# Patient Record
Sex: FEMALE | Race: BLACK OR AFRICAN AMERICAN | NOT HISPANIC OR LATINO | Employment: FULL TIME | URBAN - METROPOLITAN AREA
[De-identification: names, ages, dates, MRNs, and addresses within clinical notes are randomized per-mention and may not be internally consistent; named-entity substitution may affect disease eponyms.]

---

## 2017-01-25 ENCOUNTER — CLINICAL SUPPORT (OUTPATIENT)
Dept: OBSTETRICS AND GYNECOLOGY | Facility: CLINIC | Age: 37
End: 2017-01-25
Payer: MEDICAID

## 2017-01-25 DIAGNOSIS — Z30.42 ENCOUNTER FOR DEPO-PROVERA CONTRACEPTION: Primary | ICD-10-CM

## 2017-01-25 PROCEDURE — 96372 THER/PROPH/DIAG INJ SC/IM: CPT | Mod: PBBFAC,PO

## 2017-01-25 RX ADMIN — MEDROXYPROGESTERONE ACETATE 150 MG: 150 INJECTION, SUSPENSION INTRAMUSCULAR at 12:01

## 2017-01-25 NOTE — PROGRESS NOTES
Administered Depo Provera 150 mg/ml   1 ml IM inj in L upper quad gluteus  Patient Tolerated well.  Patient instructed to return on 4-18-17 for next injection.   Patient verbalized understanding.   Lot:[id number]  Exp: 08/2019

## 2017-03-31 ENCOUNTER — TELEPHONE (OUTPATIENT)
Dept: OBSTETRICS AND GYNECOLOGY | Facility: CLINIC | Age: 37
End: 2017-03-31

## 2017-04-03 ENCOUNTER — OFFICE VISIT (OUTPATIENT)
Dept: OBSTETRICS AND GYNECOLOGY | Facility: CLINIC | Age: 37
End: 2017-04-03
Payer: MEDICAID

## 2017-04-03 VITALS — BODY MASS INDEX: 48.86 KG/M2 | WEIGHT: 286.19 LBS | HEIGHT: 64 IN

## 2017-04-03 DIAGNOSIS — Z30.018 ENCOUNTER FOR INITIAL PRESCRIPTION OF OTHER CONTRACEPTIVES: Primary | ICD-10-CM

## 2017-04-03 PROCEDURE — 99999 PR PBB SHADOW E&M-EST. PATIENT-LVL II: CPT | Mod: PBBFAC,,, | Performed by: OBSTETRICS & GYNECOLOGY

## 2017-04-03 PROCEDURE — 99212 OFFICE O/P EST SF 10 MIN: CPT | Mod: S$PBB,,, | Performed by: OBSTETRICS & GYNECOLOGY

## 2017-04-03 PROCEDURE — 99212 OFFICE O/P EST SF 10 MIN: CPT | Mod: PBBFAC,PO | Performed by: OBSTETRICS & GYNECOLOGY

## 2017-04-03 PROCEDURE — 87591 N.GONORRHOEAE DNA AMP PROB: CPT

## 2017-04-03 NOTE — PROGRESS NOTES
Patient presents to discuss contraception options.  She has been taking Depo Provera for contraception but reports that this causes weight gain.  She desires to proceed with IUD.  On vaginal exam, uterus sounds to 10cm.  Patient counseled on IUD options. She desires Mirena IUD. Ordered.  Gc/chl collected.    Patient due for depo provera on April 18, 2017.  Patient instructed that she will likely need to get her next depo provera injection as her IUD will likely not be in the office by this time.    She verbalized her understanding    CARLIE Alvarez MD

## 2017-04-04 LAB
C TRACH DNA SPEC QL NAA+PROBE: NOT DETECTED
N GONORRHOEA DNA SPEC QL NAA+PROBE: NOT DETECTED

## 2017-04-11 ENCOUNTER — TELEPHONE (OUTPATIENT)
Dept: OBSTETRICS AND GYNECOLOGY | Facility: CLINIC | Age: 37
End: 2017-04-11

## 2017-04-19 ENCOUNTER — CLINICAL SUPPORT (OUTPATIENT)
Dept: OBSTETRICS AND GYNECOLOGY | Facility: CLINIC | Age: 37
End: 2017-04-19
Payer: MEDICAID

## 2017-04-19 DIAGNOSIS — Z30.42 ENCOUNTER FOR DEPO-PROVERA CONTRACEPTION: Primary | ICD-10-CM

## 2017-04-19 PROCEDURE — 96372 THER/PROPH/DIAG INJ SC/IM: CPT | Mod: PBBFAC,PO

## 2017-04-19 RX ADMIN — MEDROXYPROGESTERONE ACETATE 150 MG: 150 INJECTION, SUSPENSION INTRAMUSCULAR at 01:04

## 2017-04-19 NOTE — PROGRESS NOTES
Administered Depo Provera 150 mg/ml   1 ml IM inj in R upper quad gluteus  Patient Tolerated well.  Patient instructed to return on 7-11-17 for next injection.   Patient verbalized understanding.   Lot:[id number]  Exp:11/2019

## 2017-04-21 ENCOUNTER — OFFICE VISIT (OUTPATIENT)
Dept: OBSTETRICS AND GYNECOLOGY | Facility: CLINIC | Age: 37
End: 2017-04-21
Payer: MEDICAID

## 2017-04-21 VITALS
WEIGHT: 285.06 LBS | HEIGHT: 64 IN | SYSTOLIC BLOOD PRESSURE: 120 MMHG | DIASTOLIC BLOOD PRESSURE: 80 MMHG | BODY MASS INDEX: 48.67 KG/M2

## 2017-04-21 DIAGNOSIS — Z30.014 ENCOUNTER FOR INITIAL PRESCRIPTION OF INTRAUTERINE CONTRACEPTIVE DEVICE (IUD): Primary | ICD-10-CM

## 2017-04-21 PROCEDURE — 81025 URINE PREGNANCY TEST: CPT | Mod: PBBFAC,PO | Performed by: OBSTETRICS & GYNECOLOGY

## 2017-04-21 PROCEDURE — 99999 PR PBB SHADOW E&M-EST. PATIENT-LVL II: CPT | Mod: PBBFAC,,, | Performed by: OBSTETRICS & GYNECOLOGY

## 2017-04-21 PROCEDURE — 99212 OFFICE O/P EST SF 10 MIN: CPT | Mod: PBBFAC,PO | Performed by: OBSTETRICS & GYNECOLOGY

## 2017-04-21 PROCEDURE — 58300 INSERT INTRAUTERINE DEVICE: CPT | Mod: S$PBB,,, | Performed by: OBSTETRICS & GYNECOLOGY

## 2017-04-21 PROCEDURE — 58300 INSERT INTRAUTERINE DEVICE: CPT | Mod: PBBFAC,PO | Performed by: OBSTETRICS & GYNECOLOGY

## 2017-04-21 PROCEDURE — 99499 UNLISTED E&M SERVICE: CPT | Mod: S$PBB,,, | Performed by: OBSTETRICS & GYNECOLOGY

## 2017-04-21 RX ORDER — LEVONORGESTREL 52 MG/1
INTRAUTERINE DEVICE INTRAUTERINE
Refills: 0 | COMMUNITY
Start: 2017-04-05

## 2017-04-21 NOTE — PROGRESS NOTES
MIRENA INSERTION:  Date:2017  Time:9:16 AM  Name of the procedure: Mirena Insertion  Indications: Mohsen Julien is a 36 y.o. female  who presents today for insertion of Mirena.  No LMP recorded. Patient has had an injection..      PRE-Mirena INSERTION COUNSELING:  All contraceptive options were reviewed and the patient chooses Mirena.  Patients history was reviewed and there were no contraindications to Mirena.  The procedure and minimal risks of infection, bleeding, and uterine perforation at at  insertion have been discussed. Possible irregular menstrual bleeding pattern versus amenorrhea was explained. No protection against STDs discussed.    Consents: Risks/benefits of the procedure were discussed with the patient. Patient's questions were answered.  Consents signed.      Labs:    Office urine pregnancy test negative;   Gonorrhea/Chlamydia on 4/3/17 neg/neg    Procedure:   TIME OUT PERFORMED.  Speculum inserted and cervix visualized.  Cervix swabbed with betadine x 2; Single tooth tenaculum placed on anterior cervix. Endometrial pipelle inserted and uterus sounded to 9.5 cm. Mirena IUD then inserted using standard technique. IUD string cut about 4cm in length.  Tenaculum removed and hemostasis acheived. All instruments removed from the vagina.  Patient tolerated the procedure well.    Complications:    EBL: min    Disposition: Pt tolerated the procedure well.    LOT number: [id number]  Exp date:     A/P:   -s/p successful insertion of Mirena IUD  -Motrin prn pain  -instructed to report nausea/vomiting/purulent discharge to MD  -instructed to use condoms for at least 1 week after insertion to avoid undesired pregnancy  -f/u 6-8 wks for IUD check    MANSI Alvarez MD

## 2017-05-04 DIAGNOSIS — R94.5 ABNORMAL LIVER FUNCTION: Primary | ICD-10-CM

## 2017-06-06 ENCOUNTER — HOSPITAL ENCOUNTER (OUTPATIENT)
Dept: RADIOLOGY | Facility: HOSPITAL | Age: 37
Discharge: HOME | End: 2017-06-06
Attending: INTERNAL MEDICINE
Payer: MEDICAID

## 2017-06-06 DIAGNOSIS — R94.5 ABNORMAL LIVER FUNCTION: ICD-10-CM

## 2017-06-07 ENCOUNTER — HOSPITAL ENCOUNTER (OUTPATIENT)
Dept: RADIOLOGY | Facility: HOSPITAL | Age: 37
Discharge: HOME | End: 2017-06-07
Attending: INTERNAL MEDICINE
Payer: MEDICAID

## 2017-06-07 PROCEDURE — 76700 US EXAM ABDOM COMPLETE: CPT | Mod: 26,,, | Performed by: RADIOLOGY

## 2017-06-07 PROCEDURE — 76700 US EXAM ABDOM COMPLETE: CPT | Mod: TC

## 2018-08-03 ENCOUNTER — OFFICE VISIT (OUTPATIENT)
Dept: OBSTETRICS AND GYNECOLOGY | Facility: CLINIC | Age: 38
End: 2018-08-03
Payer: MEDICAID

## 2018-08-03 VITALS
WEIGHT: 288.56 LBS | DIASTOLIC BLOOD PRESSURE: 70 MMHG | SYSTOLIC BLOOD PRESSURE: 130 MMHG | BODY MASS INDEX: 49.26 KG/M2 | HEIGHT: 64 IN

## 2018-08-03 DIAGNOSIS — Z12.4 CERVICAL CANCER SCREENING: ICD-10-CM

## 2018-08-03 DIAGNOSIS — Z01.419 ENCOUNTER FOR GYNECOLOGICAL EXAMINATION WITHOUT ABNORMAL FINDING: Primary | ICD-10-CM

## 2018-08-03 DIAGNOSIS — Z12.39 SCREENING BREAST EXAMINATION: ICD-10-CM

## 2018-08-03 DIAGNOSIS — B37.31 VAGINAL YEAST INFECTION: ICD-10-CM

## 2018-08-03 PROCEDURE — 87491 CHLMYD TRACH DNA AMP PROBE: CPT

## 2018-08-03 PROCEDURE — 88142 CYTOPATH C/V THIN LAYER: CPT

## 2018-08-03 PROCEDURE — 87624 HPV HI-RISK TYP POOLED RSLT: CPT

## 2018-08-03 PROCEDURE — 99215 OFFICE O/P EST HI 40 MIN: CPT | Mod: PBBFAC,PO | Performed by: OBSTETRICS & GYNECOLOGY

## 2018-08-03 PROCEDURE — 99395 PREV VISIT EST AGE 18-39: CPT | Mod: S$PBB,,, | Performed by: OBSTETRICS & GYNECOLOGY

## 2018-08-03 PROCEDURE — 99999 PR PBB SHADOW E&M-EST. PATIENT-LVL V: CPT | Mod: PBBFAC,,, | Performed by: OBSTETRICS & GYNECOLOGY

## 2018-08-03 RX ORDER — FLUCONAZOLE 150 MG/1
150 TABLET ORAL
Qty: 2 TABLET | Refills: 3 | Status: SHIPPED | OUTPATIENT
Start: 2018-08-03 | End: 2019-06-19 | Stop reason: SDUPTHER

## 2018-08-03 NOTE — PROGRESS NOTES
"38 yo female who presents for routine gyn visit.  The patient reports pain with her breasts around the time she would have a menstrual cycle.  No cycles with Mirena IUD in place since 4/2017l.  The patient has no complaints today.  Desires STD testing.    ROS:  GENERAL: Denies weight gain or weight loss. Feeling well overall.   SKIN: Denies rash or lesions.   HEAD: Denies head injury or headache.   CHEST: Denies chest pain or shortness of breath.   CARDIOVASCULAR: Denies palpitations or left sided chest pain.   ABDOMEN: No abdominal pain, constipation, diarrhea, nausea, vomiting or rectal bleeding.   URINARY: No frequency, dysuria, hematuria, or burning on urination.  REPRODUCTIVE: See HPI.   BREASTS:  denies pain, lumps, or nipple discharge.   HEMATOLOGIC: No easy bruisability or excessive bleeding.   MUSCULOSKELETAL: Denies joint pain or swelling.   NEUROLOGIC: Denies syncope or weakness.   PSYCHIATRIC: Denies depression, anxiety or mood swings.         PE:   Vitals: /70   Ht 5' 4" (1.626 m)   Wt 130.9 kg (288 lb 9.3 oz)   BMI 49.53 kg/m²   APPEARANCE: Well nourished, well developed, in no acute distress.  SKIN: Normal skin turgor, no lesions.  CHEST: Lungs clear to auscultation.  HEART: Regular rate and rhythm, no murmurs, rubs or gallops.  ABDOMEN: Soft. No tenderness or masses. No hepatosplenomegaly. No hernias. obese  BREASTS: Symmetrical, no skin changes or visible lesions. No palpable masses, nipple discharge or adenopathy bilaterally.  PELVIC: Normal external female genitalia without lesions. Normal hair distribution. Adequate perineal body, normal urethral meatus. Vagina moist and well rugated without lesions, cottage cheese appearing discharge. Cervix pink and without lesions. No significant cystocele or rectocele. Bimanual exam showed uterus normal size, shape, position, mobile and nontender. Adnexa without masses or tenderness. Urethra and bladder normal. IUD string visualized.    AP  Routine " gyn  -s/p normal breast exam:   -s/p normal pelvic exam:   -Pap and HPV: collected  -STD testing: gc/chl and HIV  -contraception: Mirena IUD in place since 2017  -rx for diflcuan provided    CARLIE Alvarez MD

## 2018-08-05 LAB
C TRACH DNA SPEC QL NAA+PROBE: NOT DETECTED
N GONORRHOEA DNA SPEC QL NAA+PROBE: NOT DETECTED

## 2018-08-09 LAB
HPV HR 12 DNA CVX QL NAA+PROBE: NEGATIVE
HPV16 AG SPEC QL: NEGATIVE
HPV18 DNA SPEC QL NAA+PROBE: NEGATIVE

## 2018-09-04 RX ORDER — TRIAMTERENE/HYDROCHLOROTHIAZID 37.5-25 MG
TABLET ORAL
Qty: 30 TABLET | Refills: 3 | Status: CANCELLED | OUTPATIENT
Start: 2018-09-04 | End: 2019-09-04

## 2018-09-11 RX ORDER — TRIAMTERENE/HYDROCHLOROTHIAZID 37.5-25 MG
TABLET ORAL
Qty: 30 TABLET | Refills: 3 | Status: CANCELLED | OUTPATIENT
Start: 2018-09-04 | End: 2019-09-04

## 2018-10-12 RX ORDER — METHOCARBAMOL 750 MG/1
TABLET, FILM COATED ORAL
Qty: 270 TABLET | Refills: 3 | Status: CANCELLED | OUTPATIENT
Start: 2018-10-12 | End: 2019-10-12

## 2018-11-14 RX ORDER — AMITRIPTYLINE HYDROCHLORIDE 25 MG/1
TABLET, FILM COATED ORAL
Qty: 180 TABLET | Refills: 1 | Status: CANCELLED | OUTPATIENT
Start: 2018-11-14 | End: 2019-11-14

## 2019-01-12 ENCOUNTER — HOSPITAL ENCOUNTER (OUTPATIENT)
Dept: RADIOLOGY | Facility: HOSPITAL | Age: 39
Discharge: HOME | End: 2019-01-12
Attending: OBSTETRICS & GYNECOLOGY
Payer: MEDICAID

## 2019-01-12 VITALS — WEIGHT: 288 LBS | HEIGHT: 64 IN | BODY MASS INDEX: 49.17 KG/M2

## 2019-01-12 DIAGNOSIS — Z12.39 SCREENING BREAST EXAMINATION: ICD-10-CM

## 2019-01-12 PROCEDURE — 77067 SCR MAMMO BI INCL CAD: CPT | Mod: TC

## 2019-01-12 PROCEDURE — 77067 MAMMO DIGITAL SCREENING BILAT WITH TOMOSYNTHESIS_CAD: ICD-10-PCS | Mod: 26,,, | Performed by: RADIOLOGY

## 2019-01-12 PROCEDURE — 77063 BREAST TOMOSYNTHESIS BI: CPT | Mod: 26,,, | Performed by: RADIOLOGY

## 2019-01-12 PROCEDURE — 77063 MAMMO DIGITAL SCREENING BILAT WITH TOMOSYNTHESIS_CAD: ICD-10-PCS | Mod: 26,,, | Performed by: RADIOLOGY

## 2019-01-12 PROCEDURE — 77067 SCR MAMMO BI INCL CAD: CPT | Mod: 26,,, | Performed by: RADIOLOGY

## 2019-01-17 RX ORDER — TRIAMTERENE/HYDROCHLOROTHIAZID 37.5-25 MG
TABLET ORAL
Qty: 30 TABLET | Refills: 3 | Status: CANCELLED | OUTPATIENT
Start: 2019-01-17 | End: 2020-01-17

## 2019-01-27 ENCOUNTER — OFFICE VISIT (OUTPATIENT)
Dept: URGENT CARE | Facility: CLINIC | Age: 39
End: 2019-01-27
Payer: MEDICAID

## 2019-01-27 VITALS
DIASTOLIC BLOOD PRESSURE: 95 MMHG | WEIGHT: 285 LBS | SYSTOLIC BLOOD PRESSURE: 156 MMHG | HEART RATE: 78 BPM | OXYGEN SATURATION: 98 % | BODY MASS INDEX: 48.92 KG/M2 | RESPIRATION RATE: 18 BRPM | TEMPERATURE: 98 F

## 2019-01-27 DIAGNOSIS — S39.012A LUMBAR STRAIN, INITIAL ENCOUNTER: Primary | ICD-10-CM

## 2019-01-27 PROCEDURE — 72100 X-RAY EXAM L-S SPINE 2/3 VWS: CPT | Mod: FY,S$GLB,, | Performed by: RADIOLOGY

## 2019-01-27 PROCEDURE — 99203 PR OFFICE/OUTPT VISIT, NEW, LEVL III, 30-44 MIN: ICD-10-PCS | Mod: S$GLB,,, | Performed by: PHYSICIAN ASSISTANT

## 2019-01-27 PROCEDURE — 72100 XR LUMBAR SPINE AP AND LATERAL: ICD-10-PCS | Mod: FY,S$GLB,, | Performed by: RADIOLOGY

## 2019-01-27 PROCEDURE — 99203 OFFICE O/P NEW LOW 30 MIN: CPT | Mod: S$GLB,,, | Performed by: PHYSICIAN ASSISTANT

## 2019-01-27 RX ORDER — KETOROLAC TROMETHAMINE 30 MG/ML
30 INJECTION, SOLUTION INTRAMUSCULAR; INTRAVENOUS
Status: COMPLETED | OUTPATIENT
Start: 2019-01-27 | End: 2019-01-27

## 2019-01-27 RX ORDER — IBUPROFEN 800 MG/1
800 TABLET ORAL 3 TIMES DAILY
Qty: 40 TABLET | Refills: 0 | Status: ON HOLD | OUTPATIENT
Start: 2019-01-27 | End: 2020-10-05 | Stop reason: HOSPADM

## 2019-01-27 RX ORDER — TIZANIDINE 2 MG/1
2 TABLET ORAL EVERY 12 HOURS PRN
Qty: 30 TABLET | Refills: 0 | Status: SHIPPED | OUTPATIENT
Start: 2019-01-27 | End: 2019-03-08

## 2019-01-27 RX ADMIN — KETOROLAC TROMETHAMINE 30 MG: 30 INJECTION, SOLUTION INTRAMUSCULAR; INTRAVENOUS at 04:01

## 2019-01-27 NOTE — LETTER
January 27, 2019      [facility] [place] [address]  Phone: [phone]  Fax: [phone]       Patient: Mohsen Julien   YOB: 1980  Date of Visit: 01/27/2019    To Whom It May Concern:    Nancy Julien  was at [facility] on 01/27/2019. She may return to work/school on 1/29/19 with no restrictions. If you have any questions or concerns, or if I can be of further assistance, please do not hesitate to contact me.    Sincerely,    Marylu White PA-C

## 2019-01-27 NOTE — PROGRESS NOTES
Subjective:       Patient ID: Mohsen Julien is a 38 y.o. female.    Vitals:  weight is 129.3 kg (285 lb). Her tympanic temperature is 97.7 °F (36.5 °C). Her blood pressure is 156/95 (abnormal) and her pulse is 78. Her respiration is 18 and oxygen saturation is 98%.     Chief Complaint: Back Pain (2 days)    Patient was sitting in a chair when she got up to stand and felt a pull in her right low back.  Since she has spasming in her right low back.  She denies any radicular symptoms.  She denies any numbness, tingling, or weakness of bilateral lower extremities.  She denies any bowel or bladder incontinence.  She denies any fever or malaise.      Back Pain   This is a new problem. The current episode started in the past 7 days. The problem occurs constantly. The problem is unchanged. The quality of the pain is described as aching. The pain does not radiate. The pain is at a severity of 8/10. The pain is moderate. The symptoms are aggravated by bending, coughing, lying down, position, standing, sitting and twisting. Pertinent negatives include no abdominal pain, bladder incontinence, bowel incontinence, chest pain, dysuria, fever, headaches, leg pain, numbness, paresis, paresthesias, pelvic pain, perianal numbness, tingling, weakness or weight loss. She has tried heat, muscle relaxant and NSAIDs for the symptoms. The treatment provided moderate relief.       Constitution: Negative for fatigue and fever.   Cardiovascular: Negative for chest pain.   Gastrointestinal: Negative for abdominal pain and bowel incontinence.   Genitourinary: Negative for dysuria, urgency, bladder incontinence, hematuria and pelvic pain.   Musculoskeletal: Positive for back pain. Negative for muscle cramps and history of spine disorder.   Skin: Negative for rash.   Neurological: Negative for coordination disturbances, headaches, numbness and tingling.       Objective:      Physical Exam   Constitutional: She is oriented to person, place,  and time. Vital signs are normal. She appears well-developed and well-nourished. She is active and cooperative.  Non-toxic appearance. She does not appear ill. No distress.   HENT:   Head: Normocephalic and atraumatic. Head is without abrasion, without contusion and without laceration.   Right Ear: Hearing, tympanic membrane, external ear and ear canal normal. No hemotympanum.   Left Ear: Hearing, tympanic membrane, external ear and ear canal normal. No hemotympanum.   Nose: Nose normal. No mucosal edema, rhinorrhea or nasal deformity. No epistaxis. Right sinus exhibits no maxillary sinus tenderness and no frontal sinus tenderness. Left sinus exhibits no maxillary sinus tenderness and no frontal sinus tenderness.   Mouth/Throat: Uvula is midline, oropharynx is clear and moist and mucous membranes are normal. No trismus in the jaw. Normal dentition. No uvula swelling. No posterior oropharyngeal erythema.   Eyes: Conjunctivae, EOM and lids are normal. Pupils are equal, round, and reactive to light. Right eye exhibits no discharge. Left eye exhibits no discharge. No scleral icterus.   Sclera clear bilat   Neck: Trachea normal, normal range of motion, full passive range of motion without pain and phonation normal. Neck supple. No spinous process tenderness and no muscular tenderness present. No neck rigidity. No tracheal deviation present.   Cardiovascular: Normal rate, regular rhythm, normal heart sounds, intact distal pulses and normal pulses.   Pulmonary/Chest: Effort normal and breath sounds normal. No respiratory distress.   Abdominal: Soft. Normal appearance and bowel sounds are normal. She exhibits no distension, no abdominal bruit, no pulsatile midline mass and no mass. There is no tenderness.   Musculoskeletal: Normal range of motion. She exhibits no edema or deformity.        Thoracic back: She exhibits normal range of motion, no tenderness, no bony tenderness, no swelling, no edema, no deformity, no  laceration, no pain, no spasm and normal pulse.        Lumbar back: She exhibits tenderness, pain and spasm. She exhibits normal range of motion, no bony tenderness, no swelling, no edema, no deformity, no laceration and normal pulse.        Back:    TTP WITH SPASM NOTED RIGHT LUMBAR PARASPINALS  NEG ST LEG RAISE  FULL ROM B LE WITH 5/5 STRENGTH  2+DTR PATELLA AND ACHILLES  NVIT DISTALLY WITH SILT AND 2+BCR  ABLE TO AMBULATE WITH SMOOTH RHYTHMIC GAIT     Neurological: She is alert and oriented to person, place, and time. She has normal strength and normal reflexes. No cranial nerve deficit or sensory deficit. She exhibits normal muscle tone. She displays no seizure activity. Coordination normal. GCS eye subscore is 4. GCS verbal subscore is 5. GCS motor subscore is 6.   Skin: Skin is warm, dry and intact. Capillary refill takes less than 2 seconds. No abrasion, no bruising, no burn, no ecchymosis and no laceration noted. She is not diaphoretic. No pallor.   Psychiatric: She has a normal mood and affect. Her speech is normal and behavior is normal. Judgment and thought content normal. Cognition and memory are normal.   Nursing note and vitals reviewed.        XRAY: No acute fractures or dislocations noted  '  Assessment:       1. Lumbar strain, initial encounter        Plan:         Lumbar strain, initial encounter  -     X-Ray Lumbar Spine AP And Lateral; Future; Expected date: 01/27/2019  -     ketorolac injection 30 mg  -     tiZANidine (ZANAFLEX) 2 MG tablet; Take 1 tablet (2 mg total) by mouth every 12 (twelve) hours as needed (muscle spasms).  Dispense: 30 tablet; Refill: 0  -     ibuprofen (ADVIL,MOTRIN) 800 MG tablet; Take 1 tablet (800 mg total) by mouth 3 (three) times daily.  Dispense: 40 tablet; Refill: 0          Understanding Lumbosacral Strain    Lumbosacral strain is a medical term for an injury that causes low back pain. The lumbosacral area (low back) is between the bottom of the ribcage and the top  of the buttocks. A strain is tearing of muscles and tendons. These tears can be very small but still cause pain.  How a lumbosacral strain happens  Muscles and tendons connected to the spine can be strained in a number of ways:  · Sitting or standing in the same position for long periods of time. This can harm the low back over time. Poor posture can make low back pain more likely.  · Moving the muscles and tendons past their usual range of motion. This can cause a sudden injury. This can happen when you twist, bend over, or lift something heavy. Not using correct technique for sports or tasks like lifting can make back injury more likely.  · Accidents or falls  Lumbosacral strain can be caused by other problems, but these are less common.  Symptoms of lumbosacral strain  Symptoms may include:  · Pain in the back, often on one side  · Pain that gets worse with movement and gets better with rest  · Inability to move as freely as usual  · Swelling, slight redness, and skin warmth in the painful area  Treatment for lumbosacral strain  Low back pain often goes away by itself within several weeks. But it often comes back. Treatment focuses on reducing pain and avoiding further injury. Bed rest is usually not recommended for low back pain. Treatments may include:  · Avoiding or changing the action that caused the problem. This helps prevent injuring the tissues again.  · Prescription or over-the-counter pain medicines. These help reduce inflammation, swelling, and pain.  · Cold or heat packs. These help reduce pain and swelling.  · Stretching and other exercises. These improve flexibility and strength.  · Physical therapy. This usually includes exercises and other treatments.  · Injections of medicine. This may relieve symptoms.  If these treatments do not relieve symptoms, your healthcare provider may order imaging tests to learn more about the problem. Sometimes you may need surgery.  Possible complications of  lumbosacral strain  If the cause of the pain is not addressed, symptoms may return or get worse. Follow your healthcare providers instructions on lifestyle changes and treating your back.     When to call your healthcare provider  Call your healthcare provider right away if you have any of these:  · Fever of 100.4°F (38°C) or higher, or as directed  · Numbness, tingling, or weakness  · Problems with bowel or bladder control, or problems having sex  · Pain that does not go away, or gets worse  · New symptoms   Date Last Reviewed: 3/10/2016  © 7113-7163 [employer]. [address] [place] [id number]. All rights reserved. This information is not intended as a substitute for professional medical care. Always follow your healthcare professional's instructions.      Please follow up with your Primary care provider within 2-5 days if your signs and symptoms have not resolved or worsen.     If your condition worsens or fails to improve we recommend that you receive another evaluation at the emergency room immediately or contact your primary medical clinic to discuss your concerns.   You must understand that you have received an Urgent Care treatment only and that you may be released before all of your medical problems are known or treated. You, the patient, will arrange for follow up care as instructed.     RED FLAGS/WARNING SYMPTOMS DISCUSSED WITH PATIENT THAT WOULD WARRANT EMERGENT MEDICAL ATTENTION. PATIENT VERBALIZED UNDERSTANDING.     Elevated Blood Pressure  Your blood pressure was elevated during your visit to the urgent care.  It was not so high that immediate care was needed but it is recommended that you monitor your blood pressure over the next week or two to make sure that it is not staying elevated.  Please have your blood pressure taken 2-3 times daily at different times of the day.  Write all of those blood pressures down and record the time that they were taken.  Keep all that  information and take it with you to see your Primary Care Physician.  If your blood pressure is consistently above 140/90 you will need to follow up with your PCP more quickly

## 2019-01-27 NOTE — PATIENT INSTRUCTIONS
Understanding Lumbosacral Strain    Lumbosacral strain is a medical term for an injury that causes low back pain. The lumbosacral area (low back) is between the bottom of the ribcage and the top of the buttocks. A strain is tearing of muscles and tendons. These tears can be very small but still cause pain.  How a lumbosacral strain happens  Muscles and tendons connected to the spine can be strained in a number of ways:  · Sitting or standing in the same position for long periods of time. This can harm the low back over time. Poor posture can make low back pain more likely.  · Moving the muscles and tendons past their usual range of motion. This can cause a sudden injury. This can happen when you twist, bend over, or lift something heavy. Not using correct technique for sports or tasks like lifting can make back injury more likely.  · Accidents or falls  Lumbosacral strain can be caused by other problems, but these are less common.  Symptoms of lumbosacral strain  Symptoms may include:  · Pain in the back, often on one side  · Pain that gets worse with movement and gets better with rest  · Inability to move as freely as usual  · Swelling, slight redness, and skin warmth in the painful area  Treatment for lumbosacral strain  Low back pain often goes away by itself within several weeks. But it often comes back. Treatment focuses on reducing pain and avoiding further injury. Bed rest is usually not recommended for low back pain. Treatments may include:  · Avoiding or changing the action that caused the problem. This helps prevent injuring the tissues again.  · Prescription or over-the-counter pain medicines. These help reduce inflammation, swelling, and pain.  · Cold or heat packs. These help reduce pain and swelling.  · Stretching and other exercises. These improve flexibility and strength.  · Physical therapy. This usually includes exercises and other treatments.  · Injections of medicine. This may relieve  symptoms.  If these treatments do not relieve symptoms, your healthcare provider may order imaging tests to learn more about the problem. Sometimes you may need surgery.  Possible complications of lumbosacral strain  If the cause of the pain is not addressed, symptoms may return or get worse. Follow your healthcare providers instructions on lifestyle changes and treating your back.     When to call your healthcare provider  Call your healthcare provider right away if you have any of these:  · Fever of 100.4°F (38°C) or higher, or as directed  · Numbness, tingling, or weakness  · Problems with bowel or bladder control, or problems having sex  · Pain that does not go away, or gets worse  · New symptoms   Date Last Reviewed: 3/10/2016  © 9422-1596 [employer]. [address] [place] [address], [place] [address]. All rights reserved. This information is not intended as a substitute for professional medical care. Always follow your healthcare professional's instructions.      Please follow up with your Primary care provider within 2-5 days if your signs and symptoms have not resolved or worsen.     If your condition worsens or fails to improve we recommend that you receive another evaluation at the emergency room immediately or contact your primary medical clinic to discuss your concerns.   You must understand that you have received an Urgent Care treatment only and that you may be released before all of your medical problems are known or treated. You, the patient, will arrange for follow up care as instructed.     RED FLAGS/WARNING SYMPTOMS DISCUSSED WITH PATIENT THAT WOULD WARRANT EMERGENT MEDICAL ATTENTION. PATIENT VERBALIZED UNDERSTANDING.     Elevated Blood Pressure  Your blood pressure was elevated during your visit to the urgent care.  It was not so high that immediate care was needed but it is recommended that you monitor your blood pressure over the next week or two to make sure that it is not staying  elevated.  Please have your blood pressure taken 2-3 times daily at different times of the day.  Write all of those blood pressures down and record the time that they were taken.  Keep all that information and take it with you to see your Primary Care Physician.  If your blood pressure is consistently above 140/90 you will need to follow up with your PCP more quickly

## 2019-02-11 RX ORDER — FENOFIBRATE 160 MG/1
160 TABLET ORAL DAILY
Qty: 90 TABLET | Refills: 3 | Status: CANCELLED | OUTPATIENT
Start: 2019-02-11 | End: 2020-02-11

## 2019-02-13 ENCOUNTER — HOSPITAL ENCOUNTER (OUTPATIENT)
Dept: RADIOLOGY | Facility: HOSPITAL | Age: 39
Discharge: HOME | End: 2019-02-13
Attending: INTERNAL MEDICINE
Payer: MEDICAID

## 2019-02-13 DIAGNOSIS — M79.605 LEFT LEG PAIN: ICD-10-CM

## 2019-02-13 DIAGNOSIS — R60.0 LOCALIZED EDEMA: ICD-10-CM

## 2019-02-13 DIAGNOSIS — M79.605 LEFT LEG PAIN: Primary | ICD-10-CM

## 2019-02-13 DIAGNOSIS — R60.0 LOCALIZED EDEMA: Primary | ICD-10-CM

## 2019-02-13 PROCEDURE — 71046 X-RAY EXAM CHEST 2 VIEWS: CPT | Mod: TC,FY

## 2019-02-13 PROCEDURE — 93970 US LOWER EXTREMITY VEINS BILATERAL: ICD-10-PCS | Mod: 26,,, | Performed by: RADIOLOGY

## 2019-02-13 PROCEDURE — 93970 EXTREMITY STUDY: CPT | Mod: 26,,, | Performed by: RADIOLOGY

## 2019-02-13 PROCEDURE — 93970 EXTREMITY STUDY: CPT | Mod: TC

## 2019-02-13 PROCEDURE — 71046 XR CHEST PA AND LATERAL: ICD-10-PCS | Mod: 26,,, | Performed by: RADIOLOGY

## 2019-02-13 PROCEDURE — 71046 X-RAY EXAM CHEST 2 VIEWS: CPT | Mod: 26,,, | Performed by: RADIOLOGY

## 2019-02-19 RX ORDER — SIMVASTATIN 20 MG/1
20 TABLET, FILM COATED ORAL NIGHTLY
Qty: 90 TABLET | Refills: 3 | OUTPATIENT
Start: 2019-02-19 | End: 2020-10-19

## 2019-02-19 RX ORDER — FENOFIBRATE 160 MG/1
160 TABLET ORAL DAILY
Qty: 90 TABLET | Refills: 3 | OUTPATIENT
Start: 2019-02-19 | End: 2022-02-10 | Stop reason: SDUPTHER

## 2019-02-20 RX ORDER — METHOCARBAMOL 750 MG/1
750 TABLET, FILM COATED ORAL 3 TIMES DAILY PRN
Qty: 270 TABLET | Refills: 1 | Status: CANCELLED | OUTPATIENT
Start: 2019-02-20 | End: 2020-02-20

## 2019-03-04 RX ORDER — METHOCARBAMOL 750 MG/1
750 TABLET, FILM COATED ORAL 3 TIMES DAILY PRN
Qty: 270 TABLET | Refills: 1 | Status: CANCELLED | OUTPATIENT
Start: 2019-02-20 | End: 2020-02-20

## 2019-03-18 RX ORDER — TRIAMTERENE/HYDROCHLOROTHIAZID 37.5-25 MG
TABLET ORAL
Qty: 30 TABLET | Refills: 3 | Status: CANCELLED | OUTPATIENT
Start: 2019-03-18 | End: 2020-03-17

## 2019-03-21 RX ORDER — TRIAMTERENE/HYDROCHLOROTHIAZID 37.5-25 MG
TABLET ORAL
Qty: 30 TABLET | Refills: 3 | Status: CANCELLED | OUTPATIENT
Start: 2019-03-21 | End: 2020-03-20

## 2019-03-28 RX ORDER — TRIAMTERENE/HYDROCHLOROTHIAZID 37.5-25 MG
TABLET ORAL
Qty: 30 TABLET | Refills: 3 | Status: CANCELLED | OUTPATIENT
Start: 2019-03-28 | End: 2020-03-27

## 2019-04-02 RX ORDER — TRIAMTERENE/HYDROCHLOROTHIAZID 37.5-25 MG
TABLET ORAL
Qty: 30 TABLET | Refills: 3 | Status: CANCELLED | OUTPATIENT
Start: 2019-04-02 | End: 2020-04-01

## 2019-04-05 RX ORDER — TRIAMTERENE/HYDROCHLOROTHIAZID 37.5-25 MG
TABLET ORAL
Qty: 30 TABLET | Refills: 3 | Status: CANCELLED | OUTPATIENT
Start: 2019-04-05 | End: 2020-04-04

## 2019-04-06 RX ORDER — ERGOCALCIFEROL 1.25 MG/1
50000 CAPSULE ORAL WEEKLY
Qty: 4 CAPSULE | Refills: 11 | Status: CANCELLED | OUTPATIENT
Start: 2019-04-06 | End: 2020-04-05

## 2019-04-12 RX ORDER — POTASSIUM CHLORIDE 750 MG/1
10 TABLET, EXTENDED RELEASE ORAL DAILY
Qty: 30 TABLET | Refills: 11 | Status: CANCELLED | OUTPATIENT
Start: 2019-04-12 | End: 2020-04-11

## 2019-04-13 RX ORDER — TRIAMTERENE/HYDROCHLOROTHIAZID 37.5-25 MG
TABLET ORAL
Qty: 30 TABLET | Refills: 3 | Status: CANCELLED | OUTPATIENT
Start: 2019-04-13 | End: 2020-04-12

## 2019-04-17 RX ORDER — POTASSIUM CHLORIDE 750 MG/1
10 TABLET, EXTENDED RELEASE ORAL DAILY
Qty: 30 TABLET | Refills: 11 | Status: CANCELLED | OUTPATIENT
Start: 2019-04-17 | End: 2020-04-16

## 2019-04-17 RX ORDER — TRIAMTERENE/HYDROCHLOROTHIAZID 37.5-25 MG
TABLET ORAL
Qty: 30 TABLET | Refills: 3 | Status: CANCELLED | OUTPATIENT
Start: 2019-04-17 | End: 2020-04-16

## 2019-04-17 RX ORDER — ERGOCALCIFEROL 1.25 MG/1
50000 CAPSULE ORAL WEEKLY
Qty: 4 CAPSULE | Refills: 11 | Status: CANCELLED | OUTPATIENT
Start: 2019-04-17 | End: 2020-04-16

## 2019-04-18 RX ORDER — ERGOCALCIFEROL 1.25 MG/1
50000 CAPSULE ORAL WEEKLY
Qty: 4 CAPSULE | Refills: 11 | Status: CANCELLED | OUTPATIENT
Start: 2019-04-18 | End: 2020-04-17

## 2019-05-24 ENCOUNTER — HOSPITAL ENCOUNTER (OUTPATIENT)
Dept: WOUND CARE | Facility: HOSPITAL | Age: 39
Discharge: HOME | End: 2019-05-24
Attending: SURGERY
Payer: MEDICAID

## 2019-05-24 VITALS
HEIGHT: 64 IN | TEMPERATURE: 98 F | HEART RATE: 87 BPM | WEIGHT: 285 LBS | SYSTOLIC BLOOD PRESSURE: 126 MMHG | BODY MASS INDEX: 48.65 KG/M2 | DIASTOLIC BLOOD PRESSURE: 65 MMHG

## 2019-05-24 DIAGNOSIS — I87.2 VENOUS STASIS DERMATITIS OF LEFT LOWER EXTREMITY: Primary | ICD-10-CM

## 2019-05-24 PROCEDURE — 99203 OFFICE O/P NEW LOW 30 MIN: CPT | Mod: ,,, | Performed by: SURGERY

## 2019-05-24 PROCEDURE — 99212 OFFICE O/P EST SF 10 MIN: CPT

## 2019-05-24 PROCEDURE — 99203 PR OFFICE/OUTPT VISIT, NEW, LEVL III, 30-44 MIN: ICD-10-PCS | Mod: ,,, | Performed by: SURGERY

## 2019-05-24 NOTE — PROGRESS NOTES
[facility]                Progress Note    Subjective:       Patient ID: Mohsen Julien is a 38 y.o. female.    Chief Complaint: Non-healing Wound    5/24/2019: Mrs. Mohsen Robb a 38 year old female presents to wound care clinic c/o left lower leg discoloration, itching and swelling. New admit for Dr. Jeansonne; assessment and evaluation completed. Pedal pulses present bilateral. Per Dr. Jeansonne, Mrs. Julien presents with Left lower extremity Venous stasis dermatitis, education on leg discoloration, treatment and medication application provided, questions answered by Dr. Jeansonne. States going to the beach this weekend, recommendation on avoiding submerging in water provided, voices understanding. Recommendation on following up with Dr. Jeansonne in three weeks, states prefers to be seen next week at wound care clinic.      Review of Systems   Constitutional: Negative for fever.   Respiratory: Negative for shortness of breath.    Cardiovascular: Negative for chest pain.         Objective:        Physical Exam   Constitutional: She appears well-developed and well-nourished. No distress.   HENT:   Head: Normocephalic and atraumatic.   Eyes: EOM are normal.   Neck: No tracheal deviation present.   Pulmonary/Chest: Effort normal. No respiratory distress.   Musculoskeletal: Normal range of motion.   Neurological: She is alert. Gait normal.   Skin:   See wound assessment   Psychiatric: She has a normal mood and affect. Her behavior is normal. Judgment and thought content normal.       Vitals:    05/24/19 1335   BP: 126/65   Pulse: 87   Temp: 98 °F (36.7 °C)       Assessment:           ICD-10-CM ICD-9-CM   1. Venous stasis dermatitis of left lower extremity I87.2 454.1            Wound 05/24/19 1330  anterior Leg #1 (Active)   05/24/19 1330    Pre-existing: Yes   Primary Wound Type:    Side: Left   Orientation: anterior   Location: Leg   Wound/PI Number  (optional): #1   Ankle-Brachial Index: Rt leg JOSH 1.31; Left leg JOSH 1.28   Pulses: present bilateral   Removal Indication and Assessment:    Wound Outcome:    (Retired) Wound Type:    (Retired) Wound Length (cm):    (Retired) Wound Width (cm):    (Retired) Depth (cm):    Wound Description (Comments):    Removal Indications:    Wound Image     5/24/2019  2:27 PM   Dressing Appearance Open to air 5/24/2019  2:27 PM   Drainage Amount None 5/24/2019  2:27 PM   Tissue loss description Not applicable 5/24/2019  2:27 PM   Black (%), Wound Tissue Color 100 % 5/24/2019  2:27 PM   Periwound Area Dry;Swelling;Edematous 5/24/2019  2:27 PM   Wound Length (cm) 0 cm 5/24/2019  2:27 PM   Wound Width (cm) 0 cm 5/24/2019  2:27 PM   Wound Depth (cm) 0 cm 5/24/2019  2:27 PM   Wound Volume (cm^3) 0 cm^3 5/24/2019  2:27 PM   Wound Surface Area (cm^2) 0 cm^2 5/24/2019  2:27 PM   Care Cleansed with:;Sterile normal saline 5/24/2019  2:27 PM   Dressing Applied;Other (see comments) 5/24/2019  2:27 PM   Dressing Change Due 05/25/19 5/24/2019  2:27 PM         Clean left lower leg with normal saline, apply miconazole nitrate cream 2% antifungal cream daily to left lower leg, may leave open to air.    Plan:               Follow up in about 5 days (around 5/29/2019). Clinic visit as per Mrs. Julien's request.

## 2019-05-29 ENCOUNTER — HOSPITAL ENCOUNTER (OUTPATIENT)
Dept: WOUND CARE | Facility: HOSPITAL | Age: 39
Discharge: HOME | End: 2019-05-29
Attending: SURGERY
Payer: MEDICAID

## 2019-05-29 VITALS
HEART RATE: 77 BPM | TEMPERATURE: 98 F | DIASTOLIC BLOOD PRESSURE: 78 MMHG | HEIGHT: 64 IN | BODY MASS INDEX: 48.65 KG/M2 | SYSTOLIC BLOOD PRESSURE: 137 MMHG | WEIGHT: 285 LBS

## 2019-05-29 DIAGNOSIS — I10 HYPERTENSION, UNSPECIFIED TYPE: ICD-10-CM

## 2019-05-29 DIAGNOSIS — I87.2 VENOUS STASIS DERMATITIS OF LEFT LOWER EXTREMITY: Primary | ICD-10-CM

## 2019-05-29 DIAGNOSIS — E66.9 OBESITY, UNSPECIFIED CLASSIFICATION, UNSPECIFIED OBESITY TYPE, UNSPECIFIED WHETHER SERIOUS COMORBIDITY PRESENT: ICD-10-CM

## 2019-05-29 PROCEDURE — 87070 CULTURE OTHR SPECIMN AEROBIC: CPT

## 2019-05-29 PROCEDURE — 87102 FUNGUS ISOLATION CULTURE: CPT

## 2019-05-29 PROCEDURE — 29581 APPL MULTLAYER CMPRN SYS LEG: CPT | Performed by: NURSE PRACTITIONER

## 2019-05-29 PROCEDURE — 87107 FUNGI IDENTIFICATION MOLD: CPT

## 2019-05-29 RX ORDER — FLUCONAZOLE 100 MG/1
100 TABLET ORAL DAILY
Qty: 30 TABLET | Refills: 1 | Status: SHIPPED | OUTPATIENT
Start: 2019-05-29 | End: 2020-12-15 | Stop reason: SDUPTHER

## 2019-05-29 RX ORDER — CLOTRIMAZOLE AND BETAMETHASONE DIPROPIONATE 10; .64 MG/G; MG/G
CREAM TOPICAL 2 TIMES DAILY
Qty: 45 G | Refills: 1 | Status: SHIPPED | OUTPATIENT
Start: 2019-05-29 | End: 2020-10-19

## 2019-05-29 NOTE — PROGRESS NOTES
"Subjective:       Patient ID: Mohsen Julien is a 38 y.o. female.    Chief Complaint: Wound Care    5/24/2019: Mrs. Mohsen Robb a 38 year old female presents to wound care clinic c/o left lower leg discoloration, itching and swelling. New admit for Dr. Jeansonne; assessment and evaluation completed. Pedal pulses present bilateral. Per Dr. Jeansonne, Mrs. Julien presents with Left lower extremity Venous stasis dermatitis, education on leg discoloration, treatment and medication application provided, questions answered by Dr. Jeansonne. States going to the beach this weekend, recommendation on avoiding submerging in water provided, voices understanding. Recommendation on following up with Dr. Jeansonne in three weeks, states prefers to be seen next week at wound care clinic.    5/29/19: F/U in clinic with Dr. Griffith. New tiny open areas noted to lateral and medial left ankle. Patient states that "it opened up last night and was draining".  Attempted to take culture.  No drainage noted. Will start patient on Diflucan PO and continue antifungal ointment to open areas. Edema noted to LLE. Will wrap leg with compression wrap.    Review of Systems   Constitutional: Negative.    HENT: Negative.    Eyes: Negative.    Respiratory: Negative.    Cardiovascular: Negative.    Gastrointestinal: Negative.    Genitourinary: Negative.    Musculoskeletal: Negative.    Skin: Negative.    Neurological: Negative.    Psychiatric/Behavioral: Negative.        Objective:      Physical Exam   Constitutional: She is oriented to person, place, and time. She appears well-developed and well-nourished.   HENT:   Head: Normocephalic.   Eyes: Pupils are equal, round, and reactive to light. Conjunctivae and EOM are normal.   Neck: Normal range of motion. Neck supple.   Cardiovascular: Normal rate, regular rhythm, normal heart sounds and intact distal pulses.   Pulmonary/Chest: Effort normal and breath sounds normal.   Abdominal: Soft. Bowel " sounds are normal.   Musculoskeletal: Normal range of motion.   Neurological: She is alert and oriented to person, place, and time. She has normal reflexes.   Skin: Skin is warm and dry.       Assessment:       1. Venous stasis dermatitis of left lower extremity           Wound 05/24/19 1330  anterior Leg #1 (Active)   05/24/19 1330    Pre-existing: Yes   Primary Wound Type:    Side: Left   Orientation: anterior   Location: Leg   Wound/PI Number (optional): #1   Ankle-Brachial Index: Rt leg JOSH 1.31; Left leg JOSH 1.28   Pulses: present bilateral   Removal Indication and Assessment:    Wound Outcome:    (Retired) Wound Type:    (Retired) Wound Length (cm):    (Retired) Wound Width (cm):    (Retired) Depth (cm):    Wound Description (Comments):    Removal Indications:    Dressing Appearance Open to air 5/29/2019  4:00 PM   Drainage Amount None 5/29/2019  4:00 PM   Periwound Area Intact;Edematous;Hemosiderin Staining 5/29/2019  4:00 PM   Care Cleansed with:;Soap and water 5/29/2019  4:00 PM   Dressing Applied;Compression wrap 5/29/2019  4:00 PM   Periwound Care Moisturizer applied 5/29/2019  4:00 PM   Compression Two layer compression 5/29/2019  4:00 PM   Dressing Change Due 05/31/19 5/29/2019  4:00 PM            Wound 05/29/19 1610 medial Malleolus/Ankle (Active)   05/29/19 1610    Pre-existing: Yes   Primary Wound Type:    Side: Left   Orientation: medial   Location: Malleolus/Ankle   Wound/PI Number (optional):    Ankle-Brachial Index:    Pulses:    Removal Indication and Assessment:    Wound Outcome:    (Retired) Wound Type:    (Retired) Wound Length (cm):    (Retired) Wound Width (cm):    (Retired) Depth (cm):    Wound Description (Comments):    Removal Indications:    Dressing Appearance Open to air 5/29/2019  4:00 PM   Drainage Amount None 5/29/2019  4:00 PM   Appearance [place] 5/29/2019  4:00 PM   Tissue loss description Partial thickness 5/29/2019  4:00 PM   Red (%), Wound Tissue Color 100 % 5/29/2019  4:00 PM    Periwound Area Intact;Edematous;Hemosiderin Staining 5/29/2019  4:00 PM   Wound Edges Defined 5/29/2019  4:00 PM   Wound Length (cm) 0.1 cm 5/29/2019  4:00 PM   Wound Width (cm) 0.1 cm 5/29/2019  4:00 PM   Wound Depth (cm) 0.1 cm 5/29/2019  4:00 PM   Wound Volume (cm^3) 0 cm^3 5/29/2019  4:00 PM   Wound Surface Area (cm^2) 0.01 cm^2 5/29/2019  4:00 PM   Care Cleansed with:;Sterile normal saline 5/29/2019  4:00 PM   Dressing Applied;Foam;Compression wrap 5/29/2019  4:00 PM   Periwound Care Moisturizer applied;Topical treatment applied 5/29/2019  4:00 PM   Compression Two layer compression 5/29/2019  4:00 PM   Dressing Change Due 05/31/19 5/29/2019  4:00 PM            Wound 05/29/19 1615 lateral Malleolus/Ankle (Active)   05/29/19 1615    Pre-existing: Yes   Primary Wound Type:    Side: Left   Orientation: lateral   Location: Malleolus/Ankle   Wound/PI Number (optional):    Ankle-Brachial Index:    Pulses:    Removal Indication and Assessment:    Wound Outcome:    (Retired) Wound Type:    (Retired) Wound Length (cm):    (Retired) Wound Width (cm):    (Retired) Depth (cm):    Wound Description (Comments):    Removal Indications:    Dressing Appearance Open to air 5/29/2019  4:00 PM   Drainage Amount None 5/29/2019  4:00 PM   Appearance [place] 5/29/2019  4:00 PM   Tissue loss description Partial thickness 5/29/2019  4:00 PM   Red (%), Wound Tissue Color 100 % 5/29/2019  4:00 PM   Periwound Area Intact;Edematous;Hemosiderin Staining 5/29/2019  4:00 PM   Wound Edges Defined 5/29/2019  4:00 PM   Wound Length (cm) 0.1 cm 5/29/2019  4:00 PM   Wound Width (cm) 0.1 cm 5/29/2019  4:00 PM   Wound Depth (cm) 0.1 cm 5/29/2019  4:00 PM   Wound Volume (cm^3) 0 cm^3 5/29/2019  4:00 PM   Wound Surface Area (cm^2) 0.01 cm^2 5/29/2019  4:00 PM   Care Cleansed with:;Sterile normal saline 5/29/2019  4:00 PM   Dressing Applied;Compression wrap;Foam 5/29/2019  4:00 PM   Periwound Care Moisturizer applied;Topical treatment applied 5/29/2019   4:00 PM   Compression Two layer compression 5/29/2019  4:00 PM   Dressing Change Due 05/31/19 5/29/2019  4:00 PM       Open areas cleaned with normal saline. Culture taken of lateral left ankle wound. Applied moisturizer with antifungal in it to LLE. Miconazole to 2 open areas.  Covered with adaptic and aquacel foam.  2-layer co-flex with zinc compression wrap toes to knee.    Plan:                Follow up in about 1 week (around 6/5/2019) for Dr. Griffith.

## 2019-05-31 ENCOUNTER — HOSPITAL ENCOUNTER (OUTPATIENT)
Dept: WOUND CARE | Facility: HOSPITAL | Age: 39
Discharge: HOME | End: 2019-05-31
Attending: SURGERY
Payer: MEDICAID

## 2019-05-31 DIAGNOSIS — I87.2 VENOUS STASIS DERMATITIS OF LEFT LOWER EXTREMITY: Primary | ICD-10-CM

## 2019-05-31 PROCEDURE — 29581 APPL MULTLAYER CMPRN SYS LEG: CPT

## 2019-05-31 NOTE — PROGRESS NOTES
"[facility]  [address] [address]  Nurse Visit    Subjective:     Patient seen in clinic today.  Dressing changed as ordered.    5/24/2019: Mrs. Mohsen Robb a 38 year old female presents to wound care clinic c/o left lower leg discoloration, itching and swelling. New admit for Dr. Jeansonne; assessment and evaluation completed. Pedal pulses present bilateral. Per Dr. Jeansonne, Mrs. Julien presents with Left lower extremity Venous stasis dermatitis, education on leg discoloration, treatment and medication application provided, questions answered by Dr. Jeansonne. States going to the beach this weekend, recommendation on avoiding submerging in water provided, voices understanding. Recommendation on following up with Dr. Jeansonne in three weeks, states prefers to be seen next week at wound care clinic.     5/29/19: F/U in clinic with Dr. Griffith. New tiny open areas noted to lateral and medial left ankle. Patient states that "it opened up last night and was draining".  Attempted to take culture.  No drainage noted. Will start patient on Diflucan PO and continue antifungal ointment to open areas. Edema noted to LLE. Will wrap leg with compression wrap.    5/31/2019: F/U nurse visit. Pedal pulses present, two compression dressing to LLE, coflex zinc applied as ordered, tolerated dressing change.    Assessment:          Wound 05/24/19 1330  anterior Leg #1 (Active)   05/24/19 1330    Pre-existing: Yes   Primary Wound Type:    Side: Left   Orientation: anterior   Location: Leg   Wound/PI Number (optional): #1   Ankle-Brachial Index: Rt leg JOSH 1.31; Left leg JOSH 1.28   Pulses: present bilateral   Removal Indication and Assessment:    Wound Outcome:    (Retired) Wound Type:    (Retired) Wound Length (cm):    (Retired) Wound Width (cm):    (Retired) Depth (cm):    Wound Description (Comments):    Removal Indications:    Dressing Appearance Open to air 5/31/2019  4:26 PM   Drainage " Amount None 5/31/2019  4:26 PM   Tissue loss description Not applicable 5/31/2019  4:26 PM   Black (%), Wound Tissue Color 100 % 5/31/2019  4:26 PM   Periwound Area Intact;Dry;Hemosiderin Staining;Edematous 5/31/2019  4:26 PM   Wound Length (cm) 0 cm 5/31/2019  4:26 PM   Wound Width (cm) 0 cm 5/31/2019  4:26 PM   Wound Depth (cm) 0 cm 5/31/2019  4:26 PM   Wound Volume (cm^3) 0 cm^3 5/31/2019  4:26 PM   Wound Surface Area (cm^2) 0 cm^2 5/31/2019  4:26 PM   Care Cleansed with:;Sterile normal saline 5/31/2019  4:26 PM   Dressing Applied;Compression wrap 5/31/2019  4:26 PM   Periwound Care Moisture barrier applied 5/31/2019  4:26 PM   Compression Two layer compression 5/31/2019  4:26 PM   Dressing Change Due 06/03/19 5/31/2019  4:26 PM            Wound 05/29/19 1610 medial Malleolus/Ankle (Active)   05/29/19 1610    Pre-existing: Yes   Primary Wound Type:    Side: Left   Orientation: medial   Location: Malleolus/Ankle   Wound/PI Number (optional):    Ankle-Brachial Index:    Pulses:    Removal Indication and Assessment:    Wound Outcome:    (Retired) Wound Type:    (Retired) Wound Length (cm):    (Retired) Wound Width (cm):    (Retired) Depth (cm):    Wound Description (Comments):    Removal Indications:    Dressing Appearance Open to air 5/31/2019  4:26 PM   Drainage Amount None 5/31/2019  4:26 PM   Appearance [place] 5/31/2019  4:26 PM   Tissue loss description Partial thickness 5/31/2019  4:26 PM   Red (%), Wound Tissue Color 100 % 5/31/2019  4:26 PM   Periwound Area Intact;Edematous;Hemosiderin Staining 5/31/2019  4:26 PM   Wound Edges Defined 5/31/2019  4:26 PM   Wound Length (cm) 0.1 cm 5/31/2019  4:26 PM   Wound Width (cm) 0.1 cm 5/31/2019  4:26 PM   Wound Depth (cm) 0.1 cm 5/31/2019  4:26 PM   Wound Volume (cm^3) 0 cm^3 5/31/2019  4:26 PM   Wound Surface Area (cm^2) 0.01 cm^2 5/31/2019  4:26 PM   Care Cleansed with:;Sterile normal saline 5/31/2019  4:26 PM   Dressing Applied;Compression wrap 5/31/2019  4:26 PM    Periwound Care Moisture barrier applied 5/31/2019  4:26 PM   Compression Two layer compression 5/31/2019  4:26 PM   Dressing Change Due 06/03/19 5/31/2019  4:26 PM            Wound 05/29/19 1615 lateral Malleolus/Ankle (Active)   05/29/19 1615    Pre-existing: Yes   Primary Wound Type:    Side: Left   Orientation: lateral   Location: Malleolus/Ankle   Wound/PI Number (optional):    Ankle-Brachial Index:    Pulses:    Removal Indication and Assessment:    Wound Outcome:    (Retired) Wound Type:    (Retired) Wound Length (cm):    (Retired) Wound Width (cm):    (Retired) Depth (cm):    Wound Description (Comments):    Removal Indications:    Dressing Appearance Open to air 5/31/2019  4:26 PM   Drainage Amount None 5/31/2019  4:26 PM   Appearance [place] 5/31/2019  4:26 PM   Tissue loss description Partial thickness 5/31/2019  4:26 PM   Red (%), Wound Tissue Color 100 % 5/31/2019  4:26 PM   Periwound Area Intact;Edematous;Hemosiderin Staining 5/31/2019  4:26 PM   Wound Edges Defined 5/31/2019  4:26 PM   Wound Length (cm) 0.1 cm 5/31/2019  4:26 PM   Wound Width (cm) 0.1 cm 5/31/2019  4:26 PM   Wound Depth (cm) 0.1 cm 5/31/2019  4:26 PM   Wound Volume (cm^3) 0 cm^3 5/31/2019  4:26 PM   Wound Surface Area (cm^2) 0.01 cm^2 5/31/2019  4:26 PM   Care Cleansed with:;Sterile normal saline 5/31/2019  4:26 PM   Dressing Applied;Compression wrap 5/31/2019  4:26 PM   Periwound Care Moisturizer applied 5/31/2019  4:26 PM   Compression Two layer compression 5/31/2019  4:26 PM   Dressing Change Due 06/03/19 5/31/2019  4:26 PM         ICD-10-CM ICD-9-CM   1. Venous stasis dermatitis of left lower extremity I87.2 454.1     Open areas cleaned with normal saline. Applied moisturizer with antifungal in it to LLE. Miconazole to 2 open areas.  Covered with adaptic and aquacel foam.  2-layer co-flex with zinc compression wrap toes to knee.    Plan:   Follow up in about 3 days (around 6/3/2019).

## 2019-06-01 LAB — BACTERIA SPEC AEROBE CULT: NORMAL

## 2019-06-03 ENCOUNTER — HOSPITAL ENCOUNTER (OUTPATIENT)
Dept: WOUND CARE | Facility: HOSPITAL | Age: 39
Discharge: HOME | End: 2019-06-03
Attending: SURGERY
Payer: MEDICAID

## 2019-06-03 DIAGNOSIS — L97.321 NON-PRESSURE CHRONIC ULCER OF LEFT ANKLE, LIMITED TO BREAKDOWN OF SKIN: ICD-10-CM

## 2019-06-03 PROCEDURE — 99213 OFFICE O/P EST LOW 20 MIN: CPT

## 2019-06-03 NOTE — PROGRESS NOTES
"[facility]  [address] [address]  Nurse Visit    Subjective:     Patient seen in clinic today.  Dressing changed as ordered.    /24/2019: Mrs. Mohsen Robb a 38 year old female presents to wound care clinic c/o left lower leg discoloration, itching and swelling. New admit for Dr. Jeansonne; assessment and evaluation completed. Pedal pulses present bilateral. Per Dr. Jeansonne, Mrs. Julien presents with Left lower extremity Venous stasis dermatitis, education on leg discoloration, treatment and medication application provided, questions answered by Dr. Jeansonne. States going to the beach this weekend, recommendation on avoiding submerging in water provided, voices understanding. Recommendation on following up with Dr. Jeansonne in three weeks, states prefers to be seen next week at wound care clinic.     5/29/19: F/U in clinic with Dr. Griffith. New tiny open areas noted to lateral and medial left ankle. Patient states that "it opened up last night and was draining".  Attempted to take culture.  No drainage noted. Will start patient on Diflucan PO and continue antifungal ointment to open areas. Edema noted to LLE. Will wrap leg with compression wrap.     5/31/2019: F/U nurse visit. Pedal pulses present, two compression dressing to LLE, coflex zinc applied as ordered, tolerated dressing change.   06/03/19  F"U nurse visit.  Rash worsened.  Patient c/o itching.  Tubi- applied. Patient instructed to apply antifungal cream twice daily.  And return to clinic Friday to recheck.  Dr. Griffith notified of change in orders  Assessment:          Wound 05/29/19 1610 medial Malleolus/Ankle (Active)   05/29/19 1610    Pre-existing: Yes   Primary Wound Type:    Side: Left   Orientation: medial   Location: Malleolus/Ankle   Wound/PI Number (optional):    Ankle-Brachial Index:    Pulses:    Removal Indication and Assessment:    Wound Outcome:    (Retired) Wound Type:    (Retired) Wound " Length (cm):    (Retired) Wound Width (cm):    (Retired) Depth (cm):    Wound Description (Comments):    Removal Indications:    Dressing Appearance Open to air 6/3/2019  4:00 PM   Drainage Amount None 6/3/2019  4:00 PM   Appearance [place] 6/3/2019  4:00 PM   Tissue loss description Not applicable 6/3/2019  4:00 PM   Wound Length (cm) 0.1 cm 6/3/2019  4:00 PM   Wound Width (cm) 0.1 cm 6/3/2019  4:00 PM   Wound Depth (cm) 0.1 cm 6/3/2019  4:00 PM   Wound Volume (cm^3) 0 cm^3 6/3/2019  4:00 PM   Wound Surface Area (cm^2) 0.01 cm^2 6/3/2019  4:00 PM   Care Soap and water 6/3/2019  4:00 PM   Dressing Tubular bandage 6/3/2019  4:00 PM   Periwound Care Moisturizer applied 6/3/2019  4:00 PM       Open areas cleaned with normal saline. Applied  antifungal cream in it to LLE. Miconazole to 2 open areas.  Tubi- F x2 applied.  Patient instructed to apply antifungal twice daily and return to clinic on Friday.       Plan:   [unfilled]

## 2019-06-07 ENCOUNTER — HOSPITAL ENCOUNTER (OUTPATIENT)
Dept: WOUND CARE | Facility: HOSPITAL | Age: 39
Discharge: HOME | End: 2019-06-07
Attending: SURGERY
Payer: MEDICAID

## 2019-06-07 DIAGNOSIS — L97.321 NON-PRESSURE CHRONIC ULCER OF LEFT ANKLE, LIMITED TO BREAKDOWN OF SKIN: ICD-10-CM

## 2019-06-07 PROCEDURE — 99212 OFFICE O/P EST SF 10 MIN: CPT

## 2019-06-07 NOTE — PROGRESS NOTES
"[facility]  [address] [address]  Nurse Visit    Subjective:     Patient seen in clinic today.  Dressing changed as ordered.  5/24/2019: Mrs. Mohsen Robb a 38 year old female presents to wound care clinic c/o left lower leg discoloration, itching and swelling. New admit for Dr. Jeansonne; assessment and evaluation completed. Pedal pulses present bilateral. Per Dr. Jeansonne, Mrs. Julien presents with Left lower extremity Venous stasis dermatitis, education on leg discoloration, treatment and medication application provided, questions answered by Dr. Jeansonne. States going to the beach this weekend, recommendation on avoiding submerging in water provided, voices understanding. Recommendation on following up with Dr. Jeansonne in three weeks, states prefers to be seen next week at wound care clinic.     5/29/19: F/U in clinic with Dr. Griffith. New tiny open areas noted to lateral and medial left ankle. Patient states that "it opened up last night and was draining".  Attempted to take culture.  No drainage noted. Will start patient on Diflucan PO and continue antifungal ointment to open areas. Edema noted to LLE. Will wrap leg with compression wrap.     5/31/2019: F/U nurse visit. Pedal pulses present, two compression dressing to LLE, coflex zinc applied as ordered, tolerated dressing change.   06/03/19  F/U nurse visit.  Rash worsened.  Patient c/o itching.  Tubi- applied. Patient instructed to apply antifungal cream twice daily.  And return to clinic Friday to recheck.  Dr. Griffith notified of change in orders  06/07/19  F/U nurse visit. Rash much improved today.  Continue twice daily application of antifungal cream per patient with tubi- F X2  Patient to return to clinic June 12,2019    Assessment:          Wound 05/29/19 1610 medial Malleolus/Ankle (Active)   05/29/19 1610    Pre-existing: Yes   Primary Wound Type:    Side: Left   Orientation: medial   Location: " Malleolus/Ankle   Wound/PI Number (optional):    Ankle-Brachial Index:    Pulses:    Removal Indication and Assessment:    Wound Outcome:    (Retired) Wound Type:    (Retired) Wound Length (cm):    (Retired) Wound Width (cm):    (Retired) Depth (cm):    Wound Description (Comments):    Removal Indications:    Dressing Appearance Open to air 6/7/2019  3:00 PM   Drainage Amount None 6/7/2019  3:00 PM   Appearance [place] 6/7/2019  3:00 PM   Tissue loss description Not applicable 6/7/2019  3:00 PM   Wound Length (cm) 0.1 cm 6/7/2019  3:00 PM   Wound Width (cm) 0.1 cm 6/7/2019  3:00 PM   Wound Depth (cm) 0.1 cm 6/7/2019  3:00 PM   Wound Volume (cm^3) 0 cm^3 6/7/2019  3:00 PM   Wound Surface Area (cm^2) 0.01 cm^2 6/7/2019  3:00 PM   Care Soap and water 6/7/2019  3:00 PM   Dressing Tubular bandage 6/7/2019  3:00 PM   Compression Tubular elasticized bandage 6/7/2019  3:00 PM   Dressing Change Due 06/12/19 6/7/2019  3:00 PM            Wound 05/29/19 1615 lateral Malleolus/Ankle (Active)   05/29/19 1615    Pre-existing: Yes   Primary Wound Type:    Side: Left   Orientation: lateral   Location: Malleolus/Ankle   Wound/PI Number (optional):    Ankle-Brachial Index:    Pulses:    Removal Indication and Assessment:    Wound Outcome:    (Retired) Wound Type:    (Retired) Wound Length (cm):    (Retired) Wound Width (cm):    (Retired) Depth (cm):    Wound Description (Comments):    Removal Indications:    Dressing Appearance Open to air 6/7/2019  3:00 PM   Drainage Amount None 6/7/2019  3:00 PM   Tissue loss description Not applicable 6/7/2019  3:00 PM   Periwound Area Intact;Edematous;Hemosiderin Staining 6/7/2019  3:00 PM   Wound Length (cm) 0.1 cm 6/7/2019  3:00 PM   Wound Width (cm) 0.1 cm 6/7/2019  3:00 PM   Wound Depth (cm) 0.1 cm 6/7/2019  3:00 PM   Wound Volume (cm^3) 0 cm^3 6/7/2019  3:00 PM   Wound Surface Area (cm^2) 0.01 cm^2 6/7/2019  3:00 PM   Care Cleansed with: 6/7/2019  3:00 PM   Dressing Applied 6/7/2019  3:00 PM    Compression Tubular elasticized bandage 6/7/2019  3:00 PM   Dressing Change Due 06/12/19 6/7/2019  3:00 PM           Open areas cleaned with normal saline. Applied  antifungal cream in it to LLE. Miconazole/betamethasone  to 2 open areas.  Tubi- F x2 applied.  Patient instructed to apply antifungal twice daily and return to clinic on Friday.   Plan:   Patient to return to clinic Wednesday 06/12/19 for possible discharge

## 2019-06-18 DIAGNOSIS — B37.31 VAGINAL YEAST INFECTION: ICD-10-CM

## 2019-06-18 DIAGNOSIS — Z01.419 ENCOUNTER FOR GYNECOLOGICAL EXAMINATION WITHOUT ABNORMAL FINDING: ICD-10-CM

## 2019-06-19 ENCOUNTER — HOSPITAL ENCOUNTER (OUTPATIENT)
Dept: WOUND CARE | Facility: HOSPITAL | Age: 39
Discharge: HOME | End: 2019-06-19
Attending: SURGERY
Payer: MEDICAID

## 2019-06-19 VITALS
DIASTOLIC BLOOD PRESSURE: 71 MMHG | TEMPERATURE: 98 F | SYSTOLIC BLOOD PRESSURE: 137 MMHG | HEIGHT: 64 IN | HEART RATE: 82 BPM | BODY MASS INDEX: 48.65 KG/M2 | WEIGHT: 285 LBS

## 2019-06-19 DIAGNOSIS — E66.9 OBESITY, UNSPECIFIED CLASSIFICATION, UNSPECIFIED OBESITY TYPE, UNSPECIFIED WHETHER SERIOUS COMORBIDITY PRESENT: ICD-10-CM

## 2019-06-19 DIAGNOSIS — I10 HYPERTENSION, UNSPECIFIED TYPE: ICD-10-CM

## 2019-06-19 DIAGNOSIS — I87.2 VENOUS STASIS DERMATITIS OF LEFT LOWER EXTREMITY: Primary | ICD-10-CM

## 2019-06-19 DIAGNOSIS — B36.9 FUNGAL INFECTION OF SKIN: ICD-10-CM

## 2019-06-19 PROCEDURE — 99213 OFFICE O/P EST LOW 20 MIN: CPT | Performed by: NURSE PRACTITIONER

## 2019-06-19 RX ORDER — FLUCONAZOLE 150 MG/1
150 TABLET ORAL
Qty: 2 TABLET | Refills: 3 | Status: ON HOLD | OUTPATIENT
Start: 2019-06-19 | End: 2020-10-05 | Stop reason: HOSPADM

## 2019-06-19 NOTE — PROGRESS NOTES
"Subjective:       Patient ID: Mohsen Julien is a 38 y.o. female.    Chief Complaint: Wound Care    5/24/2019: Mrs. Mohsen Robb a 38 year old female presents to wound care clinic c/o left lower leg discoloration, itching and swelling. New admit for Dr. Jeansonne; assessment and evaluation completed. Pedal pulses present bilateral. Per Dr. Jeansonne, Mrs. Julien presents with Left lower extremity Venous stasis dermatitis, education on leg discoloration, treatment and medication application provided, questions answered by Dr. Jeansonne. States going to the beach this weekend, recommendation on avoiding submerging in water provided, voices understanding. Recommendation on following up with Dr. Jeansonne in three weeks, states prefers to be seen next week at wound care clinic.    5/29/19: F/U in clinic with Dr. Griffith. New tiny open areas noted to lateral and medial left ankle. Patient states that "it opened up last night and was draining".  Attempted to take culture.  No drainage noted. Will start patient on Diflucan PO and continue antifungal ointment to open areas. Edema noted to LLE. Will wrap leg with compression wrap.  5/31/2019: F/U nurse visit. Pedal pulses present, two compression dressing to LLE, coflex zinc applied as ordered, tolerated dressing change.  06/03/19  F/U nurse visit.  Rash worsened.  Patient c/o itching.  Tubi- applied. Patient instructed to apply antifungal cream twice daily.  And return to clinic Friday to recheck.  Dr. Griffith notified of change in orders  06/07/19  F/U nurse visit. Rash much improved today.  Continue twice daily application of antifungal cream per patient with tubi- F X2  Patient to return to clinic June 12,2019 6/19/19: F/U in clinic with Dr. Griffith. Continues with open area to lateral ankle. C/O itching to area. Continue taking Diflucan PO.      Review of Systems   Constitutional: Negative.    HENT: Negative.    Eyes: Negative.    Respiratory: Negative. "    Cardiovascular: Negative.    Gastrointestinal: Negative.    Genitourinary: Negative.    Musculoskeletal: Negative.    Skin: Negative.    Neurological: Negative.    Psychiatric/Behavioral: Negative.        Objective:      Physical Exam   Constitutional: She is oriented to person, place, and time. She appears well-developed and well-nourished.   HENT:   Head: Normocephalic.   Eyes: Pupils are equal, round, and reactive to light. Conjunctivae and EOM are normal.   Neck: Normal range of motion. Neck supple.   Cardiovascular: Normal rate, regular rhythm, normal heart sounds and intact distal pulses.   Pulmonary/Chest: Effort normal and breath sounds normal.   Abdominal: Soft. Bowel sounds are normal.   Musculoskeletal: Normal range of motion.   Neurological: She is alert and oriented to person, place, and time. She has normal reflexes.   Skin: Skin is warm and dry.       Assessment:       1. Venous stasis dermatitis of left lower extremity           Wound 05/29/19 1615 lateral Malleolus/Ankle (Active)   05/29/19 1615    Pre-existing: Yes   Primary Wound Type:    Side: Left   Orientation: lateral   Location: Malleolus/Ankle   Wound/PI Number (optional):    Ankle-Brachial Index:    Pulses:    Removal Indication and Assessment:    Wound Outcome:    (Retired) Wound Type:    (Retired) Wound Length (cm):    (Retired) Wound Width (cm):    (Retired) Depth (cm):    Wound Description (Comments):    Removal Indications:    Wound Image   6/19/2019  5:00 PM   Dressing Appearance Open to air 6/19/2019  5:00 PM   Drainage Amount None 6/19/2019  5:00 PM   Appearance [place] 6/19/2019  5:00 PM   Periwound Area Intact;Edematous;Hemosiderin Staining 6/19/2019  5:00 PM   Wound Edges Defined 6/19/2019  5:00 PM   Wound Length (cm) 0.3 cm 6/19/2019  5:00 PM   Wound Width (cm) 0.3 cm 6/19/2019  5:00 PM   Wound Depth (cm) 0.1 cm 6/19/2019  5:00 PM   Wound Volume (cm^3) 0.01 cm^3 6/19/2019  5:00 PM   Wound Surface Area (cm^2) 0.09 cm^2 6/19/2019   5:00 PM   Care Cleansed with:;Sterile normal saline 6/19/2019  5:00 PM   Dressing Applied;Tubular bandage 6/19/2019  5:00 PM   Periwound Care Topical treatment applied;Moisturizer applied 6/19/2019  5:00 PM   Compression Tubular elasticized bandage 6/19/2019  5:00 PM   Dressing Change Due 06/20/19 6/19/2019  5:00 PM       Wound cleaned with soap and water. Antifungal lotion to LLE.  Applied Miconazole to open area on left lateral ankle.  Tubi  F x2 toes to knee.    Plan:                Follow up in about 1 week (around 6/26/2019) for Dr. Griffith.

## 2019-06-28 RX ORDER — AMITRIPTYLINE HYDROCHLORIDE 25 MG/1
TABLET, FILM COATED ORAL
Qty: 90 TABLET | Refills: 3 | OUTPATIENT
Start: 2019-06-28 | End: 2020-06-05 | Stop reason: SDUPTHER

## 2019-07-03 LAB — FUNGUS SPEC CULT: NORMAL

## 2020-04-29 DIAGNOSIS — I10 ESSENTIAL (PRIMARY) HYPERTENSION: ICD-10-CM

## 2020-04-29 RX ORDER — TRAMADOL HYDROCHLORIDE 50 MG/1
50 TABLET ORAL 2 TIMES DAILY
Qty: 60 TABLET | Refills: 0 | Status: CANCELLED | OUTPATIENT
Start: 2020-04-29

## 2020-04-29 RX ORDER — METHOCARBAMOL 750 MG/1
750 TABLET, FILM COATED ORAL 3 TIMES DAILY
Qty: 90 TABLET | Refills: 0 | Status: CANCELLED | OUTPATIENT
Start: 2020-04-29

## 2020-04-29 RX ORDER — TRIAMTERENE/HYDROCHLOROTHIAZID 37.5-25 MG
1 TABLET ORAL DAILY
Qty: 90 TABLET | Refills: 3 | Status: CANCELLED | OUTPATIENT
Start: 2020-04-29

## 2020-07-29 ENCOUNTER — OFFICE VISIT (OUTPATIENT)
Dept: OBSTETRICS AND GYNECOLOGY | Facility: CLINIC | Age: 40
End: 2020-07-29
Payer: MEDICAID

## 2020-07-29 VITALS
WEIGHT: 284.38 LBS | HEIGHT: 64 IN | DIASTOLIC BLOOD PRESSURE: 78 MMHG | SYSTOLIC BLOOD PRESSURE: 124 MMHG | BODY MASS INDEX: 48.55 KG/M2

## 2020-07-29 DIAGNOSIS — Z12.4 CERVICAL CANCER SCREENING: ICD-10-CM

## 2020-07-29 DIAGNOSIS — Z30.431 IUD CHECK UP: ICD-10-CM

## 2020-07-29 DIAGNOSIS — Z12.31 VISIT FOR SCREENING MAMMOGRAM: ICD-10-CM

## 2020-07-29 DIAGNOSIS — Z01.419 ROUTINE GYNECOLOGICAL EXAMINATION: Primary | ICD-10-CM

## 2020-07-29 PROCEDURE — 99396 PR PREVENTIVE VISIT,EST,40-64: ICD-10-PCS | Mod: S$PBB,,, | Performed by: OBSTETRICS & GYNECOLOGY

## 2020-07-29 PROCEDURE — 99215 OFFICE O/P EST HI 40 MIN: CPT | Mod: PBBFAC,PO | Performed by: OBSTETRICS & GYNECOLOGY

## 2020-07-29 PROCEDURE — 99396 PREV VISIT EST AGE 40-64: CPT | Mod: S$PBB,,, | Performed by: OBSTETRICS & GYNECOLOGY

## 2020-07-29 PROCEDURE — 87624 HPV HI-RISK TYP POOLED RSLT: CPT

## 2020-07-29 PROCEDURE — 99999 PR PBB SHADOW E&M-EST. PATIENT-LVL V: CPT | Mod: PBBFAC,,, | Performed by: OBSTETRICS & GYNECOLOGY

## 2020-07-29 PROCEDURE — 88175 CYTOPATH C/V AUTO FLUID REDO: CPT

## 2020-07-29 PROCEDURE — 99999 PR PBB SHADOW E&M-EST. PATIENT-LVL V: ICD-10-PCS | Mod: PBBFAC,,, | Performed by: OBSTETRICS & GYNECOLOGY

## 2020-07-29 PROCEDURE — 87491 CHLMYD TRACH DNA AMP PROBE: CPT

## 2020-07-29 NOTE — PROGRESS NOTES
"  40 yo female who presents for routine gyn visit.  No cycles with Mirena IUD in place since 4/2017.  The patient has no complaints today.  Reports bump on vagina that burst yesterday.  Desires STD testing.    ROS:  GENERAL: Denies weight gain or weight loss. Feeling well overall.   SKIN: Denies rash or lesions.   HEAD: Denies head injury or headache.   CHEST: Denies chest pain or shortness of breath.   CARDIOVASCULAR: Denies palpitations or left sided chest pain.   ABDOMEN: No abdominal pain, constipation, diarrhea, nausea, vomiting or rectal bleeding.   URINARY: No frequency, dysuria, hematuria, or burning on urination.  REPRODUCTIVE: no cycle  BREASTS:  denies pain, lumps, or nipple discharge.   HEMATOLOGIC: No easy bruisability or excessive bleeding.   MUSCULOSKELETAL: Denies joint pain or swelling.   NEUROLOGIC: Denies syncope or weakness.   PSYCHIATRIC: Denies depression, anxiety or mood swings.         PE:   Vitals: /78   Ht 5' 4" (1.626 m)   Wt 129 kg (284 lb 6.3 oz)   LMP  (LMP Unknown)   BMI 48.82 kg/m²   APPEARANCE: Well nourished, well developed, in no acute distress.  SKIN: Normal skin turgor, no lesions.  ABDOMEN: Soft. No tenderness or masses. No hepatosplenomegaly. No hernias. obese  BREASTS: Symmetrical, no skin changes or visible lesions. No palpable masses, nipple discharge or adenopathy bilaterally.  PELVIC: Normal external female genitalia without lesions. Normal hair distribution. Adequate perineal body, normal urethral meatus. Vagina moist and well rugated without lesions, cottage cheese appearing discharge. Cervix pink and without lesions. No significant cystocele or rectocele. Bimanual exam showed uterus normal size, shape, position, mobile and nontender. Adnexa without masses or tenderness. Urethra and bladder normal. IUD string NOT visualized.    AP  Routine gyn  -s/p normal breast exam:   -s/p normal pelvic exam:   -Pap and HPV: collected  -STD testing: gc/chl and " HIV  -contraception: Mirena IUD in place since 2017  -pelvic US ordered to check placement of IUD      CARLIE Alvarez MD

## 2020-08-04 LAB
C TRACH DNA SPEC QL NAA+PROBE: NOT DETECTED
N GONORRHOEA DNA SPEC QL NAA+PROBE: NOT DETECTED

## 2020-08-05 LAB
HPV HR 12 DNA SPEC QL NAA+PROBE: NEGATIVE
HPV16 AG SPEC QL: NEGATIVE
HPV18 DNA SPEC QL NAA+PROBE: NEGATIVE

## 2020-08-12 ENCOUNTER — PROCEDURE VISIT (OUTPATIENT)
Dept: OBSTETRICS AND GYNECOLOGY | Facility: CLINIC | Age: 40
End: 2020-08-12
Payer: MEDICAID

## 2020-08-12 DIAGNOSIS — Z30.431 IUD CHECK UP: ICD-10-CM

## 2020-08-12 PROCEDURE — 76830 TRANSVAGINAL US NON-OB: CPT | Mod: PBBFAC,PO

## 2020-08-12 PROCEDURE — 76830 PR  ECHOGRAPHY,TRANSVAGINAL: ICD-10-PCS | Mod: 26,S$PBB,, | Performed by: OBSTETRICS & GYNECOLOGY

## 2020-08-12 PROCEDURE — 76830 TRANSVAGINAL US NON-OB: CPT | Mod: 26,S$PBB,, | Performed by: OBSTETRICS & GYNECOLOGY

## 2020-08-14 LAB
FINAL PATHOLOGIC DIAGNOSIS: NORMAL
Lab: NORMAL

## 2020-08-14 NOTE — PROGRESS NOTES
Pap and hpv are normal    Your pelvic exam will still need to occur once a year!    See you then!    Dr mclean

## 2020-09-12 ENCOUNTER — HOSPITAL ENCOUNTER (OUTPATIENT)
Dept: RADIOLOGY | Facility: HOSPITAL | Age: 40
Discharge: HOME | End: 2020-09-12
Attending: OBSTETRICS & GYNECOLOGY
Payer: MEDICAID

## 2020-09-12 DIAGNOSIS — Z12.31 VISIT FOR SCREENING MAMMOGRAM: ICD-10-CM

## 2020-09-12 PROCEDURE — 77067 SCR MAMMO BI INCL CAD: CPT | Mod: 26,,, | Performed by: RADIOLOGY

## 2020-09-12 PROCEDURE — 77067 MAMMO DIGITAL SCREENING BILAT WITH TOMOSYNTHESIS_CAD: ICD-10-PCS | Mod: 26,,, | Performed by: RADIOLOGY

## 2020-09-12 PROCEDURE — 77063 MAMMO DIGITAL SCREENING BILAT WITH TOMOSYNTHESIS_CAD: ICD-10-PCS | Mod: 26,,, | Performed by: RADIOLOGY

## 2020-09-12 PROCEDURE — 77067 SCR MAMMO BI INCL CAD: CPT | Mod: TC

## 2020-09-12 PROCEDURE — 77063 BREAST TOMOSYNTHESIS BI: CPT | Mod: 26,,, | Performed by: RADIOLOGY

## 2020-09-24 RX ORDER — FLUCONAZOLE 50 MG/1
TABLET ORAL
Qty: 20 TABLET | Refills: 2 | OUTPATIENT
Start: 2020-09-24

## 2020-09-25 ENCOUNTER — HOSPITAL ENCOUNTER (INPATIENT)
Facility: HOSPITAL | Age: 40
LOS: 10 days | Discharge: HOME | DRG: 871 | End: 2020-10-05
Attending: EMERGENCY MEDICINE | Admitting: FAMILY MEDICINE
Payer: MEDICAID

## 2020-09-25 DIAGNOSIS — I87.2 VENOUS STASIS DERMATITIS OF LEFT LOWER EXTREMITY: ICD-10-CM

## 2020-09-25 DIAGNOSIS — E87.6 HYPOKALEMIA: ICD-10-CM

## 2020-09-25 DIAGNOSIS — R11.2 NON-INTRACTABLE VOMITING WITH NAUSEA, UNSPECIFIED VOMITING TYPE: ICD-10-CM

## 2020-09-25 DIAGNOSIS — M79.89 OTHER SPECIFIED SOFT TISSUE DISORDERS: ICD-10-CM

## 2020-09-25 DIAGNOSIS — R65.10 SIRS (SYSTEMIC INFLAMMATORY RESPONSE SYNDROME): Primary | ICD-10-CM

## 2020-09-25 DIAGNOSIS — E66.01 MORBIDLY OBESE: ICD-10-CM

## 2020-09-25 DIAGNOSIS — I10 ESSENTIAL HYPERTENSION: ICD-10-CM

## 2020-09-25 DIAGNOSIS — I10 HYPERTENSION, UNSPECIFIED TYPE: ICD-10-CM

## 2020-09-25 DIAGNOSIS — L03.116 LEFT LEG CELLULITIS: ICD-10-CM

## 2020-09-25 DIAGNOSIS — L03.116 CELLULITIS OF LEFT LEG: ICD-10-CM

## 2020-09-25 DIAGNOSIS — M79.89 LEFT LEG SWELLING: ICD-10-CM

## 2020-09-25 PROBLEM — L03.90 CELLULITIS: Status: ACTIVE | Noted: 2020-09-25

## 2020-09-25 PROBLEM — E78.5 HYPERLIPIDEMIA: Status: ACTIVE | Noted: 2020-09-25

## 2020-09-25 LAB
ALBUMIN SERPL BCP-MCNC: 3.2 G/DL (ref 3.5–5.2)
ALP SERPL-CCNC: 100 U/L (ref 55–135)
ALT SERPL W/O P-5'-P-CCNC: 19 U/L (ref 10–44)
AMPHET+METHAMPHET UR QL: NEGATIVE
ANION GAP SERPL CALC-SCNC: 12 MMOL/L (ref 8–16)
ANION GAP SERPL CALC-SCNC: 15 MMOL/L (ref 8–16)
AST SERPL-CCNC: 13 U/L (ref 10–40)
B-HCG UR QL: NEGATIVE
BARBITURATES UR QL SCN>200 NG/ML: NEGATIVE
BASOPHILS # BLD AUTO: 0.04 K/UL (ref 0–0.2)
BASOPHILS NFR BLD: 0.2 % (ref 0–1.9)
BENZODIAZ UR QL SCN>200 NG/ML: NEGATIVE
BILIRUB SERPL-MCNC: 1.1 MG/DL (ref 0.1–1)
BILIRUB UR QL STRIP: NEGATIVE
BUN SERPL-MCNC: 16 MG/DL (ref 6–20)
BUN SERPL-MCNC: 17 MG/DL (ref 6–20)
BZE UR QL SCN: NEGATIVE
CALCIUM SERPL-MCNC: 8.1 MG/DL (ref 8.7–10.5)
CALCIUM SERPL-MCNC: 8.7 MG/DL (ref 8.7–10.5)
CANNABINOIDS UR QL SCN: NEGATIVE
CHLORIDE SERPL-SCNC: 97 MMOL/L (ref 95–110)
CHLORIDE SERPL-SCNC: 98 MMOL/L (ref 95–110)
CLARITY UR: CLEAR
CO2 SERPL-SCNC: 24 MMOL/L (ref 23–29)
CO2 SERPL-SCNC: 26 MMOL/L (ref 23–29)
COLOR UR: ABNORMAL
CREAT SERPL-MCNC: 1 MG/DL (ref 0.5–1.4)
CREAT SERPL-MCNC: 1 MG/DL (ref 0.5–1.4)
CREAT UR-MCNC: 218.7 MG/DL (ref 15–325)
CRP SERPL-MCNC: 232.6 MG/L (ref 0–8.2)
CTP QC/QA: YES
DIFFERENTIAL METHOD: ABNORMAL
EOSINOPHIL # BLD AUTO: 0.1 K/UL (ref 0–0.5)
EOSINOPHIL NFR BLD: 0.4 % (ref 0–8)
ERYTHROCYTE [DISTWIDTH] IN BLOOD BY AUTOMATED COUNT: 14.3 % (ref 11.5–14.5)
ERYTHROCYTE [SEDIMENTATION RATE] IN BLOOD BY WESTERGREN METHOD: 42 MM/HR (ref 0–20)
EST. GFR  (AFRICAN AMERICAN): >60 ML/MIN/1.73 M^2
EST. GFR  (AFRICAN AMERICAN): >60 ML/MIN/1.73 M^2
EST. GFR  (NON AFRICAN AMERICAN): >60 ML/MIN/1.73 M^2
EST. GFR  (NON AFRICAN AMERICAN): >60 ML/MIN/1.73 M^2
GLUCOSE SERPL-MCNC: 123 MG/DL (ref 70–110)
GLUCOSE SERPL-MCNC: 126 MG/DL (ref 70–110)
GLUCOSE UR QL STRIP: NEGATIVE
HCT VFR BLD AUTO: 37.6 % (ref 37–48.5)
HGB BLD-MCNC: 11.6 G/DL (ref 12–16)
HGB UR QL STRIP: NEGATIVE
IMM GRANULOCYTES # BLD AUTO: 0.24 K/UL (ref 0–0.04)
IMM GRANULOCYTES NFR BLD AUTO: 1.4 % (ref 0–0.5)
KETONES UR QL STRIP: NEGATIVE
LACTATE SERPL-SCNC: 2.2 MMOL/L (ref 0.5–2.2)
LEUKOCYTE ESTERASE UR QL STRIP: NEGATIVE
LIPASE SERPL-CCNC: 8 U/L (ref 4–60)
LYMPHOCYTES # BLD AUTO: 1.7 K/UL (ref 1–4.8)
LYMPHOCYTES NFR BLD: 10.2 % (ref 18–48)
MCH RBC QN AUTO: 24 PG (ref 27–31)
MCHC RBC AUTO-ENTMCNC: 30.9 G/DL (ref 32–36)
MCV RBC AUTO: 78 FL (ref 82–98)
METHADONE UR QL SCN>300 NG/ML: NEGATIVE
MONOCYTES # BLD AUTO: 0.8 K/UL (ref 0.3–1)
MONOCYTES NFR BLD: 4.7 % (ref 4–15)
NEUTROPHILS # BLD AUTO: 14.1 K/UL (ref 1.8–7.7)
NEUTROPHILS NFR BLD: 83.1 % (ref 38–73)
NITRITE UR QL STRIP: NEGATIVE
NRBC BLD-RTO: 0 /100 WBC
OPIATES UR QL SCN: NEGATIVE
PCP UR QL SCN>25 NG/ML: NEGATIVE
PH UR STRIP: 6 [PH] (ref 5–8)
PLATELET # BLD AUTO: 224 K/UL (ref 150–350)
PLATELET BLD QL SMEAR: ABNORMAL
PMV BLD AUTO: 10 FL (ref 9.2–12.9)
POTASSIUM SERPL-SCNC: 2.7 MMOL/L (ref 3.5–5.1)
POTASSIUM SERPL-SCNC: 3.3 MMOL/L (ref 3.5–5.1)
PROT SERPL-MCNC: 7.7 G/DL (ref 6–8.4)
PROT UR QL STRIP: ABNORMAL
RBC # BLD AUTO: 4.83 M/UL (ref 4–5.4)
SARS-COV-2 RDRP RESP QL NAA+PROBE: NEGATIVE
SODIUM SERPL-SCNC: 136 MMOL/L (ref 136–145)
SODIUM SERPL-SCNC: 136 MMOL/L (ref 136–145)
SP GR UR STRIP: 1.02 (ref 1–1.03)
T4 FREE SERPL-MCNC: 0.9 NG/DL (ref 0.71–1.51)
TOXICOLOGY INFORMATION: NORMAL
TSH SERPL DL<=0.005 MIU/L-ACNC: 0.27 UIU/ML (ref 0.4–4)
URN SPEC COLLECT METH UR: ABNORMAL
UROBILINOGEN UR STRIP-ACNC: NEGATIVE EU/DL
WBC # BLD AUTO: 17.06 K/UL (ref 3.9–12.7)

## 2020-09-25 PROCEDURE — 93010 EKG 12-LEAD: ICD-10-PCS | Mod: ,,, | Performed by: INTERNAL MEDICINE

## 2020-09-25 PROCEDURE — 96375 TX/PRO/DX INJ NEW DRUG ADDON: CPT

## 2020-09-25 PROCEDURE — 80053 COMPREHEN METABOLIC PANEL: CPT

## 2020-09-25 PROCEDURE — 83036 HEMOGLOBIN GLYCOSYLATED A1C: CPT

## 2020-09-25 PROCEDURE — 96372 THER/PROPH/DIAG INJ SC/IM: CPT | Mod: 59

## 2020-09-25 PROCEDURE — 96365 THER/PROPH/DIAG IV INF INIT: CPT

## 2020-09-25 PROCEDURE — 93005 ELECTROCARDIOGRAM TRACING: CPT

## 2020-09-25 PROCEDURE — 86140 C-REACTIVE PROTEIN: CPT

## 2020-09-25 PROCEDURE — 84443 ASSAY THYROID STIM HORMONE: CPT

## 2020-09-25 PROCEDURE — 63600175 PHARM REV CODE 636 W HCPCS: Performed by: STUDENT IN AN ORGANIZED HEALTH CARE EDUCATION/TRAINING PROGRAM

## 2020-09-25 PROCEDURE — 83690 ASSAY OF LIPASE: CPT

## 2020-09-25 PROCEDURE — 99285 EMERGENCY DEPT VISIT HI MDM: CPT | Mod: 25

## 2020-09-25 PROCEDURE — 84439 ASSAY OF FREE THYROXINE: CPT

## 2020-09-25 PROCEDURE — 25000003 PHARM REV CODE 250: Performed by: PHYSICIAN ASSISTANT

## 2020-09-25 PROCEDURE — 81025 URINE PREGNANCY TEST: CPT | Performed by: FAMILY MEDICINE

## 2020-09-25 PROCEDURE — 96361 HYDRATE IV INFUSION ADD-ON: CPT

## 2020-09-25 PROCEDURE — 25000003 PHARM REV CODE 250: Performed by: STUDENT IN AN ORGANIZED HEALTH CARE EDUCATION/TRAINING PROGRAM

## 2020-09-25 PROCEDURE — G0378 HOSPITAL OBSERVATION PER HR: HCPCS

## 2020-09-25 PROCEDURE — 83605 ASSAY OF LACTIC ACID: CPT

## 2020-09-25 PROCEDURE — 11000001 HC ACUTE MED/SURG PRIVATE ROOM

## 2020-09-25 PROCEDURE — 93010 ELECTROCARDIOGRAM REPORT: CPT | Mod: ,,, | Performed by: INTERNAL MEDICINE

## 2020-09-25 PROCEDURE — 83735 ASSAY OF MAGNESIUM: CPT

## 2020-09-25 PROCEDURE — 36415 COLL VENOUS BLD VENIPUNCTURE: CPT

## 2020-09-25 PROCEDURE — 80048 BASIC METABOLIC PNL TOTAL CA: CPT

## 2020-09-25 PROCEDURE — 96376 TX/PRO/DX INJ SAME DRUG ADON: CPT

## 2020-09-25 PROCEDURE — 87040 BLOOD CULTURE FOR BACTERIA: CPT

## 2020-09-25 PROCEDURE — 85652 RBC SED RATE AUTOMATED: CPT

## 2020-09-25 PROCEDURE — 63600175 PHARM REV CODE 636 W HCPCS: Performed by: PHYSICIAN ASSISTANT

## 2020-09-25 PROCEDURE — 85025 COMPLETE CBC W/AUTO DIFF WBC: CPT

## 2020-09-25 PROCEDURE — 81003 URINALYSIS AUTO W/O SCOPE: CPT | Mod: 59

## 2020-09-25 PROCEDURE — U0002 COVID-19 LAB TEST NON-CDC: HCPCS

## 2020-09-25 PROCEDURE — 80307 DRUG TEST PRSMV CHEM ANLYZR: CPT

## 2020-09-25 RX ORDER — POTASSIUM CHLORIDE 1.5 G/1.58G
40 POWDER, FOR SOLUTION ORAL ONCE
Status: COMPLETED | OUTPATIENT
Start: 2020-09-25 | End: 2020-09-25

## 2020-09-25 RX ORDER — FENOFIBRATE 145 MG/1
145 TABLET, FILM COATED ORAL DAILY
Status: DISCONTINUED | OUTPATIENT
Start: 2020-09-26 | End: 2020-10-05 | Stop reason: HOSPADM

## 2020-09-25 RX ORDER — CLINDAMYCIN PHOSPHATE 600 MG/50ML
600 INJECTION, SOLUTION INTRAVENOUS
Status: COMPLETED | OUTPATIENT
Start: 2020-09-25 | End: 2020-09-25

## 2020-09-25 RX ORDER — ONDANSETRON 8 MG/1
8 TABLET, ORALLY DISINTEGRATING ORAL EVERY 8 HOURS PRN
Status: DISCONTINUED | OUTPATIENT
Start: 2020-09-25 | End: 2020-10-05 | Stop reason: HOSPADM

## 2020-09-25 RX ORDER — METOPROLOL TARTRATE 50 MG/1
100 TABLET ORAL 2 TIMES DAILY
Status: DISCONTINUED | OUTPATIENT
Start: 2020-09-25 | End: 2020-10-05 | Stop reason: HOSPADM

## 2020-09-25 RX ORDER — SIMVASTATIN 20 MG/1
20 TABLET, FILM COATED ORAL NIGHTLY
Status: DISCONTINUED | OUTPATIENT
Start: 2020-09-25 | End: 2020-10-05 | Stop reason: HOSPADM

## 2020-09-25 RX ORDER — ACETAMINOPHEN 325 MG/1
650 TABLET ORAL
Status: COMPLETED | OUTPATIENT
Start: 2020-09-25 | End: 2020-09-25

## 2020-09-25 RX ORDER — AMITRIPTYLINE HYDROCHLORIDE 25 MG/1
25 TABLET, FILM COATED ORAL NIGHTLY PRN
Status: DISCONTINUED | OUTPATIENT
Start: 2020-09-25 | End: 2020-10-05 | Stop reason: HOSPADM

## 2020-09-25 RX ORDER — ACETAMINOPHEN 325 MG/1
650 TABLET ORAL EVERY 4 HOURS PRN
Status: DISCONTINUED | OUTPATIENT
Start: 2020-09-25 | End: 2020-09-26

## 2020-09-25 RX ORDER — FUROSEMIDE 20 MG/1
20 TABLET ORAL 2 TIMES DAILY
Status: DISCONTINUED | OUTPATIENT
Start: 2020-09-26 | End: 2020-09-28

## 2020-09-25 RX ORDER — MORPHINE SULFATE 4 MG/ML
4 INJECTION, SOLUTION INTRAMUSCULAR; INTRAVENOUS
Status: COMPLETED | OUTPATIENT
Start: 2020-09-25 | End: 2020-09-25

## 2020-09-25 RX ORDER — FERROUS SULFATE 325(65) MG
325 TABLET, DELAYED RELEASE (ENTERIC COATED) ORAL DAILY
Status: DISCONTINUED | OUTPATIENT
Start: 2020-09-26 | End: 2020-10-05 | Stop reason: HOSPADM

## 2020-09-25 RX ORDER — ACETAMINOPHEN 325 MG/1
650 TABLET ORAL EVERY 8 HOURS PRN
Status: DISCONTINUED | OUTPATIENT
Start: 2020-09-25 | End: 2020-09-26

## 2020-09-25 RX ORDER — CLINDAMYCIN PHOSPHATE 600 MG/50ML
600 INJECTION, SOLUTION INTRAVENOUS
Status: DISCONTINUED | OUTPATIENT
Start: 2020-09-25 | End: 2020-09-26

## 2020-09-25 RX ORDER — KETOROLAC TROMETHAMINE 30 MG/ML
15 INJECTION, SOLUTION INTRAMUSCULAR; INTRAVENOUS
Status: COMPLETED | OUTPATIENT
Start: 2020-09-25 | End: 2020-09-25

## 2020-09-25 RX ORDER — ONDANSETRON 2 MG/ML
4 INJECTION INTRAMUSCULAR; INTRAVENOUS
Status: COMPLETED | OUTPATIENT
Start: 2020-09-25 | End: 2020-09-25

## 2020-09-25 RX ORDER — TRIAMTERENE AND HYDROCHLOROTHIAZIDE 37.5; 25 MG/1; MG/1
1 CAPSULE ORAL DAILY
Status: DISCONTINUED | OUTPATIENT
Start: 2020-09-26 | End: 2020-10-05 | Stop reason: HOSPADM

## 2020-09-25 RX ORDER — SODIUM CHLORIDE 0.9 % (FLUSH) 0.9 %
10 SYRINGE (ML) INJECTION
Status: DISCONTINUED | OUTPATIENT
Start: 2020-09-25 | End: 2020-10-05 | Stop reason: HOSPADM

## 2020-09-25 RX ORDER — POTASSIUM CHLORIDE 20 MEQ/1
40 TABLET, EXTENDED RELEASE ORAL
Status: COMPLETED | OUTPATIENT
Start: 2020-09-25 | End: 2020-09-25

## 2020-09-25 RX ORDER — ENOXAPARIN SODIUM 100 MG/ML
40 INJECTION SUBCUTANEOUS EVERY 24 HOURS
Status: DISCONTINUED | OUTPATIENT
Start: 2020-09-25 | End: 2020-10-05 | Stop reason: HOSPADM

## 2020-09-25 RX ADMIN — ENOXAPARIN SODIUM 40 MG: 40 INJECTION SUBCUTANEOUS at 07:09

## 2020-09-25 RX ADMIN — SODIUM CHLORIDE 1000 ML: 0.9 INJECTION, SOLUTION INTRAVENOUS at 12:09

## 2020-09-25 RX ADMIN — ACETAMINOPHEN 650 MG: 325 TABLET ORAL at 10:09

## 2020-09-25 RX ADMIN — AMITRIPTYLINE HYDROCHLORIDE 25 MG: 25 TABLET, FILM COATED ORAL at 11:09

## 2020-09-25 RX ADMIN — ACETAMINOPHEN 650 MG: 325 TABLET ORAL at 04:09

## 2020-09-25 RX ADMIN — METOPROLOL TARTRATE 100 MG: 50 TABLET, FILM COATED ORAL at 09:09

## 2020-09-25 RX ADMIN — KETOROLAC TROMETHAMINE 15 MG: 30 INJECTION, SOLUTION INTRAMUSCULAR at 02:09

## 2020-09-25 RX ADMIN — CLINDAMYCIN IN 5 PERCENT DEXTROSE 600 MG: 12 INJECTION, SOLUTION INTRAVENOUS at 02:09

## 2020-09-25 RX ADMIN — CLINDAMYCIN IN 5 PERCENT DEXTROSE 600 MG: 12 INJECTION, SOLUTION INTRAVENOUS at 09:09

## 2020-09-25 RX ADMIN — SIMVASTATIN 20 MG: 20 TABLET, FILM COATED ORAL at 11:09

## 2020-09-25 RX ADMIN — SODIUM CHLORIDE 500 ML: 0.9 INJECTION, SOLUTION INTRAVENOUS at 04:09

## 2020-09-25 RX ADMIN — MORPHINE SULFATE 4 MG: 4 INJECTION INTRAVENOUS at 04:09

## 2020-09-25 RX ADMIN — POTASSIUM CHLORIDE 40 MEQ: 1.5 FOR SOLUTION ORAL at 07:09

## 2020-09-25 RX ADMIN — ONDANSETRON 4 MG: 2 INJECTION INTRAMUSCULAR; INTRAVENOUS at 12:09

## 2020-09-25 RX ADMIN — POTASSIUM CHLORIDE 40 MEQ: 1500 TABLET, EXTENDED RELEASE ORAL at 02:09

## 2020-09-25 NOTE — ED NOTES
Pt states her headache has resolved, but continues to c/o left leg pain rated 8/10. States she still does not feel like she can provide a urine sample

## 2020-09-25 NOTE — ED NOTES
Pt c/o nausea and vomiting since last night, and states she has been unable to keep anything down. Denies any abdominal pain at this time, and states she has only been having abdominal pain when actively vomiting. Pt also c/o pain and redness to left leg since yesterday. Reports history of venous stasis ulcer to her left leg. Pt also c/o headache since last night, worse today. Rates pain 8/10. Denies chest pain, SOB, or cough. Skin is hot, dry.

## 2020-09-25 NOTE — H&P
[facility] Medicine  History & Physical    Patient Name: Mohsen Julien  MRN: [id number]  Admission Date: 2020  Attending Physician: Christopher Diaz MD   Primary Care Provider: No primary care provider on file.         Patient information was obtained from patient, past medical records and ER records.     Subjective:     Principal Problem:Cellulitis    Chief Complaint:   Chief Complaint   Patient presents with    Emesis     pt. reports multiple episodes of vomiting since last night. also has hx. of venous stasis ulcer lt. leg and reports leg has been warm and painful x1 day.         HPI: Patient is a 39 y.o.  female with PMHx of HTN and HLD presenting with fevers and chills starting last night. Pt  Reports associated nausea and vomiting with headache and left leg swelling. Pt reports fevers and chills persisted this morning and  Was told by Dr. Kwok to come to the ED. Pt reports left leg skin changes for a couple of months and she has been taking antifungal medications, po and cream, to treat the wound. Pt reports using compression socks but noticed clear discharge on the socks.     In ED, patient was febrile with nausea. Pt given zofran and started IVF. Lab work presented hypokalemia of 2.7, ESR 42,  and WBC  Of  17. Pt given onedose of 40mEq K repletion. Blood cultures were ordered. Pt admitted to observation for hypokalemia and cellulitis.     Past Medical History:   Diagnosis Date    Anemia     Arthritis     Fibromyalgia     Hypertension     Thyroid disease        Past Surgical History:   Procedure Laterality Date    APPENDECTOMY       SECTION      CHOLECYSTECTOMY         Review of patient's allergies indicates:  No Known Allergies    Current Facility-Administered Medications on File Prior to Encounter   Medication    medroxyPROGESTERone (DEPO-PROVERA) injection 150 mg     Current Outpatient Medications on File Prior to Encounter    Medication Sig    amitriptyline (ELAVIL) 25 MG tablet TAKE 1 to 2 TABLETS BY MOUTH EVERY NIGHT AT BEDTIME AS NEEDED FOR SLEEP DISTURBANCE    amoxicillin (AMOXIL) 500 MG capsule Take 1 capsule by mouth 3 times a day until finished    betamethasone valerate 0.1% (VALISONE) 0.1 % Crea as needed.     cephALEXin (KEFLEX) 250 MG capsule Take 3 capsules (750 mg total) by mouth 2 (two) times daily.    clotrimazole-betamethasone 1-0.05% (LOTRISONE) cream Apply topically 2 (two) times daily.    clotrimazole-betamethasone 1-0.05% (LOTRISONE) cream Apply topically locally  as directed daily    ergocalciferol (ERGOCALCIFEROL) 50,000 unit Cap TAKE 1 CAPSULE  BY MOUTH  WEEKLY for vitamin d (Patient not taking: Reported on 7/29/2020)    fenofibrate 160 MG Tab Take 1 tablet (160 mg total) by mouth once daily for hypertriglyceridemia    ferrous sulfate 325 mg (65 mg iron) Tab tablet 325 mg once daily.     flu vac [id number] 36mos up,PF, 60 mcg (15 mcg x 4)/0.5 mL Syrg TO BE ADMINISTERED BY PHARMACIST    flu vacc [id number] 6mos up,PF, (FLUARIX QUAD [id number], PF,) 60 mcg (15 mcg x 4)/0.5 mL Syrg inject by [facility]    fluconazole (DIFLUCAN) 150 MG Tab Take 1 tablet (150 mg total) by mouth every 72 hours. (Patient not taking: Reported on 7/29/2020)    fluconazole (DIFLUCAN) 50 MG Tab TAKE 1 TABLET BY MOUTH DAILY    FLUZONE QUAD [id number], PF, 60 mcg (15 mcg x 4)/0.5 mL Syrg     ibuprofen (ADVIL,MOTRIN) 800 MG tablet Take 1 tablet (800 mg total) by mouth 3 (three) times daily. (Patient not taking: Reported on 7/29/2020)    ibuprofen (ADVIL,MOTRIN) 800 MG tablet Take 1 tablet by mouth every 8hours (no more than 4 times a day) as needed for pain (Patient not taking: Reported on 7/29/2020)    methocarbamoL (ROBAXIN) 750 MG Tab Take 1 tablet (750 mg total) by mouth 3 (three) times daily as needed for muscle spasms.    metoprolol tartrate (LOPRESSOR) 100 MG tablet     metoprolol tartrate (LOPRESSOR) 100 MG tablet TAKE ONE  TABLET BY MOUTH TWO TIMES A DAY FOR BLOOD PRESSURE    MIRENA 20 mcg/24 hr (5 years) IUD TO BE INSERTED ONE TIME BY PRESCRIBER. ROUTE INTRAUTERINE.    mupirocin (BACTROBAN) 2 % ointment APPLY AND GENTLY MASSAGE INTO AFFECTED AREA(S) TWICE DAILY.    oxyCODONE-acetaminophen (PERCOCET)  mg per tablet TAKE 1 TABLET BY MOUTH EVERY 4 TO 6 HOURS AS NEEDED FOR PAIN (Patient not taking: Reported on 7/29/2020)    potassium chloride (KLOR-CON) 10 MEQ TbSR take one tablet  by mouth daily for potassium replacment    OPW TEST CLAIM - DO NOT FILL OPW test claim. Do not fill. (Patient not taking: Reported on 7/29/2020)    simvastatin (ZOCOR) 20 MG tablet Take 1 tablet (20 mg total) by mouth every evening at bedtime to lower cholesterol    sulfamethoxazole-trimethoprim 800-160mg (BACTRIM DS) 800-160 mg Tab Take 1 tablet by mouth every 12 (twelve) hours for uti    sulfamethoxazole-trimethoprim 800-160mg (BACTRIM DS) 800-160 mg Tab TAKE 1 TABLET BY MOUTH TWICE DAILY    tramadol (ULTRAM) 50 mg tablet     traMADoL (ULTRAM) 50 mg tablet TAKE 1 TABLET BY MOUTH TWICE DAILY    triamterene-hydrochlorothiazide 37.5-25 mg (MAXZIDE-25) 37.5-25 mg per tablet Take 1 tablet by mouth once daily.    triamterene-hydrochlorothiazide 37.5-25 mg (MAXZIDE-25) 37.5-25 mg per tablet TAKE ONE TABLET BY MOUTH ONCE DAILY FOR BLOOD PRESSURE    triamterene-hydrochlorothiazide 37.5-25 mg (MAXZIDE-25) 37.5-25 mg per tablet TAKE ONE TABLET BY MOUTH ONCE DAILY FOR BLOOD PRESSURE     Family History     Problem Relation (Age of Onset)    Colon cancer Maternal Aunt    Stroke Mother        Tobacco Use    Smoking status: Never Smoker    Smokeless tobacco: Never Used   Substance and Sexual Activity    Alcohol use: No    Drug use: No    Sexual activity: Yes     Partners: Male     Birth control/protection: Implant     Review of Systems   Constitutional: Positive for chills and fever. Negative for activity change.   HENT: Negative for congestion,  postnasal drip, rhinorrhea and sore throat.    Eyes: Negative for discharge and redness.   Respiratory: Negative for cough, chest tightness and wheezing.    Cardiovascular: Negative for chest pain and palpitations.   Gastrointestinal: Positive for nausea. Negative for abdominal distention, abdominal pain, diarrhea and vomiting.   Genitourinary: Negative for dysuria, frequency and urgency.   Musculoskeletal: Negative for back pain and joint swelling.   Skin: Positive for color change.   Neurological: Positive for headaches. Negative for weakness.   Psychiatric/Behavioral: Negative.      Objective:     Vital Signs (Most Recent):  Temp: (!) 101.1 °F (38.4 °C) (09/25/20 1622)  Pulse: 102 (09/25/20 1622)  Resp: (!) 24 (09/25/20 1650)  BP: 118/62 (09/25/20 1622)  SpO2: 100 % (09/25/20 1622) Vital Signs (24h Range):  Temp:  [98.9 °F (37.2 °C)-101.6 °F (38.7 °C)] 101.1 °F (38.4 °C)  Pulse:  [102-106] 102  Resp:  [20-24] 24  SpO2:  [97 %-100 %] 100 %  BP: (118-131)/(62-74) 118/62     Weight: 128.8 kg (284 lb)  Body mass index is 48.75 kg/m².    Physical Exam  Vitals signs and nursing note reviewed.   Constitutional:       Appearance: Normal appearance. She is obese.   HENT:      Head: Normocephalic and atraumatic.      Mouth/Throat:      Mouth: Mucous membranes are moist.   Eyes:      Extraocular Movements: Extraocular movements intact.      Conjunctiva/sclera: Conjunctivae normal.   Neck:      Musculoskeletal: Normal range of motion.   Cardiovascular:      Rate and Rhythm: Normal rate and regular rhythm.      Pulses: Normal pulses.      Heart sounds: Normal heart sounds. No murmur.   Pulmonary:      Effort: Pulmonary effort is normal. No respiratory distress.      Breath sounds: Normal breath sounds. No wheezing.   Abdominal:      General: Abdomen is flat. There is no distension.      Palpations: Abdomen is soft.      Tenderness: There is no abdominal tenderness.   Musculoskeletal:         General: No swelling.   Skin:      General: Skin is warm.      Findings: Erythema and wound present.          Neurological:      General: No focal deficit present.      Mental Status: She is alert and oriented to person, place, and time.   Psychiatric:         Mood and Affect: Mood normal.         Behavior: Behavior normal.             Significant Labs:   A1C: No results for input(s): HGBA1C in the last 4320 hours.  Blood Culture: No results for input(s): LABBLOO in the last 48 hours.  CBC:   Recent Labs   Lab 09/25/20  1220   WBC 17.06*   HGB 11.6*   HCT 37.6        CMP:   Recent Labs   Lab 09/25/20  1220      K 2.7*   CL 97   CO2 24   *   BUN 16   CREATININE 1.0   CALCIUM 8.7   PROT 7.7   ALBUMIN 3.2*   BILITOT 1.1*   ALKPHOS 100   AST 13   ALT 19   ANIONGAP 15   EGFRNONAA >60     Magnesium: No results for input(s): MG in the last 48 hours.  TSH: No results for input(s): TSH in the last 4320 hours.  Urine Studies:   Recent Labs   Lab 09/25/20  1559   COLORU Inlet*   APPEARANCEUA Clear   PHUR 6.0   SPECGRAV 1.025   PROTEINUA Trace*   GLUCUA Negative   KETONESU Negative   BILIRUBINUA Negative   OCCULTUA Negative   NITRITE Negative   UROBILINOGEN Negative   LEUKOCYTESUR Negative       Significant Imaging: I have reviewed all pertinent imaging results/findings within the past 24 hours.    Assessment/Plan:     * Cellulitis  Persistent skin wound  With no skin breaks  Warm to touch  Failed antifungal treatments    Plan:  Antipyretics  IVF  Blood cultures pending  Broad spectrum antibiotics        Hyperlipidemia  Restart home med      Hypokalemia  K  2.7  EKG: no changes  No symptoms    Plan:   KCL repletion x2  in ED  Trend BMP        HTN (hypertension)  BP stable in ED    Plan:   Continue home meds      VTE Risk Mitigation (From admission, onward)    None             Maryanne Hallman MD  Department of Hospital Medicine   [facility]

## 2020-09-25 NOTE — ASSESSMENT & PLAN NOTE
Persistent skin wound  With no skin breaks  Warm to touch  Failed antifungal treatments    Plan:  Antipyretics  IVF  Blood cultures pending  Broad spectrum antibiotics

## 2020-09-25 NOTE — ED PROVIDER NOTES
Encounter Date: 2020       History     Chief Complaint   Patient presents with    Emesis     pt. reports multiple episodes of vomiting since last night. also has hx. of venous stasis ulcer lt. leg and reports leg has been warm and painful x1 day.      Mohsen Julien, a 39 y.o. female  has a past medical history of Anemia, Arthritis, Fibromyalgia, Hypertension, and Thyroid disease.     She presents to the ED evaluation of N/V that started last night.  States that she had at least 4 episodes of vomiting today.  Denies any abdominal pain.  No diarrhea.  Denies any suspect food ingestion, recent travel or antibiotic use.  No treatments tried at home.  Secondary complaint of increased tightness and sensation of warmth to left lower extremity x 2-3 days.  States that she suffered with venous stasis ulcer with combination of fungal rash to left lower extremity that has been treated with antifungal cream and oral medication.  States that the rash waxes and wanes however she has never felt tightness, pain or warmth.  Denies any previous history of DVT, no recent travel, estrogen use, or previous cancer diagnosis.      The history is provided by the patient.     Review of patient's allergies indicates:  No Known Allergies  Past Medical History:   Diagnosis Date    Anemia     Arthritis     Fibromyalgia     Hypertension     Thyroid disease      Past Surgical History:   Procedure Laterality Date    APPENDECTOMY       SECTION      CHOLECYSTECTOMY       Family History   Problem Relation Age of Onset    Stroke Mother     Colon cancer Maternal Aunt      Social History     Tobacco Use    Smoking status: Never Smoker    Smokeless tobacco: Never Used   Substance Use Topics    Alcohol use: No    Drug use: No     Review of Systems   Constitutional: Positive for chills. Negative for fever.   Respiratory: Negative for shortness of breath.    Cardiovascular: Positive for leg swelling. Negative for chest pain.    Gastrointestinal: Positive for nausea and vomiting. Negative for abdominal pain and blood in stool.   Skin: Positive for rash.   Allergic/Immunologic: Negative for immunocompromised state.   Neurological: Negative for weakness.   All other systems reviewed and are negative.      Physical Exam     Initial Vitals [09/25/20 1132]   BP Pulse Resp Temp SpO2   131/72 102 20 98.9 °F (37.2 °C) 97 %      MAP       --         Physical Exam    Nursing note and vitals reviewed.  Constitutional: She appears well-developed and well-nourished. She is not diaphoretic. She is Obese . No distress.   HENT:   Head: Normocephalic and atraumatic.   Right Ear: External ear normal.   Left Ear: External ear normal.   Nose: Nose normal.   Eyes: Conjunctivae and EOM are normal.   Neck: Normal range of motion.   Cardiovascular: Normal rate and regular rhythm.   Pulmonary/Chest: Breath sounds normal. No respiratory distress. She has no wheezes. She has no rhonchi. She has no rales. She exhibits no tenderness.   Abdominal: Soft. Bowel sounds are normal. She exhibits no distension. There is no abdominal tenderness. There is no rebound and no guarding.   Musculoskeletal: Normal range of motion.   Neurological: She is alert and oriented to person, place, and time.   Skin: Skin is warm and dry. Capillary refill takes less than 2 seconds. Rash (crusting rash to LLE ) noted. No erythema.   Psychiatric: She has a normal mood and affect. Thought content normal.               ED Course   Procedures  Labs Reviewed   CBC W/ AUTO DIFFERENTIAL - Abnormal; Notable for the following components:       Result Value    WBC 17.06 (*)     Hemoglobin 11.6 (*)     Mean Corpuscular Volume 78 (*)     Mean Corpuscular Hemoglobin 24.0 (*)     Mean Corpuscular Hemoglobin Conc 30.9 (*)     Immature Granulocytes 1.4 (*)     Gran # (ANC) 14.1 (*)     Immature Grans (Abs) 0.24 (*)     Gran% 83.1 (*)     Lymph% 10.2 (*)     All other components within normal limits    COMPREHENSIVE METABOLIC PANEL - Abnormal; Notable for the following components:    Potassium 2.7 (*)     Glucose 123 (*)     Albumin 3.2 (*)     Total Bilirubin 1.1 (*)     All other components within normal limits    Narrative:     k   critical result(s) called and verbal readback obtained from   jesus louis by MAGY 09/25/2020 13:24   C-REACTIVE PROTEIN - Abnormal; Notable for the following components:    .6 (*)     All other components within normal limits   SEDIMENTATION RATE - Abnormal; Notable for the following components:    Sed Rate 42 (*)     All other components within normal limits   CULTURE, BLOOD   CULTURE, BLOOD   LIPASE   SEDIMENTATION RATE   C-REACTIVE PROTEIN   LACTIC ACID, PLASMA   SARS-COV-2 RNA AMPLIFICATION, QUAL   URINALYSIS, REFLEX TO URINE CULTURE   DRUG SCREEN PANEL, URINE EMERGENCY          Imaging Results          US Lower Extremity Veins Bilateral (Final result)  Result time 09/25/20 14:22:39    Final result by Charlie Mike Jr., MD (09/25/20 14:22:39)                 Impression:      No evidence of deep venous thrombosis in either lower extremity.      Electronically signed by: Charile Ochoa Jr  Date:    09/25/2020  Time:    14:22             Narrative:    EXAMINATION:  US LOWER EXTREMITY VEINS BILATERAL    CLINICAL HISTORY:  Other specified soft tissue disorders    TECHNIQUE:  Duplex and color flow Doppler and dynamic compression was performed of the bilateral lower extremity veins was performed.    COMPARISON:  None    FINDINGS:  Right thigh veins: The common femoral, femoral, popliteal, upper greater saphenous, and deep femoral veins are patent and free of thrombus. The veins are normally compressible and have normal phasic flow and augmentation response.    Right calf veins: The visualized calf veins are patent.    Left thigh veins: The common femoral, femoral, popliteal, upper greater saphenous, and deep femoral veins are patent and free of thrombus. The veins are  normally compressible and have normal phasic flow and augmentation response.    Left calf veins: The visualized calf veins are patent.    Miscellaneous: None                                 Medical Decision Making:   Initial Assessment:   N/V, chills, LLE swelling and erythema   Differential Diagnosis:   Cellulitis, venous stasis, tenia, electrolyte abnormality, gastroenteritis   Clinical Tests:   Lab Tests: Ordered and Reviewed  The following lab test(s) were unremarkable: CBC, CMP, Lactate and Urinalysis  Radiological Study: Ordered and Reviewed  ED Management:  Pt presents to ED for evaluation of N/V with chills x 1 day  and concern for increased swelling and warmth to LLE x 2-3 days. Abdomen is NTTP.  Fluids and zofran given with improvement of N/V.  Potassium at 2.7.  Replete with oral.      1232  - WBC at 17K, blood cultures and lactic acid ordered; clindamycin given.     While patient was in the ED she spiked fever.  Toradol given.  US shows no evidence of DVT.  Patient will be admitted for treatment of her cellulitis.                Attending Attestation:     Physician Attestation Statement for NP/PA:   I discussed this assessment and plan of this patient with the NP/PA, but I did not personally examine the patient. The face to face encounter was performed by the NP/PA.                            Clinical Impression:       ICD-10-CM ICD-9-CM   1. SIRS (systemic inflammatory response syndrome)  R65.10 995.90   2. Left leg swelling  M79.89 729.81   3. Hypokalemia  E87.6 276.8   4. Cellulitis of left leg  L03.116 682.6   5. Non-intractable vomiting with nausea, unspecified vomiting type  R11.2 787.01                                               Lianet Chandler PA-C  09/25/20 1547       Norman Dupree MD  09/26/20 0604

## 2020-09-25 NOTE — HPI
Patient is a 39 y.o.  female with PMHx of HTN and HLD presenting with fevers and chills starting last night. Pt  Reports associated nausea and vomiting with headache and left leg swelling. Pt reports fevers and chills persisted this morning and  Was told by Dr. Griffith to come to the ED. Pt reports left leg skin changes for a couple of months and she has been taking antifungal medications, po and cream, to treat the wound. Pt reports using compression socks but noticed clear discharge on the socks.     In ED, patient was febrile with nausea. Pt given zofran and started IVF. Lab work presented hypokalemia of 2.7, ESR 42,  and WBC  Of  17. Pt given onedose of 40mEq K repletion. Blood cultures were ordered. Pt admitted to observation for hypokalemia and cellulitis.

## 2020-09-25 NOTE — SUBJECTIVE & OBJECTIVE
Past Medical History:   Diagnosis Date    Anemia     Arthritis     Fibromyalgia     Hypertension     Thyroid disease        Past Surgical History:   Procedure Laterality Date    APPENDECTOMY       SECTION      CHOLECYSTECTOMY         Review of patient's allergies indicates:  No Known Allergies    Current Facility-Administered Medications on File Prior to Encounter   Medication    medroxyPROGESTERone (DEPO-PROVERA) injection 150 mg     Current Outpatient Medications on File Prior to Encounter   Medication Sig    amitriptyline (ELAVIL) 25 MG tablet TAKE 1 to 2 TABLETS BY MOUTH EVERY NIGHT AT BEDTIME AS NEEDED FOR SLEEP DISTURBANCE    amoxicillin (AMOXIL) 500 MG capsule Take 1 capsule by mouth 3 times a day until finished    betamethasone valerate 0.1% (VALISONE) 0.1 % Crea as needed.     cephALEXin (KEFLEX) 250 MG capsule Take 3 capsules (750 mg total) by mouth 2 (two) times daily.    clotrimazole-betamethasone 1-0.05% (LOTRISONE) cream Apply topically 2 (two) times daily.    clotrimazole-betamethasone 1-0.05% (LOTRISONE) cream Apply topically locally  as directed daily    ergocalciferol (ERGOCALCIFEROL) 50,000 unit Cap TAKE 1 CAPSULE  BY MOUTH  WEEKLY for vitamin d (Patient not taking: Reported on 2020)    fenofibrate 160 MG Tab Take 1 tablet (160 mg total) by mouth once daily for hypertriglyceridemia    ferrous sulfate 325 mg (65 mg iron) Tab tablet 325 mg once daily.     flu vac [id number] 36mos up,PF, 60 mcg (15 mcg x 4)/0.5 mL Syrg TO BE ADMINISTERED BY PHARMACIST    flu vacc [id number] 6mos up,PF, (FLUARIX QUAD [id number], PF,) 60 mcg (15 mcg x 4)/0.5 mL Syrg inject by [facility]    fluconazole (DIFLUCAN) 150 MG Tab Take 1 tablet (150 mg total) by mouth every 72 hours. (Patient not taking: Reported on 2020)    fluconazole (DIFLUCAN) 50 MG Tab TAKE 1 TABLET BY MOUTH DAILY    FLUZONE QUAD [id number], PF, 60 mcg (15 mcg x 4)/0.5 mL Syrg     ibuprofen (ADVIL,MOTRIN) 800 MG tablet Take  1 tablet (800 mg total) by mouth 3 (three) times daily. (Patient not taking: Reported on 7/29/2020)    ibuprofen (ADVIL,MOTRIN) 800 MG tablet Take 1 tablet by mouth every 8hours (no more than 4 times a day) as needed for pain (Patient not taking: Reported on 7/29/2020)    methocarbamoL (ROBAXIN) 750 MG Tab Take 1 tablet (750 mg total) by mouth 3 (three) times daily as needed for muscle spasms.    metoprolol tartrate (LOPRESSOR) 100 MG tablet     metoprolol tartrate (LOPRESSOR) 100 MG tablet TAKE ONE TABLET BY MOUTH TWO TIMES A DAY FOR BLOOD PRESSURE    MIRENA 20 mcg/24 hr (5 years) IUD TO BE INSERTED ONE TIME BY PRESCRIBER. ROUTE INTRAUTERINE.    mupirocin (BACTROBAN) 2 % ointment APPLY AND GENTLY MASSAGE INTO AFFECTED AREA(S) TWICE DAILY.    oxyCODONE-acetaminophen (PERCOCET)  mg per tablet TAKE 1 TABLET BY MOUTH EVERY 4 TO 6 HOURS AS NEEDED FOR PAIN (Patient not taking: Reported on 7/29/2020)    potassium chloride (KLOR-CON) 10 MEQ TbSR take one tablet  by mouth daily for potassium replacment    OPW TEST CLAIM - DO NOT FILL OPW test claim. Do not fill. (Patient not taking: Reported on 7/29/2020)    simvastatin (ZOCOR) 20 MG tablet Take 1 tablet (20 mg total) by mouth every evening at bedtime to lower cholesterol    sulfamethoxazole-trimethoprim 800-160mg (BACTRIM DS) 800-160 mg Tab Take 1 tablet by mouth every 12 (twelve) hours for uti    sulfamethoxazole-trimethoprim 800-160mg (BACTRIM DS) 800-160 mg Tab TAKE 1 TABLET BY MOUTH TWICE DAILY    tramadol (ULTRAM) 50 mg tablet     traMADoL (ULTRAM) 50 mg tablet TAKE 1 TABLET BY MOUTH TWICE DAILY    triamterene-hydrochlorothiazide 37.5-25 mg (MAXZIDE-25) 37.5-25 mg per tablet Take 1 tablet by mouth once daily.    triamterene-hydrochlorothiazide 37.5-25 mg (MAXZIDE-25) 37.5-25 mg per tablet TAKE ONE TABLET BY MOUTH ONCE DAILY FOR BLOOD PRESSURE    triamterene-hydrochlorothiazide 37.5-25 mg (MAXZIDE-25) 37.5-25 mg per tablet TAKE ONE TABLET BY  MOUTH ONCE DAILY FOR BLOOD PRESSURE     Family History     Problem Relation (Age of Onset)    Colon cancer Maternal Aunt    Stroke Mother        Tobacco Use    Smoking status: Never Smoker    Smokeless tobacco: Never Used   Substance and Sexual Activity    Alcohol use: No    Drug use: No    Sexual activity: Yes     Partners: Male     Birth control/protection: Implant     Review of Systems   Constitutional: Positive for chills and fever. Negative for activity change.   HENT: Negative for congestion, postnasal drip, rhinorrhea and sore throat.    Eyes: Negative for discharge and redness.   Respiratory: Negative for cough, chest tightness and wheezing.    Cardiovascular: Negative for chest pain and palpitations.   Gastrointestinal: Positive for nausea. Negative for abdominal distention, abdominal pain, diarrhea and vomiting.   Genitourinary: Negative for dysuria, frequency and urgency.   Musculoskeletal: Negative for back pain and joint swelling.   Skin: Positive for color change.   Neurological: Positive for headaches. Negative for weakness.   Psychiatric/Behavioral: Negative.      Objective:     Vital Signs (Most Recent):  Temp: (!) 101.1 °F (38.4 °C) (09/25/20 1622)  Pulse: 102 (09/25/20 1622)  Resp: (!) 24 (09/25/20 1650)  BP: 118/62 (09/25/20 1622)  SpO2: 100 % (09/25/20 1622) Vital Signs (24h Range):  Temp:  [98.9 °F (37.2 °C)-101.6 °F (38.7 °C)] 101.1 °F (38.4 °C)  Pulse:  [102-106] 102  Resp:  [20-24] 24  SpO2:  [97 %-100 %] 100 %  BP: (118-131)/(62-74) 118/62     Weight: 128.8 kg (284 lb)  Body mass index is 48.75 kg/m².    Physical Exam  Vitals signs and nursing note reviewed.   Constitutional:       Appearance: Normal appearance. She is obese.   HENT:      Head: Normocephalic and atraumatic.      Mouth/Throat:      Mouth: Mucous membranes are moist.   Eyes:      Extraocular Movements: Extraocular movements intact.      Conjunctiva/sclera: Conjunctivae normal.   Neck:      Musculoskeletal: Normal range  of motion.   Cardiovascular:      Rate and Rhythm: Normal rate and regular rhythm.      Pulses: Normal pulses.      Heart sounds: Normal heart sounds. No murmur.   Pulmonary:      Effort: Pulmonary effort is normal. No respiratory distress.      Breath sounds: Normal breath sounds. No wheezing.   Abdominal:      General: Abdomen is flat. There is no distension.      Palpations: Abdomen is soft.      Tenderness: There is no abdominal tenderness.   Musculoskeletal:         General: No swelling.   Skin:     General: Skin is warm.      Findings: Erythema and wound present.          Neurological:      General: No focal deficit present.      Mental Status: She is alert and oriented to person, place, and time.   Psychiatric:         Mood and Affect: Mood normal.         Behavior: Behavior normal.             Significant Labs:   A1C: No results for input(s): HGBA1C in the last 4320 hours.  Blood Culture: No results for input(s): LABBLOO in the last 48 hours.  CBC:   Recent Labs   Lab 09/25/20  1220   WBC 17.06*   HGB 11.6*   HCT 37.6        CMP:   Recent Labs   Lab 09/25/20  1220      K 2.7*   CL 97   CO2 24   *   BUN 16   CREATININE 1.0   CALCIUM 8.7   PROT 7.7   ALBUMIN 3.2*   BILITOT 1.1*   ALKPHOS 100   AST 13   ALT 19   ANIONGAP 15   EGFRNONAA >60     Magnesium: No results for input(s): MG in the last 48 hours.  TSH: No results for input(s): TSH in the last 4320 hours.  Urine Studies:   Recent Labs   Lab 09/25/20  1559   COLORU [place]*   APPEARANCEUA Clear   PHUR 6.0   SPECGRAV 1.025   PROTEINUA Trace*   GLUCUA Negative   KETONESU Negative   BILIRUBINUA Negative   OCCULTUA Negative   NITRITE Negative   UROBILINOGEN Negative   LEUKOCYTESUR Negative       Significant Imaging: I have reviewed all pertinent imaging results/findings within the past 24 hours.

## 2020-09-25 NOTE — FIRST PROVIDER EVALUATION
Emergency Department TeleTriage Encounter Note      CHIEF COMPLAINT    Chief Complaint   Patient presents with    Emesis     pt. reports multiple episodes of vomiting since last night. also has hx. of venous stasis ulcer lt. leg and reports leg has been warm and painful x1 day.        VITAL SIGNS   Initial Vitals [09/25/20 1132]   BP Pulse Resp Temp SpO2   131/72 102 20 98.9 °F (37.2 °C) 97 %      MAP       --            ALLERGIES    Review of patient's allergies indicates:  No Known Allergies    PROVIDER TRIAGE NOTE  This is a teletriage evaluation of a 39 y.o. female presenting to the ED with c/o nausea, vomiting nad headache since last night.  Pt was advised to come to the ED by Dr. Griffith.  Pt denies any fever but had intermittent cough yesterday.     Initial orders will be placed and care will be transferred to an alternate provider when patient is roomed for a full evaluation. Any additional orders and the final disposition will be determined by that provider.         ORDERS  Labs Reviewed - No data to display    ED Orders (720h ago, onward)    Start Ordered     Status Ordering Provider    09/25/20 1141 09/25/20 1140  Vital signs  Every 2 hours      Ordered DEMETRIA PATRICK    09/25/20 1141 09/25/20 1140  Diet NPO  Diet effective now      Ordered DEMETRIA PATRICK    09/25/20 1141 09/25/20 1140  Insert peripheral IV  Once      Ordered DEMETRIA PATRICK    09/25/20 1141 09/25/20 1140  CBC W/ AUTO DIFFERENTIAL  STAT      Ordered DEMETRIA PATRICK    09/25/20 1141 09/25/20 1140  Comp. Metabolic Panel  STAT      Ordered DEMETRIA PATRICK    09/25/20 1141 09/25/20 1140  Lipase  STAT      Ordered DEMETRIA PATRICK    09/25/20 1141 09/25/20 1140  Urinalysis, Reflex to Urine Culture Urine, Clean Catch  STAT      Ordered DEMETRIA PATRICK            Virtual Visit Note: The provider triage portion of this emergency department evaluation and documentation was performed via [employer], a HIPAA-compliant telemedicine  application, in concert with a tele-presenter in the room. A face to face patient evaluation with one of my colleagues will occur once the patient is placed in an emergency department room.      DISCLAIMER: This note was prepared with [employer] voice recognition transcription software. Garbled syntax, mangled pronouns, and other bizarre constructions may be attributed to that software system.

## 2020-09-26 PROBLEM — L03.116 LEFT LEG CELLULITIS: Status: ACTIVE | Noted: 2020-09-26

## 2020-09-26 LAB
ALBUMIN SERPL BCP-MCNC: 2.8 G/DL (ref 3.5–5.2)
ALP SERPL-CCNC: 88 U/L (ref 55–135)
ALT SERPL W/O P-5'-P-CCNC: 19 U/L (ref 10–44)
ANION GAP SERPL CALC-SCNC: 10 MMOL/L (ref 8–16)
ANION GAP SERPL CALC-SCNC: 11 MMOL/L (ref 8–16)
AST SERPL-CCNC: 16 U/L (ref 10–40)
BASOPHILS # BLD AUTO: 0.02 K/UL (ref 0–0.2)
BASOPHILS NFR BLD: 0.2 % (ref 0–1.9)
BILIRUB SERPL-MCNC: 1.1 MG/DL (ref 0.1–1)
BUN SERPL-MCNC: 13 MG/DL (ref 6–20)
BUN SERPL-MCNC: 8 MG/DL (ref 6–20)
CALCIUM SERPL-MCNC: 7.7 MG/DL (ref 8.7–10.5)
CALCIUM SERPL-MCNC: 8 MG/DL (ref 8.7–10.5)
CHLORIDE SERPL-SCNC: 101 MMOL/L (ref 95–110)
CHLORIDE SERPL-SCNC: 98 MMOL/L (ref 95–110)
CO2 SERPL-SCNC: 22 MMOL/L (ref 23–29)
CO2 SERPL-SCNC: 25 MMOL/L (ref 23–29)
CREAT SERPL-MCNC: 0.8 MG/DL (ref 0.5–1.4)
CREAT SERPL-MCNC: 0.9 MG/DL (ref 0.5–1.4)
DIFFERENTIAL METHOD: ABNORMAL
EOSINOPHIL # BLD AUTO: 0.1 K/UL (ref 0–0.5)
EOSINOPHIL NFR BLD: 0.8 % (ref 0–8)
ERYTHROCYTE [DISTWIDTH] IN BLOOD BY AUTOMATED COUNT: 14.2 % (ref 11.5–14.5)
EST. GFR  (AFRICAN AMERICAN): >60 ML/MIN/1.73 M^2
EST. GFR  (AFRICAN AMERICAN): >60 ML/MIN/1.73 M^2
EST. GFR  (NON AFRICAN AMERICAN): >60 ML/MIN/1.73 M^2
EST. GFR  (NON AFRICAN AMERICAN): >60 ML/MIN/1.73 M^2
ESTIMATED AVG GLUCOSE: 94 MG/DL (ref 68–131)
GLUCOSE SERPL-MCNC: 152 MG/DL (ref 70–110)
GLUCOSE SERPL-MCNC: 181 MG/DL (ref 70–110)
HBA1C MFR BLD HPLC: 4.9 % (ref 4–5.6)
HCT VFR BLD AUTO: 33.8 % (ref 37–48.5)
HGB BLD-MCNC: 10.2 G/DL (ref 12–16)
IMM GRANULOCYTES # BLD AUTO: 0.13 K/UL (ref 0–0.04)
IMM GRANULOCYTES NFR BLD AUTO: 1.2 % (ref 0–0.5)
LACTATE SERPL-SCNC: 1.5 MMOL/L (ref 0.5–2.2)
LYMPHOCYTES # BLD AUTO: 0.8 K/UL (ref 1–4.8)
LYMPHOCYTES NFR BLD: 6.9 % (ref 18–48)
MAGNESIUM SERPL-MCNC: 1.3 MG/DL (ref 1.6–2.6)
MAGNESIUM SERPL-MCNC: 1.9 MG/DL (ref 1.6–2.6)
MCH RBC QN AUTO: 23.8 PG (ref 27–31)
MCHC RBC AUTO-ENTMCNC: 30.2 G/DL (ref 32–36)
MCV RBC AUTO: 79 FL (ref 82–98)
MONOCYTES # BLD AUTO: 0.3 K/UL (ref 0.3–1)
MONOCYTES NFR BLD: 2.3 % (ref 4–15)
NEUTROPHILS # BLD AUTO: 9.7 K/UL (ref 1.8–7.7)
NEUTROPHILS NFR BLD: 88.6 % (ref 38–73)
NRBC BLD-RTO: 0 /100 WBC
PHOSPHATE SERPL-MCNC: 1.1 MG/DL (ref 2.7–4.5)
PLATELET # BLD AUTO: 217 K/UL (ref 150–350)
PMV BLD AUTO: 10.4 FL (ref 9.2–12.9)
POTASSIUM SERPL-SCNC: 2.8 MMOL/L (ref 3.5–5.1)
POTASSIUM SERPL-SCNC: 3.8 MMOL/L (ref 3.5–5.1)
PROT SERPL-MCNC: 7.1 G/DL (ref 6–8.4)
RBC # BLD AUTO: 4.29 M/UL (ref 4–5.4)
SODIUM SERPL-SCNC: 133 MMOL/L (ref 136–145)
SODIUM SERPL-SCNC: 134 MMOL/L (ref 136–145)
WBC # BLD AUTO: 10.89 K/UL (ref 3.9–12.7)

## 2020-09-26 PROCEDURE — 80053 COMPREHEN METABOLIC PANEL: CPT

## 2020-09-26 PROCEDURE — 21400001 HC TELEMETRY ROOM

## 2020-09-26 PROCEDURE — 83605 ASSAY OF LACTIC ACID: CPT

## 2020-09-26 PROCEDURE — 63600175 PHARM REV CODE 636 W HCPCS: Performed by: STUDENT IN AN ORGANIZED HEALTH CARE EDUCATION/TRAINING PROGRAM

## 2020-09-26 PROCEDURE — 25000003 PHARM REV CODE 250: Performed by: STUDENT IN AN ORGANIZED HEALTH CARE EDUCATION/TRAINING PROGRAM

## 2020-09-26 PROCEDURE — 25000003 PHARM REV CODE 250: Performed by: FAMILY MEDICINE

## 2020-09-26 PROCEDURE — 84100 ASSAY OF PHOSPHORUS: CPT

## 2020-09-26 PROCEDURE — 96361 HYDRATE IV INFUSION ADD-ON: CPT

## 2020-09-26 PROCEDURE — 96376 TX/PRO/DX INJ SAME DRUG ADON: CPT

## 2020-09-26 PROCEDURE — 87040 BLOOD CULTURE FOR BACTERIA: CPT

## 2020-09-26 PROCEDURE — G0378 HOSPITAL OBSERVATION PER HR: HCPCS

## 2020-09-26 PROCEDURE — 83735 ASSAY OF MAGNESIUM: CPT

## 2020-09-26 PROCEDURE — 87086 URINE CULTURE/COLONY COUNT: CPT

## 2020-09-26 PROCEDURE — 96375 TX/PRO/DX INJ NEW DRUG ADDON: CPT

## 2020-09-26 PROCEDURE — 87070 CULTURE OTHR SPECIMN AEROBIC: CPT

## 2020-09-26 PROCEDURE — 25500020 PHARM REV CODE 255: Performed by: FAMILY MEDICINE

## 2020-09-26 PROCEDURE — 36415 COLL VENOUS BLD VENIPUNCTURE: CPT

## 2020-09-26 PROCEDURE — 96372 THER/PROPH/DIAG INJ SC/IM: CPT

## 2020-09-26 PROCEDURE — 87088 URINE BACTERIA CULTURE: CPT

## 2020-09-26 PROCEDURE — 80048 BASIC METABOLIC PNL TOTAL CA: CPT

## 2020-09-26 PROCEDURE — 63600175 PHARM REV CODE 636 W HCPCS: Performed by: FAMILY MEDICINE

## 2020-09-26 PROCEDURE — 85025 COMPLETE CBC W/AUTO DIFF WBC: CPT

## 2020-09-26 RX ORDER — SODIUM CHLORIDE 9 MG/ML
INJECTION, SOLUTION INTRAVENOUS CONTINUOUS
Status: ACTIVE | OUTPATIENT
Start: 2020-09-26 | End: 2020-09-26

## 2020-09-26 RX ORDER — POTASSIUM CHLORIDE 1.5 G/1.58G
40 POWDER, FOR SOLUTION ORAL EVERY 4 HOURS
Status: DISCONTINUED | OUTPATIENT
Start: 2020-09-26 | End: 2020-09-26

## 2020-09-26 RX ORDER — POTASSIUM CHLORIDE 1.5 G/1.58G
40 POWDER, FOR SOLUTION ORAL EVERY 4 HOURS
Status: COMPLETED | OUTPATIENT
Start: 2020-09-26 | End: 2020-09-26

## 2020-09-26 RX ORDER — MAGNESIUM SULFATE 1 G/100ML
1 INJECTION INTRAVENOUS
Status: COMPLETED | OUTPATIENT
Start: 2020-09-26 | End: 2020-09-26

## 2020-09-26 RX ORDER — ACETAMINOPHEN 500 MG
1000 TABLET ORAL EVERY 6 HOURS PRN
Status: DISCONTINUED | OUTPATIENT
Start: 2020-09-26 | End: 2020-10-05 | Stop reason: HOSPADM

## 2020-09-26 RX ORDER — TALC
6 POWDER (GRAM) TOPICAL NIGHTLY PRN
Status: DISCONTINUED | OUTPATIENT
Start: 2020-09-26 | End: 2020-10-05 | Stop reason: HOSPADM

## 2020-09-26 RX ORDER — BUTALBITAL, ACETAMINOPHEN AND CAFFEINE 50; 325; 40 MG/1; MG/1; MG/1
1 TABLET ORAL ONCE
Status: COMPLETED | OUTPATIENT
Start: 2020-09-26 | End: 2020-09-26

## 2020-09-26 RX ORDER — IBUPROFEN 400 MG/1
400 TABLET ORAL EVERY 6 HOURS PRN
Status: DISCONTINUED | OUTPATIENT
Start: 2020-09-26 | End: 2020-10-03

## 2020-09-26 RX ORDER — MORPHINE SULFATE 2 MG/ML
4 INJECTION, SOLUTION INTRAMUSCULAR; INTRAVENOUS ONCE
Status: COMPLETED | OUTPATIENT
Start: 2020-09-26 | End: 2020-09-26

## 2020-09-26 RX ORDER — POTASSIUM CHLORIDE 1.5 G/1.58G
20 POWDER, FOR SOLUTION ORAL ONCE
Status: COMPLETED | OUTPATIENT
Start: 2020-09-26 | End: 2020-09-26

## 2020-09-26 RX ADMIN — PIPERACILLIN AND TAZOBACTAM 4.5 G: 4; .5 INJECTION, POWDER, LYOPHILIZED, FOR SOLUTION INTRAVENOUS; PARENTERAL at 06:09

## 2020-09-26 RX ADMIN — Medication 6 MG: at 09:09

## 2020-09-26 RX ADMIN — SODIUM CHLORIDE 1000 ML: 0.9 INJECTION, SOLUTION INTRAVENOUS at 01:09

## 2020-09-26 RX ADMIN — MAGNESIUM SULFATE 1 G: 1 INJECTION INTRAVENOUS at 03:09

## 2020-09-26 RX ADMIN — POTASSIUM CHLORIDE 40 MEQ: 1.5 FOR SOLUTION ORAL at 06:09

## 2020-09-26 RX ADMIN — SODIUM CHLORIDE 1000 ML: 0.9 INJECTION, SOLUTION INTRAVENOUS at 06:09

## 2020-09-26 RX ADMIN — PIPERACILLIN AND TAZOBACTAM 4.5 G: 4; .5 INJECTION, POWDER, LYOPHILIZED, FOR SOLUTION INTRAVENOUS; PARENTERAL at 03:09

## 2020-09-26 RX ADMIN — SIMVASTATIN 20 MG: 20 TABLET, FILM COATED ORAL at 09:09

## 2020-09-26 RX ADMIN — MAGNESIUM SULFATE 1 G: 1 INJECTION INTRAVENOUS at 07:09

## 2020-09-26 RX ADMIN — FUROSEMIDE 20 MG: 20 TABLET ORAL at 05:09

## 2020-09-26 RX ADMIN — ENOXAPARIN SODIUM 40 MG: 40 INJECTION SUBCUTANEOUS at 05:09

## 2020-09-26 RX ADMIN — MAGNESIUM SULFATE 1 G: 1 INJECTION INTRAVENOUS at 01:09

## 2020-09-26 RX ADMIN — METOPROLOL TARTRATE 100 MG: 50 TABLET, FILM COATED ORAL at 09:09

## 2020-09-26 RX ADMIN — ACETAMINOPHEN 650 MG: 325 TABLET ORAL at 09:09

## 2020-09-26 RX ADMIN — POTASSIUM CHLORIDE 40 MEQ: 1.5 FOR SOLUTION ORAL at 09:09

## 2020-09-26 RX ADMIN — FENOFIBRATE 145 MG: 145 TABLET ORAL at 09:09

## 2020-09-26 RX ADMIN — ONDANSETRON 8 MG: 8 TABLET, ORALLY DISINTEGRATING ORAL at 09:09

## 2020-09-26 RX ADMIN — VANCOMYCIN HYDROCHLORIDE 1750 MG: 750 INJECTION, POWDER, LYOPHILIZED, FOR SOLUTION INTRAVENOUS at 01:09

## 2020-09-26 RX ADMIN — BUTALBITAL, ACETAMINOPHEN, AND CAFFEINE 1 TABLET: 50; 325; 40 TABLET ORAL at 01:09

## 2020-09-26 RX ADMIN — POTASSIUM CHLORIDE 40 MEQ: 1.5 FOR SOLUTION ORAL at 01:09

## 2020-09-26 RX ADMIN — SODIUM CHLORIDE: 0.9 INJECTION, SOLUTION INTRAVENOUS at 09:09

## 2020-09-26 RX ADMIN — IBUPROFEN 400 MG: 400 TABLET, FILM COATED ORAL at 09:09

## 2020-09-26 RX ADMIN — ACETAMINOPHEN 1000 MG: 500 TABLET ORAL at 05:09

## 2020-09-26 RX ADMIN — FERROUS SULFATE TAB EC 325 MG (65 MG FE EQUIVALENT) 325 MG: 325 (65 FE) TABLET DELAYED RESPONSE at 09:09

## 2020-09-26 RX ADMIN — IBUPROFEN 400 MG: 400 TABLET, FILM COATED ORAL at 01:09

## 2020-09-26 RX ADMIN — VANCOMYCIN HYDROCHLORIDE 2500 MG: 100 INJECTION, POWDER, LYOPHILIZED, FOR SOLUTION INTRAVENOUS at 01:09

## 2020-09-26 RX ADMIN — MAGNESIUM SULFATE 1 G: 1 INJECTION INTRAVENOUS at 08:09

## 2020-09-26 RX ADMIN — IOHEXOL 100 ML: 350 INJECTION, SOLUTION INTRAVENOUS at 12:09

## 2020-09-26 RX ADMIN — PIPERACILLIN AND TAZOBACTAM 4.5 G: 4; .5 INJECTION, POWDER, LYOPHILIZED, FOR SOLUTION INTRAVENOUS; PARENTERAL at 09:09

## 2020-09-26 RX ADMIN — MORPHINE SULFATE 4 MG: 2 INJECTION, SOLUTION INTRAMUSCULAR; INTRAVENOUS at 05:09

## 2020-09-26 RX ADMIN — SODIUM CHLORIDE: 0.9 INJECTION, SOLUTION INTRAVENOUS at 05:09

## 2020-09-26 RX ADMIN — FUROSEMIDE 20 MG: 20 TABLET ORAL at 09:09

## 2020-09-26 RX ADMIN — SODIUM CHLORIDE 1000 ML: 0.9 INJECTION, SOLUTION INTRAVENOUS at 04:09

## 2020-09-26 RX ADMIN — TRIAMTERENE AND HYDROCHLOROTHIAZIDE 1 CAPSULE: 25; 37.5 CAPSULE ORAL at 09:09

## 2020-09-26 RX ADMIN — POTASSIUM PHOSPHATE, MONOBASIC AND POTASSIUM PHOSPHATE, DIBASIC 15 MMOL: 224; 236 INJECTION, SOLUTION, CONCENTRATE INTRAVENOUS at 09:09

## 2020-09-26 RX ADMIN — POTASSIUM CHLORIDE 20 MEQ: 1.5 FOR SOLUTION ORAL at 05:09

## 2020-09-26 NOTE — PLAN OF CARE
Permission attained to enter room via virtual system.  Virtual rounds completed as documented.  Today's lab values, notes, and vital signs up to now have been reviewed.      MEWS  Score alert noted at 5    Elevation in temperature and   heart rate noted.     Sepsis work up completed within past  24 hours interval already.   Temp elevation not as high as on admission   Wbc count has improved and pt is on iv antibiotics.  DR Pena notified.   Bedside nurse contacted regarding temp and mews score    Pt is experiencing chills.  Requested assistance to restroom.   Staff contacted and arrived to assist.

## 2020-09-26 NOTE — PLAN OF CARE
Patient continued to be febrile despite receiving clindamyin, on basis of fever of 102.7 despite acetaminophen, tachycardia, and source, patient met sepsis criteria. Will bolus 1L fluid /hr x3h, reculture, and broaden ABX to Vanc/zosyn. Will add ibuprofen to alternate with tylenol. Continue to monitor.    Emily Mercado MD  [employer] [facility] PGY2

## 2020-09-26 NOTE — ASSESSMENT & PLAN NOTE
HR> 105 Temp 39.3C  SIRS  Criteria met    Plan:  Broad spectrum antibiotics  IVF with 2 bore IVs  bolused 3L over 3 hr; mIVF 1.5  LA wnl  Antipyretics

## 2020-09-26 NOTE — PLAN OF CARE
Patient resting in bed, AAOx4. Medications administered as ordered. Redness to left leg marked. Patient asleep on and off through the night. Encouraged to call with needs or concerns. Will continue to monitor.

## 2020-09-26 NOTE — HOSPITAL COURSE
Pt initially met SIRS criteria and was started on Vanc and zosyn. She then developed MAYUR and zosyn was discontinued, flagyl and cefipime was initiated. Blood cultures without any growth and UA did not grow any non-skin mark. Creatinine remained elevated at 2.2 so mIVF increased on 10/1/2020. MRI consistent with cellulitis, however cannot rule out fascitis so clindamycin added to antibiotic regimen 10/1/2020. Elevated Creatinine persisted despite increase in mIVF,so further work-up done to investigate. FeUrea suggestive of intrinsic causes of MAYUR. Nephrology consulted and suspected ATN, recommended avoiding nephrotoxic agents and to closely monitor outpatient for improvement and resolution. Creatinine decreased to 1.8 on day of discharge with outpatient plan to increase lasix dose to 40mg po daily and repeat BMP and follow-up with PCP later this week. IV antibiotics discontinued and po augmentin initiated 10/4/2020. Pt tolerated oral antibiotics well, remained afebrile and kidney function improved with continued adequate UOP therefore was stable for discharge. Pt will need to continue oral augmentin until 10/18 and have close following of kidney function outpatient. Pain continued to be an issue during hospitalization requiring 10mg Norco q6hrs with PRN dilaudid. Pain was increased as she became more mobile and worked with PT during hospitalization. Dr. Griffith is managing her pain medications outpatient so plan to give short course of Norco until she can be seen by him.

## 2020-09-26 NOTE — PROGRESS NOTES
Pharmacokinetic Initial Assessment: IV Vancomycin    Assessment/Plan:    Initiate intravenous vancomycin with loading dose of 2500 mg once followed by a maintenance dose of vancomycin 1750mg IV every 12 hours  Desired empiric serum trough concentration is 10 to 15 mcg/mL  Draw vancomycin trough level 60 min prior to fourth dose on 9-28 at approximately 12:30  Pharmacy will continue to follow and monitor vancomycin.      Please contact pharmacy at extension [phone] with any questions regarding this assessment.     Thank you for the consult,   [place] SUDHAKAR Jaz       Patient brief summary:  Mohsen Julien is a 39 y.o. female initiated on antimicrobial therapy with IV Vancomycin for treatment of suspected skin & soft tissue infection    Drug Allergies:   Review of patient's allergies indicates:  No Known Allergies    Actual Body Weight:   127.4 kg    Renal Function:   Estimated Creatinine Clearance: 97.9 mL/min (based on SCr of 1 mg/dL).,     Dialysis Method (if applicable):  N/A    CBC (last 72 hours):  Recent Labs   Lab Result Units 09/25/20  1220 09/25/20  2349   WBC K/uL 17.06*  --    Hemoglobin g/dL 11.6*  --    Hemoglobin A1C %  --  4.9   Hematocrit % 37.6  --    Platelets K/uL 224  --    Gran% % 83.1*  --    Lymph% % 10.2*  --    Mono% % 4.7  --    Eosinophil% % 0.4  --    Basophil% % 0.2  --    Differential Method  Automated  --        Metabolic Panel (last 72 hours):  Recent Labs   Lab Result Units 09/25/20  1220 09/25/20  1559 09/25/20  2130 09/25/20  2349   Sodium mmol/L 136  --  136  --    Potassium mmol/L 2.7*  --  3.3*  --    Chloride mmol/L 97  --  98  --    CO2 mmol/L 24  --  26  --    Glucose mg/dL 123*  --  126*  --    Glucose, UA   --  Negative  --   --    BUN, Bld mg/dL 16  --  17  --    Creatinine mg/dL 1.0  --  1.0  --    Creatinine, Random Ur mg/dL  --  218.7  --   --    Albumin g/dL 3.2*  --   --   --    Total Bilirubin mg/dL 1.1*  --   --   --    Alkaline Phosphatase U/L 100  --   --   --    AST  U/L 13  --   --   --    ALT U/L 19  --   --   --    Magnesium mg/dL  --   --   --  1.3*       Drug levels (last 3 results):  No results for input(s): VANCOMYCINRA, VANCOMYCINPE, VANCOMYCINTR in the last 72 hours.    Microbiologic Results:  Microbiology Results (last 7 days)       Procedure Component Value Units Date/Time    Blood culture [[id number]]     Order Status: Sent Specimen: Blood     Blood culture [[id number]]     Order Status: Sent Specimen: Blood     Blood Culture #1 **CANNOT BE ORDERED STAT** [[id number]] Collected: 09/25/20 1355    Order Status: Sent Specimen: Blood from Peripheral, Antecubital, Right Updated: 09/25/20 2153    Blood Culture #2 **CANNOT BE ORDERED STAT** [[id number]] Collected: 09/25/20 1410    Order Status: Sent Specimen: Blood from Peripheral, Antecubital, Left Updated: 09/25/20 2153

## 2020-09-26 NOTE — NURSING
Notified Dr. Cuadra with primary team of temp 103.2F. PRN Tylenol and ibuprofen given. Ice packs placed on patient.

## 2020-09-26 NOTE — ASSESSMENT & PLAN NOTE
Persistent skin wound  With no skin breaks  Warm to touch  Failed antifungal treatments     Plan:  Antipyretics  IVF  Blood cultures pending  vanc  And zosyn

## 2020-09-26 NOTE — PLAN OF CARE
Problem: Adult Inpatient Plan of Care  Goal: Plan of Care Review  Outcome: Ongoing, Progressing  VIRTUAL NURSE:  Cued into patient's room.  Permission received per patient to turn camera to view patient.  Introduced as VN for night shift that will be working with floor nurse and nursing assistant.  Educated patient on VN's role in patient care and  [facility] model.  Nurse at bedside at this time. Plan of care reviewed with patient.  Education per flowsheet.   Informed patient that staff will round on them every 2 hours but to use call light for any other needs they may have; informed of fall risk and fall precautions.  Patient verbalized understanding.  Call light within reach; bed siderails up x2.  Opportunity given for questions and questions answered.  Admission assessment questions answered.  Patient denies complaints or any needs at this time. Instructed to call for assistance.  Will cont to monitor and intervene as needed.    Labs, notes, orders, and careplan reviewed.

## 2020-09-26 NOTE — SUBJECTIVE & OBJECTIVE
Interval History: persistent fevers, headaches.     Review of Systems   Constitutional: Positive for chills and fever. Negative for activity change.   HENT: Negative for congestion, postnasal drip, rhinorrhea and sore throat.    Eyes: Negative for discharge and redness.   Respiratory: Negative for cough, chest tightness and wheezing.    Cardiovascular: Negative for chest pain and palpitations.   Gastrointestinal: Negative for abdominal distention, abdominal pain, diarrhea, nausea and vomiting.   Genitourinary: Negative for dysuria, frequency and urgency.   Musculoskeletal: Negative for back pain and joint swelling.   Skin: Positive for color change.   Neurological: Positive for headaches. Negative for weakness.   Psychiatric/Behavioral: Negative.      Objective:     Vital Signs (Most Recent):  Temp: 99.4 °F (37.4 °C) (09/26/20 0426)  Pulse: 103 (09/26/20 0453)  Resp: 18 (09/26/20 0426)  BP: 134/82 (09/26/20 0426)  SpO2: 100 % (09/26/20 0426) Vital Signs (24h Range):  Temp:  [98.9 °F (37.2 °C)-102.7 °F (39.3 °C)] 99.4 °F (37.4 °C)  Pulse:  [102-112] 103  Resp:  [12-24] 18  SpO2:  [97 %-100 %] 100 %  BP: (118-169)/(57-82) 134/82     Weight: 132 kg (291 lb 0.1 oz)  Body mass index is 49.95 kg/m².    Intake/Output Summary (Last 24 hours) at 9/26/2020 0740  Last data filed at 9/26/2020 0609  Gross per 24 hour   Intake 4150 ml   Output --   Net 4150 ml      Physical Exam  Vitals signs and nursing note reviewed.   Constitutional:       Appearance: Normal appearance. She is obese.   HENT:      Head: Normocephalic and atraumatic.      Mouth/Throat:      Mouth: Mucous membranes are moist.   Eyes:      Extraocular Movements: Extraocular movements intact.      Conjunctiva/sclera: Conjunctivae normal.   Neck:      Musculoskeletal: Normal range of motion.   Cardiovascular:      Rate and Rhythm: Normal rate and regular rhythm.      Pulses: Normal pulses.      Heart sounds: Normal heart sounds. No murmur.   Pulmonary:      Effort:  Pulmonary effort is normal. No respiratory distress.      Breath sounds: Normal breath sounds. No wheezing.   Abdominal:      General: Abdomen is flat. There is no distension.      Palpations: Abdomen is soft.      Tenderness: There is no abdominal tenderness.   Musculoskeletal:         General: No swelling.   Skin:     General: Skin is warm.      Findings: Erythema and wound present.             Comments: Left lower extremity   Neurological:      General: No focal deficit present.      Mental Status: She is alert and oriented to person, place, and time.   Psychiatric:         Mood and Affect: Mood normal.         Behavior: Behavior normal.         Significant Labs:   A1C:   Recent Labs   Lab 09/25/20  2349   HGBA1C 4.9     Blood Culture:   Recent Labs   Lab 09/25/20  1355 09/25/20  1410   LABBLOO No Growth to date No Growth to date     CBC:   Recent Labs   Lab 09/25/20  1220 09/26/20  0439   WBC 17.06* 10.89   HGB 11.6* 10.2*   HCT 37.6 33.8*    217     CMP:   Recent Labs   Lab 09/25/20  1220 09/25/20  2130 09/26/20  0439    136 133*   K 2.7* 3.3* 2.8*   CL 97 98 98   CO2 24 26 25   * 126* 181*   BUN 16 17 13   CREATININE 1.0 1.0 0.9   CALCIUM 8.7 8.1* 8.0*   PROT 7.7  --  7.1   ALBUMIN 3.2*  --  2.8*   BILITOT 1.1*  --  1.1*   ALKPHOS 100  --  88   AST 13  --  16   ALT 19  --  19   ANIONGAP 15 12 10   EGFRNONAA >60 >60 >60     Magnesium:   Recent Labs   Lab 09/25/20  2349 09/26/20  0439   MG 1.3* 1.9       Significant Imaging: I have reviewed all pertinent imaging results/findings within the past 24 hours.

## 2020-09-26 NOTE — ED NOTES
Adult Physical Assessment  LOC: Mohsen Julien, 39 y.o. female verified via two identifiers.  The patient is sleeping but easily roused, alert, oriented and speaking appropriately at this time.  APPEARANCE: Patient resting comfortably and appears to be in no acute distress at this time. Patient is clean and well groomed, patient's clothing is properly fastened.  SKIN:The skin is hot and dry, color consistent with ethnicity, patient has normal skin turgor and moist mucus membranes, skin intact, no breakdown or brusing noted.  MUSCULOSKELETAL: Patient moving all extremities well, no obvious swelling or deformities noted.  RESPIRATORY: Airway is open and patent, respirations are spontaneous, patient has a normal effort and rate, no accessory muscle use noted.  CARDIAC: Patient has a normal rate and rhythm, no periphreal edema noted in any extremity, capillary refill < 3 seconds in all extremities  ABDOMEN: Soft and  tender to palpation, no abdominal distention noted. Bowel sounds present in all four quadrants.  NEUROLOGIC: Eyes open spontaneously, behavior appropriate to situation, follows commands, facial expression symmetrical, bilateral hand grasp equal and even, purposeful motor response noted, normal sensation in all extremities when touched with a finger.

## 2020-09-26 NOTE — PLAN OF CARE
Adult Inpatient Plan of Care  Goal: Plan of Care Review  Outcome: Ongoing, Progressing    A&Ox4. TMax 103.2F. PRN Tylenol and ibuprofen given per order. Pt complaining of persistent headache; some relief with PRN meds. IVF and IV abx continued per order. Left leg remains warm and tender to touch. Up to bathroom and bedside chair with stand-by assistance. Safety precautions in place. Plan of care reviewed with patient and family.

## 2020-09-26 NOTE — PROGRESS NOTES
Patient with 2 IV sites after losing third. Nursing unsuccessful starting another line at this time. Will run bolus and antibiotics first before magnesium while trying to get another line started per MD.

## 2020-09-26 NOTE — PROGRESS NOTES
[facility] Hospital Medicine  Progress Note    Patient Name: Mohsen Julien  MRN: [id number]  Patient Class: OP- Observation   Admission Date: 9/25/2020  Length of Stay: 0 days  Attending Physician: Christopher Diaz MD  Primary Care Provider: No primary care provider on file.        Subjective:     Principal Problem:Cellulitis        HPI:  Patient is a 39 y.o.  female with PMHx of HTN and HLD presenting with fevers and chills starting last night. Pt  Reports associated nausea and vomiting with headache and left leg swelling. Pt reports fevers and chills persisted this morning and  Was told by Dr. Kwok to come to the ED. Pt reports left leg skin changes for a couple of months and she has been taking antifungal medications, po and cream, to treat the wound. Pt reports using compression socks but noticed clear discharge on the socks.     In ED, patient was febrile with nausea. Pt given zofran and started IVF. Lab work presented hypokalemia of 2.7, ESR 42,  and WBC  Of  17. Pt given onedose of 40mEq K repletion. Blood cultures were ordered. Pt admitted to observation for hypokalemia and cellulitis.     Overview/Hospital Course:  9/26 pt fevers persisted, met SIRS criteria. Broad spectrum abx with 2 bore IVs .     Interval History: persistent fevers, headaches.     Review of Systems   Constitutional: Positive for chills and fever. Negative for activity change.   HENT: Negative for congestion, postnasal drip, rhinorrhea and sore throat.    Eyes: Negative for discharge and redness.   Respiratory: Negative for cough, chest tightness and wheezing.    Cardiovascular: Negative for chest pain and palpitations.   Gastrointestinal: Negative for abdominal distention, abdominal pain, diarrhea, nausea and vomiting.   Genitourinary: Negative for dysuria, frequency and urgency.   Musculoskeletal: Negative for back pain and joint swelling.   Skin: Positive for color change.    Neurological: Positive for headaches. Negative for weakness.   Psychiatric/Behavioral: Negative.      Objective:     Vital Signs (Most Recent):  Temp: 99.4 °F (37.4 °C) (09/26/20 0426)  Pulse: 103 (09/26/20 0453)  Resp: 18 (09/26/20 0426)  BP: 134/82 (09/26/20 0426)  SpO2: 100 % (09/26/20 0426) Vital Signs (24h Range):  Temp:  [98.9 °F (37.2 °C)-102.7 °F (39.3 °C)] 99.4 °F (37.4 °C)  Pulse:  [102-112] 103  Resp:  [12-24] 18  SpO2:  [97 %-100 %] 100 %  BP: (118-169)/(57-82) 134/82     Weight: 132 kg (291 lb 0.1 oz)  Body mass index is 49.95 kg/m².    Intake/Output Summary (Last 24 hours) at 9/26/2020 0740  Last data filed at 9/26/2020 0609  Gross per 24 hour   Intake 4150 ml   Output --   Net 4150 ml      Physical Exam  Vitals signs and nursing note reviewed.   Constitutional:       Appearance: Normal appearance. She is obese.   HENT:      Head: Normocephalic and atraumatic.      Mouth/Throat:      Mouth: Mucous membranes are moist.   Eyes:      Extraocular Movements: Extraocular movements intact.      Conjunctiva/sclera: Conjunctivae normal.   Neck:      Musculoskeletal: Normal range of motion.   Cardiovascular:      Rate and Rhythm: Normal rate and regular rhythm.      Pulses: Normal pulses.      Heart sounds: Normal heart sounds. No murmur.   Pulmonary:      Effort: Pulmonary effort is normal. No respiratory distress.      Breath sounds: Normal breath sounds. No wheezing.   Abdominal:      General: Abdomen is flat. There is no distension.      Palpations: Abdomen is soft.      Tenderness: There is no abdominal tenderness.   Musculoskeletal:         General: No swelling.   Skin:     General: Skin is warm.      Findings: Erythema and wound present.             Comments: Left lower extremity   Neurological:      General: No focal deficit present.      Mental Status: She is alert and oriented to person, place, and time.   Psychiatric:         Mood and Affect: Mood normal.         Behavior: Behavior normal.          Significant Labs:   A1C:   Recent Labs   Lab 09/25/20  2349   HGBA1C 4.9     Blood Culture:   Recent Labs   Lab 09/25/20  1355 09/25/20  1410   LABBLOO No Growth to date No Growth to date     CBC:   Recent Labs   Lab 09/25/20  1220 09/26/20  0439   WBC 17.06* 10.89   HGB 11.6* 10.2*   HCT 37.6 33.8*    217     CMP:   Recent Labs   Lab 09/25/20  1220 09/25/20  2130 09/26/20  0439    136 133*   K 2.7* 3.3* 2.8*   CL 97 98 98   CO2 24 26 25   * 126* 181*   BUN 16 17 13   CREATININE 1.0 1.0 0.9   CALCIUM 8.7 8.1* 8.0*   PROT 7.7  --  7.1   ALBUMIN 3.2*  --  2.8*   BILITOT 1.1*  --  1.1*   ALKPHOS 100  --  88   AST 13  --  16   ALT 19  --  19   ANIONGAP 15 12 10   EGFRNONAA >60 >60 >60     Magnesium:   Recent Labs   Lab 09/25/20  2349 09/26/20  0439   MG 1.3* 1.9       Significant Imaging: I have reviewed all pertinent imaging results/findings within the past 24 hours.      Assessment/Plan:      * Cellulitis  Persistent skin wound  With no skin breaks  Warm to touch  Failed antifungal treatments    Plan:  Antipyretics  IVF  Blood cultures pending  vanc  And zosyn        SIRS (systemic inflammatory response syndrome)  HR> 105 Temp 39.3C  SIRS  Criteria met    Plan:  Broad spectrum antibiotics  IVF with 2 bore IVs  bolused 3L over 3 hr; mIVF 1.5  LA wnl  Antipyretics       Hyperlipidemia  Restart home med      Hypokalemia  K  2.7 on admission  EKG: no changes  Asymptomatic  Possibly due to antifungal meds?    Plan:   KCL repletion x2  in ED  Trend BMP        HTN (hypertension)  BP stable in ED    Plan:   Continue home meds        VTE Risk Mitigation (From admission, onward)         Ordered     enoxaparin injection 40 mg  Every 24 hours      09/25/20 1920     IP VTE HIGH RISK PATIENT  Once      09/25/20 1920     Place sequential compression device  Until discontinued      09/25/20 1920                Discharge Planning   SAYRA:      Code Status: Full Code   Is the patient medically ready for  discharge?:     Reason for patient still in hospital (select all that apply): Patient trending condition and Treatment           Maryanne Hallman MD  Department of Hospital Medicine   [facility]

## 2020-09-26 NOTE — ASSESSMENT & PLAN NOTE
K  2.7 on admission  EKG: no changes  Asymptomatic  Possibly due to antifungal meds?    Plan:   KCL repletion x2  in ED  Trend BMP

## 2020-09-26 NOTE — PROGRESS NOTES
[facility] Hospital Medicine  Progress Note    Patient Name: Mohsen Julien  MRN: [id number]  Patient Class: OP- Observation   Admission Date: 9/25/2020  Length of Stay: 0 days  Attending Physician: Christopher Diaz MD  Primary Care Provider: No primary care provider on file.        Subjective:     Principal Problem:Left leg cellulitis        HPI:  Patient is a 39 y.o.  female with PMHx of HTN and HLD presenting with fevers and chills starting last night. Pt  Reports associated nausea and vomiting with headache and left leg swelling. Pt reports fevers and chills persisted this morning and  Was told by Dr. Kwok to come to the ED. Pt reports left leg skin changes for a couple of months and she has been taking antifungal medications, po and cream, to treat the wound. Pt reports using compression socks but noticed clear discharge on the socks.     In ED, patient was febrile with nausea. Pt given zofran and started IVF. Lab work presented hypokalemia of 2.7, ESR 42,  and WBC  Of  17. Pt given onedose of 40mEq K repletion. Blood cultures were ordered. Pt admitted to observation for hypokalemia and cellulitis.     Overview/Hospital Course:  9/26 pt fevers persisted, met SIRS criteria. Broad spectrum abx with 2 bore IVs .     Interval History: persistent fevers, headaches.     Review of Systems   Constitutional: Positive for chills and fever. Negative for activity change.   HENT: Negative for congestion, postnasal drip, rhinorrhea and sore throat.    Eyes: Negative for discharge and redness.   Respiratory: Negative for cough, chest tightness and wheezing.    Cardiovascular: Negative for chest pain and palpitations.   Gastrointestinal: Negative for abdominal distention, abdominal pain, diarrhea, nausea and vomiting.   Genitourinary: Negative for dysuria, frequency and urgency.   Musculoskeletal: Negative for back pain and joint swelling.   Skin: Positive for color change.    Neurological: Positive for headaches. Negative for weakness.   Psychiatric/Behavioral: Negative.      Objective:     Vital Signs (Most Recent):  Temp: 99.4 °F (37.4 °C) (09/26/20 0426)  Pulse: 103 (09/26/20 0453)  Resp: 18 (09/26/20 0426)  BP: 134/82 (09/26/20 0426)  SpO2: 100 % (09/26/20 0426) Vital Signs (24h Range):  Temp:  [98.9 °F (37.2 °C)-102.7 °F (39.3 °C)] 99.4 °F (37.4 °C)  Pulse:  [102-112] 103  Resp:  [12-24] 18  SpO2:  [97 %-100 %] 100 %  BP: (118-169)/(57-82) 134/82     Weight: 132 kg (291 lb 0.1 oz)  Body mass index is 49.95 kg/m².    Intake/Output Summary (Last 24 hours) at 9/26/2020 0740  Last data filed at 9/26/2020 0609  Gross per 24 hour   Intake 4150 ml   Output --   Net 4150 ml      Physical Exam  Vitals signs and nursing note reviewed.   Constitutional:       Appearance: Normal appearance. She is obese.   HENT:      Head: Normocephalic and atraumatic.      Mouth/Throat:      Mouth: Mucous membranes are moist.   Eyes:      Extraocular Movements: Extraocular movements intact.      Conjunctiva/sclera: Conjunctivae normal.   Neck:      Musculoskeletal: Normal range of motion.   Cardiovascular:      Rate and Rhythm: Normal rate and regular rhythm.      Pulses: Normal pulses.      Heart sounds: Normal heart sounds. No murmur.   Pulmonary:      Effort: Pulmonary effort is normal. No respiratory distress.      Breath sounds: Normal breath sounds. No wheezing.   Abdominal:      General: Abdomen is flat. There is no distension.      Palpations: Abdomen is soft.      Tenderness: There is no abdominal tenderness.   Musculoskeletal:         General: No swelling.   Skin:     General: Skin is warm.      Findings: Erythema and wound present.             Comments: Left lower extremity   Neurological:      General: No focal deficit present.      Mental Status: She is alert and oriented to person, place, and time.   Psychiatric:         Mood and Affect: Mood normal.         Behavior: Behavior normal.          Significant Labs:   A1C:   Recent Labs   Lab 09/25/20  2349   HGBA1C 4.9     Blood Culture:   Recent Labs   Lab 09/25/20  1355 09/25/20  1410   LABBLOO No Growth to date No Growth to date     CBC:   Recent Labs   Lab 09/25/20  1220 09/26/20  0439   WBC 17.06* 10.89   HGB 11.6* 10.2*   HCT 37.6 33.8*    217     CMP:   Recent Labs   Lab 09/25/20  1220 09/25/20  2130 09/26/20  0439    136 133*   K 2.7* 3.3* 2.8*   CL 97 98 98   CO2 24 26 25   * 126* 181*   BUN 16 17 13   CREATININE 1.0 1.0 0.9   CALCIUM 8.7 8.1* 8.0*   PROT 7.7  --  7.1   ALBUMIN 3.2*  --  2.8*   BILITOT 1.1*  --  1.1*   ALKPHOS 100  --  88   AST 13  --  16   ALT 19  --  19   ANIONGAP 15 12 10   EGFRNONAA >60 >60 >60     Magnesium:   Recent Labs   Lab 09/25/20  2349 09/26/20  0439   MG 1.3* 1.9       Significant Imaging: I have reviewed all pertinent imaging results/findings within the past 24 hours.      Assessment/Plan:      * Left leg cellulitis  Persistent skin wound  With no skin breaks  Warm to touch  Failed antifungal treatments     Plan:  Antipyretics  IVF  Blood cultures pending  vanc  And zosyn      SIRS (systemic inflammatory response syndrome)  HR> 105 Temp 39.3C  SIRS  Criteria met    Plan:  Broad spectrum antibiotics  IVF with 2 bore IVs  bolused 3L over 3 hr; mIVF 1.5  LA wnl  Antipyretics       Hyperlipidemia  Restart home med      Hypokalemia  K  2.7 on admission  EKG: no changes  Asymptomatic  Possibly due to antifungal meds?    Plan:   KCL repletion x2  in ED  Trend BMP        HTN (hypertension)  BP stable in ED    Plan:   Continue home meds        VTE Risk Mitigation (From admission, onward)         Ordered     enoxaparin injection 40 mg  Every 24 hours      09/25/20 1920     IP VTE HIGH RISK PATIENT  Once      09/25/20 1920     Place sequential compression device  Until discontinued      09/25/20 1920                Discharge Planning   SAYRA:      Code Status: Full Code   Is the patient medically ready  for discharge?:     Reason for patient still in hospital (select all that apply): Patient trending condition, Treatment and Imaging                     Maryanne Hallman MD  Department of Hospital Medicine   [facility]

## 2020-09-27 LAB
ALBUMIN SERPL BCP-MCNC: 2.5 G/DL (ref 3.5–5.2)
ALP SERPL-CCNC: 94 U/L (ref 55–135)
ALT SERPL W/O P-5'-P-CCNC: 20 U/L (ref 10–44)
ANION GAP SERPL CALC-SCNC: 10 MMOL/L (ref 8–16)
AST SERPL-CCNC: 19 U/L (ref 10–40)
BACTERIA #/AREA URNS HPF: NORMAL /HPF
BASOPHILS # BLD AUTO: 0.01 K/UL (ref 0–0.2)
BASOPHILS NFR BLD: 0.1 % (ref 0–1.9)
BILIRUB SERPL-MCNC: 0.6 MG/DL (ref 0.1–1)
BILIRUB UR QL STRIP: NEGATIVE
BUN SERPL-MCNC: 7 MG/DL (ref 6–20)
CALCIUM SERPL-MCNC: 7.8 MG/DL (ref 8.7–10.5)
CHLORIDE SERPL-SCNC: 100 MMOL/L (ref 95–110)
CLARITY UR: CLEAR
CO2 SERPL-SCNC: 24 MMOL/L (ref 23–29)
COLOR UR: YELLOW
CREAT SERPL-MCNC: 0.9 MG/DL (ref 0.5–1.4)
DIFFERENTIAL METHOD: ABNORMAL
EOSINOPHIL # BLD AUTO: 0 K/UL (ref 0–0.5)
EOSINOPHIL NFR BLD: 0.3 % (ref 0–8)
ERYTHROCYTE [DISTWIDTH] IN BLOOD BY AUTOMATED COUNT: 14.6 % (ref 11.5–14.5)
EST. GFR  (AFRICAN AMERICAN): >60 ML/MIN/1.73 M^2
EST. GFR  (NON AFRICAN AMERICAN): >60 ML/MIN/1.73 M^2
GLUCOSE SERPL-MCNC: 135 MG/DL (ref 70–110)
GLUCOSE UR QL STRIP: NEGATIVE
HCT VFR BLD AUTO: 31.1 % (ref 37–48.5)
HGB BLD-MCNC: 9.2 G/DL (ref 12–16)
HGB UR QL STRIP: ABNORMAL
HYALINE CASTS #/AREA URNS LPF: 0 /LPF
IMM GRANULOCYTES # BLD AUTO: 0.03 K/UL (ref 0–0.04)
IMM GRANULOCYTES NFR BLD AUTO: 0.4 % (ref 0–0.5)
KETONES UR QL STRIP: NEGATIVE
LEUKOCYTE ESTERASE UR QL STRIP: NEGATIVE
LYMPHOCYTES # BLD AUTO: 0.8 K/UL (ref 1–4.8)
LYMPHOCYTES NFR BLD: 10.1 % (ref 18–48)
MAGNESIUM SERPL-MCNC: 2.5 MG/DL (ref 1.6–2.6)
MCH RBC QN AUTO: 23.5 PG (ref 27–31)
MCHC RBC AUTO-ENTMCNC: 29.6 G/DL (ref 32–36)
MCV RBC AUTO: 80 FL (ref 82–98)
MICROSCOPIC COMMENT: NORMAL
MONOCYTES # BLD AUTO: 0.4 K/UL (ref 0.3–1)
MONOCYTES NFR BLD: 5.1 % (ref 4–15)
NEUTROPHILS # BLD AUTO: 6.2 K/UL (ref 1.8–7.7)
NEUTROPHILS NFR BLD: 84 % (ref 38–73)
NITRITE UR QL STRIP: NEGATIVE
NRBC BLD-RTO: 0 /100 WBC
PH UR STRIP: 6 [PH] (ref 5–8)
PHOSPHATE SERPL-MCNC: 1.5 MG/DL (ref 2.7–4.5)
PLATELET # BLD AUTO: 175 K/UL (ref 150–350)
PMV BLD AUTO: 10.4 FL (ref 9.2–12.9)
POCT GLUCOSE: 151 MG/DL (ref 70–110)
POTASSIUM SERPL-SCNC: 3.1 MMOL/L (ref 3.5–5.1)
PROT SERPL-MCNC: 6.8 G/DL (ref 6–8.4)
PROT UR QL STRIP: ABNORMAL
RBC # BLD AUTO: 3.91 M/UL (ref 4–5.4)
RBC #/AREA URNS HPF: 3 /HPF (ref 0–4)
SODIUM SERPL-SCNC: 134 MMOL/L (ref 136–145)
SP GR UR STRIP: 1.01 (ref 1–1.03)
URN SPEC COLLECT METH UR: ABNORMAL
UROBILINOGEN UR STRIP-ACNC: NEGATIVE EU/DL
WBC # BLD AUTO: 7.41 K/UL (ref 3.9–12.7)
WBC #/AREA URNS HPF: 2 /HPF (ref 0–5)

## 2020-09-27 PROCEDURE — 25000003 PHARM REV CODE 250: Performed by: STUDENT IN AN ORGANIZED HEALTH CARE EDUCATION/TRAINING PROGRAM

## 2020-09-27 PROCEDURE — 36415 COLL VENOUS BLD VENIPUNCTURE: CPT

## 2020-09-27 PROCEDURE — 63600175 PHARM REV CODE 636 W HCPCS: Performed by: STUDENT IN AN ORGANIZED HEALTH CARE EDUCATION/TRAINING PROGRAM

## 2020-09-27 PROCEDURE — 63600175 PHARM REV CODE 636 W HCPCS: Performed by: FAMILY MEDICINE

## 2020-09-27 PROCEDURE — 81000 URINALYSIS NONAUTO W/SCOPE: CPT

## 2020-09-27 PROCEDURE — 96376 TX/PRO/DX INJ SAME DRUG ADON: CPT

## 2020-09-27 PROCEDURE — 80053 COMPREHEN METABOLIC PANEL: CPT

## 2020-09-27 PROCEDURE — 21400001 HC TELEMETRY ROOM

## 2020-09-27 PROCEDURE — 83735 ASSAY OF MAGNESIUM: CPT

## 2020-09-27 PROCEDURE — 85025 COMPLETE CBC W/AUTO DIFF WBC: CPT

## 2020-09-27 PROCEDURE — 84100 ASSAY OF PHOSPHORUS: CPT

## 2020-09-27 PROCEDURE — 25000003 PHARM REV CODE 250: Performed by: FAMILY MEDICINE

## 2020-09-27 PROCEDURE — 87040 BLOOD CULTURE FOR BACTERIA: CPT | Mod: 59

## 2020-09-27 RX ORDER — SODIUM CHLORIDE, SODIUM LACTATE, POTASSIUM CHLORIDE, CALCIUM CHLORIDE 600; 310; 30; 20 MG/100ML; MG/100ML; MG/100ML; MG/100ML
INJECTION, SOLUTION INTRAVENOUS CONTINUOUS
Status: DISCONTINUED | OUTPATIENT
Start: 2020-09-27 | End: 2020-10-02

## 2020-09-27 RX ORDER — HYDROXYZINE HYDROCHLORIDE 25 MG/1
25 TABLET, FILM COATED ORAL 3 TIMES DAILY PRN
Status: DISCONTINUED | OUTPATIENT
Start: 2020-09-27 | End: 2020-10-05 | Stop reason: HOSPADM

## 2020-09-27 RX ORDER — MUPIROCIN 20 MG/G
OINTMENT TOPICAL 2 TIMES DAILY
Status: DISCONTINUED | OUTPATIENT
Start: 2020-09-27 | End: 2020-10-05 | Stop reason: HOSPADM

## 2020-09-27 RX ORDER — SODIUM CHLORIDE 9 MG/ML
INJECTION, SOLUTION INTRAVENOUS CONTINUOUS
Status: DISCONTINUED | OUTPATIENT
Start: 2020-09-27 | End: 2020-09-27

## 2020-09-27 RX ORDER — KETOROLAC TROMETHAMINE 30 MG/ML
15 INJECTION, SOLUTION INTRAMUSCULAR; INTRAVENOUS ONCE
Status: COMPLETED | OUTPATIENT
Start: 2020-09-27 | End: 2020-09-27

## 2020-09-27 RX ORDER — SODIUM,POTASSIUM PHOSPHATES 280-250MG
1 POWDER IN PACKET (EA) ORAL
Status: DISCONTINUED | OUTPATIENT
Start: 2020-09-27 | End: 2020-09-27

## 2020-09-27 RX ORDER — SODIUM,POTASSIUM PHOSPHATES 280-250MG
1 POWDER IN PACKET (EA) ORAL
Status: DISPENSED | OUTPATIENT
Start: 2020-09-27 | End: 2020-09-27

## 2020-09-27 RX ADMIN — PROMETHAZINE HYDROCHLORIDE 25 MG: 25 INJECTION INTRAMUSCULAR; INTRAVENOUS at 06:09

## 2020-09-27 RX ADMIN — ACETAMINOPHEN 1000 MG: 500 TABLET ORAL at 12:09

## 2020-09-27 RX ADMIN — ONDANSETRON 8 MG: 8 TABLET, ORALLY DISINTEGRATING ORAL at 12:09

## 2020-09-27 RX ADMIN — MUPIROCIN: 20 OINTMENT TOPICAL at 08:09

## 2020-09-27 RX ADMIN — FUROSEMIDE 20 MG: 20 TABLET ORAL at 06:09

## 2020-09-27 RX ADMIN — METOPROLOL TARTRATE 100 MG: 50 TABLET, FILM COATED ORAL at 09:09

## 2020-09-27 RX ADMIN — FERROUS SULFATE TAB EC 325 MG (65 MG FE EQUIVALENT) 325 MG: 325 (65 FE) TABLET DELAYED RESPONSE at 09:09

## 2020-09-27 RX ADMIN — METOPROLOL TARTRATE 100 MG: 50 TABLET, FILM COATED ORAL at 08:09

## 2020-09-27 RX ADMIN — SODIUM CHLORIDE, SODIUM LACTATE, POTASSIUM CHLORIDE, AND CALCIUM CHLORIDE: .6; .31; .03; .02 INJECTION, SOLUTION INTRAVENOUS at 01:09

## 2020-09-27 RX ADMIN — VANCOMYCIN HYDROCHLORIDE 1750 MG: 750 INJECTION, POWDER, LYOPHILIZED, FOR SOLUTION INTRAVENOUS at 01:09

## 2020-09-27 RX ADMIN — ONDANSETRON 8 MG: 8 TABLET, ORALLY DISINTEGRATING ORAL at 05:09

## 2020-09-27 RX ADMIN — KETOROLAC TROMETHAMINE 15 MG: 30 INJECTION, SOLUTION INTRAMUSCULAR at 01:09

## 2020-09-27 RX ADMIN — Medication 6 MG: at 10:09

## 2020-09-27 RX ADMIN — PIPERACILLIN AND TAZOBACTAM 4.5 G: 4; .5 INJECTION, POWDER, LYOPHILIZED, FOR SOLUTION INTRAVENOUS; PARENTERAL at 10:09

## 2020-09-27 RX ADMIN — TRIAMTERENE AND HYDROCHLOROTHIAZIDE 1 CAPSULE: 25; 37.5 CAPSULE ORAL at 09:09

## 2020-09-27 RX ADMIN — VANCOMYCIN HYDROCHLORIDE 1750 MG: 750 INJECTION, POWDER, LYOPHILIZED, FOR SOLUTION INTRAVENOUS at 12:09

## 2020-09-27 RX ADMIN — POTASSIUM & SODIUM PHOSPHATES POWDER PACK 280-160-250 MG 1 PACKET: 280-160-250 PACK at 12:09

## 2020-09-27 RX ADMIN — PIPERACILLIN AND TAZOBACTAM 4.5 G: 4; .5 INJECTION, POWDER, LYOPHILIZED, FOR SOLUTION INTRAVENOUS; PARENTERAL at 05:09

## 2020-09-27 RX ADMIN — PIPERACILLIN AND TAZOBACTAM 4.5 G: 4; .5 INJECTION, POWDER, LYOPHILIZED, FOR SOLUTION INTRAVENOUS; PARENTERAL at 01:09

## 2020-09-27 RX ADMIN — MUPIROCIN: 20 OINTMENT TOPICAL at 09:09

## 2020-09-27 RX ADMIN — FENOFIBRATE 145 MG: 145 TABLET ORAL at 09:09

## 2020-09-27 RX ADMIN — FUROSEMIDE 20 MG: 20 TABLET ORAL at 09:09

## 2020-09-27 RX ADMIN — IBUPROFEN 400 MG: 400 TABLET, FILM COATED ORAL at 09:09

## 2020-09-27 RX ADMIN — ONDANSETRON 8 MG: 8 TABLET, ORALLY DISINTEGRATING ORAL at 09:09

## 2020-09-27 RX ADMIN — HYDROXYZINE HYDROCHLORIDE 25 MG: 25 TABLET, FILM COATED ORAL at 12:09

## 2020-09-27 RX ADMIN — ENOXAPARIN SODIUM 40 MG: 40 INJECTION SUBCUTANEOUS at 05:09

## 2020-09-27 RX ADMIN — SIMVASTATIN 20 MG: 20 TABLET, FILM COATED ORAL at 08:09

## 2020-09-27 RX ADMIN — KETOROLAC TROMETHAMINE 15 MG: 30 INJECTION, SOLUTION INTRAMUSCULAR at 06:09

## 2020-09-27 NOTE — PLAN OF CARE
Pt endorsing 10/10 LLE pain and HA. Ordered 1x dose of toradol 15mg IV. Temp. 100.9, pt received tylenol 650mg PO. Continue to monitor.

## 2020-09-27 NOTE — PROGRESS NOTES
[facility] Hospital Medicine  Progress Note    Patient Name: Mohsen Julien  MRN: [id number]  Patient Class: OP- Observation   Admission Date: 9/25/2020  Length of Stay: 0 days  Attending Physician: Christopher Diaz MD  Primary Care Provider: No primary care provider on file.        Subjective:     Principal Problem:Left leg cellulitis        HPI:  Patient is a 39 y.o.  female with PMHx of HTN and HLD presenting with fevers and chills starting last night. Pt  Reports associated nausea and vomiting with headache and left leg swelling. Pt reports fevers and chills persisted this morning and  Was told by Dr. Kwok to come to the ED. Pt reports left leg skin changes for a couple of months and she has been taking antifungal medications, po and cream, to treat the wound. Pt reports using compression socks but noticed clear discharge on the socks.     In ED, patient was febrile with nausea. Pt given zofran and started IVF. Lab work presented hypokalemia of 2.7, ESR 42,  and WBC  Of  17. Pt given onedose of 40mEq K repletion. Blood cultures were ordered. Pt admitted to observation for hypokalemia and cellulitis.     Overview/Hospital Course:  9/26 pt fevers persisted, met SIRS criteria. Broad spectrum abx with 2 bore IVs .     Interval History: NAEON. Pt continued to endorse 10/10 LLE pain and HA overnight. 1 time dose of toradol given, pt tolerated. Pt had 2 episodes of vomiting, non bloody. Gave  1 time dose of phenergan. Pt continues to endorse LLE pain and HA this AM. Pt had febrile episode of 100.9 and 102, but then 99.3. No cultures drawn. Denies F, lightheadedness, dizziness, acute changes in vision, congestion, rhinorrhea, cough, sore throat, difficulty breathing, CP, SOB, AP, bowel/bladder issues.    Review of Systems   Constitutional: Positive for chills. Negative for activity change and fever.   HENT: Negative for congestion, postnasal drip, rhinorrhea and sore  throat.    Eyes: Negative for discharge and redness.   Respiratory: Negative for cough, chest tightness and wheezing.    Cardiovascular: Negative for chest pain and palpitations.   Gastrointestinal: Negative for abdominal distention, abdominal pain, diarrhea, nausea and vomiting.   Genitourinary: Negative for dysuria, frequency and urgency.   Musculoskeletal: Negative for back pain and joint swelling.   Skin: Positive for color change, rash and wound.        LLE skin breakdown w/ drainage on lateral leg.    Neurological: Positive for headaches. Negative for weakness.   Psychiatric/Behavioral: Negative.      Objective:     Vital Signs (Most Recent):  Temp: 98.5 °F (36.9 °C) (09/27/20 0450)  Pulse: (!) 118 (09/27/20 0745)  Resp: 18 (09/27/20 0450)  BP: 139/63 (09/27/20 0450)  SpO2: 99 % (09/27/20 0450) Vital Signs (24h Range):  Temp:  [98.5 °F (36.9 °C)-103.2 °F (39.6 °C)] 98.5 °F (36.9 °C)  Pulse:  [] 118  Resp:  [18-24] 18  SpO2:  [99 %] 99 %  BP: (119-139)/(58-72) 139/63     Weight: 132 kg (291 lb 0.1 oz)  Body mass index is 49.95 kg/m².    Intake/Output Summary (Last 24 hours) at 9/27/2020 0835  Last data filed at 9/27/2020 0516  Gross per 24 hour   Intake 3217.5 ml   Output --   Net 3217.5 ml      Physical Exam  Vitals signs and nursing note reviewed.   Constitutional:       Appearance: Normal appearance. She is obese.   HENT:      Head: Normocephalic and atraumatic.      Mouth/Throat:      Pharynx: Oropharynx is clear.   Eyes:      General:         Right eye: No discharge.         Left eye: No discharge.      Conjunctiva/sclera: Conjunctivae normal.   Neck:      Musculoskeletal: Normal range of motion.   Cardiovascular:      Pulses: Normal pulses.   Pulmonary:      Effort: Pulmonary effort is normal. No respiratory distress.   Abdominal:      General: Abdomen is flat.      Palpations: Abdomen is soft.      Tenderness: There is no abdominal tenderness.   Musculoskeletal:         General: No swelling.       Right lower leg: No edema.   Skin:     General: Skin is warm.      Findings: Erythema and wound present.             Comments: Left lower extremity skin breakdwon w/ drainage from lateral leg. Erythematous to touch to below the knee.    Neurological:      General: No focal deficit present.      Mental Status: She is alert and oriented to person, place, and time.   Psychiatric:         Mood and Affect: Mood normal.         Behavior: Behavior normal.         Significant Labs:  Recent Results (from the past 24 hour(s))   Basic metabolic panel    Collection Time: 09/26/20  4:02 PM   Result Value Ref Range    Sodium 134 (L) 136 - 145 mmol/L    Potassium 3.8 3.5 - 5.1 mmol/L    Chloride 101 95 - 110 mmol/L    CO2 22 (L) 23 - 29 mmol/L    Glucose 152 (H) 70 - 110 mg/dL    BUN, Bld 8 6 - 20 mg/dL    Creatinine 0.8 0.5 - 1.4 mg/dL    Calcium 7.7 (L) 8.7 - 10.5 mg/dL    Anion Gap 11 8 - 16 mmol/L    eGFR if African American >60 >60 mL/min/1.73 m^2    eGFR if non African American >60 >60 mL/min/1.73 m^2   Comprehensive Metabolic Panel (CMP)    Collection Time: 09/27/20  4:41 AM   Result Value Ref Range    Sodium 134 (L) 136 - 145 mmol/L    Potassium 3.1 (L) 3.5 - 5.1 mmol/L    Chloride 100 95 - 110 mmol/L    CO2 24 23 - 29 mmol/L    Glucose 135 (H) 70 - 110 mg/dL    BUN, Bld 7 6 - 20 mg/dL    Creatinine 0.9 0.5 - 1.4 mg/dL    Calcium 7.8 (L) 8.7 - 10.5 mg/dL    Total Protein 6.8 6.0 - 8.4 g/dL    Albumin 2.5 (L) 3.5 - 5.2 g/dL    Total Bilirubin 0.6 0.1 - 1.0 mg/dL    Alkaline Phosphatase 94 55 - 135 U/L    AST 19 10 - 40 U/L    ALT 20 10 - 44 U/L    Anion Gap 10 8 - 16 mmol/L    eGFR if African American >60 >60 mL/min/1.73 m^2    eGFR if non African American >60 >60 mL/min/1.73 m^2   Magnesium    Collection Time: 09/27/20  4:41 AM   Result Value Ref Range    Magnesium 2.5 1.6 - 2.6 mg/dL   Phosphorus    Collection Time: 09/27/20  4:41 AM   Result Value Ref Range    Phosphorus 1.5 (L) 2.7 - 4.5 mg/dL   CBC with Automated  Differential    Collection Time: 09/27/20  4:41 AM   Result Value Ref Range    WBC 7.41 3.90 - 12.70 K/uL    RBC 3.91 (L) 4.00 - 5.40 M/uL    Hemoglobin 9.2 (L) 12.0 - 16.0 g/dL    Hematocrit 31.1 (L) 37.0 - 48.5 %    Mean Corpuscular Volume 80 (L) 82 - 98 fL    Mean Corpuscular Hemoglobin 23.5 (L) 27.0 - 31.0 pg    Mean Corpuscular Hemoglobin Conc 29.6 (L) 32.0 - 36.0 g/dL    RDW 14.6 (H) 11.5 - 14.5 %    Platelets 175 150 - 350 K/uL    MPV 10.4 9.2 - 12.9 fL    Immature Granulocytes 0.4 0.0 - 0.5 %    Gran # (ANC) 6.2 1.8 - 7.7 K/uL    Immature Grans (Abs) 0.03 0.00 - 0.04 K/uL    Lymph # 0.8 (L) 1.0 - 4.8 K/uL    Mono # 0.4 0.3 - 1.0 K/uL    Eos # 0.0 0.0 - 0.5 K/uL    Baso # 0.01 0.00 - 0.20 K/uL    nRBC 0 0 /100 WBC    Gran% 84.0 (H) 38.0 - 73.0 %    Lymph% 10.1 (L) 18.0 - 48.0 %    Mono% 5.1 4.0 - 15.0 %    Eosinophil% 0.3 0.0 - 8.0 %    Basophil% 0.1 0.0 - 1.9 %    Differential Method Automated        Significant Imaging:   Imaging Results          X-Ray Chest 1 View (Final result)  Result time 09/25/20 16:27:41    Final result by Ivonne Gottlieb MD (09/25/20 16:27:41)                 Impression:      No acute cardiopulmonary abnormality.      Electronically signed by: Ivonne Gottlieb  Date:    09/25/2020  Time:    16:27             Narrative:    EXAMINATION:  XR CHEST 1 VIEW    CLINICAL HISTORY:  COPD Exacerbation;    TECHNIQUE:  Single view of the chest was obtained.    COMPARISON:  Multiple priors, most recent 02/13/2019    FINDINGS:  The right costophrenic angles excluded from field of view.    Normal cardiomediastinal contour. No focal consolidation, pleural effusion or pneumothorax.                               US Lower Extremity Veins Bilateral (Final result)  Result time 09/25/20 14:22:39    Final result by Charlie Mike Jr., MD (09/25/20 14:22:39)                 Impression:      No evidence of deep venous thrombosis in either lower extremity.      Electronically signed by: Charlie Ochoa    Date:    09/25/2020  Time:    14:22             Narrative:    EXAMINATION:  US LOWER EXTREMITY VEINS BILATERAL    CLINICAL HISTORY:  Other specified soft tissue disorders    TECHNIQUE:  Duplex and color flow Doppler and dynamic compression was performed of the bilateral lower extremity veins was performed.    COMPARISON:  None    FINDINGS:  Right thigh veins: The common femoral, femoral, popliteal, upper greater saphenous, and deep femoral veins are patent and free of thrombus. The veins are normally compressible and have normal phasic flow and augmentation response.    Right calf veins: The visualized calf veins are patent.    Left thigh veins: The common femoral, femoral, popliteal, upper greater saphenous, and deep femoral veins are patent and free of thrombus. The veins are normally compressible and have normal phasic flow and augmentation response.    Left calf veins: The visualized calf veins are patent.    Miscellaneous: None                                  Assessment/Plan:      * Left leg cellulitis  Persistent skin wound  With no skin breaks  Warm to touch  Failed antifungal treatments     Plan:  Antipyretics  Consider restarting IVF, given poor PO intake  Blood cultures pending  vanc  And zosyn      SIRS (systemic inflammatory response syndrome)  HR> 105 Temp 39.3C  SIRS  Criteria met    Plan:  Broad spectrum antibiotics, on Vanc and Zosyn  IVF with 2 bore IVs  Consider stating fluids again? Last fluids received 09/26  LA wnl, 1.5 on 09/26  Antipyretics       Hyperlipidemia  Restart home med  On fenofibrate 145mg PO daily and simvastatin 20mg PO QHS    Hypokalemia  K  2.7 on admission  EKG: no changes  Asymptomatic  Possibly due to antifungal meds?    Plan:   KCL repletion x2  in ED  Trend BMP  K 3.1 and Phos 1.5 this AM. Repleted      Morbidly obese      HTN (hypertension)  BP stable in ED    Plan:   Continue home meds  On lasix 20mg BID and lopressor 100mg BID      VTE Risk Mitigation (From  admission, onward)         Ordered     enoxaparin injection 40 mg  Every 24 hours      09/25/20 1920     IP VTE HIGH RISK PATIENT  Once      09/25/20 1920     Place sequential compression device  Until discontinued      09/25/20 1920                Discharge Planning   SAYRA:      Code Status: Full Code   Is the patient medically ready for discharge?:     Reason for patient still in hospital (select all that apply): Treatment           James Kapoor MD   PGY-1  Department of Hospital Medicine   [facility]

## 2020-09-27 NOTE — PLAN OF CARE
Upon VN chart review MEWS score of 5 seen. VN completed sepsis screening tool. Physicians notified.reivewed screen with md  . Orders are being placed.   Notified assigned bedside nurse and charge nurse Jennifer

## 2020-09-27 NOTE — SUBJECTIVE & OBJECTIVE
Interval History: NAEON. Pt continued to endorse 10/10 LLE pain and HA overnight. 1 time dose of toradol given, pt tolerated. Pt had 2 episodes of vomiting, non bloody. Gave  1 time dose of phenergan. Pt continues to endorse LLE pain and HA this AM. Pt had febrile episode of 100.9 and 102, but then 99.3. No cultures drawn. Denies F, lightheadedness, dizziness, acute changes in vision, congestion, rhinorrhea, cough, sore throat, difficulty breathing, CP, SOB, AP, bowel/bladder issues.    Review of Systems   Constitutional: Positive for chills. Negative for activity change and fever.   HENT: Negative for congestion, postnasal drip, rhinorrhea and sore throat.    Eyes: Negative for discharge and redness.   Respiratory: Negative for cough, chest tightness and wheezing.    Cardiovascular: Negative for chest pain and palpitations.   Gastrointestinal: Negative for abdominal distention, abdominal pain, diarrhea, nausea and vomiting.   Genitourinary: Negative for dysuria, frequency and urgency.   Musculoskeletal: Negative for back pain and joint swelling.   Skin: Positive for color change, rash and wound.        LLE skin breakdown w/ drainage on lateral leg.    Neurological: Positive for headaches. Negative for weakness.   Psychiatric/Behavioral: Negative.      Objective:     Vital Signs (Most Recent):  Temp: 98.5 °F (36.9 °C) (09/27/20 0450)  Pulse: (!) 118 (09/27/20 0745)  Resp: 18 (09/27/20 0450)  BP: 139/63 (09/27/20 0450)  SpO2: 99 % (09/27/20 0450) Vital Signs (24h Range):  Temp:  [98.5 °F (36.9 °C)-103.2 °F (39.6 °C)] 98.5 °F (36.9 °C)  Pulse:  [] 118  Resp:  [18-24] 18  SpO2:  [99 %] 99 %  BP: (119-139)/(58-72) 139/63     Weight: 132 kg (291 lb 0.1 oz)  Body mass index is 49.95 kg/m².    Intake/Output Summary (Last 24 hours) at 9/27/2020 0821  Last data filed at 9/27/2020 0516  Gross per 24 hour   Intake 3217.5 ml   Output --   Net 3217.5 ml      Physical Exam  Vitals signs and nursing note reviewed.    Constitutional:       Appearance: Normal appearance. She is obese.   HENT:      Head: Normocephalic and atraumatic.      Mouth/Throat:      Pharynx: Oropharynx is clear.   Eyes:      General:         Right eye: No discharge.         Left eye: No discharge.      Conjunctiva/sclera: Conjunctivae normal.   Neck:      Musculoskeletal: Normal range of motion.   Cardiovascular:      Pulses: Normal pulses.   Pulmonary:      Effort: Pulmonary effort is normal. No respiratory distress.   Abdominal:      General: Abdomen is flat.      Palpations: Abdomen is soft.      Tenderness: There is no abdominal tenderness.   Musculoskeletal:         General: No swelling.      Right lower leg: No edema.   Skin:     General: Skin is warm.      Findings: Erythema and wound present.             Comments: Left lower extremity skin breakdwon w/ drainage from lateral leg. Erythematous to touch to below the knee.    Neurological:      General: No focal deficit present.      Mental Status: She is alert and oriented to person, place, and time.   Psychiatric:         Mood and Affect: Mood normal.         Behavior: Behavior normal.         Significant Labs:  Recent Results (from the past 24 hour(s))   Basic metabolic panel    Collection Time: 09/26/20  4:02 PM   Result Value Ref Range    Sodium 134 (L) 136 - 145 mmol/L    Potassium 3.8 3.5 - 5.1 mmol/L    Chloride 101 95 - 110 mmol/L    CO2 22 (L) 23 - 29 mmol/L    Glucose 152 (H) 70 - 110 mg/dL    BUN, Bld 8 6 - 20 mg/dL    Creatinine 0.8 0.5 - 1.4 mg/dL    Calcium 7.7 (L) 8.7 - 10.5 mg/dL    Anion Gap 11 8 - 16 mmol/L    eGFR if African American >60 >60 mL/min/1.73 m^2    eGFR if non African American >60 >60 mL/min/1.73 m^2   Comprehensive Metabolic Panel (CMP)    Collection Time: 09/27/20  4:41 AM   Result Value Ref Range    Sodium 134 (L) 136 - 145 mmol/L    Potassium 3.1 (L) 3.5 - 5.1 mmol/L    Chloride 100 95 - 110 mmol/L    CO2 24 23 - 29 mmol/L    Glucose 135 (H) 70 - 110 mg/dL    BUN,  Bld 7 6 - 20 mg/dL    Creatinine 0.9 0.5 - 1.4 mg/dL    Calcium 7.8 (L) 8.7 - 10.5 mg/dL    Total Protein 6.8 6.0 - 8.4 g/dL    Albumin 2.5 (L) 3.5 - 5.2 g/dL    Total Bilirubin 0.6 0.1 - 1.0 mg/dL    Alkaline Phosphatase 94 55 - 135 U/L    AST 19 10 - 40 U/L    ALT 20 10 - 44 U/L    Anion Gap 10 8 - 16 mmol/L    eGFR if African American >60 >60 mL/min/1.73 m^2    eGFR if non African American >60 >60 mL/min/1.73 m^2   Magnesium    Collection Time: 09/27/20  4:41 AM   Result Value Ref Range    Magnesium 2.5 1.6 - 2.6 mg/dL   Phosphorus    Collection Time: 09/27/20  4:41 AM   Result Value Ref Range    Phosphorus 1.5 (L) 2.7 - 4.5 mg/dL   CBC with Automated Differential    Collection Time: 09/27/20  4:41 AM   Result Value Ref Range    WBC 7.41 3.90 - 12.70 K/uL    RBC 3.91 (L) 4.00 - 5.40 M/uL    Hemoglobin 9.2 (L) 12.0 - 16.0 g/dL    Hematocrit 31.1 (L) 37.0 - 48.5 %    Mean Corpuscular Volume 80 (L) 82 - 98 fL    Mean Corpuscular Hemoglobin 23.5 (L) 27.0 - 31.0 pg    Mean Corpuscular Hemoglobin Conc 29.6 (L) 32.0 - 36.0 g/dL    RDW 14.6 (H) 11.5 - 14.5 %    Platelets 175 150 - 350 K/uL    MPV 10.4 9.2 - 12.9 fL    Immature Granulocytes 0.4 0.0 - 0.5 %    Gran # (ANC) 6.2 1.8 - 7.7 K/uL    Immature Grans (Abs) 0.03 0.00 - 0.04 K/uL    Lymph # 0.8 (L) 1.0 - 4.8 K/uL    Mono # 0.4 0.3 - 1.0 K/uL    Eos # 0.0 0.0 - 0.5 K/uL    Baso # 0.01 0.00 - 0.20 K/uL    nRBC 0 0 /100 WBC    Gran% 84.0 (H) 38.0 - 73.0 %    Lymph% 10.1 (L) 18.0 - 48.0 %    Mono% 5.1 4.0 - 15.0 %    Eosinophil% 0.3 0.0 - 8.0 %    Basophil% 0.1 0.0 - 1.9 %    Differential Method Automated        Significant Imaging:   Imaging Results          X-Ray Chest 1 View (Final result)  Result time 09/25/20 16:27:41    Final result by Ivonne Gottlieb MD (09/25/20 16:27:41)                 Impression:      No acute cardiopulmonary abnormality.      Electronically signed by: Ivonne Gottlieb  Date:    09/25/2020  Time:    16:27             Narrative:    EXAMINATION:  XR  CHEST 1 VIEW    CLINICAL HISTORY:  COPD Exacerbation;    TECHNIQUE:  Single view of the chest was obtained.    COMPARISON:  Multiple priors, most recent 02/13/2019    FINDINGS:  The right costophrenic angles excluded from field of view.    Normal cardiomediastinal contour. No focal consolidation, pleural effusion or pneumothorax.                               US Lower Extremity Veins Bilateral (Final result)  Result time 09/25/20 14:22:39    Final result by Charlie Mike Jr., MD (09/25/20 14:22:39)                 Impression:      No evidence of deep venous thrombosis in either lower extremity.      Electronically signed by: Charlie Ochoa Jr  Date:    09/25/2020  Time:    14:22             Narrative:    EXAMINATION:  US LOWER EXTREMITY VEINS BILATERAL    CLINICAL HISTORY:  Other specified soft tissue disorders    TECHNIQUE:  Duplex and color flow Doppler and dynamic compression was performed of the bilateral lower extremity veins was performed.    COMPARISON:  None    FINDINGS:  Right thigh veins: The common femoral, femoral, popliteal, upper greater saphenous, and deep femoral veins are patent and free of thrombus. The veins are normally compressible and have normal phasic flow and augmentation response.    Right calf veins: The visualized calf veins are patent.    Left thigh veins: The common femoral, femoral, popliteal, upper greater saphenous, and deep femoral veins are patent and free of thrombus. The veins are normally compressible and have normal phasic flow and augmentation response.    Left calf veins: The visualized calf veins are patent.    Miscellaneous: None

## 2020-09-27 NOTE — PLAN OF CARE
Problem: Adult Inpatient Plan of Care  Goal: Plan of Care Review  Outcome: Ongoing, Progressing     VIRTUAL NURSE:  Cued into patient's room.  Permission received per patient to turn camera to view patient.  Introduced as VN for night shift that will be working with floor nurse and nursing assistant.  Educated patient on VN's role in patient care and  [facility] model.  Plan of care reviewed with patient.  Education per flowsheet.   Informed patient that staff will round on them every 2 hours but to use call light for any other needs they may have; informed of fall risk and fall precautions.  Patient verbalized understanding.  Call light within reach; bed siderails up x3.  Opportunity given for questions and questions answered.  Patient denies complaints or any needs at this time.  MEW score 3 with temp 99.3.  Will cont to monitor and intervene as needed.    Labs, notes, orders, and careplan reviewed.

## 2020-09-27 NOTE — PROGRESS NOTES
"Surgery follow up  /65   Pulse 93   Temp 98.6 °F (37 °C)   Resp 18   Ht 5' 4" (1.626 m)   Wt 132 kg (291 lb 0.1 oz)   SpO2 99%   Breastfeeding No   BMI 49.95 kg/m²   I/O last 3 completed shifts:  In: 6417.5 [P.O.:480; I.V.:1287.5; IV Piggyback:4650]  Out: -   No intake/output data recorded.      Recent Results (from the past 336 hour(s))   CBC with Automated Differential    Collection Time: 09/27/20  4:41 AM   Result Value Ref Range    WBC 7.41 3.90 - 12.70 K/uL    Hemoglobin 9.2 (L) 12.0 - 16.0 g/dL    Hematocrit 31.1 (L) 37.0 - 48.5 %    Platelets 175 150 - 350 K/uL   CBC with Automated Differential    Collection Time: 09/26/20  4:39 AM   Result Value Ref Range    WBC 10.89 3.90 - 12.70 K/uL    Hemoglobin 10.2 (L) 12.0 - 16.0 g/dL    Hematocrit 33.8 (L) 37.0 - 48.5 %    Platelets 217 150 - 350 K/uL   CBC W/ AUTO DIFFERENTIAL    Collection Time: 09/25/20 12:20 PM   Result Value Ref Range    WBC 17.06 (H) 3.90 - 12.70 K/uL    Hemoglobin 11.6 (L) 12.0 - 16.0 g/dL    Hematocrit 37.6 37.0 - 48.5 %    Platelets 224 150 - 350 K/uL     Recent Results (from the past 336 hour(s))   Basic metabolic panel    Collection Time: 09/26/20  4:02 PM   Result Value Ref Range    Sodium 134 (L) 136 - 145 mmol/L    Potassium 3.8 3.5 - 5.1 mmol/L    Chloride 101 95 - 110 mmol/L    CO2 22 (L) 23 - 29 mmol/L    BUN, Bld 8 6 - 20 mg/dL    Creatinine 0.8 0.5 - 1.4 mg/dL    Calcium 7.7 (L) 8.7 - 10.5 mg/dL    Anion Gap 11 8 - 16 mmol/L   Basic metabolic panel    Collection Time: 09/25/20  9:30 PM   Result Value Ref Range    Sodium 136 136 - 145 mmol/L    Potassium 3.3 (L) 3.5 - 5.1 mmol/L    Chloride 98 95 - 110 mmol/L    CO2 26 23 - 29 mmol/L    BUN, Bld 17 6 - 20 mg/dL    Creatinine 1.0 0.5 - 1.4 mg/dL    Calcium 8.1 (L) 8.7 - 10.5 mg/dL    Anion Gap 12 8 - 16 mmol/L   awaiting cultures report, , blood cultures negative , swelling left leg unchanged , will start local application of Bactroban daily, continue iv antibiotics.  "

## 2020-09-27 NOTE — ASSESSMENT & PLAN NOTE
K  2.7 on admission  EKG: no changes  Asymptomatic  Possibly due to antifungal meds?    Plan:   KCL repletion x2  in ED  Trend BMP  K 3.1 and Phos 1.5 this AM. Repleted

## 2020-09-27 NOTE — ASSESSMENT & PLAN NOTE
Persistent skin wound  With no skin breaks  Warm to touch  Failed antifungal treatments     Plan:  Antipyretics  Consider restarting IVF, given poor PO intake  Blood cultures pending  vanc  And zosyn

## 2020-09-27 NOTE — NURSING
VIRTUAL NURSE:  Mew score 3 with temp 100.9; patient received tylenol 650mg po per YVES Lindsey. Awaiting temp retake.

## 2020-09-27 NOTE — ASSESSMENT & PLAN NOTE
HR> 105 Temp 39.3C  SIRS  Criteria met    Plan:  Broad spectrum antibiotics, on Vanc and Zosyn  IVF with 2 bore IVs  Consider stating fluids again? Last fluids received 09/26  LA wnl, 1.5 on 09/26  Antipyretics

## 2020-09-27 NOTE — CONSULTS
Today`s Date: 2020     Admit Date: 2020    Admitting Physician: Christopher Diaz MD    Patient`s Name: Mohsen Julien , 39 y.o. female    Reason for consultation  Patient known to me , with cellulitis left leg   Patient Active Problem List:     HTN (hypertension)     Morbidly obese     Venous stasis dermatitis of left lower extremity     Fungal infection of skin     Hypokalemia     Hyperlipidemia     SIRS (systemic inflammatory response syndrome)     Left leg cellulitis      Past Medical History:  No date: Anemia  No date: Arthritis  No date: Fibromyalgia  No date: Hypertension  No date: Thyroid disease    Past Surgical History:  No date: APPENDECTOMY  No date:  SECTION  No date: CHOLECYSTECTOMY    Prior to Admission medications :  Medication amitriptyline (ELAVIL) 25 MG tablet, Sig TAKE 1 to 2 TABLETS BY MOUTH EVERY NIGHT AT BEDTIME AS NEEDED FOR SLEEP DISTURBANCE, Start Date 20, End Date 21, Taking? Yes, Authorizing Provider NADINE Leyva    Medication methocarbamoL (ROBAXIN) 750 MG Tab, Sig Take 1 tablet (750 mg total) by mouth 3 (three) times daily as needed for muscle spasms., Start Date 20, End Date , Taking? Yes, Authorizing Provider     Medication metoprolol tartrate (LOPRESSOR) 100 MG tablet, Sig TAKE ONE TABLET BY MOUTH TWO TIMES A DAY FOR BLOOD PRESSURE, Start Date 20, End Date , Taking? Yes, Authorizing Provider     Medication potassium chloride (KLOR-CON) 10 MEQ TbSR, Sig take one tablet  by mouth daily for potassium replacment, Start Date 19, End Date , Taking? Yes, Authorizing Provider     Medication simvastatin (ZOCOR) 20 MG tablet, Sig Take 1 tablet (20 mg total) by mouth every evening at bedtime to lower cholesterol, Start Date 19, End Date 20, Taking? Yes, Authorizing Provider Barbie Pino MD    Medication traMADoL (ULTRAM) 50 mg tablet, Sig TAKE 1 TABLET BY MOUTH TWICE DAILY, Start Date 20, End Date , Taking? Yes,  Authorizing Provider Ana Griffith MD    Medication triamterene-hydrochlorothiazide 37.5-25 mg (MAXZIDE-25) 37.5-25 mg per tablet, Sig TAKE ONE TABLET BY MOUTH ONCE DAILY FOR BLOOD PRESSURE, Start Date 4/29/20, End Date , Taking? Yes, Authorizing Provider Ana Griffith MD    Medication amoxicillin (AMOXIL) 500 MG capsule, Sig Take 1 capsule by mouth 3 times a day until finished, Start Date 3/15/19, End Date , Taking? , Authorizing Provider     Medication betamethasone valerate 0.1% (VALISONE) 0.1 % Crea, Sig as needed. , Start Date 8/26/16, End Date , Taking? , Authorizing Provider Historical Provider    Medication cephALEXin (KEFLEX) 250 MG capsule, Sig Take 3 capsules (750 mg total) by mouth 2 (two) times daily., Start Date 11/14/18, End Date , Taking? , Authorizing Provider Barbie Pino MD    Medication clotrimazole-betamethasone 1-0.05% (LOTRISONE) cream, Sig Apply topically 2 (two) times daily., Start Date 5/29/19, End Date , Taking? , Authorizing Provider Ana Griffith MD    Medication clotrimazole-betamethasone 1-0.05% (LOTRISONE) cream, Sig Apply topically locally  as directed daily, Start Date 5/19/20, End Date , Taking? , Authorizing Provider Ana Griffith MD    Medication ergocalciferol (ERGOCALCIFEROL) 50,000 unit Cap, Sig TAKE 1 CAPSULE  BY MOUTH  WEEKLY for vitamin dPatient not taking: Reported on 7/29/2020, Start Date 4/16/19, End Date , Taking? , Authorizing Provider     Medication fenofibrate 160 MG Tab, Sig Take 1 tablet (160 mg total) by mouth once daily for hypertriglyceridemia, Start Date 2/19/19, End Date 2/19/20, Taking? , Authorizing Provider Barbie Pino MD    Medication ferrous sulfate 325 mg (65 mg iron) Tab tablet, Sig 325 mg once daily. , Start Date 8/26/16, End Date , Taking? , Authorizing Provider Historical Provider    Medication flu vac [id number] 36mos up,PF, 60 mcg (15 mcg x 4)/0.5 mL Syrg, Sig TO BE ADMINISTERED BY PHARMACIST, Start  Date 9/5/18, End Date , Taking? , Authorizing Provider Colton Browning, PharmD    Medication flu vacc [id number] 6mos up,PF, (FLUARIX QUAD [id number], PF,) 60 mcg (15 mcg x 4)/0.5 mL Syrg, Sig inject by [facility], Start Date 10/2/19, End Date , Taking? , Authorizing Provider Dewayne Berry, Nadja    Medication fluconazole (DIFLUCAN) 150 MG Tab, Sig Take 1 tablet (150 mg total) by mouth every 72 hours.Patient not taking: Reported on 7/29/2020, Start Date 6/19/19, End Date , Taking? , Authorizing Provider Ana Griffith MD    Medication fluconazole (DIFLUCAN) 50 MG Tab, Sig TAKE 1 TABLET BY MOUTH DAILY, Start Date 5/19/20, End Date , Taking? , Authorizing Provider Ana Griffith MD    Medication FLUZONE QUAD [id number], PF, 60 mcg (15 mcg x 4)/0.5 mL Syrg, Sig , Start Date 1/13/17, End Date , Taking? , Authorizing Provider Historical Provider    Medication ibuprofen (ADVIL,MOTRIN) 800 MG tablet, Sig Take 1 tablet (800 mg total) by mouth 3 (three) times daily.Patient not taking: Reported on 7/29/2020, Start Date 1/27/19, End Date , Taking? , Authorizing Provider Marylu Vasques PA-C    Medication ibuprofen (ADVIL,MOTRIN) 800 MG tablet, Sig Take 1 tablet by mouth every 8hours (no more than 4 times a day) as needed for painPatient not taking: Reported on 7/29/2020, Start Date 3/15/19, End Date , Taking? , Authorizing Provider     Medication metoprolol tartrate (LOPRESSOR) 100 MG tablet, Sig , Start Date 8/26/16, End Date , Taking? , Authorizing Provider Historical Provider    Medication MIRENA 20 mcg/24 hr (5 years) IUD, Sig TO BE INSERTED ONE TIME BY PRESCRIBER. ROUTE INTRAUTERINE., Start Date 4/5/17, End Date , Taking? , Authorizing Provider Historical Provider    Medication mupirocin (BACTROBAN) 2 % ointment, Sig APPLY AND GENTLY MASSAGE INTO AFFECTED AREA(S) TWICE DAILY., Start Date 2/13/19, End Date , Taking? , Authorizing Provider     Medication oxyCODONE-acetaminophen (PERCOCET)  mg per tablet, Sig  TAKE 1 TABLET BY MOUTH EVERY 4 TO 6 HOURS AS NEEDED FOR PAINPatient not taking: Reported on 7/29/2020, Start Date 4/4/19, End Date , Taking? , Authorizing Provider Tomasz Iniguez DPM    Medication OPW TEST CLAIM - DO NOT FILL, Sig OPW test claim. Do not fill.Patient not taking: Reported on 7/29/2020, Start Date 7/27/20, End Date , Taking? , Authorizing Provider Dewayne Berry, PharmD    Medication sulfamethoxazole-trimethoprim 800-160mg (BACTRIM DS) 800-160 mg Tab, Sig Take 1 tablet by mouth every 12 (twelve) hours for uti, Start Date 5/9/18, End Date , Taking? , Authorizing Provider Pankaj Cha MD    Medication sulfamethoxazole-trimethoprim 800-160mg (BACTRIM DS) 800-160 mg Tab, Sig TAKE 1 TABLET BY MOUTH TWICE DAILY, Start Date 2/22/19, End Date , Taking? , Authorizing Provider     Medication tramadol (ULTRAM) 50 mg tablet, Sig , Start Date 4/13/15, End Date , Taking? , Authorizing Provider Historical Provider    Medication triamterene-hydrochlorothiazide 37.5-25 mg (MAXZIDE-25) 37.5-25 mg per tablet, Sig Take 1 tablet by mouth once daily., Start Date , End Date , Taking? , Authorizing Provider Historical Provider    Medication triamterene-hydrochlorothiazide 37.5-25 mg (MAXZIDE-25) 37.5-25 mg per tablet, Sig TAKE ONE TABLET BY MOUTH ONCE DAILY FOR BLOOD PRESSURE, Start Date 1/25/19, End Date , Taking? , Authorizing Provider       No current facility-administered medications on file prior to encounter.   Current Outpatient Medications on File Prior to Encounter:  amitriptyline (ELAVIL) 25 MG tablet, TAKE 1 to 2 TABLETS BY MOUTH EVERY NIGHT AT BEDTIME AS NEEDED FOR SLEEP DISTURBANCE, Disp: 90 tablet, Rfl: 3  methocarbamoL (ROBAXIN) 750 MG Tab, Take 1 tablet (750 mg total) by mouth 3 (three) times daily as needed for muscle spasms., Disp: 90 tablet, Rfl: 3  metoprolol tartrate (LOPRESSOR) 100 MG tablet, TAKE ONE TABLET BY MOUTH TWO TIMES A DAY FOR BLOOD PRESSURE, Disp: 180 tablet, Rfl: 3  potassium chloride  (KLOR-CON) 10 MEQ TbSR, take one tablet  by mouth daily for potassium replacment, Disp: 90 tablet, Rfl: 3  simvastatin (ZOCOR) 20 MG tablet, Take 1 tablet (20 mg total) by mouth every evening at bedtime to lower cholesterol, Disp: 90 tablet, Rfl: 3  traMADoL (ULTRAM) 50 mg tablet, TAKE 1 TABLET BY MOUTH TWICE DAILY, Disp: 60 tablet, Rfl: 3  triamterene-hydrochlorothiazide 37.5-25 mg (MAXZIDE-25) 37.5-25 mg per tablet, TAKE ONE TABLET BY MOUTH ONCE DAILY FOR BLOOD PRESSURE, Disp: 90 tablet, Rfl: 3  amoxicillin (AMOXIL) 500 MG capsule, Take 1 capsule by mouth 3 times a day until finished, Disp: 30 capsule, Rfl: 0  betamethasone valerate 0.1% (VALISONE) 0.1 % Crea, as needed. , Disp: , Rfl:   cephALEXin (KEFLEX) 250 MG capsule, Take 3 capsules (750 mg total) by mouth 2 (two) times daily., Disp: 30 capsule, Rfl: 0  clotrimazole-betamethasone 1-0.05% (LOTRISONE) cream, Apply topically 2 (two) times daily., Disp: 45 g, Rfl: 1  clotrimazole-betamethasone 1-0.05% (LOTRISONE) cream, Apply topically locally  as directed daily, Disp: 15 g, Rfl: 2  ergocalciferol (ERGOCALCIFEROL) 50,000 unit Cap, TAKE 1 CAPSULE  BY MOUTH  WEEKLY for vitamin d (Patient not taking: Reported on 7/29/2020), Disp: 12 capsule, Rfl: 3  fenofibrate 160 MG Tab, Take 1 tablet (160 mg total) by mouth once daily for hypertriglyceridemia, Disp: 90 tablet, Rfl: 3  ferrous sulfate 325 mg (65 mg iron) Tab tablet, 325 mg once daily. , Disp: , Rfl:   flu vac [id number] 36mos up,PF, 60 mcg (15 mcg x 4)/0.5 mL Syrg, TO BE ADMINISTERED BY PHARMACIST, Disp: 0.5 mL, Rfl: 0  flu vacc [id number] 6mos up,PF, (FLUARIX QUAD [id number], PF,) 60 mcg (15 mcg x 4)/0.5 mL Syrg, inject by [facility], Disp: 0.5 mL, Rfl: 0  fluconazole (DIFLUCAN) 150 MG Tab, Take 1 tablet (150 mg total) by mouth every 72 hours. (Patient not taking: Reported on 7/29/2020), Disp: 2 tablet, Rfl: 3  fluconazole (DIFLUCAN) 50 MG Tab, TAKE 1 TABLET BY MOUTH DAILY, Disp: 20 tablet, Rfl: 2  FLUZONE QUAD [id number],  PF, 60 mcg (15 mcg x 4)/0.5 mL Syrg, , Disp: , Rfl:    ibuprofen (ADVIL,MOTRIN) 800 MG tablet, Take 1 tablet (800 mg total) by mouth 3 (three) times daily. (Patient not taking: Reported on 7/29/2020), Disp: 40 tablet, Rfl: 0  ibuprofen (ADVIL,MOTRIN) 800 MG tablet, Take 1 tablet by mouth every 8hours (no more than 4 times a day) as needed for pain (Patient not taking: Reported on 7/29/2020), Disp: 16 tablet, Rfl: 0  metoprolol tartrate (LOPRESSOR) 100 MG tablet, , Disp: , Rfl:   MIRENA 20 mcg/24 hr (5 years) IUD, TO BE INSERTED ONE TIME BY PRESCRIBER. ROUTE INTRAUTERINE., Disp: , Rfl: 0  mupirocin (BACTROBAN) 2 % ointment, APPLY AND GENTLY MASSAGE INTO AFFECTED AREA(S) TWICE DAILY., Disp: 22 g, Rfl: 3  oxyCODONE-acetaminophen (PERCOCET)  mg per tablet, TAKE 1 TABLET BY MOUTH EVERY 4 TO 6 HOURS AS NEEDED FOR PAIN (Patient not taking: Reported on 7/29/2020), Disp: 30 tablet, Rfl: 0  OPW TEST CLAIM - DO NOT FILL, OPW test claim. Do not fill. (Patient not taking: Reported on 7/29/2020), Disp: 10 tablet, Rfl: 0  sulfamethoxazole-trimethoprim 800-160mg (BACTRIM DS) 800-160 mg Tab, Take 1 tablet by mouth every 12 (twelve) hours for uti, Disp: 14 tablet, Rfl: 0  sulfamethoxazole-trimethoprim 800-160mg (BACTRIM DS) 800-160 mg Tab, TAKE 1 TABLET BY MOUTH TWICE DAILY, Disp: 20 tablet, Rfl: 0  tramadol (ULTRAM) 50 mg tablet, , Disp: , Rfl: 0  triamterene-hydrochlorothiazide 37.5-25 mg (MAXZIDE-25) 37.5-25 mg per tablet, Take 1 tablet by mouth once daily., Disp: , Rfl:   triamterene-hydrochlorothiazide 37.5-25 mg (MAXZIDE-25) 37.5-25 mg per tablet, TAKE ONE TABLET BY MOUTH ONCE DAILY FOR BLOOD PRESSURE, Disp: 90 tablet, Rfl: 3         Review of patient's allergies indicates:  No Known Allergies    Social History:   reports that she has never smoked. She has never used smokeless tobacco. She reports that she does not drink alcohol or use drugs.     Review of patient's family history indicates:  Problem: Stroke       Relation: Mother          Age of Onset: (Not Specified)  Problem: Colon cancer      Relation: Maternal Aunt          Age of Onset: (Not Specified)      PHYSICAL EXAMINATION  Temp:  [98.5 °F (36.9 °C)-102.3 °F (39.1 °C)] 98.6 °F (37 °C)  Pulse:  [] 93  Resp:  [18-24] 18  SpO2:  [99 %] 99 %  BP: (119-139)/(58-72) 127/65    General Condition:   alert x 3    Head & Neck  Anemia: None  Jaundice: None  Neck vein: Not distended  Carotid Bruits: none  Lymph nodes: none palpable  Thyroid: normal    Chest: normal    Heart: normal    Rt. Breast: not examined  Lt. Breast: not examined  Axillary lymph nodes: none    Abdomen: Soft,  None tender with no palpable mass or organ  Hernia: none    Rectal: Defered    Extremities:  Swelling and edema left leg upto left knee area in circular fashion, with skin break down medially with milr serous ooze from the wound  Vascular: normal    Specific focus Examination    Imp: cellulitis left leg skin break down medially. Obesity, htn    Plan:, continue iv antibiotics, fever w/u  Check culture for areobic , check ct scan leg

## 2020-09-27 NOTE — PLAN OF CARE
Permission attained to enter room via virtual system.  Virtual rounds completed as documented.  Today's lab values, notes, and vital signs up to now have been reviewed.  Pt informed that additional lab studies would be performed

## 2020-09-28 LAB
ALBUMIN SERPL BCP-MCNC: 2.3 G/DL (ref 3.5–5.2)
ALP SERPL-CCNC: 80 U/L (ref 55–135)
ALT SERPL W/O P-5'-P-CCNC: 38 U/L (ref 10–44)
ANION GAP SERPL CALC-SCNC: 12 MMOL/L (ref 8–16)
AST SERPL-CCNC: 48 U/L (ref 10–40)
BACTERIA UR CULT: ABNORMAL
BASOPHILS # BLD AUTO: 0.01 K/UL (ref 0–0.2)
BASOPHILS NFR BLD: 0.1 % (ref 0–1.9)
BILIRUB SERPL-MCNC: 0.5 MG/DL (ref 0.1–1)
BUN SERPL-MCNC: 14 MG/DL (ref 6–20)
CALCIUM SERPL-MCNC: 8.4 MG/DL (ref 8.7–10.5)
CHLORIDE SERPL-SCNC: 100 MMOL/L (ref 95–110)
CO2 SERPL-SCNC: 24 MMOL/L (ref 23–29)
CREAT SERPL-MCNC: 2.2 MG/DL (ref 0.5–1.4)
DIFFERENTIAL METHOD: ABNORMAL
EOSINOPHIL # BLD AUTO: 0 K/UL (ref 0–0.5)
EOSINOPHIL NFR BLD: 0.1 % (ref 0–8)
ERYTHROCYTE [DISTWIDTH] IN BLOOD BY AUTOMATED COUNT: 14.6 % (ref 11.5–14.5)
EST. GFR  (AFRICAN AMERICAN): 31 ML/MIN/1.73 M^2
EST. GFR  (NON AFRICAN AMERICAN): 27 ML/MIN/1.73 M^2
GLUCOSE SERPL-MCNC: 130 MG/DL (ref 70–110)
HCT VFR BLD AUTO: 26.7 % (ref 37–48.5)
HGB BLD-MCNC: 8.2 G/DL (ref 12–16)
IMM GRANULOCYTES # BLD AUTO: 0.09 K/UL (ref 0–0.04)
IMM GRANULOCYTES NFR BLD AUTO: 0.9 % (ref 0–0.5)
LYMPHOCYTES # BLD AUTO: 1.1 K/UL (ref 1–4.8)
LYMPHOCYTES NFR BLD: 11.7 % (ref 18–48)
MAGNESIUM SERPL-MCNC: 2.3 MG/DL (ref 1.6–2.6)
MCH RBC QN AUTO: 23.8 PG (ref 27–31)
MCHC RBC AUTO-ENTMCNC: 30.7 G/DL (ref 32–36)
MCV RBC AUTO: 78 FL (ref 82–98)
MONOCYTES # BLD AUTO: 0.8 K/UL (ref 0.3–1)
MONOCYTES NFR BLD: 7.8 % (ref 4–15)
NEUTROPHILS # BLD AUTO: 7.8 K/UL (ref 1.8–7.7)
NEUTROPHILS NFR BLD: 79.4 % (ref 38–73)
NRBC BLD-RTO: 0 /100 WBC
PHOSPHATE SERPL-MCNC: 2.1 MG/DL (ref 2.7–4.5)
PLATELET # BLD AUTO: 234 K/UL (ref 150–350)
PMV BLD AUTO: 10.3 FL (ref 9.2–12.9)
POTASSIUM SERPL-SCNC: 3.1 MMOL/L (ref 3.5–5.1)
PROT SERPL-MCNC: 6.8 G/DL (ref 6–8.4)
RBC # BLD AUTO: 3.44 M/UL (ref 4–5.4)
SODIUM SERPL-SCNC: 136 MMOL/L (ref 136–145)
VANCOMYCIN TROUGH SERPL-MCNC: 26.4 UG/ML (ref 10–22)
WBC # BLD AUTO: 9.78 K/UL (ref 3.9–12.7)

## 2020-09-28 PROCEDURE — 25000003 PHARM REV CODE 250: Performed by: STUDENT IN AN ORGANIZED HEALTH CARE EDUCATION/TRAINING PROGRAM

## 2020-09-28 PROCEDURE — 84100 ASSAY OF PHOSPHORUS: CPT

## 2020-09-28 PROCEDURE — 25000003 PHARM REV CODE 250: Performed by: FAMILY MEDICINE

## 2020-09-28 PROCEDURE — 63600175 PHARM REV CODE 636 W HCPCS: Performed by: STUDENT IN AN ORGANIZED HEALTH CARE EDUCATION/TRAINING PROGRAM

## 2020-09-28 PROCEDURE — 80053 COMPREHEN METABOLIC PANEL: CPT

## 2020-09-28 PROCEDURE — 21400001 HC TELEMETRY ROOM

## 2020-09-28 PROCEDURE — 80202 ASSAY OF VANCOMYCIN: CPT

## 2020-09-28 PROCEDURE — 85025 COMPLETE CBC W/AUTO DIFF WBC: CPT

## 2020-09-28 PROCEDURE — 63600175 PHARM REV CODE 636 W HCPCS: Performed by: FAMILY MEDICINE

## 2020-09-28 PROCEDURE — 83735 ASSAY OF MAGNESIUM: CPT

## 2020-09-28 RX ORDER — POTASSIUM CHLORIDE 20 MEQ/1
40 TABLET, EXTENDED RELEASE ORAL ONCE
Status: COMPLETED | OUTPATIENT
Start: 2020-09-28 | End: 2020-09-28

## 2020-09-28 RX ORDER — LOPERAMIDE HYDROCHLORIDE 2 MG/1
2 CAPSULE ORAL 4 TIMES DAILY PRN
Status: DISCONTINUED | OUTPATIENT
Start: 2020-09-28 | End: 2020-10-05 | Stop reason: HOSPADM

## 2020-09-28 RX ORDER — KETOROLAC TROMETHAMINE 30 MG/ML
15 INJECTION, SOLUTION INTRAMUSCULAR; INTRAVENOUS ONCE
Status: COMPLETED | OUTPATIENT
Start: 2020-09-28 | End: 2020-09-28

## 2020-09-28 RX ORDER — CLINDAMYCIN PHOSPHATE 600 MG/50ML
600 INJECTION, SOLUTION INTRAVENOUS
Status: DISCONTINUED | OUTPATIENT
Start: 2020-09-28 | End: 2020-09-28

## 2020-09-28 RX ORDER — POTASSIUM CHLORIDE 1.5 G/1.58G
40 POWDER, FOR SOLUTION ORAL ONCE
Status: COMPLETED | OUTPATIENT
Start: 2020-09-28 | End: 2020-09-28

## 2020-09-28 RX ADMIN — VANCOMYCIN HYDROCHLORIDE 1750 MG: 750 INJECTION, POWDER, LYOPHILIZED, FOR SOLUTION INTRAVENOUS at 12:09

## 2020-09-28 RX ADMIN — FENOFIBRATE 145 MG: 145 TABLET ORAL at 09:09

## 2020-09-28 RX ADMIN — CLINDAMYCIN IN 5 PERCENT DEXTROSE 600 MG: 12 INJECTION, SOLUTION INTRAVENOUS at 06:09

## 2020-09-28 RX ADMIN — ONDANSETRON 8 MG: 8 TABLET, ORALLY DISINTEGRATING ORAL at 01:09

## 2020-09-28 RX ADMIN — TRIAMTERENE AND HYDROCHLOROTHIAZIDE 1 CAPSULE: 25; 37.5 CAPSULE ORAL at 08:09

## 2020-09-28 RX ADMIN — SODIUM CHLORIDE, SODIUM LACTATE, POTASSIUM CHLORIDE, AND CALCIUM CHLORIDE: .6; .31; .03; .02 INJECTION, SOLUTION INTRAVENOUS at 06:09

## 2020-09-28 RX ADMIN — METOPROLOL TARTRATE 100 MG: 50 TABLET, FILM COATED ORAL at 09:09

## 2020-09-28 RX ADMIN — MUPIROCIN: 20 OINTMENT TOPICAL at 09:09

## 2020-09-28 RX ADMIN — PIPERACILLIN AND TAZOBACTAM 4.5 G: 4; .5 INJECTION, POWDER, LYOPHILIZED, FOR SOLUTION INTRAVENOUS; PARENTERAL at 05:09

## 2020-09-28 RX ADMIN — FERROUS SULFATE TAB EC 325 MG (65 MG FE EQUIVALENT) 325 MG: 325 (65 FE) TABLET DELAYED RESPONSE at 08:09

## 2020-09-28 RX ADMIN — MUPIROCIN: 20 OINTMENT TOPICAL at 08:09

## 2020-09-28 RX ADMIN — ENOXAPARIN SODIUM 40 MG: 40 INJECTION SUBCUTANEOUS at 06:09

## 2020-09-28 RX ADMIN — POTASSIUM CHLORIDE 40 MEQ: 1.5 FOR SOLUTION ORAL at 02:09

## 2020-09-28 RX ADMIN — LOPERAMIDE HYDROCHLORIDE 2 MG: 2 CAPSULE ORAL at 01:09

## 2020-09-28 RX ADMIN — CLINDAMYCIN IN 5 PERCENT DEXTROSE 600 MG: 12 INJECTION, SOLUTION INTRAVENOUS at 11:09

## 2020-09-28 RX ADMIN — KETOROLAC TROMETHAMINE 15 MG: 30 INJECTION, SOLUTION INTRAMUSCULAR at 06:09

## 2020-09-28 RX ADMIN — FUROSEMIDE 20 MG: 20 TABLET ORAL at 08:09

## 2020-09-28 RX ADMIN — Medication 6 MG: at 11:09

## 2020-09-28 RX ADMIN — SIMVASTATIN 20 MG: 20 TABLET, FILM COATED ORAL at 09:09

## 2020-09-28 RX ADMIN — METOPROLOL TARTRATE 100 MG: 50 TABLET, FILM COATED ORAL at 08:09

## 2020-09-28 RX ADMIN — AMITRIPTYLINE HYDROCHLORIDE 25 MG: 25 TABLET, FILM COATED ORAL at 11:09

## 2020-09-28 RX ADMIN — POTASSIUM CHLORIDE 40 MEQ: 1500 TABLET, EXTENDED RELEASE ORAL at 02:09

## 2020-09-28 NOTE — PROGRESS NOTES
[facility] Hospital Medicine  Progress Note    Patient Name: Mohsen Julien  MRN: [id number]  Patient Class: IP- Inpatient   Admission Date: 9/25/2020  Length of Stay: 1 days  Attending Physician: Christopher Diaz MD  Primary Care Provider: No primary care provider on file.        Subjective:     Principal Problem:Left leg cellulitis        HPI:  Patient is a 39 y.o.  female with PMHx of HTN and HLD presenting with fevers and chills starting last night. Pt  Reports associated nausea and vomiting with headache and left leg swelling. Pt reports fevers and chills persisted this morning and  Was told by Dr. Griffith to come to the ED. Pt reports left leg skin changes for a couple of months and she has been taking antifungal medications, po and cream, to treat the wound. Pt reports using compression socks but noticed clear discharge on the socks.     In ED, patient was febrile with nausea. Pt given zofran and started IVF. Lab work presented hypokalemia of 2.7, ESR 42,  and WBC  Of  17. Pt given onedose of 40mEq K repletion. Blood cultures were ordered. Pt admitted to observation for hypokalemia and cellulitis.     Overview/Hospital Course:  9/26 pt fevers persisted, met SIRS criteria. Broad spectrum abx with 2 bore IVs .   9/27 fevers persisting, on IV vanc and zosyn,    Interval History: multiple episodes of watery diarrhea. Afebrile. Reports feeling better and LLE improving. Increased appetite this morning, will attempt to eat.     Review of Systems   Constitutional: Positive for chills. Negative for activity change and fever.   HENT: Negative for congestion, postnasal drip, rhinorrhea and sore throat.    Eyes: Negative for discharge and redness.   Respiratory: Negative for cough, chest tightness and wheezing.    Cardiovascular: Negative for chest pain and palpitations.   Gastrointestinal: Negative for abdominal distention, abdominal pain, diarrhea, nausea and vomiting.    Genitourinary: Negative for dysuria, frequency and urgency.   Musculoskeletal: Negative for back pain and joint swelling.   Skin: Positive for color change, rash and wound.        LLE skin breakdown w/ drainage on lateral leg.    Neurological: Negative for weakness and headaches.   Psychiatric/Behavioral: Negative.      Objective:     Vital Signs (Most Recent):  Temp: 99.1 °F (37.3 °C) (09/28/20 0354)  Pulse: 96 (09/28/20 0824)  Resp: 18 (09/28/20 0824)  BP: 115/60 (09/28/20 0824)  SpO2: 99 % (09/27/20 0450) Vital Signs (24h Range):  Temp:  [98.6 °F (37 °C)-102.3 °F (39.1 °C)] 99.1 °F (37.3 °C)  Pulse:  [] 96  Resp:  [18-20] 18  BP: (108-138)/(58-70) 115/60     Weight: 132 kg (291 lb 0.1 oz)  Body mass index is 49.95 kg/m².    Intake/Output Summary (Last 24 hours) at 9/28/2020 0846  Last data filed at 9/27/2020 1821  Gross per 24 hour   Intake 1402.5 ml   Output 700 ml   Net 702.5 ml      Physical Exam  Vitals signs and nursing note reviewed.   Constitutional:       Appearance: Normal appearance. She is obese.   HENT:      Head: Normocephalic and atraumatic.      Mouth/Throat:      Pharynx: Oropharynx is clear.   Eyes:      General:         Right eye: No discharge.         Left eye: No discharge.      Conjunctiva/sclera: Conjunctivae normal.   Neck:      Musculoskeletal: Normal range of motion.   Cardiovascular:      Pulses: Normal pulses.   Pulmonary:      Effort: Pulmonary effort is normal. No respiratory distress.   Abdominal:      General: Abdomen is flat.      Palpations: Abdomen is soft.      Tenderness: There is no abdominal tenderness.   Musculoskeletal:         General: No swelling.      Right lower leg: No edema.   Skin:     General: Skin is warm.      Findings: Erythema and wound present.             Comments: Left lower extremity skin breakdown w/ drainage from lateral leg. Warm to touch to midshaft, improving    Neurological:      General: No focal deficit present.      Mental Status: She is  alert and oriented to person, place, and time.   Psychiatric:         Mood and Affect: Mood normal.         Behavior: Behavior normal.         Significant Labs:   CBC:   Recent Labs   Lab 09/27/20  0441   WBC 7.41   HGB 9.2*   HCT 31.1*        CMP:   Recent Labs   Lab 09/26/20  1602 09/27/20  0441   * 134*   K 3.8 3.1*    100   CO2 22* 24   * 135*   BUN 8 7   CREATININE 0.8 0.9   CALCIUM 7.7* 7.8*   PROT  --  6.8   ALBUMIN  --  2.5*   BILITOT  --  0.6   ALKPHOS  --  94   AST  --  19   ALT  --  20   ANIONGAP 11 10   EGFRNONAA >60 >60       Significant Imaging: I have reviewed all pertinent imaging results/findings within the past 24 hours.      Assessment/Plan:      * Left leg cellulitis  Persistent skin wound  With no skin breaks  Warm to touch  Failed antifungal treatments     Plan:  Antipyretics  Encouraging PO intake.   Blood cultures ngtd  vanc  And zosyn      SIRS (systemic inflammatory response syndrome)  HR> 105 Temp 39.3C  SIRS  Criteria met    Plan:  Broad spectrum antibiotics, on Vanc and Zosyn  IVF with 2 bore IVs  Consider stating fluids again? Last fluids received 09/26  LA wnl, 1.5 on 09/26  Antipyretics       Hyperlipidemia  Restart home med  On fenofibrate 145mg PO daily and simvastatin 20mg PO QHS    Hypokalemia  K  2.7 on admission  EKG: no changes  Asymptomatic  Possibly due to antifungal meds?    Plan:   KCL repletion x2  in ED  Trend BMP  AM labs pending collection          Morbidly obese        HTN (hypertension)  BP stable in ED    Plan:   Continue home meds  On lasix 20mg BID and lopressor 100mg BID      VTE Risk Mitigation (From admission, onward)         Ordered     enoxaparin injection 40 mg  Every 24 hours      09/25/20 1920     IP VTE HIGH RISK PATIENT  Once      09/25/20 1920     Place sequential compression device  Until discontinued      09/25/20 1920                Discharge Planning   SAYRA:      Code Status: Full Code   Is the patient medically ready for  discharge?:     Reason for patient still in hospital (select all that apply): Patient trending condition, Laboratory test and Treatment                     Maryanne Hallman MD  Department of Hospital Medicine   [facility]

## 2020-09-28 NOTE — PLAN OF CARE
TN met with patient at bedside to complete discharge assessment. Currently, patient lives at home with family and is independent with ADL's. No DME or HH noted. Per patient, upon discharge her family will provide transportation home and will be available to help as needed. Patient states she does not have a PCP at this time.      TN  updated whiteboard with contact information. Discharge brochure given to patient. TN will continue to follow throughout transitions of care and will assist with discharge needs.          09/28/20 1130   Discharge Assessment   Assessment Type Discharge Planning Assessment   Confirmed/corrected address and phone number on facesheet? Yes   Assessment information obtained from? Patient;Medical Record   Expected Length of Stay (days) 3   Communicated expected length of stay with patient/caregiver yes   Prior to hospitilization cognitive status: Alert/Oriented   Prior to hospitalization functional status: Independent   Current cognitive status: Alert/Oriented   Current Functional Status: Independent   Lives With other relative(s)   Able to Return to Prior Arrangements yes   Is patient able to care for self after discharge? Yes   Readmission Within the Last 30 Days no previous admission in last 30 days   Patient currently being followed by outpatient case management? No   Patient currently receives any other outside agency services? No   Equipment Currently Used at Home none   Do you have any problems affording any of your prescribed medications? No   Is the patient taking medications as prescribed? yes   Does the patient have transportation home? Yes   Transportation Anticipated family or friend will provide   Does the patient receive services at the [facility]? No   Discharge Plan A Home with family   DME Needed Upon Discharge  none

## 2020-09-28 NOTE — ASSESSMENT & PLAN NOTE
K  2.7 on admission  EKG: no changes  Asymptomatic  Possibly due to antifungal meds?    Plan:   KCL repletion x2  in ED  Trend BMP  AM labs pending collection

## 2020-09-28 NOTE — PLAN OF CARE
Patient AAOx4, VSS, patient tolerating activity, ambulated to bathroom with assistance.  Free from falls. Tolerating diet, no nausea or vomiting. Patient's dressing cleansed and dressing changed today. Clean, dry, and intact. Call bell in reach. Safety maintained. Will continue to monitor.   Problem: Fall Injury Risk  Goal: Absence of Fall and Fall-Related Injury  Outcome: Ongoing, Progressing     Problem: Adult Inpatient Plan of Care  Goal: Plan of Care Review  Outcome: Ongoing, Progressing  Goal: Patient-Specific Goal (Individualization)  Outcome: Ongoing, Progressing  Goal: Absence of Hospital-Acquired Illness or Injury  Outcome: Ongoing, Progressing  Goal: Optimal Comfort and Wellbeing  Outcome: Ongoing, Progressing  Goal: Readiness for Transition of Care  Outcome: Ongoing, Progressing  Goal: Rounds/Family Conference  Outcome: Ongoing, Progressing     Problem: Wound  Goal: Optimal Wound Healing  Outcome: Ongoing, Progressing     Problem: Infection  Goal: Infection Symptom Resolution  Outcome: Ongoing, Progressing     Problem: Skin or Soft Tissue Infection  Goal: Infection Symptom Resolution  Outcome: Ongoing, Progressing

## 2020-09-28 NOTE — ASSESSMENT & PLAN NOTE
K  2.7 on admission  EKG: no changes  Asymptomatic  Possibly due to antifungal meds?    Plan:   KCL repletion x2  in ED  Trend BMP  AM labs showing hypokalemia, repleted this AM  Dc  lasix

## 2020-09-28 NOTE — PROGRESS NOTES
"Surgery follow up  /60   Pulse 94   Temp 98.8 °F (37.1 °C)   Resp 18   Ht 5' 4" (1.626 m)   Wt 132 kg (291 lb 0.1 oz)   SpO2 99%   Breastfeeding No   BMI 49.95 kg/m²   I/O last 3 completed shifts:  In: 2342.5 [P.O.:480; I.V.:562.5; IV Piggyback:1300]  Out: 700 [Urine:700]  I/O this shift:  In: 50 [IV Piggyback:50]  Out: 600 [Urine:600]  Recent Results (from the past 336 hour(s))   CBC with Automated Differential    Collection Time: 09/27/20  4:41 AM   Result Value Ref Range    WBC 7.41 3.90 - 12.70 K/uL    Hemoglobin 9.2 (L) 12.0 - 16.0 g/dL    Hematocrit 31.1 (L) 37.0 - 48.5 %    Platelets 175 150 - 350 K/uL   CBC with Automated Differential    Collection Time: 09/26/20  4:39 AM   Result Value Ref Range    WBC 10.89 3.90 - 12.70 K/uL    Hemoglobin 10.2 (L) 12.0 - 16.0 g/dL    Hematocrit 33.8 (L) 37.0 - 48.5 %    Platelets 217 150 - 350 K/uL   CBC W/ AUTO DIFFERENTIAL    Collection Time: 09/25/20 12:20 PM   Result Value Ref Range    WBC 17.06 (H) 3.90 - 12.70 K/uL    Hemoglobin 11.6 (L) 12.0 - 16.0 g/dL    Hematocrit 37.6 37.0 - 48.5 %    Platelets 224 150 - 350 K/uL   Afebrile , Pain better leg area, No dressing placed left leg, awaitng culture repot , cellulitis, t. leg getting better   Continue present treatment.  "

## 2020-09-28 NOTE — SUBJECTIVE & OBJECTIVE
Interval History: multiple episodes of watery diarrhea. Afebrile. Reports feeling better and LLE improving. Increased appetite this morning, will attempt to eat.     Review of Systems   Constitutional: Positive for chills. Negative for activity change and fever.   HENT: Negative for congestion, postnasal drip, rhinorrhea and sore throat.    Eyes: Negative for discharge and redness.   Respiratory: Negative for cough, chest tightness and wheezing.    Cardiovascular: Negative for chest pain and palpitations.   Gastrointestinal: Negative for abdominal distention, abdominal pain, diarrhea, nausea and vomiting.   Genitourinary: Negative for dysuria, frequency and urgency.   Musculoskeletal: Negative for back pain and joint swelling.   Skin: Positive for color change, rash and wound.        LLE skin breakdown w/ drainage on lateral leg.    Neurological: Negative for weakness and headaches.   Psychiatric/Behavioral: Negative.      Objective:     Vital Signs (Most Recent):  Temp: 99.1 °F (37.3 °C) (09/28/20 0354)  Pulse: 96 (09/28/20 0824)  Resp: 18 (09/28/20 0824)  BP: 115/60 (09/28/20 0824)  SpO2: 99 % (09/27/20 0450) Vital Signs (24h Range):  Temp:  [98.6 °F (37 °C)-102.3 °F (39.1 °C)] 99.1 °F (37.3 °C)  Pulse:  [] 96  Resp:  [18-20] 18  BP: (108-138)/(58-70) 115/60     Weight: 132 kg (291 lb 0.1 oz)  Body mass index is 49.95 kg/m².    Intake/Output Summary (Last 24 hours) at 9/28/2020 0846  Last data filed at 9/27/2020 1821  Gross per 24 hour   Intake 1402.5 ml   Output 700 ml   Net 702.5 ml      Physical Exam  Vitals signs and nursing note reviewed.   Constitutional:       Appearance: Normal appearance. She is obese.   HENT:      Head: Normocephalic and atraumatic.      Mouth/Throat:      Pharynx: Oropharynx is clear.   Eyes:      General:         Right eye: No discharge.         Left eye: No discharge.      Conjunctiva/sclera: Conjunctivae normal.   Neck:      Musculoskeletal: Normal range of motion.    Cardiovascular:      Pulses: Normal pulses.   Pulmonary:      Effort: Pulmonary effort is normal. No respiratory distress.   Abdominal:      General: Abdomen is flat.      Palpations: Abdomen is soft.      Tenderness: There is no abdominal tenderness.   Musculoskeletal:         General: No swelling.      Right lower leg: No edema.   Skin:     General: Skin is warm.      Findings: Erythema and wound present.             Comments: Left lower extremity skin breakdown w/ drainage from lateral leg. Warm to touch to midshaft, improving    Neurological:      General: No focal deficit present.      Mental Status: She is alert and oriented to person, place, and time.   Psychiatric:         Mood and Affect: Mood normal.         Behavior: Behavior normal.         Significant Labs:   CBC:   Recent Labs   Lab 09/27/20  0441   WBC 7.41   HGB 9.2*   HCT 31.1*        CMP:   Recent Labs   Lab 09/26/20  1602 09/27/20  0441   * 134*   K 3.8 3.1*    100   CO2 22* 24   * 135*   BUN 8 7   CREATININE 0.8 0.9   CALCIUM 7.7* 7.8*   PROT  --  6.8   ALBUMIN  --  2.5*   BILITOT  --  0.6   ALKPHOS  --  94   AST  --  19   ALT  --  20   ANIONGAP 11 10   EGFRNONAA >60 >60       Significant Imaging: I have reviewed all pertinent imaging results/findings within the past 24 hours.

## 2020-09-28 NOTE — PLAN OF CARE
VN rounds: VN cued into pt's room with pt's permission. Pt sitting up in chair at bedside. Fall risk protocol discussed with pt. VN instructed pt to call for assistance. Pt aware and agreeable. Pt rates lle pain 6/10. Refused pain medication. NAD noted. Allowed time for questions.  Will cont to be available and intervene as needed.

## 2020-09-28 NOTE — PLAN OF CARE
Pt vitals were maintained, pt denied any pain. Md was called b/c pt c/o of diarrhea, imodium was ordered. Pt ambulates with asst. To bathroom, pt left foot is toe to touching when ambulating. L/ leg is warm to touch. Iv antibiotics tolerated. Possible MRSA in wound awaiting results. TM

## 2020-09-28 NOTE — ASSESSMENT & PLAN NOTE
Persistent skin wound  With no skin breaks  Warm to touch  Failed antifungal treatments     Plan:  Antipyretics  Encouraging PO intake.   Blood cultures ngtd  vanc  And zosyn

## 2020-09-28 NOTE — PROGRESS NOTES
Pharmacokinetic Assessment Follow Up: IV Vancomycin    Vancomycin serum concentration assessment(s):    The trough level was drawn correctly and can be used to guide therapy at this time. The measurement is above the desired definitive target range of 10 to 15 mcg/mL.    Vancomycin Regimen Plan:    Plan to HOLD vancomycin therapy today as level is elevated and patient with new MAYUR. Will order vancomycin random with AM labs on 9/29.     Drug levels (last 3 results):  Recent Labs   Lab Result Units 09/28/20  1310   Vancomycin-Trough ug/mL 26.4*       Pharmacy will continue to follow and monitor vancomycin.    Please contact pharmacy at extension [phone] for questions regarding this assessment.    Thank you for the consult,   Татьяна Singh       Patient brief summary:  Mohsen Julien is a 39 y.o. female initiated on antimicrobial therapy with IV Vancomycin for treatment of skin & soft tissue infection    The patient's current regimen is vancomycin 1750 mg q 12 hours    Drug Allergies:   Review of patient's allergies indicates:  No Known Allergies    Actual Body Weight:   132 kg    Renal Function:   Estimated Creatinine Clearance: 45.5 mL/min (A) (based on SCr of 2.2 mg/dL (H)).,     Dialysis Method (if applicable):  N/A    CBC (last 72 hours):  Recent Labs   Lab Result Units 09/25/20  2349 09/26/20  0439 09/27/20  0441 09/28/20  1310   WBC K/uL  --  10.89 7.41 9.78   Hemoglobin g/dL  --  10.2* 9.2* 8.2*   Hemoglobin A1C % 4.9  --   --   --    Hematocrit %  --  33.8* 31.1* 26.7*   Platelets K/uL  --  217 175 234   Gran% %  --  88.6* 84.0* 79.4*   Lymph% %  --  6.9* 10.1* 11.7*   Mono% %  --  2.3* 5.1 7.8   Eosinophil% %  --  0.8 0.3 0.1   Basophil% %  --  0.2 0.1 0.1   Differential Method   --  Automated Automated Automated       Metabolic Panel (last 72 hours):  Recent Labs   Lab Result Units 09/25/20  1559 09/25/20  2130 09/25/20  2349 09/26/20  0439 09/26/20  1602 09/27/20  0441 09/27/20  1446 09/28/20  1319    Sodium mmol/L  --  136  --  133* 134* 134*  --  136   Potassium mmol/L  --  3.3*  --  2.8* 3.8 3.1*  --  3.1*   Chloride mmol/L  --  98  --  98 101 100  --  100   CO2 mmol/L  --  26  --  25 22* 24  --  24   Glucose mg/dL  --  126*  --  181* 152* 135*  --  130*   Glucose, UA  Negative  --   --   --   --   --  Negative  --    BUN, Bld mg/dL  --  17  --  13 8 7  --  14   Creatinine mg/dL  --  1.0  --  0.9 0.8 0.9  --  2.2*   Creatinine, Random Ur mg/dL 218.7  --   --   --   --   --   --   --    Albumin g/dL  --   --   --  2.8*  --  2.5*  --  2.3*   Total Bilirubin mg/dL  --   --   --  1.1*  --  0.6  --  0.5   Alkaline Phosphatase U/L  --   --   --  88  --  94  --  80   AST U/L  --   --   --  16  --  19  --  48*   ALT U/L  --   --   --  19  --  20  --  38   Magnesium mg/dL  --   --  1.3* 1.9  --  2.5  --  2.3   Phosphorus mg/dL  --   --   --  1.1*  --  1.5*  --  2.1*       Vancomycin Administrations:  vancomycin given in the last 96 hours                     vancomycin (VANCOCIN) 1,750 mg in dextrose 5 % 500 mL IVPB (mg) 1,750 mg New Bag 09/28/20 0058     1,750 mg New Bag 09/27/20 1351     1,750 mg New Bag  0053     1,750 mg New Bag 09/26/20 1323    vancomycin (VANCOCIN) 2,500 mg in dextrose 5 % 500 mL IVPB (mg) 2,500 mg New Bag 09/26/20 0153                    Microbiologic Results:  Microbiology Results (last 7 days)       Procedure Component Value Units Date/Time    Urine Culture High Risk [[id number]] Collected: 09/26/20 1131    Order Status: Completed Specimen: Urine, Clean Catch Updated: 09/28/20 0921     Urine Culture, Routine Further report to follow    Narrative:      Indicated criteria for high risk culture:->Other  Other (specify):->septic patient on broad spectrum abx with  continued fevers    Clostridium difficile EIA [[id number]]     Order Status: No result Specimen: Stool     Aerobic culture [[id number]] Collected: 09/26/20 1316    Order Status: Completed Specimen: Wound from Leg, Left Updated: 09/28/20  0829     Aerobic Bacterial Culture Further report to follow    Blood Culture #2 **CANNOT BE ORDERED STAT** [[id number]] Collected: 09/25/20 1410    Order Status: Completed Specimen: Blood from Peripheral, Antecubital, Left Updated: 09/27/20 2312     Blood Culture, Routine No Growth to date      No Growth to date      No Growth to date    Blood Culture #1 **CANNOT BE ORDERED STAT** [[id number]] Collected: 09/25/20 1355    Order Status: Completed Specimen: Blood from Peripheral, Antecubital, Right Updated: 09/27/20 2312     Blood Culture, Routine No Growth to date      No Growth to date      No Growth to date    Blood culture [[id number]] Collected: 09/27/20 0944    Order Status: Completed Specimen: Blood from Antecubital, Left Arm Updated: 09/27/20 2115     Blood Culture, Routine No Growth to date    Narrative:      Please collected 2 cultures from 2 separate sites    Blood culture [[id number]] Collected: 09/27/20 0939    Order Status: Completed Specimen: Blood from Antecubital, Left Arm Updated: 09/27/20 2115     Blood Culture, Routine No Growth to date    Narrative:      Please collect 2 cultures from 2 separate sites.  Collection has been rescheduled by UAD at 09/27/2020 09:46 Reason:   duplicate order did 2 cultute  Collection has been rescheduled by UAD at 09/27/2020 09:46 Reason:   duplicate order did 2 cultute    Blood culture [[id number]] Collected: 09/26/20 0439    Order Status: Completed Specimen: Blood from Peripheral, Left Updated: 09/27/20 1412     Blood Culture, Routine No Growth to date      No Growth to date    Blood culture [[id number]] Collected: 09/26/20 0444    Order Status: Completed Specimen: Blood from Peripheral, Left Updated: 09/27/20 1412     Blood Culture, Routine No Growth to date      No Growth to date    Blood culture [[id number]]     Order Status: Canceled Specimen: Blood     Blood culture [[id number]]     Order Status: Canceled Specimen: Blood     Blood culture [[id number]]     Order Status:  Canceled Specimen: Blood

## 2020-09-28 NOTE — PROGRESS NOTES
Rx Message- Therapy with Vancomycin is complete and/or discontinued by provider and Vancomycin lab and consult is now discontinued. Pharmacy will sign off at this time.Please re-consult as needed. Thanks

## 2020-09-29 ENCOUNTER — ANESTHESIA (OUTPATIENT)
Dept: MEDSURG UNIT | Facility: HOSPITAL | Age: 40
DRG: 871 | End: 2020-09-29
Payer: MEDICAID

## 2020-09-29 ENCOUNTER — ANESTHESIA EVENT (OUTPATIENT)
Dept: MEDSURG UNIT | Facility: HOSPITAL | Age: 40
DRG: 871 | End: 2020-09-29
Payer: MEDICAID

## 2020-09-29 LAB
ALBUMIN SERPL BCP-MCNC: 2.2 G/DL (ref 3.5–5.2)
ALP SERPL-CCNC: 79 U/L (ref 55–135)
ALT SERPL W/O P-5'-P-CCNC: 83 U/L (ref 10–44)
ANION GAP SERPL CALC-SCNC: 11 MMOL/L (ref 8–16)
AST SERPL-CCNC: 88 U/L (ref 10–40)
BACTERIA SPEC AEROBE CULT: NORMAL
BASOPHILS # BLD AUTO: 0.02 K/UL (ref 0–0.2)
BASOPHILS NFR BLD: 0.2 % (ref 0–1.9)
BILIRUB SERPL-MCNC: 0.5 MG/DL (ref 0.1–1)
BUN SERPL-MCNC: 15 MG/DL (ref 6–20)
CALCIUM SERPL-MCNC: 8.2 MG/DL (ref 8.7–10.5)
CHLORIDE SERPL-SCNC: 102 MMOL/L (ref 95–110)
CO2 SERPL-SCNC: 25 MMOL/L (ref 23–29)
CREAT SERPL-MCNC: 2.3 MG/DL (ref 0.5–1.4)
DIFFERENTIAL METHOD: ABNORMAL
EOSINOPHIL # BLD AUTO: 0 K/UL (ref 0–0.5)
EOSINOPHIL NFR BLD: 0.5 % (ref 0–8)
ERYTHROCYTE [DISTWIDTH] IN BLOOD BY AUTOMATED COUNT: 14.5 % (ref 11.5–14.5)
EST. GFR  (AFRICAN AMERICAN): 30 ML/MIN/1.73 M^2
EST. GFR  (NON AFRICAN AMERICAN): 26 ML/MIN/1.73 M^2
GLUCOSE SERPL-MCNC: 118 MG/DL (ref 70–110)
HCT VFR BLD AUTO: 27.7 % (ref 37–48.5)
HGB BLD-MCNC: 8.5 G/DL (ref 12–16)
IMM GRANULOCYTES # BLD AUTO: 0.1 K/UL (ref 0–0.04)
IMM GRANULOCYTES NFR BLD AUTO: 1.2 % (ref 0–0.5)
LYMPHOCYTES # BLD AUTO: 1.4 K/UL (ref 1–4.8)
LYMPHOCYTES NFR BLD: 17 % (ref 18–48)
MAGNESIUM SERPL-MCNC: 2.3 MG/DL (ref 1.6–2.6)
MCH RBC QN AUTO: 23.6 PG (ref 27–31)
MCHC RBC AUTO-ENTMCNC: 30.7 G/DL (ref 32–36)
MCV RBC AUTO: 77 FL (ref 82–98)
MONOCYTES # BLD AUTO: 0.9 K/UL (ref 0.3–1)
MONOCYTES NFR BLD: 10.3 % (ref 4–15)
NEUTROPHILS # BLD AUTO: 5.9 K/UL (ref 1.8–7.7)
NEUTROPHILS NFR BLD: 70.8 % (ref 38–73)
NRBC BLD-RTO: 0 /100 WBC
PHOSPHATE SERPL-MCNC: 2.5 MG/DL (ref 2.7–4.5)
PLATELET # BLD AUTO: 237 K/UL (ref 150–350)
PMV BLD AUTO: 10 FL (ref 9.2–12.9)
POTASSIUM SERPL-SCNC: 3.1 MMOL/L (ref 3.5–5.1)
PROT SERPL-MCNC: 6.7 G/DL (ref 6–8.4)
RBC # BLD AUTO: 3.6 M/UL (ref 4–5.4)
SODIUM SERPL-SCNC: 138 MMOL/L (ref 136–145)
VANCOMYCIN SERPL-MCNC: 15.8 UG/ML
WBC # BLD AUTO: 8.33 K/UL (ref 3.9–12.7)

## 2020-09-29 PROCEDURE — 36415 COLL VENOUS BLD VENIPUNCTURE: CPT

## 2020-09-29 PROCEDURE — 63600175 PHARM REV CODE 636 W HCPCS: Performed by: SURGERY

## 2020-09-29 PROCEDURE — 80202 ASSAY OF VANCOMYCIN: CPT

## 2020-09-29 PROCEDURE — 25000003 PHARM REV CODE 250: Performed by: STUDENT IN AN ORGANIZED HEALTH CARE EDUCATION/TRAINING PROGRAM

## 2020-09-29 PROCEDURE — 85025 COMPLETE CBC W/AUTO DIFF WBC: CPT

## 2020-09-29 PROCEDURE — 36000 PLACE NEEDLE IN VEIN: CPT | Performed by: STUDENT IN AN ORGANIZED HEALTH CARE EDUCATION/TRAINING PROGRAM

## 2020-09-29 PROCEDURE — 63600175 PHARM REV CODE 636 W HCPCS: Performed by: STUDENT IN AN ORGANIZED HEALTH CARE EDUCATION/TRAINING PROGRAM

## 2020-09-29 PROCEDURE — 84100 ASSAY OF PHOSPHORUS: CPT

## 2020-09-29 PROCEDURE — 25000003 PHARM REV CODE 250: Performed by: SURGERY

## 2020-09-29 PROCEDURE — 63600175 PHARM REV CODE 636 W HCPCS: Performed by: FAMILY MEDICINE

## 2020-09-29 PROCEDURE — 83735 ASSAY OF MAGNESIUM: CPT

## 2020-09-29 PROCEDURE — 21400001 HC TELEMETRY ROOM

## 2020-09-29 PROCEDURE — 80053 COMPREHEN METABOLIC PANEL: CPT

## 2020-09-29 RX ORDER — CEFEPIME HYDROCHLORIDE 1 G/50ML
1 INJECTION, SOLUTION INTRAVENOUS
Status: DISCONTINUED | OUTPATIENT
Start: 2020-09-29 | End: 2020-09-29

## 2020-09-29 RX ORDER — POTASSIUM CHLORIDE 20 MEQ/1
40 TABLET, EXTENDED RELEASE ORAL ONCE
Status: COMPLETED | OUTPATIENT
Start: 2020-09-29 | End: 2020-09-29

## 2020-09-29 RX ORDER — METRONIDAZOLE 500 MG/1
500 TABLET ORAL EVERY 12 HOURS
Status: DISCONTINUED | OUTPATIENT
Start: 2020-09-29 | End: 2020-09-29

## 2020-09-29 RX ORDER — HYDROCODONE BITARTRATE AND ACETAMINOPHEN 5; 325 MG/1; MG/1
1 TABLET ORAL EVERY 8 HOURS PRN
Status: DISCONTINUED | OUTPATIENT
Start: 2020-09-29 | End: 2020-10-01

## 2020-09-29 RX ORDER — CEFEPIME HYDROCHLORIDE 1 G/50ML
1 INJECTION, SOLUTION INTRAVENOUS
Status: DISCONTINUED | OUTPATIENT
Start: 2020-09-29 | End: 2020-10-04

## 2020-09-29 RX ORDER — POTASSIUM CHLORIDE 1.5 G/1.58G
40 POWDER, FOR SOLUTION ORAL DAILY
Status: DISCONTINUED | OUTPATIENT
Start: 2020-09-29 | End: 2020-09-29

## 2020-09-29 RX ORDER — METRONIDAZOLE 500 MG/1
500 TABLET ORAL EVERY 8 HOURS
Status: DISCONTINUED | OUTPATIENT
Start: 2020-09-29 | End: 2020-10-03

## 2020-09-29 RX ADMIN — METRONIDAZOLE 500 MG: 500 TABLET ORAL at 09:09

## 2020-09-29 RX ADMIN — ENOXAPARIN SODIUM 40 MG: 40 INJECTION SUBCUTANEOUS at 06:09

## 2020-09-29 RX ADMIN — SODIUM CHLORIDE, SODIUM LACTATE, POTASSIUM CHLORIDE, AND CALCIUM CHLORIDE: .6; .31; .03; .02 INJECTION, SOLUTION INTRAVENOUS at 09:09

## 2020-09-29 RX ADMIN — METOPROLOL TARTRATE 100 MG: 50 TABLET, FILM COATED ORAL at 09:09

## 2020-09-29 RX ADMIN — TRIAMTERENE AND HYDROCHLOROTHIAZIDE 1 CAPSULE: 25; 37.5 CAPSULE ORAL at 08:09

## 2020-09-29 RX ADMIN — POTASSIUM CHLORIDE 40 MEQ: 1500 TABLET, EXTENDED RELEASE ORAL at 03:09

## 2020-09-29 RX ADMIN — MUPIROCIN: 20 OINTMENT TOPICAL at 09:09

## 2020-09-29 RX ADMIN — Medication 6 MG: at 09:09

## 2020-09-29 RX ADMIN — HYDROCODONE BITARTRATE AND ACETAMINOPHEN 1 TABLET: 5; 325 TABLET ORAL at 07:09

## 2020-09-29 RX ADMIN — METRONIDAZOLE 500 MG: 500 TABLET ORAL at 03:09

## 2020-09-29 RX ADMIN — FERROUS SULFATE TAB EC 325 MG (65 MG FE EQUIVALENT) 325 MG: 325 (65 FE) TABLET DELAYED RESPONSE at 08:09

## 2020-09-29 RX ADMIN — FENOFIBRATE 145 MG: 145 TABLET ORAL at 08:09

## 2020-09-29 RX ADMIN — VANCOMYCIN HYDROCHLORIDE 1500 MG: 1.5 INJECTION, POWDER, LYOPHILIZED, FOR SOLUTION INTRAVENOUS at 09:09

## 2020-09-29 RX ADMIN — ACETAMINOPHEN 1000 MG: 500 TABLET ORAL at 06:09

## 2020-09-29 RX ADMIN — SIMVASTATIN 20 MG: 20 TABLET, FILM COATED ORAL at 09:09

## 2020-09-29 RX ADMIN — CEFEPIME HYDROCHLORIDE 1 G: 1 INJECTION, SOLUTION INTRAVENOUS at 03:09

## 2020-09-29 RX ADMIN — MUPIROCIN: 20 OINTMENT TOPICAL at 01:09

## 2020-09-29 RX ADMIN — POTASSIUM CHLORIDE 40 MEQ: 1500 TABLET, EXTENDED RELEASE ORAL at 09:09

## 2020-09-29 RX ADMIN — SODIUM CHLORIDE, SODIUM LACTATE, POTASSIUM CHLORIDE, AND CALCIUM CHLORIDE: .6; .31; .03; .02 INJECTION, SOLUTION INTRAVENOUS at 10:09

## 2020-09-29 RX ADMIN — AMITRIPTYLINE HYDROCHLORIDE 25 MG: 25 TABLET, FILM COATED ORAL at 09:09

## 2020-09-29 RX ADMIN — METOPROLOL TARTRATE 100 MG: 50 TABLET, FILM COATED ORAL at 08:09

## 2020-09-29 NOTE — PROGRESS NOTES
Pharmacist Renal Dose Adjustment Note    Mohsen Julien is a 39 y.o. female being treated with the medication cefepime    Patient Data:    Vital Signs (Most Recent):  Temp: 99.1 °F (37.3 °C) (09/29/20 1140)  Pulse: 84 (09/29/20 1151)  Resp: 18 (09/29/20 1140)  BP: (!) 108/55 (09/29/20 1140)  SpO2: 98 % (09/29/20 1140)   Vital Signs (72h Range):  Temp:  [98.4 °F (36.9 °C)-102.3 °F (39.1 °C)]   Pulse:  []   Resp:  [17-24]   BP: (108-139)/(54-75)   SpO2:  [93 %-100 %]      Recent Labs   Lab 09/27/20  0441 09/28/20  1310 09/29/20  0557   CREATININE 0.9 2.2* 2.3*     Serum creatinine: 2.3 mg/dL (H) 09/29/20 0557  Estimated creatinine clearance: 43.5 mL/min (A)    Medication cefepime dose: 1 gm frequency q24h will be changed to medication cefepime dose 1gm frequency q12h    Pharmacist's Name: Sadia Villa  Pharmacist's Extension: [phone]

## 2020-09-29 NOTE — CONSULTS
[facility] Consult Note    Primary Attending Physician: Christopher Diaz MD  Consultant Attending: Albert Collazo MD  Consultant Fellow: Luis Felipe George MD    Reason for Consult:     Cellulitis      Assessment:     Mohsen Julien is a 39 y.o. female with:  Patient Active Problem List    Diagnosis Date Noted    Left leg cellulitis 09/26/2020    Hypokalemia 09/25/2020    Hyperlipidemia 09/25/2020    SIRS (systemic inflammatory response syndrome) 09/25/2020    Fungal infection of skin 06/19/2019    Venous stasis dermatitis of left lower extremity     HTN (hypertension) 05/08/2013    Morbidly obese 05/08/2013        Plan:     - Though wound persists, objectively, patient is improving. Leukocytosis is resolved and she has been afebrile for over 48 hours.   - Continue vancomycin  - Add cefepime 1 g q24h (dosed for renal function) and metronidazole 500 mg q8h PO.   - Urine culture with <50k cfu diphtheroids, which are normal cutaneous mark. This is not consistent with UTI.   - Advised patient to elevate her leg while laying down, as edema is likely contributing significantly to her symptoms.     Thank you for allowing us to participate in the care of this patient. Please contact me if you have any questions regarding this consult. ID will continue to follow.     Luis Felipe George MD  [facility] Infectious Diseases, PGY-4  Cell: [phone]    Subjective:      History of Present Illness:  Mohsen Julien is a 39 y.o. female with PMHx of HTN , HLD, and venous stasis dermatitis who presented to [facility] on 9/25 with the CC of nausea, vomiting, and subjective fevers/chills for 2 days. She follows with Dr Griffith for her chronic venous stasis and small wound of the left lower extremity. She was on PO fluconazole as well as a topical antifungal prior to presentation. She feels her wound has enlarged and the edema of the left lower leg has worsened.     Patient was started on vanc and pip-tazo. CT  of the left tib-fib with soft tissue edema, no discrete fluid collection. LE venous u/s without DVT.     Past Medical History:  Past Medical History:   Diagnosis Date    Anemia     Arthritis     Fibromyalgia     Hypertension     Thyroid disease        Past Surgical History:  Past Surgical History:   Procedure Laterality Date    APPENDECTOMY       SECTION      CHOLECYSTECTOMY         Allergies:  Review of patient's allergies indicates:  No Known Allergies    Medications:   In-Hospital Scheduled Medications:   enoxparin  40 mg Subcutaneous Q24H    fenofibrate  145 mg Oral Daily    ferrous sulfate  325 mg Oral Daily    metoprolol tartrate  100 mg Oral BID    mupirocin   Topical (Top) BID    potassium chloride  40 mEq Oral Daily    simvastatin  20 mg Oral QHS    triamterene-hydrochlorothiazide 37.5-25 mg  1 capsule Oral Daily      In-Hospital PRN Medications:  acetaminophen, amitriptyline, hydrOXYzine HCL, ibuprofen, loperamide, melatonin, ondansetron, sodium chloride 0.9%, Pharmacy to dose Vancomycin consult **AND** vancomycin - pharmacy to dose   In-Hospital IV Infusion Medications:   lactated ringers 125 mL/hr at 20 1015      Home Medications:  Prior to Admission medications    Medication Sig Start Date End Date Taking? Authorizing Provider   amitriptyline (ELAVIL) 25 MG tablet TAKE 1 to 2 TABLETS BY MOUTH EVERY NIGHT AT BEDTIME AS NEEDED FOR SLEEP DISTURBANCE 20 Yes NADINE Leyva   methocarbamoL (ROBAXIN) 750 MG Tab Take 1 tablet (750 mg total) by mouth 3 (three) times daily as needed for muscle spasms. 20  Yes    metoprolol tartrate (LOPRESSOR) 100 MG tablet TAKE ONE TABLET BY MOUTH TWO TIMES A DAY FOR BLOOD PRESSURE 20  Yes    potassium chloride (KLOR-CON) 10 MEQ TbSR take one tablet  by mouth daily for potassium replacment 19  Yes    simvastatin (ZOCOR) 20 MG tablet Take 1 tablet (20 mg total) by mouth every evening at bedtime to lower cholesterol  2/19/19 9/25/20 Yes Barbie Pino MD   traMADoL (ULTRAM) 50 mg tablet TAKE 1 TABLET BY MOUTH TWICE DAILY 8/20/20  Yes Ana Griffith MD   triamterene-hydrochlorothiazide 37.5-25 mg (MAXZIDE-25) 37.5-25 mg per tablet TAKE ONE TABLET BY MOUTH ONCE DAILY FOR BLOOD PRESSURE 4/29/20  Yes Ana Griffith MD   amoxicillin (AMOXIL) 500 MG capsule Take 1 capsule by mouth 3 times a day until finished 3/15/19      betamethasone valerate 0.1% (VALISONE) 0.1 % Crea as needed.  8/26/16   Historical Provider   cephALEXin (KEFLEX) 250 MG capsule Take 3 capsules (750 mg total) by mouth 2 (two) times daily. 11/14/18   Barbie Pino MD   clotrimazole-betamethasone 1-0.05% (LOTRISONE) cream Apply topically 2 (two) times daily. 5/29/19   Ana Griffith MD   clotrimazole-betamethasone 1-0.05% (LOTRISONE) cream Apply topically locally  as directed daily 5/19/20   Ana Griffith MD   ergocalciferol (ERGOCALCIFEROL) 50,000 unit Cap TAKE 1 CAPSULE  BY MOUTH  WEEKLY for vitamin d  Patient not taking: Reported on 7/29/2020 4/16/19      fenofibrate 160 MG Tab Take 1 tablet (160 mg total) by mouth once daily for hypertriglyceridemia 2/19/19 2/19/20  Barbie Pino MD   ferrous sulfate 325 mg (65 mg iron) Tab tablet 325 mg once daily.  8/26/16   Historical Provider   flu vac [id number] 36mos up,PF, 60 mcg (15 mcg x 4)/0.5 mL Syrg TO BE ADMINISTERED BY PHARMACIST 9/5/18   Colton Browning, Nadja   flu vacc [id number] 6mos up,PF, (FLUARIX QUAD [id number], PF,) 60 mcg (15 mcg x 4)/0.5 mL Syrg inject by [facility] 10/2/19   Dewayne Berry, PharmD   fluconazole (DIFLUCAN) 150 MG Tab Take 1 tablet (150 mg total) by mouth every 72 hours.  Patient not taking: Reported on 7/29/2020 6/19/19   Ana Griffith MD   fluconazole (DIFLUCAN) 50 MG Tab TAKE 1 TABLET BY MOUTH DAILY 5/19/20   Ana Griffith MD   FLUZONE QUAD [id number], PF, 60 mcg (15 mcg x 4)/0.5 mL Syrg  1/13/17   Historical Provider   ibuprofen  (ADVIL,MOTRIN) 800 MG tablet Take 1 tablet (800 mg total) by mouth 3 (three) times daily.  Patient not taking: Reported on 7/29/2020 1/27/19   Marylu Vasques PA-C   ibuprofen (ADVIL,MOTRIN) 800 MG tablet Take 1 tablet by mouth every 8hours (no more than 4 times a day) as needed for pain  Patient not taking: Reported on 7/29/2020 3/15/19      metoprolol tartrate (LOPRESSOR) 100 MG tablet  8/26/16   Historical Provider   MIRENA 20 mcg/24 hr (5 years) IUD TO BE INSERTED ONE TIME BY PRESCRIBER. ROUTE INTRAUTERINE. 4/5/17   Historical Provider   mupirocin (BACTROBAN) 2 % ointment APPLY AND GENTLY MASSAGE INTO AFFECTED AREA(S) TWICE DAILY. 2/13/19      oxyCODONE-acetaminophen (PERCOCET)  mg per tablet TAKE 1 TABLET BY MOUTH EVERY 4 TO 6 HOURS AS NEEDED FOR PAIN  Patient not taking: Reported on 7/29/2020 4/4/19   Tomasz Iniguez DPM   OPW TEST CLAIM - DO NOT FILL OPW test claim. Do not fill.  Patient not taking: Reported on 7/29/2020 7/27/20   Dewayne Berry, PharmD   sulfamethoxazole-trimethoprim 800-160mg (BACTRIM DS) 800-160 mg Tab Take 1 tablet by mouth every 12 (twelve) hours for uti 5/9/18   Pankaj Cha MD   sulfamethoxazole-trimethoprim 800-160mg (BACTRIM DS) 800-160 mg Tab TAKE 1 TABLET BY MOUTH TWICE DAILY 2/22/19      tramadol (ULTRAM) 50 mg tablet  4/13/15   Historical Provider   triamterene-hydrochlorothiazide 37.5-25 mg (MAXZIDE-25) 37.5-25 mg per tablet Take 1 tablet by mouth once daily.    Historical Provider   triamterene-hydrochlorothiazide 37.5-25 mg (MAXZIDE-25) 37.5-25 mg per tablet TAKE ONE TABLET BY MOUTH ONCE DAILY FOR BLOOD PRESSURE 1/25/19          Family History:  Family History   Problem Relation Age of Onset    Stroke Mother     Colon cancer Maternal Aunt        Social History:  Social History     Tobacco Use    Smoking status: Never Smoker    Smokeless tobacco: Never Used   Substance Use Topics    Alcohol use: No    Drug use: No       Review of Systems   Constitutional:  Positive for chills and malaise/fatigue. Negative for fever.   HENT: Negative for sore throat.    Eyes: Negative for photophobia.   Respiratory: Negative for shortness of breath.    Cardiovascular: Positive for leg swelling. Negative for chest pain.   Gastrointestinal: Positive for nausea and vomiting. Negative for abdominal pain, blood in stool and diarrhea.   Genitourinary: Negative for dysuria.   Musculoskeletal: Negative for back pain, joint pain, myalgias and neck pain.   Neurological: Negative for headaches.   Psychiatric/Behavioral: Negative for depression.          Objective:   Last 24 Hour Vital Signs:  BP  Min: 108/55  Max: 137/75  Temp  Av.8 °F (37.1 °C)  Min: 98.4 °F (36.9 °C)  Max: 99.2 °F (37.3 °C)  Pulse  Av.9  Min: 84  Max: 102  Resp  Av.3  Min: 17  Max: 20  SpO2  Av %  Min: 93 %  Max: 100 %  I/O last 3 completed shifts:  In: 50 [IV Piggyback:50]  Out: 600 [Urine:600]    Physical Exam  Constitutional:       General: She is not in acute distress.     Appearance: Normal appearance. She is obese. She is not ill-appearing or diaphoretic.   HENT:      Head: Normocephalic and atraumatic.      Nose: Nose normal.      Mouth/Throat:      Mouth: Mucous membranes are moist.      Pharynx: Oropharynx is clear. No oropharyngeal exudate or posterior oropharyngeal erythema.   Eyes:      General: No scleral icterus.     Conjunctiva/sclera: Conjunctivae normal.   Neck:      Musculoskeletal: Normal range of motion and neck supple. No neck rigidity.   Cardiovascular:      Rate and Rhythm: Normal rate and regular rhythm.      Heart sounds: Normal heart sounds.   Pulmonary:      Effort: Pulmonary effort is normal.      Breath sounds: Normal breath sounds.   Abdominal:      General: There is no distension.      Palpations: Abdomen is soft.      Tenderness: There is no abdominal tenderness.   Musculoskeletal:         General: Swelling (bilateral, L >> R) and tenderness present. No deformity.       Comments: Wound of medial LLL in c/d/i dressing   Skin:     Coloration: Skin is not jaundiced.      Findings: No lesion or rash.   Neurological:      General: No focal deficit present.      Mental Status: She is alert and oriented to person, place, and time.   Psychiatric:         Mood and Affect: Mood normal.         Behavior: Behavior normal.         Laboratory Results:  Most Recent Data:  CBC:   Lab Results   Component Value Date    WBC 8.33 09/29/2020    HGB 8.5 (L) 09/29/2020    HCT 27.7 (L) 09/29/2020     09/29/2020    MCV 77 (L) 09/29/2020    RDW 14.5 09/29/2020     BMP:   Lab Results   Component Value Date     09/29/2020    K 3.1 (L) 09/29/2020     09/29/2020    CO2 25 09/29/2020    BUN 15 09/29/2020     (H) 09/29/2020    CALCIUM 8.2 (L) 09/29/2020    MG 2.3 09/29/2020    PHOS 2.5 (L) 09/29/2020     LFTs:   Lab Results   Component Value Date    PROT 6.7 09/29/2020    ALBUMIN 2.2 (L) 09/29/2020    BILITOT 0.5 09/29/2020    AST 88 (H) 09/29/2020    ALKPHOS 79 09/29/2020    ALT 83 (H) 09/29/2020     Coags: No results found for: INR, PROTIME, PTT  FLP: No results found for: CHOL, HDL, LDLCALC, TRIG, CHOLHDL  DM:   Lab Results   Component Value Date    HGBA1C 4.9 09/25/2020    CREATININE 2.3 (H) 09/29/2020     Thyroid:   Lab Results   Component Value Date    TSH 0.268 (L) 09/25/2020    FREET4 0.90 09/25/2020     Anemia: No results found for: IRON, TIBC, FERRITIN, [id number], FOLATE  Cardiac: No results found for: TROPONINI, CKTOTAL, CKMB, BNP  Urinalysis:   Lab Results   Component Value Date    LABURIN DIPHTHEROIDS  10,000 - 49,999 cfu/ml   (A) 09/26/2020    COLORU Yellow 09/27/2020    SPECGRAV 1.015 09/27/2020    NITRITE Negative 09/27/2020    KETONESU Negative 09/27/2020    UROBILINOGEN Negative 09/27/2020       Trended Lab Data:  Recent Labs   Lab 09/27/20  0441 09/28/20  1310 09/29/20  0557   WBC 7.41 9.78 8.33   HGB 9.2* 8.2* 8.5*   HCT 31.1* 26.7* 27.7*    234 237   MCV 80*  78* 77*   RDW 14.6* 14.6* 14.5   * 136 138   K 3.1* 3.1* 3.1*    100 102   CO2 24 24 25   BUN 7 14 15   * 130* 118*   PROT 6.8 6.8 6.7   ALBUMIN 2.5* 2.3* 2.2*   BILITOT 0.6 0.5 0.5   AST 19 48* 88*   ALKPHOS 94 80 79   ALT 20 38 83*       Microbiology Data:  Blood culture 9/25 NGTD  Blood culture 9/26 NGTD  Urine culture 9/26 < 50k cfu diphtheroids  Wound culture 9/26 with normal skin mark  Blood culture 9/27 NGTD    Antimicrobials:  Clindamycin 9/25  Pip-tazo 9/25 - 9/27  Vancomycin 9/25 - present    Other Results:    Radiology:  X-ray Chest 1 View    Result Date: 9/27/2020  EXAMINATION: XR CHEST 1 VIEW CLINICAL HISTORY: Febrile. TECHNIQUE: Single frontal view of the chest was performed. COMPARISON: Multiple prior radiographs of the chest, most recent from 09/25/2020. FINDINGS: The lungs are well expanded and clear. No focal opacities are seen. The pleural spaces are clear.  The cardiac silhouette is borderline enlarged although this may be accentuated by technique.  The visualized osseous structures are unremarkable.     No acute cardiopulmonary abnormality. Electronically signed by: Vignesh Sanchez Date:    09/27/2020 Time:    10:25    X-ray Chest 1 View    Result Date: 9/25/2020  EXAMINATION: XR CHEST 1 VIEW CLINICAL HISTORY: COPD Exacerbation; TECHNIQUE: Single view of the chest was obtained. COMPARISON: Multiple priors, most recent 02/13/2019 FINDINGS: The right costophrenic angles excluded from field of view. Normal cardiomediastinal contour. No focal consolidation, pleural effusion or pneumothorax.     No acute cardiopulmonary abnormality. Electronically signed by: Ivonne Gottlieb Date:    09/25/2020 Time:    16:27    Us Lower Extremity Veins Bilateral    Result Date: 9/25/2020  EXAMINATION: US LOWER EXTREMITY VEINS BILATERAL CLINICAL HISTORY: Other specified soft tissue disorders TECHNIQUE: Duplex and color flow Doppler and dynamic compression was performed of the bilateral lower extremity  veins was performed. COMPARISON: None FINDINGS: Right thigh veins: The common femoral, femoral, popliteal, upper greater saphenous, and deep femoral veins are patent and free of thrombus. The veins are normally compressible and have normal phasic flow and augmentation response. Right calf veins: The visualized calf veins are patent. Left thigh veins: The common femoral, femoral, popliteal, upper greater saphenous, and deep femoral veins are patent and free of thrombus. The veins are normally compressible and have normal phasic flow and augmentation response. Left calf veins: The visualized calf veins are patent. Miscellaneous: None     No evidence of deep venous thrombosis in either lower extremity. Electronically signed by: Charlie Ochoa Jr Date:    09/25/2020 Time:    14:22    Ct Thigh W W/o Contrast Left    Result Date: 9/26/2020  EXAMINATION: CT THIGH W W/O CONTRAST LEFT; CT LEG (TIBIA-FIBULA) W W/O CONTRAST LEFT CLINICAL HISTORY: Concern for necrotizing fasciitis.;; Concern for necrotizing fasciitis. Patient with lower leg cellulitis.; TECHNIQUE: CT of the left lower extremity was obtained from the level of the pelvis to the feet.  Images were obtained prior to and following administration of 100 cc IV Omnipaque 350.  Multiplanar reconstructions were obtained and reviewed. COMPARISON: None FINDINGS: There is nonspecific subcutaneous soft tissue edema in the left lower extremity below the level of the knee.  No fluid collections visualized to suggest abscess.  No soft tissue gas visualized to suggest necrotizing fasciitis.  No acute or suspicious osseous findings demonstrated.  Visualized musculature appears grossly normal. There is an IUD in place within the uterus.  No adnexal masses visualized.  The urinary bladder appears unremarkable. There are multiple nonspecific enlarged left external iliac and inguinal lymph nodes present measuring up to 15 mm and 19 mm in axial short dimension respectively.   Visualized vasculature appears grossly within normal limits.     1. Nonspecific subcutaneous soft tissue edema in the left lower leg possibly representing cellulitis, correlate clinically.  No definite abscess or evidence of necrotizing fasciitis. 2. Nonspecific left external iliac chain and inguinal adenopathy, likely reactive in nature. Electronically signed by: Humberto Love MD Date:    09/26/2020 Time:    12:34    Ct Leg (tibia-fibula) W W/o Contrast Left    Result Date: 9/26/2020  EXAMINATION: CT THIGH W W/O CONTRAST LEFT; CT LEG (TIBIA-FIBULA) W W/O CONTRAST LEFT CLINICAL HISTORY: Concern for necrotizing fasciitis.;; Concern for necrotizing fasciitis. Patient with lower leg cellulitis.; TECHNIQUE: CT of the left lower extremity was obtained from the level of the pelvis to the feet.  Images were obtained prior to and following administration of 100 cc IV Omnipaque 350.  Multiplanar reconstructions were obtained and reviewed. COMPARISON: None FINDINGS: There is nonspecific subcutaneous soft tissue edema in the left lower extremity below the level of the knee.  No fluid collections visualized to suggest abscess.  No soft tissue gas visualized to suggest necrotizing fasciitis.  No acute or suspicious osseous findings demonstrated.  Visualized musculature appears grossly normal. There is an IUD in place within the uterus.  No adnexal masses visualized.  The urinary bladder appears unremarkable. There are multiple nonspecific enlarged left external iliac and inguinal lymph nodes present measuring up to 15 mm and 19 mm in axial short dimension respectively.  Visualized vasculature appears grossly within normal limits.     1. Nonspecific subcutaneous soft tissue edema in the left lower leg possibly representing cellulitis, correlate clinically.  No definite abscess or evidence of necrotizing fasciitis. 2. Nonspecific left external iliac chain and inguinal adenopathy, likely reactive in nature. Electronically signed  by: Humberto Love MD Date:    09/26/2020 Time:    12:34    Mammo Digital Screening Bilat W/ Butch    Result Date: 9/14/2020  Result: Mammo Digital Screening Bilat w/ Butch History: Patient is 39 y.o. and is seen for a screening mammogram. Films Compared: Prior images (if available) were compared. Findings: This procedure was performed using tomosynthesis. Computer-aided detection was utilized in the interpretation of this examination.  The breasts have scattered areas of fibroglandular density. There is no evidence of suspicious masses, microcalcifications or architectural distortion. 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     No mammographic evidence of malignancy. BI-RADS Category 1: Negative Recommendation: Routine screening mammogram in 1 year is recommended. Your estimated lifetime risk of breast cancer (to age 85) based on Tyrer-Cuzick risk assessment model is Tyrer-Cuzick: 8.52 %. According to the [employer], patients with a lifetime breast cancer risk of 20% or higher might benefit from supplemental screening tests.

## 2020-09-29 NOTE — PROGRESS NOTES
[facility] Hospital Medicine  Progress Note    Patient Name: Mohsen Julien  MRN: [id number]  Patient Class: IP- Inpatient   Admission Date: 9/25/2020  Length of Stay: 2 days  Attending Physician: Christopher Diaz MD  Primary Care Provider: Christopher Diaz MD        Subjective:     Principal Problem:Left leg cellulitis        HPI:  Patient is a 39 y.o.  female with PMHx of HTN and HLD presenting with fevers and chills starting last night. Pt  Reports associated nausea and vomiting with headache and left leg swelling. Pt reports fevers and chills persisted this morning and  Was told by Dr. Griffith to come to the ED. Pt reports left leg skin changes for a couple of months and she has been taking antifungal medications, po and cream, to treat the wound. Pt reports using compression socks but noticed clear discharge on the socks.     In ED, patient was febrile with nausea. Pt given zofran and started IVF. Lab work presented hypokalemia of 2.7, ESR 42,  and WBC  Of  17. Pt given onedose of 40mEq K repletion. Blood cultures were ordered. Pt admitted to observation for hypokalemia and cellulitis.     Overview/Hospital Course:  9/26 pt fevers persisted, met SIRS criteria. Broad spectrum abx with 2 bore IVs .   9/27 fevers persisting, on IV vanc and zosyn,    Interval History: lost IV access overnight; no reported fevers. Tolerating diet,     Review of Systems   Constitutional: Negative for activity change, chills and fever.   HENT: Negative for congestion, postnasal drip, rhinorrhea and sore throat.    Eyes: Negative for discharge and redness.   Respiratory: Negative for cough, chest tightness and wheezing.    Cardiovascular: Negative for chest pain and palpitations.   Gastrointestinal: Negative for abdominal distention, abdominal pain, diarrhea, nausea and vomiting.   Genitourinary: Negative for dysuria, frequency and urgency.   Musculoskeletal: Negative for back pain and  joint swelling.   Skin: Positive for color change, rash and wound.        LLE skin breakdown w/ drainage on lateral leg.    Neurological: Negative for weakness and headaches.   Psychiatric/Behavioral: Negative.      Objective:     Vital Signs (Most Recent):  Temp: 99.1 °F (37.3 °C) (09/29/20 1140)  Pulse: 84 (09/29/20 1151)  Resp: 18 (09/29/20 1140)  BP: (!) 108/55 (09/29/20 1140)  SpO2: 98 % (09/29/20 1140) Vital Signs (24h Range):  Temp:  [98.4 °F (36.9 °C)-99.2 °F (37.3 °C)] 99.1 °F (37.3 °C)  Pulse:  [] 84  Resp:  [17-20] 18  SpO2:  [93 %-100 %] 98 %  BP: (108-137)/(54-75) 108/55     Weight: 132 kg (291 lb 0.1 oz)  Body mass index is 49.95 kg/m².    Intake/Output Summary (Last 24 hours) at 9/29/2020 1240  Last data filed at 9/29/2020 0845  Gross per 24 hour   Intake 80 ml   Output --   Net 80 ml      Physical Exam  Vitals signs and nursing note reviewed.   Constitutional:       Appearance: Normal appearance. She is obese.   HENT:      Head: Normocephalic and atraumatic.      Mouth/Throat:      Pharynx: Oropharynx is clear.   Eyes:      General:         Right eye: No discharge.         Left eye: No discharge.      Conjunctiva/sclera: Conjunctivae normal.   Neck:      Musculoskeletal: Normal range of motion.   Cardiovascular:      Pulses: Normal pulses.   Pulmonary:      Effort: Pulmonary effort is normal. No respiratory distress.   Abdominal:      General: Abdomen is flat.      Palpations: Abdomen is soft.      Tenderness: There is no abdominal tenderness.   Musculoskeletal:         General: No swelling.      Right lower leg: No edema.   Skin:     General: Skin is warm.      Findings: Erythema and wound present.             Comments: Left lower extremity skin breakdown w/ drainage from lateral leg. Warm to touch to midshaft, improving   Edematous    Neurological:      General: No focal deficit present.      Mental Status: She is alert and oriented to person, place, and time.   Psychiatric:         Mood and  Affect: Mood normal.         Behavior: Behavior normal.         Significant Labs:   CBC:   Recent Labs   Lab 09/28/20  1310 09/29/20  0557   WBC 9.78 8.33   HGB 8.2* 8.5*   HCT 26.7* 27.7*    237     CMP:   Recent Labs   Lab 09/28/20  1310 09/29/20  0557    138   K 3.1* 3.1*    102   CO2 24 25   * 118*   BUN 14 15   CREATININE 2.2* 2.3*   CALCIUM 8.4* 8.2*   PROT 6.8 6.7   ALBUMIN 2.3* 2.2*   BILITOT 0.5 0.5   ALKPHOS 80 79   AST 48* 88*   ALT 38 83*   ANIONGAP 12 11   EGFRNONAA 27* 26*     Urine Culture: No results for input(s): LABURIN in the last 48 hours.    Significant Imaging: I have reviewed all pertinent imaging results/findings within the past 24 hours.      Assessment/Plan:      * Left leg cellulitis  Persistent skin wound  With no skin breaks  Warm to touch  Failed antifungal treatments     Plan:  Antipyretics  Encouraging PO intake.   Blood cultures ngtd  vanc continued  D/c zosyn  Still afebrile but no improvement on swelling  Consulted ID, recs appreciated      SIRS (systemic inflammatory response syndrome)  HR> 105 Temp 39.3C  SIRS  Criteria met    Plan:  Broad spectrum antibiotics, on Vanc and Zosyn  IVF with 2 bore IVs  Consider stating fluids again? Last fluids received 09/26  LA wnl, 1.5 on 09/26  Antipyretics   Afebrile for few days      Hyperlipidemia  Restart home med  On fenofibrate 145mg PO daily and simvastatin 20mg PO QHS    Hypokalemia  K  2.7 on admission  EKG: no changes  Asymptomatic  Possibly due to antifungal meds?    Plan:   KCL repletion x2  in ED  Trend BMP  AM labs showing hypokalemia, repleted this AM  Dc  lasix          Morbidly obese        HTN (hypertension)  BP stable in ED    Plan:   Continue home meds  On lasix 20mg BID and lopressor 100mg BID  D/c lasix due to persistent hypokalemia    VTE Risk Mitigation (From admission, onward)         Ordered     enoxaparin injection 40 mg  Every 24 hours      09/25/20 1920     IP VTE HIGH RISK PATIENT  Once       09/25/20 1920     Place sequential compression device  Until discontinued      09/25/20 1920                Discharge Planning   SAYRA:      Code Status: Full Code   Is the patient medically ready for discharge?:     Reason for patient still in hospital (select all that apply): Patient trending condition and Treatment  Discharge Plan A: Home with family                  Maryanne Hallman MD  Department of Hospital Medicine   [facility]

## 2020-09-29 NOTE — ASSESSMENT & PLAN NOTE
HR> 105 Temp 39.3C  SIRS  Criteria met    Plan:  Broad spectrum antibiotics, on Vanc and Zosyn  IVF with 2 bore IVs  Consider stating fluids again? Last fluids received 09/26  LA wnl, 1.5 on 09/26  Antipyretics   Afebrile for few days

## 2020-09-29 NOTE — ANESTHESIA PROCEDURE NOTES
Peripheral IV Insertion    Diagnosis: I87.9 Disorder of vein, unspecified.    Patient location during procedure: floor  Procedure start time: 9/29/2020 6:25 AM  Procedure end time: 9/29/2020 6:27 AM    Staffing  Authorizing Provider: Mercedes Kaplan MD  Performing Provider: Mercedes Kaplan MD    Anesthesiologist was present at the time of the procedure.    Preanesthetic Checklist  Completed: patient identified, site marked, surgical consent, pre-op evaluation, timeout performed, IV checked, risks and benefits discussed, monitors and equipment checked and anesthesia consent givenPeripheral IV Insertion  Skin Prep: chlorhexidine gluconate  Local Infiltration: none  Orientation: right  Location: upper arm  Catheter Size: 20 G  Catheter placement by Ultrasound guidance. Heme positive aspiration all ports.  Vessel Caliber: medium, patent, compressibility normal  Vascular Doppler:  not doneInsertion Attempts: 1  Assessment  Dressing: secured with tape and tegaderm  Patient: Tolerated well  Line flushed easily.

## 2020-09-29 NOTE — SUBJECTIVE & OBJECTIVE
Interval History: lost IV access overnight; no reported fevers. Tolerating diet,     Review of Systems   Constitutional: Negative for activity change, chills and fever.   HENT: Negative for congestion, postnasal drip, rhinorrhea and sore throat.    Eyes: Negative for discharge and redness.   Respiratory: Negative for cough, chest tightness and wheezing.    Cardiovascular: Negative for chest pain and palpitations.   Gastrointestinal: Negative for abdominal distention, abdominal pain, diarrhea, nausea and vomiting.   Genitourinary: Negative for dysuria, frequency and urgency.   Musculoskeletal: Negative for back pain and joint swelling.   Skin: Positive for color change, rash and wound.        LLE skin breakdown w/ drainage on lateral leg.    Neurological: Negative for weakness and headaches.   Psychiatric/Behavioral: Negative.      Objective:     Vital Signs (Most Recent):  Temp: 99.1 °F (37.3 °C) (09/29/20 1140)  Pulse: 84 (09/29/20 1151)  Resp: 18 (09/29/20 1140)  BP: (!) 108/55 (09/29/20 1140)  SpO2: 98 % (09/29/20 1140) Vital Signs (24h Range):  Temp:  [98.4 °F (36.9 °C)-99.2 °F (37.3 °C)] 99.1 °F (37.3 °C)  Pulse:  [] 84  Resp:  [17-20] 18  SpO2:  [93 %-100 %] 98 %  BP: (108-137)/(54-75) 108/55     Weight: 132 kg (291 lb 0.1 oz)  Body mass index is 49.95 kg/m².    Intake/Output Summary (Last 24 hours) at 9/29/2020 1240  Last data filed at 9/29/2020 0845  Gross per 24 hour   Intake 80 ml   Output --   Net 80 ml      Physical Exam  Vitals signs and nursing note reviewed.   Constitutional:       Appearance: Normal appearance. She is obese.   HENT:      Head: Normocephalic and atraumatic.      Mouth/Throat:      Pharynx: Oropharynx is clear.   Eyes:      General:         Right eye: No discharge.         Left eye: No discharge.      Conjunctiva/sclera: Conjunctivae normal.   Neck:      Musculoskeletal: Normal range of motion.   Cardiovascular:      Pulses: Normal pulses.   Pulmonary:      Effort: Pulmonary  effort is normal. No respiratory distress.   Abdominal:      General: Abdomen is flat.      Palpations: Abdomen is soft.      Tenderness: There is no abdominal tenderness.   Musculoskeletal:         General: No swelling.      Right lower leg: No edema.   Skin:     General: Skin is warm.      Findings: Erythema and wound present.             Comments: Left lower extremity skin breakdown w/ drainage from lateral leg. Warm to touch to midshaft, improving   Edematous    Neurological:      General: No focal deficit present.      Mental Status: She is alert and oriented to person, place, and time.   Psychiatric:         Mood and Affect: Mood normal.         Behavior: Behavior normal.         Significant Labs:   CBC:   Recent Labs   Lab 09/28/20  1310 09/29/20  0557   WBC 9.78 8.33   HGB 8.2* 8.5*   HCT 26.7* 27.7*    237     CMP:   Recent Labs   Lab 09/28/20  1310 09/29/20  0557    138   K 3.1* 3.1*    102   CO2 24 25   * 118*   BUN 14 15   CREATININE 2.2* 2.3*   CALCIUM 8.4* 8.2*   PROT 6.8 6.7   ALBUMIN 2.3* 2.2*   BILITOT 0.5 0.5   ALKPHOS 80 79   AST 48* 88*   ALT 38 83*   ANIONGAP 12 11   EGFRNONAA 27* 26*     Urine Culture: No results for input(s): LABURIN in the last 48 hours.    Significant Imaging: I have reviewed all pertinent imaging results/findings within the past 24 hours.

## 2020-09-29 NOTE — PROGRESS NOTES
Pharmacokinetic Assessment Follow Up: IV Vancomycin    Vancomycin serum concentration assessment(s):    The vancomycin random resulted at 15.8 mcg/mL. Using patient specific kinetics, ke = 0.03, half life = 23 hours.    Vancomycin Regimen Plan:    Will give a one time dose of vancomycin 1500 mg IV and recheck vancomycin random on 9/30 at 0800. Goal 10-15 mcg/mL    Drug levels (last 3 results):  Recent Labs   Lab Result Units 09/28/20  1310 09/29/20  0557   Vancomycin, Random ug/mL  --  15.8   Vancomycin-Trough ug/mL 26.4*  --        Pharmacy will continue to follow and monitor vancomycin.    Please contact pharmacy at extension [phone] for questions regarding this assessment.    Thank you for the consult,   Татьяна Singh       Patient brief summary:  Mohsen Julien is a 39 y.o. female initiated on antimicrobial therapy with IV Vancomycin for treatment of skin & soft tissue infection    The patient's current regimen is pulse dosed    Drug Allergies:   Review of patient's allergies indicates:  No Known Allergies    Actual Body Weight:   132 kg    Renal Function:   Estimated Creatinine Clearance: 43.5 mL/min (A) (based on SCr of 2.3 mg/dL (H)).,     Dialysis Method (if applicable):  N/A    CBC (last 72 hours):  Recent Labs   Lab Result Units 09/27/20  0441 09/28/20  1310 09/29/20  0557   WBC K/uL 7.41 9.78 8.33   Hemoglobin g/dL 9.2* 8.2* 8.5*   Hematocrit % 31.1* 26.7* 27.7*   Platelets K/uL 175 234 237   Gran% % 84.0* 79.4* 70.8   Lymph% % 10.1* 11.7* 17.0*   Mono% % 5.1 7.8 10.3   Eosinophil% % 0.3 0.1 0.5   Basophil% % 0.1 0.1 0.2   Differential Method  Automated Automated Automated       Metabolic Panel (last 72 hours):  Recent Labs   Lab Result Units 09/26/20  1602 09/27/20  0441 09/27/20  1446 09/28/20  1310 09/29/20  0557   Sodium mmol/L 134* 134*  --  136 138   Potassium mmol/L 3.8 3.1*  --  3.1* 3.1*   Chloride mmol/L 101 100  --  100 102   CO2 mmol/L 22* 24  --  24 25   Glucose mg/dL 152* 135*  --  130*  118*   Glucose, UA   --   --  Negative  --   --    BUN, Bld mg/dL 8 7  --  14 15   Creatinine mg/dL 0.8 0.9  --  2.2* 2.3*   Albumin g/dL  --  2.5*  --  2.3* 2.2*   Total Bilirubin mg/dL  --  0.6  --  0.5 0.5   Alkaline Phosphatase U/L  --  94  --  80 79   AST U/L  --  19  --  48* 88*   ALT U/L  --  20  --  38 83*   Magnesium mg/dL  --  2.5  --  2.3 2.3   Phosphorus mg/dL  --  1.5*  --  2.1* 2.5*       Vancomycin Administrations:  vancomycin given in the last 96 hours                     vancomycin (VANCOCIN) 1,750 mg in dextrose 5 % 500 mL IVPB (mg) 1,750 mg New Bag 09/28/20 0058     1,750 mg New Bag 09/27/20 1351     1,750 mg New Bag  0053     1,750 mg New Bag 09/26/20 1323    vancomycin (VANCOCIN) 2,500 mg in dextrose 5 % 500 mL IVPB (mg) 2,500 mg New Bag 09/26/20 0153                    Microbiologic Results:  Microbiology Results (last 7 days)       Procedure Component Value Units Date/Time    Blood Culture #1 **CANNOT BE ORDERED STAT** [[id number]] Collected: 09/25/20 1355    Order Status: Completed Specimen: Blood from Peripheral, Antecubital, Right Updated: 09/28/20 2312     Blood Culture, Routine No Growth to date      No Growth to date      No Growth to date      No Growth to date    Blood Culture #2 **CANNOT BE ORDERED STAT** [[id number]] Collected: 09/25/20 1410    Order Status: Completed Specimen: Blood from Peripheral, Antecubital, Left Updated: 09/28/20 2312     Blood Culture, Routine No Growth to date      No Growth to date      No Growth to date      No Growth to date    Blood culture [[id number]] Collected: 09/27/20 0944    Order Status: Completed Specimen: Blood from Antecubital, Left Arm Updated: 09/28/20 1612     Blood Culture, Routine No Growth to date      No Growth to date    Narrative:      Please collected 2 cultures from 2 separate sites    Blood culture [[id number]] Collected: 09/27/20 0939    Order Status: Completed Specimen: Blood from Antecubital, Left Arm Updated: 09/28/20 1612     Blood  Culture, Routine No Growth to date      No Growth to date    Narrative:      Please collect 2 cultures from 2 separate sites.  Collection has been rescheduled by [facility] at 09/27/2020 09:46 Reason:   duplicate order did 2 cultute  Collection has been rescheduled by [facility] at 09/27/2020 09:46 Reason:   duplicate order did 2 cultute    Urine Culture High Risk [[id number]]  (Abnormal) Collected: 09/26/20 1131    Order Status: Completed Specimen: Urine, Clean Catch Updated: 09/28/20 1525     Urine Culture, Routine DIPHTHEROIDS  10,000 - 49,999 cfu/ml      Narrative:      Indicated criteria for high risk culture:->Other  Other (specify):->septic patient on broad spectrum abx with  continued fevers    Aerobic culture [[id number]] Collected: 09/26/20 1316    Order Status: Completed Specimen: Wound from Leg, Left Updated: 09/28/20 1434     Aerobic Bacterial Culture No significant isolate to date    Blood culture [[id number]] Collected: 09/26/20 0439    Order Status: Completed Specimen: Blood from Peripheral, Left Updated: 09/28/20 1412     Blood Culture, Routine No Growth to date      No Growth to date      No Growth to date    Blood culture [[id number]] Collected: 09/26/20 0444    Order Status: Completed Specimen: Blood from Peripheral, Left Updated: 09/28/20 1412     Blood Culture, Routine No Growth to date      No Growth to date      No Growth to date    Clostridium difficile EIA [[id number]]     Order Status: Canceled Specimen: Stool     Blood culture [[id number]]     Order Status: Canceled Specimen: Blood     Blood culture [[id number]]     Order Status: Canceled Specimen: Blood     Blood culture [[id number]]     Order Status: Canceled Specimen: Blood

## 2020-09-29 NOTE — PROGRESS NOTES
"Surgery follow up  BP (!) 108/55 (BP Location: Left arm, Patient Position: Lying)   Pulse 84   Temp 99.1 °F (37.3 °C) (Oral)   Resp 18   Ht 5' 4" (1.626 m)   Wt 132 kg (291 lb 0.1 oz)   SpO2 98%   Breastfeeding No   BMI 49.95 kg/m²   I/O last 3 completed shifts:  In: 50 [IV Piggyback:50]  Out: 600 [Urine:600]  I/O this shift:  In: 80 [P.O.:80]  Out: -   Recent Results (from the past 336 hour(s))   CBC with Automated Differential    Collection Time: 09/29/20  5:57 AM   Result Value Ref Range    WBC 8.33 3.90 - 12.70 K/uL    Hemoglobin 8.5 (L) 12.0 - 16.0 g/dL    Hematocrit 27.7 (L) 37.0 - 48.5 %    Platelets 237 150 - 350 K/uL   CBC with Automated Differential    Collection Time: 09/28/20  1:10 PM   Result Value Ref Range    WBC 9.78 3.90 - 12.70 K/uL    Hemoglobin 8.2 (L) 12.0 - 16.0 g/dL    Hematocrit 26.7 (L) 37.0 - 48.5 %    Platelets 234 150 - 350 K/uL   CBC with Automated Differential    Collection Time: 09/27/20  4:41 AM   Result Value Ref Range    WBC 7.41 3.90 - 12.70 K/uL    Hemoglobin 9.2 (L) 12.0 - 16.0 g/dL    Hematocrit 31.1 (L) 37.0 - 48.5 %    Platelets 175 150 - 350 K/uL     Recent Results (from the past 336 hour(s))   Basic metabolic panel    Collection Time: 09/26/20  4:02 PM   Result Value Ref Range    Sodium 134 (L) 136 - 145 mmol/L    Potassium 3.8 3.5 - 5.1 mmol/L    Chloride 101 95 - 110 mmol/L    CO2 22 (L) 23 - 29 mmol/L    BUN, Bld 8 6 - 20 mg/dL    Creatinine 0.8 0.5 - 1.4 mg/dL    Calcium 7.7 (L) 8.7 - 10.5 mg/dL    Anion Gap 11 8 - 16 mmol/L   Basic metabolic panel    Collection Time: 09/25/20  9:30 PM   Result Value Ref Range    Sodium 136 136 - 145 mmol/L    Potassium 3.3 (L) 3.5 - 5.1 mmol/L    Chloride 98 95 - 110 mmol/L    CO2 26 23 - 29 mmol/L    BUN, Bld 17 6 - 20 mg/dL    Creatinine 1.0 0.5 - 1.4 mg/dL    Calcium 8.1 (L) 8.7 - 10.5 mg/dL    Anion Gap 12 8 - 16 mmol/L   pain better swelling improving , still low [place], on vancio iv awaiting culture report.  "

## 2020-09-29 NOTE — PROGRESS NOTES
Pharmacist Renal Dose Adjustment Note    Mohsen Julien is a 39 y.o. female being treated with the medication  metronidazole    Patient Data:    Vital Signs (Most Recent):  Temp: 99.1 °F (37.3 °C) (09/29/20 1140)  Pulse: 84 (09/29/20 1151)  Resp: 18 (09/29/20 1140)  BP: (!) 108/55 (09/29/20 1140)  SpO2: 98 % (09/29/20 1140)   Vital Signs (72h Range):  Temp:  [98.4 °F (36.9 °C)-102.3 °F (39.1 °C)]   Pulse:  []   Resp:  [17-24]   BP: (108-139)/(54-75)   SpO2:  [93 %-100 %]      Recent Labs   Lab 09/27/20  0441 09/28/20  1310 09/29/20  0557   CREATININE 0.9 2.2* 2.3*     Serum creatinine: 2.3 mg/dL (H) 09/29/20 0557  Estimated creatinine clearance: 43.5 mL/min (A)    Medication metronidazole dose: 500 mg frequency q12h will be changed to medication metronidazole dose 500 mg frequency q8h    Pharmacist's Name: Sadia Villa  Pharmacist's Extension: [phone]

## 2020-09-29 NOTE — ASSESSMENT & PLAN NOTE
Persistent skin wound  With no skin breaks  Warm to touch  Failed antifungal treatments     Plan:  Antipyretics  Encouraging PO intake.   Blood cultures ngtd  vanc continued  D/c zosyn  Still afebrile but no improvement on swelling  Consulted ID, recs appreciated

## 2020-09-29 NOTE — ASSESSMENT & PLAN NOTE
BP stable in ED    Plan:   Continue home meds  On lasix 20mg BID and lopressor 100mg BID  D/c lasix due to persistent hypokalemia

## 2020-09-30 PROBLEM — N17.9 AKI (ACUTE KIDNEY INJURY): Status: ACTIVE | Noted: 2020-09-30

## 2020-09-30 LAB
ALBUMIN SERPL BCP-MCNC: 2.1 G/DL (ref 3.5–5.2)
ALBUMIN SERPL BCP-MCNC: 2.2 G/DL (ref 3.5–5.2)
ALP SERPL-CCNC: 77 U/L (ref 55–135)
ALP SERPL-CCNC: 79 U/L (ref 55–135)
ALT SERPL W/O P-5'-P-CCNC: 63 U/L (ref 10–44)
ALT SERPL W/O P-5'-P-CCNC: 73 U/L (ref 10–44)
ANION GAP SERPL CALC-SCNC: 13 MMOL/L (ref 8–16)
ANION GAP SERPL CALC-SCNC: 9 MMOL/L (ref 8–16)
ANISOCYTOSIS BLD QL SMEAR: SLIGHT
AST SERPL-CCNC: 34 U/L (ref 10–40)
AST SERPL-CCNC: 43 U/L (ref 10–40)
BACTERIA BLD CULT: NORMAL
BACTERIA BLD CULT: NORMAL
BASOPHILS # BLD AUTO: 0.03 K/UL (ref 0–0.2)
BASOPHILS NFR BLD: 0.3 % (ref 0–1.9)
BILIRUB SERPL-MCNC: 0.4 MG/DL (ref 0.1–1)
BILIRUB SERPL-MCNC: 0.6 MG/DL (ref 0.1–1)
BUN SERPL-MCNC: 12 MG/DL (ref 6–20)
BUN SERPL-MCNC: 13 MG/DL (ref 6–20)
CALCIUM SERPL-MCNC: 8.2 MG/DL (ref 8.7–10.5)
CALCIUM SERPL-MCNC: 8.4 MG/DL (ref 8.7–10.5)
CHLORIDE SERPL-SCNC: 101 MMOL/L (ref 95–110)
CHLORIDE SERPL-SCNC: 104 MMOL/L (ref 95–110)
CO2 SERPL-SCNC: 21 MMOL/L (ref 23–29)
CO2 SERPL-SCNC: 27 MMOL/L (ref 23–29)
CREAT SERPL-MCNC: 2.2 MG/DL (ref 0.5–1.4)
CREAT SERPL-MCNC: 2.2 MG/DL (ref 0.5–1.4)
CRP SERPL-MCNC: 128.34 MG/L (ref 0–3.19)
DIFFERENTIAL METHOD: ABNORMAL
EOSINOPHIL # BLD AUTO: 0.1 K/UL (ref 0–0.5)
EOSINOPHIL NFR BLD: 0.9 % (ref 0–8)
ERYTHROCYTE [DISTWIDTH] IN BLOOD BY AUTOMATED COUNT: 14.8 % (ref 11.5–14.5)
ERYTHROCYTE [SEDIMENTATION RATE] IN BLOOD BY WESTERGREN METHOD: >120 MM/HR (ref 0–20)
EST. GFR  (AFRICAN AMERICAN): 31 ML/MIN/1.73 M^2
EST. GFR  (AFRICAN AMERICAN): 31 ML/MIN/1.73 M^2
EST. GFR  (NON AFRICAN AMERICAN): 27 ML/MIN/1.73 M^2
EST. GFR  (NON AFRICAN AMERICAN): 27 ML/MIN/1.73 M^2
GLUCOSE SERPL-MCNC: 120 MG/DL (ref 70–110)
GLUCOSE SERPL-MCNC: 126 MG/DL (ref 70–110)
HCT VFR BLD AUTO: 26.4 % (ref 37–48.5)
HGB BLD-MCNC: 7.9 G/DL (ref 12–16)
HYPOCHROMIA BLD QL SMEAR: ABNORMAL
IMM GRANULOCYTES # BLD AUTO: 0.27 K/UL (ref 0–0.04)
IMM GRANULOCYTES NFR BLD AUTO: 3 % (ref 0–0.5)
LYMPHOCYTES # BLD AUTO: 1.6 K/UL (ref 1–4.8)
LYMPHOCYTES NFR BLD: 18.3 % (ref 18–48)
MAGNESIUM SERPL-MCNC: 2.3 MG/DL (ref 1.6–2.6)
MCH RBC QN AUTO: 23.4 PG (ref 27–31)
MCHC RBC AUTO-ENTMCNC: 29.9 G/DL (ref 32–36)
MCV RBC AUTO: 78 FL (ref 82–98)
MONOCYTES # BLD AUTO: 1.2 K/UL (ref 0.3–1)
MONOCYTES NFR BLD: 13 % (ref 4–15)
NEUTROPHILS # BLD AUTO: 5.8 K/UL (ref 1.8–7.7)
NEUTROPHILS NFR BLD: 64.5 % (ref 38–73)
NRBC BLD-RTO: 0 /100 WBC
PHOSPHATE SERPL-MCNC: 2.3 MG/DL (ref 2.7–4.5)
PLATELET # BLD AUTO: 267 K/UL (ref 150–350)
PLATELET BLD QL SMEAR: ABNORMAL
PMV BLD AUTO: ABNORMAL FL (ref 9.2–12.9)
POIKILOCYTOSIS BLD QL SMEAR: SLIGHT
POTASSIUM SERPL-SCNC: 3.6 MMOL/L (ref 3.5–5.1)
POTASSIUM SERPL-SCNC: 3.6 MMOL/L (ref 3.5–5.1)
PROT SERPL-MCNC: 6.7 G/DL (ref 6–8.4)
PROT SERPL-MCNC: 6.9 G/DL (ref 6–8.4)
RBC # BLD AUTO: 3.37 M/UL (ref 4–5.4)
SODIUM SERPL-SCNC: 137 MMOL/L (ref 136–145)
SODIUM SERPL-SCNC: 138 MMOL/L (ref 136–145)
VANCOMYCIN SERPL-MCNC: 16.2 UG/ML
WBC # BLD AUTO: 8.95 K/UL (ref 3.9–12.7)

## 2020-09-30 PROCEDURE — 80053 COMPREHEN METABOLIC PANEL: CPT

## 2020-09-30 PROCEDURE — 85025 COMPLETE CBC W/AUTO DIFF WBC: CPT

## 2020-09-30 PROCEDURE — 25000003 PHARM REV CODE 250: Performed by: STUDENT IN AN ORGANIZED HEALTH CARE EDUCATION/TRAINING PROGRAM

## 2020-09-30 PROCEDURE — 97802 MEDICAL NUTRITION INDIV IN: CPT

## 2020-09-30 PROCEDURE — 83735 ASSAY OF MAGNESIUM: CPT

## 2020-09-30 PROCEDURE — 25000003 PHARM REV CODE 250: Performed by: FAMILY MEDICINE

## 2020-09-30 PROCEDURE — 21400001 HC TELEMETRY ROOM

## 2020-09-30 PROCEDURE — 80202 ASSAY OF VANCOMYCIN: CPT

## 2020-09-30 PROCEDURE — 80053 COMPREHEN METABOLIC PANEL: CPT | Mod: 91

## 2020-09-30 PROCEDURE — 63600175 PHARM REV CODE 636 W HCPCS: Performed by: SURGERY

## 2020-09-30 PROCEDURE — 84100 ASSAY OF PHOSPHORUS: CPT

## 2020-09-30 PROCEDURE — 86141 C-REACTIVE PROTEIN HS: CPT

## 2020-09-30 PROCEDURE — 85652 RBC SED RATE AUTOMATED: CPT

## 2020-09-30 PROCEDURE — 63600175 PHARM REV CODE 636 W HCPCS: Performed by: FAMILY MEDICINE

## 2020-09-30 PROCEDURE — 63600175 PHARM REV CODE 636 W HCPCS: Performed by: STUDENT IN AN ORGANIZED HEALTH CARE EDUCATION/TRAINING PROGRAM

## 2020-09-30 PROCEDURE — 36415 COLL VENOUS BLD VENIPUNCTURE: CPT

## 2020-09-30 RX ORDER — LORAZEPAM 1 MG/1
1 TABLET ORAL ONCE
Status: COMPLETED | OUTPATIENT
Start: 2020-09-30 | End: 2020-09-30

## 2020-09-30 RX ORDER — LORAZEPAM 1 MG/1
1 TABLET ORAL ONCE
Status: DISCONTINUED | OUTPATIENT
Start: 2020-09-30 | End: 2020-09-30

## 2020-09-30 RX ADMIN — SODIUM CHLORIDE, SODIUM LACTATE, POTASSIUM CHLORIDE, AND CALCIUM CHLORIDE: .6; .31; .03; .02 INJECTION, SOLUTION INTRAVENOUS at 06:09

## 2020-09-30 RX ADMIN — LORAZEPAM 1 MG: 1 TABLET ORAL at 11:09

## 2020-09-30 RX ADMIN — FERROUS SULFATE TAB EC 325 MG (65 MG FE EQUIVALENT) 325 MG: 325 (65 FE) TABLET DELAYED RESPONSE at 08:09

## 2020-09-30 RX ADMIN — VANCOMYCIN HYDROCHLORIDE 1250 MG: 1.25 INJECTION, POWDER, LYOPHILIZED, FOR SOLUTION INTRAVENOUS at 12:09

## 2020-09-30 RX ADMIN — SIMVASTATIN 20 MG: 20 TABLET, FILM COATED ORAL at 09:09

## 2020-09-30 RX ADMIN — SODIUM CHLORIDE, SODIUM LACTATE, POTASSIUM CHLORIDE, AND CALCIUM CHLORIDE: .6; .31; .03; .02 INJECTION, SOLUTION INTRAVENOUS at 09:09

## 2020-09-30 RX ADMIN — CEFEPIME HYDROCHLORIDE 1 G: 1 INJECTION, SOLUTION INTRAVENOUS at 04:09

## 2020-09-30 RX ADMIN — MUPIROCIN: 20 OINTMENT TOPICAL at 12:09

## 2020-09-30 RX ADMIN — HYDROCODONE BITARTRATE AND ACETAMINOPHEN 1 TABLET: 5; 325 TABLET ORAL at 08:09

## 2020-09-30 RX ADMIN — METOPROLOL TARTRATE 100 MG: 50 TABLET, FILM COATED ORAL at 09:09

## 2020-09-30 RX ADMIN — METRONIDAZOLE 500 MG: 500 TABLET ORAL at 02:09

## 2020-09-30 RX ADMIN — TRIAMTERENE AND HYDROCHLOROTHIAZIDE 1 CAPSULE: 25; 37.5 CAPSULE ORAL at 08:09

## 2020-09-30 RX ADMIN — METRONIDAZOLE 500 MG: 500 TABLET ORAL at 06:09

## 2020-09-30 RX ADMIN — METRONIDAZOLE 500 MG: 500 TABLET ORAL at 09:09

## 2020-09-30 RX ADMIN — ENOXAPARIN SODIUM 40 MG: 40 INJECTION SUBCUTANEOUS at 04:09

## 2020-09-30 RX ADMIN — CEFEPIME HYDROCHLORIDE 1 G: 1 INJECTION, SOLUTION INTRAVENOUS at 02:09

## 2020-09-30 RX ADMIN — FENOFIBRATE 145 MG: 145 TABLET ORAL at 08:09

## 2020-09-30 RX ADMIN — METOPROLOL TARTRATE 100 MG: 50 TABLET, FILM COATED ORAL at 08:09

## 2020-09-30 RX ADMIN — MUPIROCIN: 20 OINTMENT TOPICAL at 09:09

## 2020-09-30 RX ADMIN — ACETAMINOPHEN 1000 MG: 500 TABLET ORAL at 09:09

## 2020-09-30 NOTE — PROGRESS NOTES
Infectious Disease Follow-up Note      Impression/Plan:    Left Lower Extremity Cellulitis  - Leukocytosis still resolved, afebrile for 72 hours.  - Interval worsening of left leg since yesterday; patient's vitals are currently stable, but should have low threshold for surgical exploration if patient begins to decompensate  - Continue vancomycin, cefepime (renally dosed), and flagyl  - Urine culture with <50k cfu diphtheroids, which are normal cutaneous mark. This is not consistent with UTI.   - Advised patient to elevate her leg while laying down, as edema is likely contributing significantly to her symptoms.   - Will follow up MRI of leg and cultures to check for source control    Thank you for allowing us to participate in the care for this patient. Please call with any questions.  Edwardo Leone  [facility]  Infectious Disease Consult Service  [phone]    History of Present Illness:  Mohsen Julien is a 39 y.o. female with PMHx of HTN , HLD, and venous stasis dermatitis who presented to [facility] on 9/25 with the CC of nausea, vomiting, and subjective fevers/chills for 2 days. She follows with Dr Griffith for her chronic venous stasis and small wound of the left lower extremity. She was on PO fluconazole as well as a topical antifungal prior to presentation. She feels her wound has enlarged and the edema of the left lower leg has worsened.      Patient was started on vanc and pip-tazo. CT of the left tib-fib with soft tissue edema, no discrete fluid collection. LE venous u/s without DVT.     Microbiology Data:  Blood culture 9/25 NGTD  Blood culture 9/26 NGTD  Urine culture 9/26 < 50k cfu diphtheroids  Wound culture 9/26 with normal skin mark  Blood culture 9/27 NGTD     Antimicrobials:  Clindamycin 9/25  Pip-tazo 9/25 - 9/27  Vancomycin 9/25 - present  Cefepime 9/29 - present  Flagyl 9/29 - present    Subjective and Interval History:  Hospital Day 3  Surgery felt the patient's leg has  continued to worsen while on antibiotics. Sent for left leg MRI to assess for deep tissue abscess or fluid collection.    Review of Symptoms:  ROS negative aside from that noted in HPI.    Medications:  Antibiotics:   Antibiotics (From admission, onward)    Start     Stop Route Frequency Ordered    09/30/20 1200  vancomycin (VANCOCIN) 1,250 mg in dextrose 5 % 250 mL IVPB      -- IV Once 09/30/20 1057    09/29/20 1530  cefepime in dextrose 5 % 1 gram/50 mL IVPB 1 g      -- IV Every 12 hours (non-standard times) 09/29/20 1415    09/29/20 1430  metroNIDAZOLE tablet 500 mg      -- Oral Every 8 hours 09/29/20 1415    09/28/20 1446  vancomycin - pharmacy to dose      -- IV pharmacy to manage frequency 09/28/20 1347    09/27/20 1030  mupirocin 2 % ointment      -- Top 2 times daily 09/27/20 0916          Objective:  Physical Exam:  VS (24h):  Temp:  [98.2 °F (36.8 °C)-99 °F (37.2 °C)] 99 °F (37.2 °C)  Pulse:  [] 108  Resp:  [17-18] 18  SpO2:  [97 %] 97 %  BP: (100-134)/(54-69) 134/69    Physical Exam  Constitutional:       General: She is not in acute distress.     Appearance: Normal appearance. She is obese. She is not ill-appearing or diaphoretic.   HENT:      Head: Normocephalic and atraumatic.      Nose: Nose normal.      Mouth/Throat:      Mouth: Mucous membranes are moist.      Pharynx: Oropharynx is clear. No oropharyngeal exudate or posterior oropharyngeal erythema.   Eyes:      General: No scleral icterus.     Conjunctiva/sclera: Conjunctivae normal.   Neck:      Musculoskeletal: Normal range of motion and neck supple. No neck rigidity.   Cardiovascular:      Rate and Rhythm: Normal rate and regular rhythm.      Heart sounds: Normal heart sounds.   Pulmonary:      Effort: Pulmonary effort is normal.      Breath sounds: Normal breath sounds.   Abdominal:      General: There is no distension.      Palpations: Abdomen is soft.      Tenderness: There is no abdominal tenderness.   Musculoskeletal:          General: Swelling (bilateral, L >> R) increased from yesterday and tenderness present. No deformity. Left leg warm to touch.     Comments: Wound of medial LLL in c/d/i dressing   Skin:     Coloration: Skin is not jaundiced. Blistering with interval worsening from yesterday.     Findings: No lesion or rash.   Neurological:      General: No focal deficit present.      Mental Status: She is alert and oriented to person, place, and time.   Psychiatric:         Mood and Affect: Mood normal.         Behavior: Behavior normal.     Labs:  CBC:   Lab Results   Component Value Date    WBC 8.95 09/30/2020    WBC 8.33 09/29/2020    WBC 9.78 09/28/2020    WBC 7.41 09/27/2020    WBC 10.89 09/26/2020    HCT 26.4 (L) 09/30/2020     09/30/2020       BMP:   Recent Labs   Lab 09/30/20  0848   *      K 3.6      CO2 27   BUN 12   CREATININE 2.2*   CALCIUM 8.2*   MG 2.3       LFT:   Lab Results   Component Value Date    ALT 73 (H) 09/30/2020    AST 43 (H) 09/30/2020    ALKPHOS 79 09/30/2020    BILITOT 0.6 09/30/2020         Microbiology x 7d:   Microbiology Results (last 7 days)     Procedure Component Value Units Date/Time    Blood Culture #1 **CANNOT BE ORDERED STAT** [[id number]] Collected: 09/25/20 1355    Order Status: Completed Specimen: Blood from Peripheral, Antecubital, Right Updated: 09/29/20 2312     Blood Culture, Routine No Growth to date      No Growth to date      No Growth to date      No Growth to date      No Growth to date    Blood Culture #2 **CANNOT BE ORDERED STAT** [[id number]] Collected: 09/25/20 1410    Order Status: Completed Specimen: Blood from Peripheral, Antecubital, Left Updated: 09/29/20 2312     Blood Culture, Routine No Growth to date      No Growth to date      No Growth to date      No Growth to date      No Growth to date    Blood culture [[id number]] Collected: 09/27/20 0944    Order Status: Completed Specimen: Blood from Antecubital, Left Arm Updated: 09/29/20 1612     Blood  Culture, Routine No Growth to date      No Growth to date      No Growth to date    Narrative:      Please collected 2 cultures from 2 separate sites    Blood culture [[id number]] Collected: 09/27/20 0939    Order Status: Completed Specimen: Blood from Antecubital, Left Arm Updated: 09/29/20 1612     Blood Culture, Routine No Growth to date      No Growth to date      No Growth to date    Narrative:      Please collect 2 cultures from 2 separate sites.  Collection has been rescheduled by [facility] at 09/27/2020 09:46 Reason:   duplicate order did 2 cultute  Collection has been rescheduled by [facility]AD at 09/27/2020 09:46 Reason:   duplicate order did 2 cultute    Blood culture [[id number]] Collected: 09/26/20 0439    Order Status: Completed Specimen: Blood from Peripheral, Left Updated: 09/29/20 1412     Blood Culture, Routine No Growth to date      No Growth to date      No Growth to date      No Growth to date    Blood culture [[id number]] Collected: 09/26/20 0444    Order Status: Completed Specimen: Blood from Peripheral, Left Updated: 09/29/20 1412     Blood Culture, Routine No Growth to date      No Growth to date      No Growth to date      No Growth to date    Aerobic culture [[id number]] Collected: 09/26/20 1316    Order Status: Completed Specimen: Wound from Leg, Left Updated: 09/29/20 1119     Aerobic Bacterial Culture Skin mark,  no predominant organism    Urine Culture High Risk [[id number]]  (Abnormal) Collected: 09/26/20 1131    Order Status: Completed Specimen: Urine, Clean Catch Updated: 09/28/20 1525     Urine Culture, Routine DIPHTHEROIDS  10,000 - 49,999 cfu/ml      Narrative:      Indicated criteria for high risk culture:->Other  Other (specify):->septic patient on broad spectrum abx with  continued fevers    Clostridium difficile EIA [[id number]]     Order Status: Canceled Specimen: Stool     Blood culture [[id number]]     Order Status: Canceled Specimen: Blood     Blood culture [[id number]]     Order Status:  Canceled Specimen: Blood     Blood culture [[id number]]     Order Status: Canceled Specimen: Blood           Imaging:  MRI Tibia Fibula with Contrast 9/30  Report pending    X-ray Chest 1 View     Result Date: 9/27/2020  EXAMINATION: XR CHEST 1 VIEW CLINICAL HISTORY: Febrile. TECHNIQUE: Single frontal view of the chest was performed. COMPARISON: Multiple prior radiographs of the chest, most recent from 09/25/2020. FINDINGS: The lungs are well expanded and clear. No focal opacities are seen. The pleural spaces are clear.  The cardiac silhouette is borderline enlarged although this may be accentuated by technique.  The visualized osseous structures are unremarkable.      No acute cardiopulmonary abnormality. Electronically signed by:    Vignesh Sanchez Date:                                          09/27/2020 Time:                                            10:25     X-ray Chest 1 View     Result Date: 9/25/2020  EXAMINATION: XR CHEST 1 VIEW CLINICAL HISTORY: COPD Exacerbation; TECHNIQUE: Single view of the chest was obtained. COMPARISON: Multiple priors, most recent 02/13/2019 FINDINGS: The right costophrenic angles excluded from field of view. Normal cardiomediastinal contour. No focal consolidation, pleural effusion or pneumothorax.      No acute cardiopulmonary abnormality. Electronically signed by:    Ivonne Gottlieb Date:                                          09/25/2020 Time:                                            16:27     Us Lower Extremity Veins Bilateral     Result Date: 9/25/2020  EXAMINATION: US LOWER EXTREMITY VEINS BILATERAL CLINICAL HISTORY: Other specified soft tissue disorders TECHNIQUE: Duplex and color flow Doppler and dynamic compression was performed of the bilateral lower extremity veins was performed. COMPARISON: None FINDINGS: Right thigh veins: The common femoral, femoral, popliteal, upper greater saphenous, and deep femoral veins are patent and free of thrombus. The veins are normally  compressible and have normal phasic flow and augmentation response. Right calf veins: The visualized calf veins are patent. Left thigh veins: The common femoral, femoral, popliteal, upper greater saphenous, and deep femoral veins are patent and free of thrombus. The veins are normally compressible and have normal phasic flow and augmentation response. Left calf veins: The visualized calf veins are patent. Miscellaneous: None      No evidence of deep venous thrombosis in either lower extremity. Electronically signed by:      Charlie Ochoa Jr Date:                                         09/25/2020 Time:                                            14:22     Ct Thigh W W/o Contrast Left     Result Date: 9/26/2020  EXAMINATION: CT THIGH W W/O CONTRAST LEFT; CT LEG (TIBIA-FIBULA) W W/O CONTRAST LEFT CLINICAL HISTORY: Concern for necrotizing fasciitis.;; Concern for necrotizing fasciitis. Patient with lower leg cellulitis.; TECHNIQUE: CT of the left lower extremity was obtained from the level of the pelvis to the feet.  Images were obtained prior to and following administration of 100 cc IV Omnipaque 350.  Multiplanar reconstructions were obtained and reviewed. COMPARISON: None FINDINGS: There is nonspecific subcutaneous soft tissue edema in the left lower extremity below the level of the knee.  No fluid collections visualized to suggest abscess.  No soft tissue gas visualized to suggest necrotizing fasciitis.  No acute or suspicious osseous findings demonstrated.  Visualized musculature appears grossly normal. There is an IUD in place within the uterus.  No adnexal masses visualized.  The urinary bladder appears unremarkable. There are multiple nonspecific enlarged left external iliac and inguinal lymph nodes present measuring up to 15 mm and 19 mm in axial short dimension respectively.  Visualized vasculature appears grossly within normal limits.      1. Nonspecific subcutaneous soft tissue edema in the left lower  leg possibly representing cellulitis, correlate clinically.  No definite abscess or evidence of necrotizing fasciitis. 2. Nonspecific left external iliac chain and inguinal adenopathy, likely reactive in nature. Electronically signed by:     Humberto Love MD Date:                                           09/26/2020 Time:                                            12:34     Ct Leg (tibia-fibula) W W/o Contrast Left     Result Date: 9/26/2020  EXAMINATION: CT THIGH W W/O CONTRAST LEFT; CT LEG (TIBIA-FIBULA) W W/O CONTRAST LEFT CLINICAL HISTORY: Concern for necrotizing fasciitis.;; Concern for necrotizing fasciitis. Patient with lower leg cellulitis.; TECHNIQUE: CT of the left lower extremity was obtained from the level of the pelvis to the feet.  Images were obtained prior to and following administration of 100 cc IV Omnipaque 350.  Multiplanar reconstructions were obtained and reviewed. COMPARISON: None FINDINGS: There is nonspecific subcutaneous soft tissue edema in the left lower extremity below the level of the knee.  No fluid collections visualized to suggest abscess.  No soft tissue gas visualized to suggest necrotizing fasciitis.  No acute or suspicious osseous findings demonstrated.  Visualized musculature appears grossly normal. There is an IUD in place within the uterus.  No adnexal masses visualized.  The urinary bladder appears unremarkable. There are multiple nonspecific enlarged left external iliac and inguinal lymph nodes present measuring up to 15 mm and 19 mm in axial short dimension respectively.  Visualized vasculature appears grossly within normal limits.      1. Nonspecific subcutaneous soft tissue edema in the left lower leg possibly representing cellulitis, correlate clinically.  No definite abscess or evidence of necrotizing fasciitis. 2. Nonspecific left external iliac chain and inguinal adenopathy, likely reactive in nature. Electronically signed by:     Humberto Love MD Date:                                            09/26/2020 Time:                                            12:34     Mammo Digital Screening Bilat W/ Butch     Result Date: 9/14/2020  Result: Mammo Digital Screening Bilat w/ Butch History: Patient is 39 y.o. and is seen for a screening mammogram. Films Compared: Prior images (if available) were compared. Findings: This procedure was performed using tomosynthesis. Computer-aided detection was utilized in the interpretation of this examination.  The breasts have scattered areas of fibroglandular density. There is no evidence of suspicious masses, microcalcifications or architectural distortion. 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      No mammographic evidence of malignancy. BI-RADS Category 1: Negative Recommendation: Routine screening mammogram in 1 year is recommended. Your estimated lifetime risk of breast cancer (to age 85) based on Tyrer-Cuzick risk assessment model is Tyrer-Cuzick: 8.52 %. According to the [employer] Cancer Society, patients with a lifetime breast cancer risk of 20% or higher might benefit from supplemental screening tests.       Assessment:    Active Hospital Problems    Diagnosis    *Left leg cellulitis    Hypokalemia    Hyperlipidemia    SIRS (systemic inflammatory response syndrome)    HTN (hypertension)    Morbidly obese         Plan:  See top of page.

## 2020-09-30 NOTE — PLAN OF CARE
Recommendation:   1. Addition of Boost BID. Addition of Paddy BID   2. Encourage intake of cardiac diet at all meals.   3. RD to follow up     Goals:  Pt to meet >/=50% PO intake by RD follow up    Nutrition Goal Status: new  Communication of RD Recs: other (comment)(POC)    Problem: Wound  Goal: Optimal Wound Healing  Outcome: Ongoing, Progressing     Problem: Oral Intake Inadequate (Acute Kidney Injury/Impairment)  Goal: Optimal Nutrition Intake  Outcome: Ongoing, Progressing

## 2020-09-30 NOTE — PHYSICIAN QUERY
PT Name: Mohsen Julien  MR #: [id number]     SYSTEMIC INFECTIOUS PROCESS CLARIFICATION     CDS: Noemy Hand RN, CCDS         Contact information :ext [phone] [email or web address]     This form is a permanent document in the medical record.     Query Date: September 30, 2020    By submitting this query, we are merely seeking further clarification of documentation.  Please utilize your independent clinical judgment when addressing the question(s) below.  The Medical Record contains the following:  Indicators Supporting Clinical Findings Location in Medical Record   x HR         RR          BP        Temp Temp:  [98.9 °F (37.2 °C)-101.6 °F (38.7 °C)] 101.1 °F (38.4 °C)  Pulse:  [102-106] 102  Resp:  [20-24] 24 VS 9/25/20   x Lactic Acid          Procalcitonin Lactate 2.2 Lab 9/25/20   x WBC           Bands          CRP  WBC 17.06, .6  T. Bilirubin 1.1 Lab 9/25/20   x Culture(s)  Urine culture with <50k cfu diphtheroids, which are normal cutaneous mark. This is not consistent with UTI.   Microbiology Data:  Blood culture 9/25 NGTD  Blood culture 9/26 NGTD  Urine culture 9/26 < 50k cfu diphtheroids  Wound culture 9/26 with normal skin mark  Blood culture 9/27 NGTD ID Note 9/30/20    AMS, Confusion, LOC, etc.      Organ Dysfunction/Failure     x Bacteremia or Sepsis / Septic patient met sepsis criteria Hospital medicine Plan of Care Note 9/26/20   x Known or Suspected Source of Infection documented Cellulitis H&P 9/25/20   x (Failed) Outpatient Treatment Failed antifungal treatments H&P 9/25/20   x Medication Plan:  Antipyretics  IVF  Blood cultures pending  Broad spectrum antibiotics H&P 9/25/20   x Treatment Patient continued to be febrile despite receiving clindamyin, on basis of fever of 102.7 despite acetaminophen, tachycardia, and source, patient met sepsis criteria. Will bolus 1L fluid /hr x3h, reculture, and broaden ABX to Vanc/zosyn. Will add ibuprofen to alternate with tylenol. Continue  to monitor.    Antimicrobials:  Clindamycin 9/25  Pip-tazo 9/25 - 9/27  Vancomycin 9/25 - present  Cefepime 9/29 - present  Flagyl 9/29 - present Hospital medicine Plan of Care Note 9/26/20            ID Note 9/30/20   x Other 9/26 pt fevers persisted, met SIRS criteria. Broad spectrum abx with 2 bore IVs     SIRS (systemic inflammatory response syndrome)  HR> 105 Temp 39.3C  SIRS  Criteria met  Blood cultures ngtd  vanc continued  D/c zosyn  Still afebrile but no improvement on swelling  Consulted ID, recs appreciated: added cefepime and flagyl Hospital medicine PN 9/26/20    Hospital Medicine PN 9/30/20        Provider, please specify diagnosis or diagnoses associated with above clinical findings.  Please clarify Sepsis diagnosis.    [x  ] Sepsis Ruled in , please specify associated organism(s):           [    ] Bacterial gram negative           [    ] Bacterial gram positive           [ x   ] Bacterial, unspecified           [    ] Fungal, unspecified           [    ] Unknown or Other, please specify, ______     [   ]   SIRS with infection but without Sepsis       [   ]   Sepsis Ruled Out     [  ] Clinically Undetermined         Please document in your progress notes daily for the duration of treatment until resolved and include in your discharge summary.

## 2020-09-30 NOTE — PROGRESS NOTES
[facility] Hospital Medicine  Progress Note    Patient Name: Mohsen Julien  MRN: [id number]  Patient Class: IP- Inpatient   Admission Date: 9/25/2020  Length of Stay: 3 days  Attending Physician: Jeremy White III, MD  Primary Care Provider: Christopher Diaz MD        Subjective:     Principal Problem:Left leg cellulitis        HPI:  Patient is a 39 y.o.  female with PMHx of HTN and HLD presenting with fevers and chills starting last night. Pt  Reports associated nausea and vomiting with headache and left leg swelling. Pt reports fevers and chills persisted this morning and  Was told by Dr. Griffith to come to the ED. Pt reports left leg skin changes for a couple of months and she has been taking antifungal medications, po and cream, to treat the wound. Pt reports using compression socks but noticed clear discharge on the socks.     In ED, patient was febrile with nausea. Pt given zofran and started IVF. Lab work presented hypokalemia of 2.7, ESR 42,  and WBC  Of  17. Pt given onedose of 40mEq K repletion. Blood cultures were ordered. Pt admitted to observation for hypokalemia and cellulitis.     Overview/Hospital Course:  9/26 pt fevers persisted, met SIRS criteria. Broad spectrum abx with 2 bore IVs .   9/27 fevers persisting, on IV vanc and zosyn,  9/30 afebrile for several days; ID consulted for infection.    Interval History: NAEON. Persistent swelling in lower left extremity. Afebrile for several days.     Review of Systems   Constitutional: Negative for activity change, chills and fever.   HENT: Negative for congestion, postnasal drip, rhinorrhea and sore throat.    Eyes: Negative for discharge and redness.   Respiratory: Negative for cough, chest tightness and wheezing.    Cardiovascular: Negative for chest pain and palpitations.   Gastrointestinal: Negative for abdominal distention, abdominal pain, diarrhea, nausea and vomiting.   Genitourinary: Negative for  dysuria, frequency and urgency.   Musculoskeletal: Negative for back pain and joint swelling.   Skin: Positive for color change, rash and wound.        LLE skin breakdown w/ drainage on lateral leg.    Neurological: Negative for weakness and headaches.   Psychiatric/Behavioral: Negative.      Objective:     Vital Signs (Most Recent):  Temp: 99 °F (37.2 °C) (09/30/20 0753)  Pulse: 108 (09/30/20 0815)  Resp: 18 (09/30/20 0818)  BP: 134/69 (09/30/20 0753)  SpO2: 97 % (09/29/20 1620) Vital Signs (24h Range):  Temp:  [98.2 °F (36.8 °C)-99 °F (37.2 °C)] 99 °F (37.2 °C)  Pulse:  [] 108  Resp:  [17-18] 18  SpO2:  [97 %] 97 %  BP: (100-134)/(54-69) 134/69     Weight: 132 kg (291 lb 0.1 oz)  Body mass index is 49.95 kg/m².    Intake/Output Summary (Last 24 hours) at 9/30/2020 1337  Last data filed at 9/29/2020 1922  Gross per 24 hour   Intake 140 ml   Output 400 ml   Net -260 ml      Physical Exam  Vitals signs and nursing note reviewed.   Constitutional:       Appearance: Normal appearance. She is obese.   HENT:      Head: Normocephalic and atraumatic.      Mouth/Throat:      Pharynx: Oropharynx is clear.   Eyes:      General:         Right eye: No discharge.         Left eye: No discharge.      Conjunctiva/sclera: Conjunctivae normal.   Neck:      Musculoskeletal: Normal range of motion.   Cardiovascular:      Pulses: Normal pulses.   Pulmonary:      Effort: Pulmonary effort is normal. No respiratory distress.   Abdominal:      General: Abdomen is flat.      Palpations: Abdomen is soft.      Tenderness: There is no abdominal tenderness.   Musculoskeletal:         General: No swelling.      Right lower leg: No edema.   Skin:     General: Skin is warm.      Findings: Erythema and wound present.             Comments: Left lower extremity skin breakdown w/ drainage from lateral leg. Warm to touch to midshaft, improving   Edematous    Neurological:      General: No focal deficit present.      Mental Status: She is alert  and oriented to person, place, and time.   Psychiatric:         Mood and Affect: Mood normal.         Behavior: Behavior normal.         Significant Labs:   CBC:   Recent Labs   Lab 09/29/20  0557 09/30/20  0848   WBC 8.33 8.95   HGB 8.5* 7.9*   HCT 27.7* 26.4*    267     CMP:   Recent Labs   Lab 09/29/20  0557 09/30/20  0848    137   K 3.1* 3.6    101   CO2 25 27   * 120*   BUN 15 12   CREATININE 2.3* 2.2*   CALCIUM 8.2* 8.2*   PROT 6.7 6.7   ALBUMIN 2.2* 2.2*   BILITOT 0.5 0.6   ALKPHOS 79 79   AST 88* 43*   ALT 83* 73*   ANIONGAP 11 9   EGFRNONAA 26* 27*       Significant Imaging: I have reviewed all pertinent imaging results/findings within the past 24 hours.      Assessment/Plan:      * Left leg cellulitis  Persistent skin wound  With no skin breaks  Warm to touch  Failed antifungal treatments     Plan:  Antipyretics  Encouraging PO intake.   Blood cultures ngtd  vanc continued  D/c zosyn  Still afebrile but no improvement on swelling  Consulted ID, recs appreciated: added cefepime and flagyl  Pending MRI and CT lower extremity      MAYUR (acute kidney injury)  Pt baseline cr 0.9  IV vanc on board, MAYUR developed    Plan:  mIVF  Monitor CMP for creatinine      SIRS (systemic inflammatory response syndrome)  HR> 105 Temp 39.3C  SIRS  Criteria met    Plan:  Broad spectrum antibiotics, on Vanc and Zosyn  IVF with 2 bore IVs  Consider stating fluids again? Last fluids received 09/26  LA wnl, 1.5 on 09/26  Antipyretics   Afebrile for few days      Hyperlipidemia  Restart home med  On fenofibrate 145mg PO daily and simvastatin 20mg PO QHS    Hypokalemia  K  2.7 on admission  EKG: no changes  Asymptomatic  Possibly due to antifungal meds?    Plan:   KCL repletion x2  in ED  Trend BMP  Dc  Lasix  Potassium repleted          Morbidly obese        HTN (hypertension)  BP stable in ED    Plan:   Continue home meds  On lasix 20mg BID and lopressor 100mg BID  D/c lasix due to persistent  hypokalemia      VTE Risk Mitigation (From admission, onward)         Ordered     enoxaparin injection 40 mg  Every 24 hours      09/25/20 1920     IP VTE HIGH RISK PATIENT  Once      09/25/20 1920     Place sequential compression device  Until discontinued      09/25/20 1920                Discharge Planning   SAYRA:      Code Status: Full Code   Is the patient medically ready for discharge?:     Reason for patient still in hospital (select all that apply): Treatment and Consult recommendations  Discharge Plan A: Home with family                  Maryanne Hallman MD  Department of Hospital Medicine   [facility]

## 2020-09-30 NOTE — RESIDENT HANDOFF
Ms. Julien is a 39 y.o.  female with PMHx of HTN and HLD presenting with fevers and chills and left lower extremity cellulitis. 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 Dr. Griffith was consulted and cultures were taken. Normal skin mark on cultures with no predominate. ID consulted and recommended Vancomycin (Day 5), cefepime (Day 3) and po flagyl (day 3), also had completed 3 days of zosyn. Wound is persistent and only showing mild improvement on IV antibiotics.  ID recommended MRI of leg for source control. This is likely a component of fluid overload and venous stasis ulcer. She also had an MAYUR 2/2 supra-therapeutic vancomycin trough. She will need final ID recommendations and wound care recommendations for discharge planning.

## 2020-09-30 NOTE — PROGRESS NOTES
"[facility]  Adult Nutrition  Progress Note    SUMMARY       Recommendations    Recommendation:   1. Addition of Boost BID. Addition of Paddy BID   2. Encourage intake of cardiac diet at all meals.   3. RD to follow up     Goals:  Pt to meet >/=50% PO intake by RD follow up    Nutrition Goal Status: new  Communication of RD Recs: other (comment)(POC)    Reason for Assessment    Reason For Assessment: identified at risk by screening criteria(wounds)  Diagnosis: (left leg cellulitis)  Relevant Medical History: HTN, fibromyalgia, thyroid disease, anemia, cholecystectomy  Interdisciplinary Rounds: did not attend  General Information Comments: Pt was not in room at time of visit 2/2 MRI. Discussed with daughter if Pt was eating, daughter said PT only ate fruit for breakfast. Discussed the addition of Boost for nutrients and Paddy to aid in wound healing.  Russell: 20.  NFPE- not completed today 2/2 to pt not in the room. Per note Pt appears to be well nourished.  Nutrition Discharge Planning: d/c needs to be determined    Nutrition Risk Screen    Nutrition Risk Screen: large or nonhealing wound, burn or pressure injury    Nutrition/Diet History    Food Preferences: no spiritual, cultural, or Scientologist food preferences noted at this time  Food Allergies: NKFA  Factors Affecting Nutritional Intake: early satiety, nausea/vomiting    Anthropometrics    Temp: 99 °F (37.2 °C)  Height Method: Stated  Height: 5' 4" (162.6 cm)  Height (inches): 64 in  Weight Method: Bed Scale  Weight: 132 kg (291 lb 0.1 oz)  Weight (lb): 291.01 lb  Ideal Body Weight (IBW), Female: 120 lb  % Ideal Body Weight, Female (lb): 242.51 %  BMI (Calculated): 49.9  BMI Grade: greater than 40 - morbid obesity       Lab/Procedures/Meds    Pertinent Labs Reviewed: reviewed  Pertinent Labs Comments: L Hgb 7.9; L Hct 26.4; H BUN 2.2; H glucose 120; L calcium 8.2; L phosphorus 2.3; L Alb 2.2; H AST 43; H ALT 73  Pertinent Medications Reviewed: " reviewed  Pertinent Medications Comments: cefepimine, enoxaparin, finofiibrate, ferrous sulfate, metoprolol tartate, metronidazole, mupurocin, simvastatin, triamterene- hydroxhlorothiazide    Physical Findings/Assessment    Russell: 20    Estimated/Assessed Needs    Weight Used For Calorie Calculations: 54.5 kg (120 lb 2.4 oz)(IBW)  Energy Calorie Requirements (kcal): 1907(35 kcal/kg (IBW))  Energy Need Method: Kcal/kg  Protein Requirements: 87(1.6 g/kg (IBW))  Weight Used For Protein Calculations: 54.5 kg (120 lb 2.4 oz)(IBW)     Estimated Fluid Requirement Method: RDA Method  RDA Method (mL): 1907         Nutrition Prescription Ordered    [facility]    Evaluation of Received Nutrient/Fluid Intake    I/O: 320/400  Energy Calories Required: not meeting needs  Protein Required: not meeting needs  Fluid Required: not meeting needs  Comments: LBM 09/28/2020  % Intake of Estimated Energy Needs: 0 - 25 %  % Meal Intake: 0 - 25 %    Nutrition Risk    Level of Risk/Frequency of Follow-up: (2 x/week)     Assessment and Plan    MAYUR (acute kidney injury)  Contributing Nutrition Diagnosis  Inadequate oral intake    Related to (etiology):   Nausea and vomitting    Signs and Symptoms (as evidenced by):   Pt chart review showing pt with Nausea and vomiting upon admission, pt's daughter reporting pt consuming less than 25% of her meals.      Interventions:  Collaboration with other providers  Commercial Beverage: Boost Plus BID  Modified Beverage: Paddy BID     Nutrition Diagnosis Status:   New           Monitor and Evaluation    Food and Nutrient Intake: energy intake  Food and Nutrient Adminstration: diet order  Knowledge/Beliefs/Attitudes: food and nutrition knowledge/skill  Physical Activity and Function: nutrition-related ADLs and IADLs  Anthropometric Measurements: weight, weight change, body mass index  Biochemical Data, Medical Tests and Procedures: electrolyte and renal panel, glucose/endocrine  profile  Nutrition-Focused Physical Findings: overall appearance     Malnutrition Assessment      Subcutaneous Fat Loss (Final Summary): well nourished  Muscle Loss Evaluation (Final Summary): well nourished         Nutrition Follow-Up    RD Follow-up?: Yes

## 2020-09-30 NOTE — ASSESSMENT & PLAN NOTE
Contributing Nutrition Diagnosis  Inadequate oral intake    Related to (etiology):   Nausea and vomitting    Signs and Symptoms (as evidenced by):   Pt chart review showing pt with Nausea and vomiting upon admission, pt's daughter reporting pt consuming less than 25% of her meals.      Interventions:  Collaboration with other providers  Commercial Beverage: Boost Plus BID  Modified Beverage: Paddy BID     Nutrition Diagnosis Status:   Improving

## 2020-09-30 NOTE — ASSESSMENT & PLAN NOTE
K  2.7 on admission  EKG: no changes  Asymptomatic  Possibly due to antifungal meds?    Plan:   KCL repletion x2  in ED  Trend BMP  Dc  Lasix  Potassium repleted

## 2020-09-30 NOTE — PROGRESS NOTES
Pharmacokinetic Assessment Follow Up: IV Vancomycin    Vancomycin serum concentration assessment(s):    The random level was drawn correctly and can be used to guide therapy at this time. The measurement is above the desired definitive target range of 10 to 15 mcg/mL.    Vancomycin Regimen Plan:    Will give a one time dose of vancomycin 1250 mg IV once and recheck vancomycin random on 10/1 at 1100. Goal 10-15 mcg/mL    Drug levels (last 3 results):  Recent Labs   Lab Result Units 09/28/20  1310 09/29/20  0557 09/30/20  0848   Vancomycin, Random ug/mL  --  15.8 16.2   Vancomycin-Trough ug/mL 26.4*  --   --        Pharmacy will continue to follow and monitor vancomycin.    Please contact pharmacy at extension [phone] for questions regarding this assessment.    Thank you for the consult,   Татьяна Singh       Patient brief summary:  Mohsen Julien is a 39 y.o. female initiated on antimicrobial therapy with IV Vancomycin for treatment of skin & soft tissue infection    The patient's current regimen is pulse dosing    Drug Allergies:   Review of patient's allergies indicates:  No Known Allergies    Actual Body Weight:   132 kg    Renal Function:   Estimated Creatinine Clearance: 45.5 mL/min (A) (based on SCr of 2.2 mg/dL (H)).,     Dialysis Method (if applicable):  N/A    CBC (last 72 hours):  Recent Labs   Lab Result Units 09/28/20 1310 09/29/20  0557 09/30/20  0848   WBC K/uL 9.78 8.33 8.95   Hemoglobin g/dL 8.2* 8.5* 7.9*   Hematocrit % 26.7* 27.7* 26.4*   Platelets K/uL 234 237  --    Gran% % 79.4* 70.8  --    Lymph% % 11.7* 17.0*  --    Mono% % 7.8 10.3  --    Eosinophil% % 0.1 0.5  --    Basophil% % 0.1 0.2  --    Differential Method  Automated Automated  --        Metabolic Panel (last 72 hours):  Recent Labs   Lab Result Units 09/27/20  1446 09/28/20  1310 09/29/20  0557 09/30/20  0848   Sodium mmol/L  --  136 138 137   Potassium mmol/L  --  3.1* 3.1* 3.6   Chloride mmol/L  --  100 102 101   CO2 mmol/L  --   24 25 27   Glucose mg/dL  --  130* 118* 120*   Glucose, UA  Negative  --   --   --    BUN, Bld mg/dL  --  14 15 12   Creatinine mg/dL  --  2.2* 2.3* 2.2*   Albumin g/dL  --  2.3* 2.2* 2.2*   Total Bilirubin mg/dL  --  0.5 0.5 0.6   Alkaline Phosphatase U/L  --  80 79 79   AST U/L  --  48* 88* 43*   ALT U/L  --  38 83* 73*   Magnesium mg/dL  --  2.3 2.3 2.3   Phosphorus mg/dL  --  2.1* 2.5* 2.3*       Vancomycin Administrations:  vancomycin given in the last 96 hours                     vancomycin 1.5 g in dextrose 5 % 250 mL IVPB (ready to mix) (mg) 1,500 mg New Bag 09/29/20 0900    vancomycin (VANCOCIN) 1,750 mg in dextrose 5 % 500 mL IVPB (mg) 1,750 mg New Bag 09/28/20 0058     1,750 mg New Bag 09/27/20 1351     1,750 mg New Bag  0053     1,750 mg New Bag 09/26/20 1323                    Microbiologic Results:  Microbiology Results (last 7 days)       Procedure Component Value Units Date/Time    Blood Culture #1 **CANNOT BE ORDERED STAT** [[id number]] Collected: 09/25/20 1355    Order Status: Completed Specimen: Blood from Peripheral, Antecubital, Right Updated: 09/29/20 2312     Blood Culture, Routine No Growth to date      No Growth to date      No Growth to date      No Growth to date      No Growth to date    Blood Culture #2 **CANNOT BE ORDERED STAT** [[id number]] Collected: 09/25/20 1410    Order Status: Completed Specimen: Blood from Peripheral, Antecubital, Left Updated: 09/29/20 2312     Blood Culture, Routine No Growth to date      No Growth to date      No Growth to date      No Growth to date      No Growth to date    Blood culture [[id number]] Collected: 09/27/20 0944    Order Status: Completed Specimen: Blood from Antecubital, Left Arm Updated: 09/29/20 1612     Blood Culture, Routine No Growth to date      No Growth to date      No Growth to date    Narrative:      Please collected 2 cultures from 2 separate sites    Blood culture [[id number]] Collected: 09/27/20 0939    Order Status: Completed  Specimen: Blood from Antecubital, Left Arm Updated: 09/29/20 1612     Blood Culture, Routine No Growth to date      No Growth to date      No Growth to date    Narrative:      Please collect 2 cultures from 2 separate sites.  Collection has been rescheduled by UAD at 09/27/2020 09:46 Reason:   duplicate order did 2 cultute  Collection has been rescheduled by UAD at 09/27/2020 09:46 Reason:   duplicate order did 2 cultute    Blood culture [[id number]] Collected: 09/26/20 0439    Order Status: Completed Specimen: Blood from Peripheral, Left Updated: 09/29/20 1412     Blood Culture, Routine No Growth to date      No Growth to date      No Growth to date      No Growth to date    Blood culture [[id number]] Collected: 09/26/20 0444    Order Status: Completed Specimen: Blood from Peripheral, Left Updated: 09/29/20 1412     Blood Culture, Routine No Growth to date      No Growth to date      No Growth to date      No Growth to date    Aerobic culture [[id number]] Collected: 09/26/20 1316    Order Status: Completed Specimen: Wound from Leg, Left Updated: 09/29/20 1119     Aerobic Bacterial Culture Skin mark,  no predominant organism    Urine Culture High Risk [[id number]]  (Abnormal) Collected: 09/26/20 1131    Order Status: Completed Specimen: Urine, Clean Catch Updated: 09/28/20 1525     Urine Culture, Routine DIPHTHEROIDS  10,000 - 49,999 cfu/ml      Narrative:      Indicated criteria for high risk culture:->Other  Other (specify):->septic patient on broad spectrum abx with  continued fevers    Clostridium difficile EIA [[id number]]     Order Status: Canceled Specimen: Stool     Blood culture [[id number]]     Order Status: Canceled Specimen: Blood     Blood culture [[id number]]     Order Status: Canceled Specimen: Blood     Blood culture [[id number]]     Order Status: Canceled Specimen: Blood

## 2020-09-30 NOTE — ASSESSMENT & PLAN NOTE
Persistent skin wound  With no skin breaks  Warm to touch  Failed antifungal treatments     Plan:  Antipyretics  Encouraging PO intake.   Blood cultures ngtd  vanc continued  D/c zosyn  Still afebrile but no improvement on swelling  Consulted ID, recs appreciated: added cefepime and flagyl  Pending MRI and CT lower extremity

## 2020-09-30 NOTE — ASSESSMENT & PLAN NOTE
Pt baseline cr 0.9  IV vanc on board, MAYUR developed    Plan:  mIVF  Monitor CMP for creatinine

## 2020-09-30 NOTE — PROGRESS NOTES
"Surgery follow up  /69 (Patient Position: Sitting)   Pulse 108   Temp 99 °F (37.2 °C) (Oral)   Resp 18   Ht 5' 4" (1.626 m)   Wt 132 kg (291 lb 0.1 oz)   SpO2 97%   Breastfeeding No   BMI 49.95 kg/m²   I/O last 3 completed shifts:  In: 320 [P.O.:320]  Out: 400 [Urine:400]  No intake/output data recorded.  Recent Results (from the past 336 hour(s))   CBC with Automated Differential    Collection Time: 09/30/20  8:48 AM   Result Value Ref Range    WBC 8.95 3.90 - 12.70 K/uL    Hemoglobin 7.9 (L) 12.0 - 16.0 g/dL    Hematocrit 26.4 (L) 37.0 - 48.5 %   CBC with Automated Differential    Collection Time: 09/29/20  5:57 AM   Result Value Ref Range    WBC 8.33 3.90 - 12.70 K/uL    Hemoglobin 8.5 (L) 12.0 - 16.0 g/dL    Hematocrit 27.7 (L) 37.0 - 48.5 %    Platelets 237 150 - 350 K/uL   CBC with Automated Differential    Collection Time: 09/28/20  1:10 PM   Result Value Ref Range    WBC 9.78 3.90 - 12.70 K/uL    Hemoglobin 8.2 (L) 12.0 - 16.0 g/dL    Hematocrit 26.7 (L) 37.0 - 48.5 %    Platelets 234 150 - 350 K/uL     Recent Results (from the past 336 hour(s))   Basic metabolic panel    Collection Time: 09/26/20  4:02 PM   Result Value Ref Range    Sodium 134 (L) 136 - 145 mmol/L    Potassium 3.8 3.5 - 5.1 mmol/L    Chloride 101 95 - 110 mmol/L    CO2 22 (L) 23 - 29 mmol/L    BUN, Bld 8 6 - 20 mg/dL    Creatinine 0.8 0.5 - 1.4 mg/dL    Calcium 7.7 (L) 8.7 - 10.5 mg/dL    Anion Gap 11 8 - 16 mmol/L   Basic metabolic panel    Collection Time: 09/25/20  9:30 PM   Result Value Ref Range    Sodium 136 136 - 145 mmol/L    Potassium 3.3 (L) 3.5 - 5.1 mmol/L    Chloride 98 95 - 110 mmol/L    CO2 26 23 - 29 mmol/L    BUN, Bld 17 6 - 20 mg/dL    Creatinine 1.0 0.5 - 1.4 mg/dL    Calcium 8.1 (L) 8.7 - 10.5 mg/dL    Anion Gap 12 8 - 16 mmol/L   swelling and edema increasing with blisters of the lower leg  Check us for possible dvt  ID consult noted, will check mri left leg    "

## 2020-09-30 NOTE — PROGRESS NOTES
"Surgery follow up  /80 (Patient Position: Lying)   Pulse 88   Temp 99.6 °F (37.6 °C)   Resp 18   Ht 5' 4" (1.626 m)   Wt 132 kg (291 lb 0.1 oz)   SpO2 97%   Breastfeeding No   BMI 49.95 kg/m²   I/O last 3 completed shifts:  In: 320 [P.O.:320]  Out: 400 [Urine:400]  I/O this shift:  In: 850 [I.V.:600; IV Piggyback:250]  Out: -       Recent Results (from the past 336 hour(s))   CBC with Automated Differential    Collection Time: 09/30/20  8:48 AM   Result Value Ref Range    WBC 8.95 3.90 - 12.70 K/uL    Hemoglobin 7.9 (L) 12.0 - 16.0 g/dL    Hematocrit 26.4 (L) 37.0 - 48.5 %    Platelets 267 150 - 350 K/uL   CBC with Automated Differential    Collection Time: 09/29/20  5:57 AM   Result Value Ref Range    WBC 8.33 3.90 - 12.70 K/uL    Hemoglobin 8.5 (L) 12.0 - 16.0 g/dL    Hematocrit 27.7 (L) 37.0 - 48.5 %    Platelets 237 150 - 350 K/uL   CBC with Automated Differential    Collection Time: 09/28/20  1:10 PM   Result Value Ref Range    WBC 9.78 3.90 - 12.70 K/uL    Hemoglobin 8.2 (L) 12.0 - 16.0 g/dL    Hematocrit 26.7 (L) 37.0 - 48.5 %    Platelets 234 150 - 350 K/uL     Recent Results (from the past 336 hour(s))   Basic metabolic panel    Collection Time: 09/26/20  4:02 PM   Result Value Ref Range    Sodium 134 (L) 136 - 145 mmol/L    Potassium 3.8 3.5 - 5.1 mmol/L    Chloride 101 95 - 110 mmol/L    CO2 22 (L) 23 - 29 mmol/L    BUN, Bld 8 6 - 20 mg/dL    Creatinine 0.8 0.5 - 1.4 mg/dL    Calcium 7.7 (L) 8.7 - 10.5 mg/dL    Anion Gap 11 8 - 16 mmol/L   Basic metabolic panel    Collection Time: 09/25/20  9:30 PM   Result Value Ref Range    Sodium 136 136 - 145 mmol/L    Potassium 3.3 (L) 3.5 - 5.1 mmol/L    Chloride 98 95 - 110 mmol/L    CO2 26 23 - 29 mmol/L    BUN, Bld 17 6 - 20 mg/dL    Creatinine 1.0 0.5 - 1.4 mg/dL    Calcium 8.1 (L) 8.7 - 10.5 mg/dL    Anion Gap 12 8 - 16 mmol/L     Us and MRI noted , will continue leg elevation on three pillows and present iv antibiotics.  "

## 2020-10-01 LAB
ALBUMIN SERPL BCP-MCNC: 2.1 G/DL (ref 3.5–5.2)
ALP SERPL-CCNC: 71 U/L (ref 55–135)
ALT SERPL W/O P-5'-P-CCNC: 46 U/L (ref 10–44)
ANION GAP SERPL CALC-SCNC: 10 MMOL/L (ref 8–16)
ANISOCYTOSIS BLD QL SMEAR: SLIGHT
AST SERPL-CCNC: 17 U/L (ref 10–40)
BACTERIA BLD CULT: NORMAL
BACTERIA BLD CULT: NORMAL
BASOPHILS # BLD AUTO: 0.03 K/UL (ref 0–0.2)
BASOPHILS NFR BLD: 0.3 % (ref 0–1.9)
BILIRUB SERPL-MCNC: 0.4 MG/DL (ref 0.1–1)
BUN SERPL-MCNC: 11 MG/DL (ref 6–20)
CALCIUM SERPL-MCNC: 8.1 MG/DL (ref 8.7–10.5)
CHLORIDE SERPL-SCNC: 101 MMOL/L (ref 95–110)
CO2 SERPL-SCNC: 28 MMOL/L (ref 23–29)
CREAT SERPL-MCNC: 2.2 MG/DL (ref 0.5–1.4)
DIFFERENTIAL METHOD: ABNORMAL
EOSINOPHIL # BLD AUTO: 0.1 K/UL (ref 0–0.5)
EOSINOPHIL NFR BLD: 0.6 % (ref 0–8)
ERYTHROCYTE [DISTWIDTH] IN BLOOD BY AUTOMATED COUNT: 14.6 % (ref 11.5–14.5)
EST. GFR  (AFRICAN AMERICAN): 31 ML/MIN/1.73 M^2
EST. GFR  (NON AFRICAN AMERICAN): 27 ML/MIN/1.73 M^2
GLUCOSE SERPL-MCNC: 133 MG/DL (ref 70–110)
HCT VFR BLD AUTO: 26.1 % (ref 37–48.5)
HGB BLD-MCNC: 8 G/DL (ref 12–16)
HYPOCHROMIA BLD QL SMEAR: ABNORMAL
IMM GRANULOCYTES # BLD AUTO: 0.51 K/UL (ref 0–0.04)
IMM GRANULOCYTES NFR BLD AUTO: 5.1 % (ref 0–0.5)
LYMPHOCYTES # BLD AUTO: 1.9 K/UL (ref 1–4.8)
LYMPHOCYTES NFR BLD: 19.3 % (ref 18–48)
MAGNESIUM SERPL-MCNC: 2.3 MG/DL (ref 1.6–2.6)
MCH RBC QN AUTO: 23.9 PG (ref 27–31)
MCHC RBC AUTO-ENTMCNC: 30.7 G/DL (ref 32–36)
MCV RBC AUTO: 78 FL (ref 82–98)
MONOCYTES # BLD AUTO: 1.2 K/UL (ref 0.3–1)
MONOCYTES NFR BLD: 12.3 % (ref 4–15)
NEUTROPHILS # BLD AUTO: 6.3 K/UL (ref 1.8–7.7)
NEUTROPHILS NFR BLD: 62.4 % (ref 38–73)
NRBC BLD-RTO: 0 /100 WBC
PHOSPHATE SERPL-MCNC: 2.5 MG/DL (ref 2.7–4.5)
PLATELET # BLD AUTO: 331 K/UL (ref 150–350)
PLATELET BLD QL SMEAR: ABNORMAL
PMV BLD AUTO: 9.5 FL (ref 9.2–12.9)
POIKILOCYTOSIS BLD QL SMEAR: SLIGHT
POTASSIUM SERPL-SCNC: 3.1 MMOL/L (ref 3.5–5.1)
PROT SERPL-MCNC: 6.8 G/DL (ref 6–8.4)
RBC # BLD AUTO: 3.35 M/UL (ref 4–5.4)
SODIUM SERPL-SCNC: 139 MMOL/L (ref 136–145)
VANCOMYCIN SERPL-MCNC: 18.1 UG/ML
WBC # BLD AUTO: 10.07 K/UL (ref 3.9–12.7)

## 2020-10-01 PROCEDURE — 80053 COMPREHEN METABOLIC PANEL: CPT

## 2020-10-01 PROCEDURE — 97530 THERAPEUTIC ACTIVITIES: CPT

## 2020-10-01 PROCEDURE — 84100 ASSAY OF PHOSPHORUS: CPT

## 2020-10-01 PROCEDURE — 25000003 PHARM REV CODE 250: Performed by: STUDENT IN AN ORGANIZED HEALTH CARE EDUCATION/TRAINING PROGRAM

## 2020-10-01 PROCEDURE — 25000003 PHARM REV CODE 250

## 2020-10-01 PROCEDURE — 63600175 PHARM REV CODE 636 W HCPCS: Performed by: FAMILY MEDICINE

## 2020-10-01 PROCEDURE — 80202 ASSAY OF VANCOMYCIN: CPT

## 2020-10-01 PROCEDURE — 85027 COMPLETE CBC AUTOMATED: CPT

## 2020-10-01 PROCEDURE — 36415 COLL VENOUS BLD VENIPUNCTURE: CPT

## 2020-10-01 PROCEDURE — 83735 ASSAY OF MAGNESIUM: CPT

## 2020-10-01 PROCEDURE — 63600175 PHARM REV CODE 636 W HCPCS: Performed by: STUDENT IN AN ORGANIZED HEALTH CARE EDUCATION/TRAINING PROGRAM

## 2020-10-01 PROCEDURE — 25000003 PHARM REV CODE 250: Performed by: FAMILY MEDICINE

## 2020-10-01 PROCEDURE — 21400001 HC TELEMETRY ROOM

## 2020-10-01 PROCEDURE — 97165 OT EVAL LOW COMPLEX 30 MIN: CPT

## 2020-10-01 PROCEDURE — 85007 BL SMEAR W/DIFF WBC COUNT: CPT

## 2020-10-01 PROCEDURE — 97161 PT EVAL LOW COMPLEX 20 MIN: CPT

## 2020-10-01 RX ORDER — LANOLIN ALCOHOL/MO/W.PET/CERES
400 CREAM (GRAM) TOPICAL ONCE
Status: COMPLETED | OUTPATIENT
Start: 2020-10-01 | End: 2020-10-01

## 2020-10-01 RX ORDER — SODIUM,POTASSIUM PHOSPHATES 280-250MG
1 POWDER IN PACKET (EA) ORAL ONCE
Status: COMPLETED | OUTPATIENT
Start: 2020-10-01 | End: 2020-10-01

## 2020-10-01 RX ORDER — VANCOMYCIN HCL IN 5 % DEXTROSE 1G/250ML
1000 PLASTIC BAG, INJECTION (ML) INTRAVENOUS ONCE
Status: COMPLETED | OUTPATIENT
Start: 2020-10-01 | End: 2020-10-01

## 2020-10-01 RX ORDER — POTASSIUM CHLORIDE 20 MEQ/1
40 TABLET, EXTENDED RELEASE ORAL ONCE
Status: COMPLETED | OUTPATIENT
Start: 2020-10-01 | End: 2020-10-01

## 2020-10-01 RX ORDER — HYDROCODONE BITARTRATE AND ACETAMINOPHEN 7.5; 325 MG/1; MG/1
1 TABLET ORAL EVERY 6 HOURS PRN
Status: DISCONTINUED | OUTPATIENT
Start: 2020-10-01 | End: 2020-10-03

## 2020-10-01 RX ORDER — FAMOTIDINE 20 MG/1
20 TABLET, FILM COATED ORAL DAILY
Status: DISCONTINUED | OUTPATIENT
Start: 2020-10-01 | End: 2020-10-05 | Stop reason: HOSPADM

## 2020-10-01 RX ORDER — CLINDAMYCIN PHOSPHATE 600 MG/50ML
600 INJECTION, SOLUTION INTRAVENOUS
Status: DISCONTINUED | OUTPATIENT
Start: 2020-10-01 | End: 2020-10-03

## 2020-10-01 RX ADMIN — POTASSIUM CHLORIDE 40 MEQ: 1500 TABLET, EXTENDED RELEASE ORAL at 12:10

## 2020-10-01 RX ADMIN — ACETAMINOPHEN 1000 MG: 500 TABLET ORAL at 03:10

## 2020-10-01 RX ADMIN — CEFEPIME HYDROCHLORIDE 1 G: 1 INJECTION, SOLUTION INTRAVENOUS at 03:10

## 2020-10-01 RX ADMIN — METOPROLOL TARTRATE 100 MG: 50 TABLET, FILM COATED ORAL at 09:10

## 2020-10-01 RX ADMIN — Medication 6 MG: at 09:10

## 2020-10-01 RX ADMIN — POTASSIUM & SODIUM PHOSPHATES POWDER PACK 280-160-250 MG 1 PACKET: 280-160-250 PACK at 09:10

## 2020-10-01 RX ADMIN — FERROUS SULFATE TAB EC 325 MG (65 MG FE EQUIVALENT) 325 MG: 325 (65 FE) TABLET DELAYED RESPONSE at 09:10

## 2020-10-01 RX ADMIN — HYDROCODONE BITARTRATE AND ACETAMINOPHEN 1 TABLET: 5; 325 TABLET ORAL at 09:10

## 2020-10-01 RX ADMIN — MAGNESIUM OXIDE 400 MG (241.3 MG MAGNESIUM) TABLET 400 MG: TABLET at 12:10

## 2020-10-01 RX ADMIN — ENOXAPARIN SODIUM 40 MG: 40 INJECTION SUBCUTANEOUS at 03:10

## 2020-10-01 RX ADMIN — CLINDAMYCIN IN 5 PERCENT DEXTROSE 600 MG: 12 INJECTION, SOLUTION INTRAVENOUS at 03:10

## 2020-10-01 RX ADMIN — MUPIROCIN: 20 OINTMENT TOPICAL at 09:10

## 2020-10-01 RX ADMIN — CEFEPIME HYDROCHLORIDE 1 G: 1 INJECTION, SOLUTION INTRAVENOUS at 04:10

## 2020-10-01 RX ADMIN — TRIAMTERENE AND HYDROCHLOROTHIAZIDE 1 CAPSULE: 25; 37.5 CAPSULE ORAL at 09:10

## 2020-10-01 RX ADMIN — HYDROCODONE BITARTRATE AND ACETAMINOPHEN 1 TABLET: 5; 325 TABLET ORAL at 05:10

## 2020-10-01 RX ADMIN — AMITRIPTYLINE HYDROCHLORIDE 25 MG: 25 TABLET, FILM COATED ORAL at 09:10

## 2020-10-01 RX ADMIN — METRONIDAZOLE 500 MG: 500 TABLET ORAL at 07:10

## 2020-10-01 RX ADMIN — SIMVASTATIN 20 MG: 20 TABLET, FILM COATED ORAL at 09:10

## 2020-10-01 RX ADMIN — FENOFIBRATE 145 MG: 145 TABLET ORAL at 12:10

## 2020-10-01 RX ADMIN — VANCOMYCIN HYDROCHLORIDE 1000 MG: 1 INJECTION, POWDER, LYOPHILIZED, FOR SOLUTION INTRAVENOUS at 03:10

## 2020-10-01 RX ADMIN — FAMOTIDINE 20 MG: 20 TABLET ORAL at 12:10

## 2020-10-01 RX ADMIN — METRONIDAZOLE 500 MG: 500 TABLET ORAL at 09:10

## 2020-10-01 RX ADMIN — METRONIDAZOLE 500 MG: 500 TABLET ORAL at 03:10

## 2020-10-01 RX ADMIN — SODIUM CHLORIDE, SODIUM LACTATE, POTASSIUM CHLORIDE, AND CALCIUM CHLORIDE: .6; .31; .03; .02 INJECTION, SOLUTION INTRAVENOUS at 11:10

## 2020-10-01 RX ADMIN — CLINDAMYCIN IN 5 PERCENT DEXTROSE 600 MG: 12 INJECTION, SOLUTION INTRAVENOUS at 11:10

## 2020-10-01 NOTE — PLAN OF CARE
Patient alert and awake. Isolation precaution d/c'd. On IV fluids and IV  antibiotics. Left leg elevated. Up to commode. Pain medicine given as requested. Call light within reach. Bed alarm on.

## 2020-10-01 NOTE — ASSESSMENT & PLAN NOTE
- K  2.7 on admission, improved to  3.8 today  - Continues to be asymptomatic without EKG changes

## 2020-10-01 NOTE — ASSESSMENT & PLAN NOTE
- Pt baseline cr 0.9, improved to Cr 2.1 today  - Trend daily CMP  - Strict I's/O's  - UFeUrea suggestive of intrinsic kidney injury  - Kidney US wnl  - Appreciate nephro recs

## 2020-10-01 NOTE — PT/OT/SLP EVAL
Physical Therapy Evaluation    Patient Name:  Mohsen Julien   MRN:  [id number]    Recommendations:     Discharge Recommendations:    home with possible HH vs outpt if patient goes to wound care  Discharge Equipment Recommendations: (to be determined with functional assessment)   Barriers to discharge: None    Assessment:     Mohsen Julien is a 39 y.o. female admitted with a medical diagnosis of Left leg cellulitis.  She presents with the following impairments/functional limitations:  weakness, pain, impaired skin, impaired endurance, edema, decreased ROM  Initial eval completed, needs continued functional assessment however. Pt worked with OT earlier and ambulated within room and nursing reports patient has been up OOB in chair and going to restroom. Post acute recs to be determined       Rehab Prognosis: Fair; patient would benefit from acute skilled PT services to address these deficits and reach maximum level of function.    Recent Surgery: * No surgery found *      Plan:     During this hospitalization, patient to be seen 5 x/week to address the identified rehab impairments via gait training, therapeutic activities, therapeutic exercises and progress toward the following goals:    · Plan of Care Expires:  10/10/20    Subjective     Chief Complaint: tired  Patient/Family Comments/goals: pt reports getting up with OT already, holding on to IV and walking to door. Does not feel that she needs a device to ambulate but would like to put off PT eval until am due to already being up. LLE elevated on pillows to help control pain  Pain/Comfort:  · Pain Rating 1: 6/10  · Location - Side 1: Left  · Location - Orientation 1: generalized  · Location 1: leg  · Pain Addressed 1: Cessation of Activity, Reposition  · Pain Rating Post-Intervention 1: (pt reports improved pain control with LLE elevated)    Patients cultural, spiritual, Mu-ism conflicts given the current situation: no    Living Environment:  Lives with  her 19 and 22 year old children in a SSH, no steps to enter, no DME, has tub/shower combo and soaks in tub.   Prior to admission, patients level of function was independent.  Equipment used at home: none.  DME owned (not currently used): none.  Upon discharge, patient will have assistance from unknown at this time unless children can assist with transportation    Objective:     Communicated with nursing prior to session.  Patient found supine with peripheral IV  upon PT entry to room.    General Precautions: Standard, fall   Orthopedic Precautions:N/A   Braces: N/A     Exams:  · Cognitive Exam:  Patient is oriented to Person, Place, Time and Situation  · Gross Motor Coordination:  WFL  · Sensation: pt reports hx of neuropathy  · Skin Integrity/Edema:   LLE with edema, blistering skin, with gauze wrap with moderate serous drainage noted  · Limitation if BLE ankle DF to neutral, LLE not strength tested, but patient able to demonstrate independent SLR, knee flexion / ext and PF; RLE grossly 4+/5, hip flexion 3+/5      Functional Mobility:   · Not wishing to get OOB at this time    Therapeutic Activities and Exercises:   n/a    AM-PAC 6 CLICK MOBILITY  Total Score:18     Patient left supine with all lines intact and RN notified. Raised lower left bedrail up and placed pillow in so that patient could comfortable allow LLE to relax. Exercises while in bed encouraged to maintain movement / circulation and avoid pressure areas.     GOALS:   Multidisciplinary Problems     Physical Therapy Goals        Problem: Physical Therapy Goal    Goal Priority Disciplines Outcome Goal Variances Interventions   Physical Therapy Goal     PT, PT/OT Ongoing, Progressing     Description: Goals to be met by: 10/10/2020     Patient will increase functional independence with mobility by performing:    Supine<>sit mod Ind  Sit<>stand mod Ind  Pt to ambulate with least restrictive AD household distances  Pt to perform LE TE x 15 reps BLE for  reconditioning                     History:     Past Medical History:   Diagnosis Date    Anemia     Arthritis     Fibromyalgia     Hypertension     Thyroid disease        Past Surgical History:   Procedure Laterality Date    APPENDECTOMY       SECTION      CHOLECYSTECTOMY         Time Tracking:     PT Received On: 10/01/20  PT Start Time: 1402     PT Stop Time: 1410  PT Total Time (min): 8 min     Billable Minutes: Evaluation 8      Jina Duggan, PT  10/01/2020

## 2020-10-01 NOTE — PLAN OF CARE
Problem: Physical Therapy Goal  Goal: Physical Therapy Goal  Description: Goals to be met by: 10/10/2020     Patient will increase functional independence with mobility by performing:    Supine<>sit mod Ind  Sit<>stand mod Ind  Pt to ambulate with least restrictive AD household distances  Pt to perform LE TE x 15 reps BLE for reconditioning    Outcome: Ongoing, Progressing    Initial eval completed, needs continued functional assessment however. Pt worked with OT earlier and ambulated within room and nursing reports patient has been up OOB in chair and going to restroom. Post acute recs to be determined

## 2020-10-01 NOTE — PROGRESS NOTES
Progress Note  Family Medicine    Admit Date: 9/25/2020  Attending: Dr. Mp White III, MD    Hospital Day: 7    SUBJECTIVE:   HPI: Patient is a 39 y.o.  female with PMHx of HTN and HLD presenting with fevers and chills starting last night. Pt  Reports associated nausea and vomiting with headache and left leg swelling. Pt reports fevers and chills persisted this morning and  Was told by Dr. Griffith to come to the ED. Pt reports left leg skin changes for a couple of months and she has been taking antifungal medications, po and cream, to treat the wound. Pt reports using compression socks but noticed clear discharge on the socks.     In ED, patient was febrile with nausea. Pt given zofran and started IVF. Lab work presented hypokalemia of 2.7, ESR 42,  and WBC  Of  17. Pt given onedose of 40mEq K repletion. Blood cultures were ordered. Pt admitted to observation for hypokalemia and cellulitis.     Interval History:  Pt spiked fever last night of 101.1, resolved with tylenol. Pt reports feeling much improved from yesterday though she does note that her leg is  and warm. She is tolerating foods with nausea or vomiting. Feels like her appetite is near normal and overall feels like her usual self. During dressing changes yesterday, she noted drainage from her wound but unsure of what the drainage looked like. Asking about when she can go home this am. Denies any SOB, chest pain, abdominal pain, or any urinary symptoms.     Of note, pt had LLE US showing subcutaneous edema and mild L inguinal lymph node enlargement and no evidence of DVT. LLE MRI shows soft tissue swelling and scattered linear tracking of fluid.    Review of Systems:  As per Subjective  Constitutional: denies fever and chills  Eyes: denies visual changes and blurred vision  Respiratory: denies cough, shortness of breath and wheezing  Cardiovascular: denies chest pain and palpitations  Gastrointestinal: denies nausea,  "vomiting, abdominal pain and change in bowel habits  Genitourinary: denies difficulty urinating and changes in urination  Musculoskeletal: denies joint pain and decreased ROM  Neurological: denies numbness, tingling, headache and dizziness  Behavioral/Psych: denies sleep disturbance, anxiety  Skin: denies rash, lesions, and easy bleeding/bruising      Scheduled Meds:   ceFEPime (MAXIPIME) IVPB  1 g Intravenous Q12H    enoxparin  40 mg Subcutaneous Q24H    fenofibrate  145 mg Oral Daily    ferrous sulfate  325 mg Oral Daily    metoprolol tartrate  100 mg Oral BID    metroNIDAZOLE  500 mg Oral Q8H    mupirocin   Topical (Top) BID    simvastatin  20 mg Oral QHS    triamterene-hydrochlorothiazide 37.5-25 mg  1 capsule Oral Daily     Continuous Infusions:   lactated ringers 50 mL/hr at 20     PRN Meds:acetaminophen, amitriptyline, HYDROcodone-acetaminophen, hydrOXYzine HCL, ibuprofen, loperamide, melatonin, ondansetron, sodium chloride 0.9%, Pharmacy to dose Vancomycin consult **AND** vancomycin - pharmacy to dose    Review of patient's allergies indicates:  No Known Allergies      OBJECTIVE:     Vitals(Most Recent)      BP  Min: 121/66  Max: 144/73  Temp  Av.7 °F (37.6 °C)  Min: 98.8 °F (37.1 °C)  Max: 101.4 °F (38.6 °C)  Pulse  Av.7  Min: 81  Max: 102  Resp  Av.2  Min: 18  Max: 20  Height  Av' 4" (162.6 cm)  Min: 5' 4" (162.6 cm)  Max: 5' 4" (162.6 cm)  Weight  Av kg (291 lb 0.1 oz)  Min: 132 kg (291 lb 0.1 oz)  Max: 132 kg (291 lb 0.1 oz)             Vitals([id number])  Temp:  [98.8 °F (37.1 °C)-101.4 °F (38.6 °C)]   Pulse:  []   Resp:  [18-20]   BP: (121-144)/(64-80)     I & O([id number])    Intake/Output Summary (Last 24 hours) at 10/1/2020 0902  Last data filed at 2020 1700  Gross per 24 hour   Intake 850 ml   Output --   Net 850 ml       Physical Exam:  Gen: No apparent distress, well nourished and developed, appears stated age  CV: RRR, S1 and S2 present, no " murmurs appreciated on exam  Resp: normal respiratory effort. Exam limited by body habitus, pt unable to sit up or lean forward this am. CTA in bilateral anterior lung fields  Abd: non-tender, non-distended with normal bowel sounds  Neuro: A&O x4, spontaneous movement of all extremities, sensation intact  MSK: full ROM UE and LE bilaterally  Skin: LLE with skin breakdown and drainage from lateral leg. Warm to touch up to knee L>R. Erythema and indurated on L posterior calf that is tender to palpation. Erythema improved from initial marked area. Bandage in place with evidence of discharge. LLE still with edema, but skin is compressible and not tense.  Psych: logical thought process, good eye contact    Laboratory:  CBC:   Recent Labs   Lab 20  0848   WBC 8.95   RBC 3.37*   HGB 7.9*   HCT 26.4*      MCV 78*   MCH 23.4*   MCHC 29.9*     CMP:   Recent Labs   Lab 20  1718   *   CALCIUM 8.4*   ALBUMIN 2.1*   PROT 6.9      K 3.6   CO2 21*      BUN 13   CREATININE 2.2*   ALKPHOS 77   ALT 63*   AST 34   BILITOT 0.4     Labs within the past 24 hours have been reviewed.    Imagin/30 - LLE US:  Impression:  No evidence of deep venous thrombosis in the left lower extremity.  Subcutaneous edema noting mildly prominent left inguinal lymph nodes.    : LLE MRI  Impression:  Generalized soft tissue swelling with scattered, linear tracking fluid along the superficial fascia of the left lower extremity.  Differential etiologies to include noninfectious edema versus cellulitis.  No discrete collection noting noncontrast exam.  Note that component of fasciitis can not be excluded on the basis of imaging.    ASSESSMENT/PLAN:   40yo F with PMHx of HTN and HLD presenting with fevers, chills, and L leg swelling associated with erythema, tenderness, and skin breakdown with drainage.    Left leg cellulitis  Persistent skin wound  With draining wound.  Warm to touch  Failed antifungal  treatments  LLE MRI showed soft tissue swelling and scattered linear tracking of fluid  LLE US shows no evidence of DVT     Plan:  Antipyretics  Encouraging PO intake.   Blood cultures ngtd  vanc continued  D/c zosyn  Consulted ID, recs appreciated: added cefepime and flagyl        MAYUR (acute kidney injury)  Pt baseline cr 0.9, Cr 2.2 today     Plan:  MIVF, increase to 125mL/hr  Daily CMP        SIRS (systemic inflammatory response syndrome)  Temp 101.1 last night     Plan:  Broad spectrum antibiotics, on Vanc, cefepime, and flagyl  IVF with 2 bore IVs  Antipyretics PRN  Appreciate ID recs     Hyperlipidemia  Restart home med  On fenofibrate 145mg PO daily and simvastatin 20mg PO QHS     Hypokalemia  K  2.7 on admission and s/p KCl repletion x2 in ED  EKG: no changes  Asymptomatic     Plan:   Trend BMP  Replace po K+ and Mg today       HTN (hypertension)  BP stable   Continue home meds     FEN- 125mL/hr LR, K+ and Mg today, cardiac  DVT- enoxaparin  GI- famotidine  CODE- FULL  DISPO- home pending clinical improvement    Cl Washington DO  [facility], PGY-1

## 2020-10-01 NOTE — PLAN OF CARE
Problem: Occupational Therapy Goal  Goal: Occupational Therapy Goal  Description: Goals to be met by: 11/1/20     Patient will increase functional independence with ADLs by performing:    LE Dressing with Modified [place].  Grooming while standing with Modified [place].  Toileting from toilet with Modified [place] for hygiene and clothing management.   Supine to sit with Modified [place].  Step transfer with Modified [place]  Toilet transfer to toilet with Modified [place].  Increased functional strength to WFL for self care skills and functional mobility.  Upper extremity exercise program x10 reps per handout, with independence.    Outcome: Ongoing, Progressing       Pt would benefit from cont OT services in order to maximize functional independence. Recommending RW, BSC and TTB at d/c. Pt may benefit from HH services as well

## 2020-10-01 NOTE — PROGRESS NOTES
"Surgery follow up  BP (!) 146/66 (Patient Position: Sitting)   Pulse 89   Temp 99.1 °F (37.3 °C) (Oral)   Resp 18   Ht 5' 4" (1.626 m)   Wt 132 kg (291 lb 0.1 oz)   SpO2 97%   Breastfeeding No   BMI 49.95 kg/m²   I/O last 3 completed shifts:  In: 850 [I.V.:600; IV Piggyback:250]  Out: 400 [Urine:400]  No intake/output data recorded.  Recent Results (from the past 336 hour(s))   CBC auto differential    Collection Time: 10/01/20  9:19 AM   Result Value Ref Range    WBC 10.07 3.90 - 12.70 K/uL    Hemoglobin 8.0 (L) 12.0 - 16.0 g/dL    Hematocrit 26.1 (L) 37.0 - 48.5 %    Platelets 331 150 - 350 K/uL   CBC with Automated Differential    Collection Time: 09/30/20  8:48 AM   Result Value Ref Range    WBC 8.95 3.90 - 12.70 K/uL    Hemoglobin 7.9 (L) 12.0 - 16.0 g/dL    Hematocrit 26.4 (L) 37.0 - 48.5 %    Platelets 267 150 - 350 K/uL   CBC with Automated Differential    Collection Time: 09/29/20  5:57 AM   Result Value Ref Range    WBC 8.33 3.90 - 12.70 K/uL    Hemoglobin 8.5 (L) 12.0 - 16.0 g/dL    Hematocrit 27.7 (L) 37.0 - 48.5 %    Platelets 237 150 - 350 K/uL     Recent Results (from the past 336 hour(s))   Basic metabolic panel    Collection Time: 09/26/20  4:02 PM   Result Value Ref Range    Sodium 134 (L) 136 - 145 mmol/L    Potassium 3.8 3.5 - 5.1 mmol/L    Chloride 101 95 - 110 mmol/L    CO2 22 (L) 23 - 29 mmol/L    BUN, Bld 8 6 - 20 mg/dL    Creatinine 0.8 0.5 - 1.4 mg/dL    Calcium 7.7 (L) 8.7 - 10.5 mg/dL    Anion Gap 11 8 - 16 mmol/L   Basic metabolic panel    Collection Time: 09/25/20  9:30 PM   Result Value Ref Range    Sodium 136 136 - 145 mmol/L    Potassium 3.3 (L) 3.5 - 5.1 mmol/L    Chloride 98 95 - 110 mmol/L    CO2 26 23 - 29 mmol/L    BUN, Bld 17 6 - 20 mg/dL    Creatinine 1.0 0.5 - 1.4 mg/dL    Calcium 8.1 (L) 8.7 - 10.5 mg/dL    Anion Gap 12 8 - 16 mmol/L   swelling and edema subsiding , still blistering , supeficial cellulitis  Continue present treatment.  "

## 2020-10-01 NOTE — PROGRESS NOTES
Pharmacokinetic Assessment Follow Up: IV Vancomycin    Vancomycin serum concentration assessment(s):    The random level was drawn correctly and can be used to guide therapy at this time. The measurement is above the desired definitive target range of 10 to 15 mcg/mL.    Vancomycin Regimen Plan:    Will give a one time dose of vancomycin 1000 mg IV q 24 hours. Vancomycin random ordered for 10/2 at 1300.     Drug levels (last 3 results):  Recent Labs   Lab Result Units 09/28/20  1310 09/29/20  0557 09/30/20  0848 10/01/20  0919   Vancomycin, Random ug/mL  --  15.8 16.2 18.1   Vancomycin-Trough ug/mL 26.4*  --   --   --        Pharmacy will continue to follow and monitor vancomycin.    Please contact pharmacy at extension [phone] for questions regarding this assessment.    Thank you for the consult,   Татьяна Singh       Patient brief summary:  Mohsen Julien is a 39 y.o. female initiated on antimicrobial therapy with IV Vancomycin for treatment of skin & soft tissue infection    The patient's current regimen is pulse dosed in setting of MAYUR    Drug Allergies:   Review of patient's allergies indicates:  No Known Allergies    Actual Body Weight:   132 kg    Renal Function:   Estimated Creatinine Clearance: 45.5 mL/min (A) (based on SCr of 2.2 mg/dL (H)).,     Dialysis Method (if applicable):  N/A    CBC (last 72 hours):  Recent Labs   Lab Result Units 09/28/20 1310 09/29/20  0557 09/30/20  0848 10/01/20  0919   WBC K/uL 9.78 8.33 8.95 10.07   Hemoglobin g/dL 8.2* 8.5* 7.9* 8.0*   Hematocrit % 26.7* 27.7* 26.4* 26.1*   Platelets K/uL 234 237 267 331   Gran% % 79.4* 70.8 64.5 62.4   Lymph% % 11.7* 17.0* 18.3 19.3   Mono% % 7.8 10.3 13.0 12.3   Eosinophil% % 0.1 0.5 0.9 0.6   Basophil% % 0.1 0.2 0.3 0.3   Differential Method  Automated Automated Automated automated       Metabolic Panel (last 72 hours):  Recent Labs   Lab Result Units 09/28/20  1310 09/29/20  0557 09/30/20  0848 09/30/20  1718 10/01/20  0919    Sodium mmol/L 136 138 137 138 139   Potassium mmol/L 3.1* 3.1* 3.6 3.6 3.1*   Chloride mmol/L 100 102 101 104 101   CO2 mmol/L 24 25 27 21* 28   Glucose mg/dL 130* 118* 120* 126* 133*   BUN, Bld mg/dL 14 15 12 13 11   Creatinine mg/dL 2.2* 2.3* 2.2* 2.2* 2.2*   Albumin g/dL 2.3* 2.2* 2.2* 2.1* 2.1*   Total Bilirubin mg/dL 0.5 0.5 0.6 0.4 0.4   Alkaline Phosphatase U/L 80 79 79 77 71   AST U/L 48* 88* 43* 34 17   ALT U/L 38 83* 73* 63* 46*   Magnesium mg/dL 2.3 2.3 2.3  --  2.3   Phosphorus mg/dL 2.1* 2.5* 2.3*  --  2.5*       Vancomycin Administrations:  vancomycin given in the last 96 hours                     vancomycin (VANCOCIN) 1,250 mg in dextrose 5 % 250 mL IVPB (mg) 1,250 mg New Bag 09/30/20 1252    vancomycin 1.5 g in dextrose 5 % 250 mL IVPB (ready to mix) (mg) 1,500 mg New Bag 09/29/20 0900    vancomycin (VANCOCIN) 1,750 mg in dextrose 5 % 500 mL IVPB (mg) 1,750 mg New Bag 09/28/20 0058     1,750 mg New Bag 09/27/20 1351                    Microbiologic Results:  Microbiology Results (last 7 days)       Procedure Component Value Units Date/Time    Blood Culture #2 **CANNOT BE ORDERED STAT** [[id number]] Collected: 09/25/20 1410    Order Status: Completed Specimen: Blood from Peripheral, Antecubital, Left Updated: 09/30/20 2312     Blood Culture, Routine No growth after 5 days.    Blood Culture #1 **CANNOT BE ORDERED STAT** [[id number]] Collected: 09/25/20 1355    Order Status: Completed Specimen: Blood from Peripheral, Antecubital, Right Updated: 09/30/20 2312     Blood Culture, Routine No growth after 5 days.    Blood culture [[id number]] Collected: 09/27/20 0944    Order Status: Completed Specimen: Blood from Antecubital, Left Arm Updated: 09/30/20 1612     Blood Culture, Routine No Growth to date      No Growth to date      No Growth to date      No Growth to date    Narrative:      Please collected 2 cultures from 2 separate sites    Blood culture [[id number]] Collected: 09/27/20 8839    Order Status:  Completed Specimen: Blood from Antecubital, Left Arm Updated: 09/30/20 1612     Blood Culture, Routine No Growth to date      No Growth to date      No Growth to date      No Growth to date    Narrative:      Please collect 2 cultures from 2 separate sites.  Collection has been rescheduled by [facility] at 09/27/2020 09:46 Reason:   duplicate order did 2 cultute  Collection has been rescheduled by [facility] at 09/27/2020 09:46 Reason:   duplicate order did 2 cultute    Blood culture [[id number]] Collected: 09/26/20 0439    Order Status: Completed Specimen: Blood from Peripheral, Left Updated: 09/30/20 1412     Blood Culture, Routine No Growth to date      No Growth to date      No Growth to date      No Growth to date      No Growth to date    Blood culture [[id number]] Collected: 09/26/20 0444    Order Status: Completed Specimen: Blood from Peripheral, Left Updated: 09/30/20 1412     Blood Culture, Routine No Growth to date      No Growth to date      No Growth to date      No Growth to date      No Growth to date    Aerobic culture [[id number]] Collected: 09/26/20 1316    Order Status: Completed Specimen: Wound from Leg, Left Updated: 09/29/20 1119     Aerobic Bacterial Culture Skin mark,  no predominant organism    Urine Culture High Risk [[id number]]  (Abnormal) Collected: 09/26/20 1131    Order Status: Completed Specimen: Urine, Clean Catch Updated: 09/28/20 1525     Urine Culture, Routine DIPHTHEROIDS  10,000 - 49,999 cfu/ml      Narrative:      Indicated criteria for high risk culture:->Other  Other (specify):->septic patient on broad spectrum abx with  continued fevers    Clostridium difficile EIA [[id number]]     Order Status: Canceled Specimen: Stool     Blood culture [[id number]]     Order Status: Canceled Specimen: Blood     Blood culture [[id number]]     Order Status: Canceled Specimen: Blood     Blood culture [[id number]]     Order Status: Canceled Specimen: Blood

## 2020-10-01 NOTE — ASSESSMENT & PLAN NOTE
-cellulitis vs fascitis. Imaging reassuring not osteomyelitis  - Continue wound care, wound culture pending  - LLE MRI showed soft tissue swelling and scattered linear tracking of fluid  - LLE US showed no evidence of DVT  - Continue antipyretics PRN, afebrile x48hrs  - Blood cultures with no growth to date  - appreciate ID and surgery input  - Continue vanc and cefipime  - Discontinue flagyl and clindamycin per ID recs  - Increase Norco to 10mg q6hrs for pain control  - Continue dilaudid PRN for pain

## 2020-10-01 NOTE — PROGRESS NOTES
Infectious Disease Follow-up Note      Impression/Plan:    Left Lower Extremity Cellulitis  - Patient febrile to 101.4 overnight. MRI c/w cellulitis; however tracking along superficial fascia present, and fasciitis cannot be ruled out.   - As fasciitis cannot be ruled out and patient was febrile overnight, would recommend adding clindamycin 600 mg IV q8h (ordered) to inhibit toxin production. Continue vancomycin, cefepime (renally dosed), and flagyl.   - Appreciate Surgery's input. Will closely monitor her condition over the next 24-48 hours.     Thank you for allowing us to participate in the care for this patient. Please call with any questions. ID will continue to follow.     Luis Felipe George MD  [facility], PGY-4  Cell: [phone]    History of Present Illness:  Mohsen Julien is a 39 y.o. female with PMHx of HTN , HLD, and venous stasis dermatitis who presented to [facility] on 9/25 with the CC of nausea, vomiting, and subjective fevers/chills for 2 days. She follows with Dr Griffith for her chronic venous stasis and small wound of the left lower extremity. She was on PO fluconazole as well as a topical antifungal prior to presentation. She feels her wound has enlarged and the edema of the left lower leg has worsened.      Patient was started on vanc and pip-tazo. CT of the left tib-fib with soft tissue edema, no discrete fluid collection. LE venous u/s without DVT.     Microbiology Data:  Blood culture 9/25 NGTD  Blood culture 9/26 NGTD  Urine culture 9/26 < 50k cfu diphtheroids  Wound culture 9/26 with normal skin mark  Blood culture 9/27 NGTD     Antimicrobials:  Clindamycin 9/25  Pip-tazo 9/25 - 9/27  Vancomycin 9/25 - present  Cefepime 9/29 - present  Flagyl 9/29 - present  Clindamycin 10/1 - present    Subjective and Interval History:  Hospital Day 4  Leg looking and patient feeling a bit better today, despite fevers overnight.     Review of Symptoms:  ROS negative aside from that  noted in HPI.    Medications:  Antibiotics:   Antibiotics (From admission, onward)    Start     Stop Route Frequency Ordered    09/29/20 1530  cefepime in dextrose 5 % 1 gram/50 mL IVPB 1 g      -- IV Every 12 hours (non-standard times) 09/29/20 1415    09/29/20 1430  metroNIDAZOLE tablet 500 mg      -- Oral Every 8 hours 09/29/20 1415    09/28/20 1446  vancomycin - pharmacy to dose      -- IV pharmacy to manage frequency 09/28/20 1347    09/27/20 1030  mupirocin 2 % ointment      -- Top 2 times daily 09/27/20 0916          Objective:  Physical Exam:  VS (24h):  Temp:  [98.8 °F (37.1 °C)-101.4 °F (38.6 °C)] 99 °F (37.2 °C)  Pulse:  [] 93  Resp:  [18-20] 18  BP: (121-144)/(64-80) 144/73    Physical Exam  Constitutional:       General: She is not in acute distress.     Appearance: Normal appearance. She is obese. She is not ill-appearing or diaphoretic.   HENT:      Head: Normocephalic and atraumatic.      Nose: Nose normal.      Mouth/Throat:      Mouth: Mucous membranes are moist.      Pharynx: Oropharynx is clear. No oropharyngeal exudate or posterior oropharyngeal erythema.   Eyes:      General: No scleral icterus.     Conjunctiva/sclera: Conjunctivae normal.   Neck:      Musculoskeletal: Normal range of motion and neck supple. No neck rigidity.   Cardiovascular:      Rate and Rhythm: Normal rate and regular rhythm.      Heart sounds: Normal heart sounds.   Pulmonary:      Effort: Pulmonary effort is normal.      Breath sounds: Normal breath sounds.   Abdominal:      General: There is no distension.      Palpations: Abdomen is soft.      Tenderness: There is no abdominal tenderness.   Musculoskeletal:         General: Swelling (bilateral, L >> R) increased from yesterday and tenderness present. No deformity. Left leg warm to touch.     Comments: Wound of medial LLL in c/d/i dressing   Skin:     Coloration: Skin is not jaundiced. Blistering with interval worsening from yesterday.     Findings: No lesion or  rash.   Neurological:      General: No focal deficit present.      Mental Status: She is alert and oriented to person, place, and time.   Psychiatric:         Mood and Affect: Mood normal.         Behavior: Behavior normal.     Labs:  CBC:   Lab Results   Component Value Date    WBC 10.07 10/01/2020    WBC 8.95 09/30/2020    WBC 8.33 09/29/2020    WBC 9.78 09/28/2020    WBC 7.41 09/27/2020    HCT 26.1 (L) 10/01/2020     10/01/2020       BMP:   Recent Labs   Lab 10/01/20  0919   *      K 3.1*      CO2 28   BUN 11   CREATININE 2.2*   CALCIUM 8.1*   MG 2.3       LFT:   Lab Results   Component Value Date    ALT 46 (H) 10/01/2020    AST 17 10/01/2020    ALKPHOS 71 10/01/2020    BILITOT 0.4 10/01/2020         Microbiology x 7d:   Microbiology Results (last 7 days)     Procedure Component Value Units Date/Time    Blood Culture #2 **CANNOT BE ORDERED STAT** [[id number]] Collected: 09/25/20 1410    Order Status: Completed Specimen: Blood from Peripheral, Antecubital, Left Updated: 09/30/20 2312     Blood Culture, Routine No growth after 5 days.    Blood Culture #1 **CANNOT BE ORDERED STAT** [[id number]] Collected: 09/25/20 1355    Order Status: Completed Specimen: Blood from Peripheral, Antecubital, Right Updated: 09/30/20 2312     Blood Culture, Routine No growth after 5 days.    Blood culture [[id number]] Collected: 09/27/20 0944    Order Status: Completed Specimen: Blood from Antecubital, Left Arm Updated: 09/30/20 1612     Blood Culture, Routine No Growth to date      No Growth to date      No Growth to date      No Growth to date    Narrative:      Please collected 2 cultures from 2 separate sites    Blood culture [[id number]] Collected: 09/27/20 0939    Order Status: Completed Specimen: Blood from Antecubital, Left Arm Updated: 09/30/20 1612     Blood Culture, Routine No Growth to date      No Growth to date      No Growth to date      No Growth to date    Narrative:      Please collect 2 cultures  from 2 separate sites.  Collection has been rescheduled by [facility] at 09/27/2020 09:46 Reason:   duplicate order did 2 cultute  Collection has been rescheduled by [facility] at 09/27/2020 09:46 Reason:   duplicate order did 2 cultute    Blood culture [[id number]] Collected: 09/26/20 0439    Order Status: Completed Specimen: Blood from Peripheral, Left Updated: 09/30/20 1412     Blood Culture, Routine No Growth to date      No Growth to date      No Growth to date      No Growth to date      No Growth to date    Blood culture [[id number]] Collected: 09/26/20 0444    Order Status: Completed Specimen: Blood from Peripheral, Left Updated: 09/30/20 1412     Blood Culture, Routine No Growth to date      No Growth to date      No Growth to date      No Growth to date      No Growth to date    Aerobic culture [[id number]] Collected: 09/26/20 1316    Order Status: Completed Specimen: Wound from Leg, Left Updated: 09/29/20 1119     Aerobic Bacterial Culture Skin mark,  no predominant organism    Urine Culture High Risk [[id number]]  (Abnormal) Collected: 09/26/20 1131    Order Status: Completed Specimen: Urine, Clean Catch Updated: 09/28/20 1525     Urine Culture, Routine DIPHTHEROIDS  10,000 - 49,999 cfu/ml      Narrative:      Indicated criteria for high risk culture:->Other  Other (specify):->septic patient on broad spectrum abx with  continued fevers    Clostridium difficile EIA [[id number]]     Order Status: Canceled Specimen: Stool     Blood culture [[id number]]     Order Status: Canceled Specimen: Blood     Blood culture [[id number]]     Order Status: Canceled Specimen: Blood     Blood culture [[id number]]     Order Status: Canceled Specimen: Blood           Imaging:  MRI Tibia Fibula with Contrast 9/30  Report pending    X-ray Chest 1 View     Result Date: 9/27/2020  EXAMINATION: XR CHEST 1 VIEW CLINICAL HISTORY: Febrile. TECHNIQUE: Single frontal view of the chest was performed. COMPARISON: Multiple prior radiographs of the chest,  most recent from 09/25/2020. FINDINGS: The lungs are well expanded and clear. No focal opacities are seen. The pleural spaces are clear.  The cardiac silhouette is borderline enlarged although this may be accentuated by technique.  The visualized osseous structures are unremarkable.      No acute cardiopulmonary abnormality. Electronically signed by:    Vignesh Sanchez Date:                                          09/27/2020 Time:                                            10:25     X-ray Chest 1 View     Result Date: 9/25/2020  EXAMINATION: XR CHEST 1 VIEW CLINICAL HISTORY: COPD Exacerbation; TECHNIQUE: Single view of the chest was obtained. COMPARISON: Multiple priors, most recent 02/13/2019 FINDINGS: The right costophrenic angles excluded from field of view. Normal cardiomediastinal contour. No focal consolidation, pleural effusion or pneumothorax.      No acute cardiopulmonary abnormality. Electronically signed by:    Ivonne Gottlieb Date:                                          09/25/2020 Time:                                            16:27     Us Lower Extremity Veins Bilateral     Result Date: 9/25/2020  EXAMINATION: US LOWER EXTREMITY VEINS BILATERAL CLINICAL HISTORY: Other specified soft tissue disorders TECHNIQUE: Duplex and color flow Doppler and dynamic compression was performed of the bilateral lower extremity veins was performed. COMPARISON: None FINDINGS: Right thigh veins: The common femoral, femoral, popliteal, upper greater saphenous, and deep femoral veins are patent and free of thrombus. The veins are normally compressible and have normal phasic flow and augmentation response. Right calf veins: The visualized calf veins are patent. Left thigh veins: The common femoral, femoral, popliteal, upper greater saphenous, and deep femoral veins are patent and free of thrombus. The veins are normally compressible and have normal phasic flow and augmentation response. Left calf veins: The visualized calf  veins are patent. Miscellaneous: None      No evidence of deep venous thrombosis in either lower extremity. Electronically signed by:      Charlie Ochoa Jr Date:                                         09/25/2020 Time:                                            14:22     Ct Thigh W W/o Contrast Left     Result Date: 9/26/2020  EXAMINATION: CT THIGH W W/O CONTRAST LEFT; CT LEG (TIBIA-FIBULA) W W/O CONTRAST LEFT CLINICAL HISTORY: Concern for necrotizing fasciitis.;; Concern for necrotizing fasciitis. Patient with lower leg cellulitis.; TECHNIQUE: CT of the left lower extremity was obtained from the level of the pelvis to the feet.  Images were obtained prior to and following administration of 100 cc IV Omnipaque 350.  Multiplanar reconstructions were obtained and reviewed. COMPARISON: None FINDINGS: There is nonspecific subcutaneous soft tissue edema in the left lower extremity below the level of the knee.  No fluid collections visualized to suggest abscess.  No soft tissue gas visualized to suggest necrotizing fasciitis.  No acute or suspicious osseous findings demonstrated.  Visualized musculature appears grossly normal. There is an IUD in place within the uterus.  No adnexal masses visualized.  The urinary bladder appears unremarkable. There are multiple nonspecific enlarged left external iliac and inguinal lymph nodes present measuring up to 15 mm and 19 mm in axial short dimension respectively.  Visualized vasculature appears grossly within normal limits.      1. Nonspecific subcutaneous soft tissue edema in the left lower leg possibly representing cellulitis, correlate clinically.  No definite abscess or evidence of necrotizing fasciitis. 2. Nonspecific left external iliac chain and inguinal adenopathy, likely reactive in nature. Electronically signed by:     Humberto Love MD Date:                                           09/26/2020 Time:                                            12:34     Ct Leg  (tibia-fibula) W W/o Contrast Left     Result Date: 9/26/2020  EXAMINATION: CT THIGH W W/O CONTRAST LEFT; CT LEG (TIBIA-FIBULA) W W/O CONTRAST LEFT CLINICAL HISTORY: Concern for necrotizing fasciitis.;; Concern for necrotizing fasciitis. Patient with lower leg cellulitis.; TECHNIQUE: CT of the left lower extremity was obtained from the level of the pelvis to the feet.  Images were obtained prior to and following administration of 100 cc IV Omnipaque 350.  Multiplanar reconstructions were obtained and reviewed. COMPARISON: None FINDINGS: There is nonspecific subcutaneous soft tissue edema in the left lower extremity below the level of the knee.  No fluid collections visualized to suggest abscess.  No soft tissue gas visualized to suggest necrotizing fasciitis.  No acute or suspicious osseous findings demonstrated.  Visualized musculature appears grossly normal. There is an IUD in place within the uterus.  No adnexal masses visualized.  The urinary bladder appears unremarkable. There are multiple nonspecific enlarged left external iliac and inguinal lymph nodes present measuring up to 15 mm and 19 mm in axial short dimension respectively.  Visualized vasculature appears grossly within normal limits.      1. Nonspecific subcutaneous soft tissue edema in the left lower leg possibly representing cellulitis, correlate clinically.  No definite abscess or evidence of necrotizing fasciitis. 2. Nonspecific left external iliac chain and inguinal adenopathy, likely reactive in nature. Electronically signed by:     Humberto Love MD Date:                                           09/26/2020 Time:                                            12:34     Mammo Digital Screening Bilat W/ Butch     Result Date: 9/14/2020  Result: Mammo Digital Screening Bilat w/ Butch History: Patient is 39 y.o. and is seen for a screening mammogram. Films Compared: Prior images (if available) were compared. Findings: This procedure was performed using  tomosynthesis. Computer-aided detection was utilized in the interpretation of this examination.  The breasts have scattered areas of fibroglandular density. There is no evidence of suspicious masses, microcalcifications or architectural distortion. 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      No mammographic evidence of malignancy. BI-RADS Category 1: Negative Recommendation: Routine screening mammogram in 1 year is recommended. Your estimated lifetime risk of breast cancer (to age 85) based on Tyrer-Cuzick risk assessment model is Tyrer-Cuzick: 8.52 %. According to the American Cancer Society, patients with a lifetime breast cancer risk of 20% or higher might benefit from supplemental screening tests.       Assessment:    Active Hospital Problems    Diagnosis    *Left leg cellulitis    MAYUR (acute kidney injury)    Hypokalemia    Hyperlipidemia    SIRS (systemic inflammatory response syndrome)    HTN (hypertension)    Morbidly obese         Plan:  See top of page.

## 2020-10-01 NOTE — PT/OT/SLP EVAL
Occupational Therapy   Evaluation    Name: Mohsen Julien  MRN: [id number]  Admitting Diagnosis:  Left leg cellulitis      Recommendations:     Discharge Recommendations: home health OT, home health PT  Discharge Equipment Recommendations:  bedside commode, bath bench, walker, rolling  Barriers to discharge:  None    Assessment:     Mohsen Julien is a 39 y.o. female with a medical diagnosis of Left leg cellulitis.  She presents with LLE wound . Performance deficits affecting function: weakness, impaired endurance, impaired self care skills, impaired functional mobilty, impaired balance, gait instability, pain, edema, decreased lower extremity function.        Pt would benefit from cont OT services in order to maximize functional independence. Recommending RW, BSC and TTB at d/c. Pt may benefit from HH services as well     Rehab Prognosis: Good; patient would benefit from acute skilled OT services to address these deficits and reach maximum level of function.       Plan:     Patient to be seen 5 x/week to address the above listed problems via self-care/home management, therapeutic activities, therapeutic exercises  · Plan of Care Expires: 11/01/20  · Plan of Care Reviewed with: patient, mother    Subjective     Chief Complaint: pt reporting pain in LLE and decreased ability to WB for ambulation/participation in ADLs   Patient/Family Comments/goals: decrease pain and swelling; return to PLOF     Occupational Profile:  Living Environment: with children (18yo and 23yo) in [facility], no steps to enter, t/s combo   Previous level of function: pt reports (I); drives; 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    General Precautions: Standard, fall   Orthopedic Precautions:N/A   Braces: N/A     Occupational Performance:    Bed Mobility:    · Patient completed Scooting/Bridging with modified independence  · Patient completed Supine to Sit with minimum assistance and with leg lift  · Patient completed Sit to Supine with minimum assistance and with leg lift    Functional Mobility/Transfers:  · Patient completed Sit <> Stand Transfer with contact guard assistance  with  no assistive device and rolling walker   · Functional Mobility: CGA with RW; unable to take steps at time of eval without DME 2/2 pain and inability to WB     Activities of Daily Living:  · Lower Body Dressing: total A  to chalino L sock     Cognitive/Visual Perceptual:  WFL     Physical Exam:  Balance:    -       WFL sitting; CGA standing   Postural examination/scapula alignment:    -       Rounded shoulders  Skin integrity: Wound LLE  Edema:  Moderate /pitting LLE   Dominant hand:    -       right  Upper Extremity Range of Motion:    BUE WFL   Upper Extremity Strength:  BUE WFL    Strength:   good     AMPAC 6 Click ADL:  AMPAC Total Score: 19    Treatment & Education:  Pt educated on role OT in acute setting   Pt reports difficulty with WB and movement in the room; states she is only able to tolerate WB "a little" through the ball of the foot   Pt required min A for bed mobility for assist with LLE   Pt stood x 1 trial without AD from EOB SBA with use of bed rail and BUEs; weight shifted over R leg and holding on with RUE support; only able to minimally WB through LLE and ball of foot  Returned to seated position and educated pt on use of RW; therapist demonstrated safe transfers, hand placement, and 3 point gait   Pt able to stand CGA with RW and take 2 lateral steps CGA and 4 forward/back steps away " "from EOB; pt reports increased fatigue and pain; states, :" Don't want to over do it" and returned to supine   Educated on DME recs- RW, TTB, and BSC for home use   Education:    Patient left supine with all lines intact, call button in reach, bed alarm on and nsg and mother  present    GOALS:   Multidisciplinary Problems     Occupational Therapy Goals        Problem: Occupational Therapy Goal    Goal Priority Disciplines Outcome Interventions   Occupational Therapy Goal     OT, PT/OT Ongoing, Progressing    Description: Goals to be met by: 20     Patient will increase functional independence with ADLs by performing:    LE Dressing with Modified [place].  Grooming while standing with Modified [place].  Toileting from toilet with Modified [place] for hygiene and clothing management.   Supine to sit with Modified [place].  Step transfer with Modified [place]  Toilet transfer to toilet with Modified [place].  Increased functional strength to WFL for self care skills and functional mobility.  Upper extremity exercise program x10 reps per handout, with independence.                     History:     Past Medical History:   Diagnosis Date    Anemia     Arthritis     Fibromyalgia     Hypertension     Thyroid disease        Past Surgical History:   Procedure Laterality Date    APPENDECTOMY       SECTION      CHOLECYSTECTOMY         Time Tracking:     OT Date of Treatment: 10/01/20  OT Start Time: 1146  OT Stop Time: 1211  OT Total Time (min): 25 min    Billable Minutes:Evaluation 10  Therapeutic Activity 15    Romy Claros OT  10/1/2020    "

## 2020-10-02 LAB
ALBUMIN SERPL BCP-MCNC: 2 G/DL (ref 3.5–5.2)
ALP SERPL-CCNC: 56 U/L (ref 55–135)
ALT SERPL W/O P-5'-P-CCNC: 35 U/L (ref 10–44)
ANION GAP SERPL CALC-SCNC: 10 MMOL/L (ref 8–16)
AST SERPL-CCNC: 20 U/L (ref 10–40)
BACTERIA BLD CULT: NORMAL
BACTERIA BLD CULT: NORMAL
BASOPHILS # BLD AUTO: 0.04 K/UL (ref 0–0.2)
BASOPHILS NFR BLD: 0.3 % (ref 0–1.9)
BILIRUB SERPL-MCNC: 0.4 MG/DL (ref 0.1–1)
BILIRUB UR QL STRIP: NEGATIVE
BUN SERPL-MCNC: 11 MG/DL (ref 6–20)
CALCIUM SERPL-MCNC: 7.9 MG/DL (ref 8.7–10.5)
CHLORIDE SERPL-SCNC: 102 MMOL/L (ref 95–110)
CLARITY UR: CLEAR
CO2 SERPL-SCNC: 26 MMOL/L (ref 23–29)
COLOR UR: YELLOW
CREAT SERPL-MCNC: 2.3 MG/DL (ref 0.5–1.4)
CREAT UR-MCNC: 67.9 MG/DL (ref 15–325)
CREAT UR-MCNC: 67.9 MG/DL (ref 15–325)
DIFFERENTIAL METHOD: ABNORMAL
EOSINOPHIL # BLD AUTO: 0.2 K/UL (ref 0–0.5)
EOSINOPHIL NFR BLD: 1.5 % (ref 0–8)
ERYTHROCYTE [DISTWIDTH] IN BLOOD BY AUTOMATED COUNT: 14.8 % (ref 11.5–14.5)
EST. GFR  (AFRICAN AMERICAN): 30 ML/MIN/1.73 M^2
EST. GFR  (NON AFRICAN AMERICAN): 26 ML/MIN/1.73 M^2
GLUCOSE SERPL-MCNC: 111 MG/DL (ref 70–110)
GLUCOSE UR QL STRIP: NEGATIVE
HCT VFR BLD AUTO: 26.3 % (ref 37–48.5)
HGB BLD-MCNC: 8 G/DL (ref 12–16)
HGB UR QL STRIP: NEGATIVE
IMM GRANULOCYTES # BLD AUTO: 0.54 K/UL (ref 0–0.04)
IMM GRANULOCYTES NFR BLD AUTO: 4.2 % (ref 0–0.5)
KETONES UR QL STRIP: NEGATIVE
LEUKOCYTE ESTERASE UR QL STRIP: NEGATIVE
LYMPHOCYTES # BLD AUTO: 1.9 K/UL (ref 1–4.8)
LYMPHOCYTES NFR BLD: 14.5 % (ref 18–48)
MAGNESIUM SERPL-MCNC: 2.3 MG/DL (ref 1.6–2.6)
MCH RBC QN AUTO: 23.7 PG (ref 27–31)
MCHC RBC AUTO-ENTMCNC: 30.4 G/DL (ref 32–36)
MCV RBC AUTO: 78 FL (ref 82–98)
MONOCYTES # BLD AUTO: 1.3 K/UL (ref 0.3–1)
MONOCYTES NFR BLD: 10.4 % (ref 4–15)
NEUTROPHILS # BLD AUTO: 8.9 K/UL (ref 1.8–7.7)
NEUTROPHILS NFR BLD: 69.1 % (ref 38–73)
NITRITE UR QL STRIP: NEGATIVE
NRBC BLD-RTO: 0 /100 WBC
PH UR STRIP: 7 [PH] (ref 5–8)
PHOSPHATE SERPL-MCNC: 3.6 MG/DL (ref 2.7–4.5)
PLATELET # BLD AUTO: 404 K/UL (ref 150–350)
PMV BLD AUTO: 10.2 FL (ref 9.2–12.9)
POTASSIUM SERPL-SCNC: 3.3 MMOL/L (ref 3.5–5.1)
PROT SERPL-MCNC: 7 G/DL (ref 6–8.4)
PROT UR QL STRIP: NEGATIVE
PROT UR-MCNC: 11 MG/DL (ref 0–15)
PROT/CREAT UR: 0.16 MG/G{CREAT} (ref 0–0.2)
RBC # BLD AUTO: 3.38 M/UL (ref 4–5.4)
SODIUM SERPL-SCNC: 138 MMOL/L (ref 136–145)
SODIUM UR-SCNC: 70 MMOL/L (ref 20–250)
SP GR UR STRIP: 1.01 (ref 1–1.03)
URN SPEC COLLECT METH UR: NORMAL
UROBILINOGEN UR STRIP-ACNC: NEGATIVE EU/DL
UUN UR-MCNC: 182 MG/DL (ref 140–1050)
VANCOMYCIN SERPL-MCNC: 13.4 UG/ML
WBC # BLD AUTO: 12.84 K/UL (ref 3.9–12.7)

## 2020-10-02 PROCEDURE — 25000003 PHARM REV CODE 250: Performed by: STUDENT IN AN ORGANIZED HEALTH CARE EDUCATION/TRAINING PROGRAM

## 2020-10-02 PROCEDURE — 25000003 PHARM REV CODE 250: Performed by: FAMILY MEDICINE

## 2020-10-02 PROCEDURE — 97530 THERAPEUTIC ACTIVITIES: CPT

## 2020-10-02 PROCEDURE — 80202 ASSAY OF VANCOMYCIN: CPT

## 2020-10-02 PROCEDURE — 63600175 PHARM REV CODE 636 W HCPCS: Performed by: STUDENT IN AN ORGANIZED HEALTH CARE EDUCATION/TRAINING PROGRAM

## 2020-10-02 PROCEDURE — 84540 ASSAY OF URINE/UREA-N: CPT

## 2020-10-02 PROCEDURE — 81003 URINALYSIS AUTO W/O SCOPE: CPT

## 2020-10-02 PROCEDURE — 87075 CULTR BACTERIA EXCEPT BLOOD: CPT

## 2020-10-02 PROCEDURE — 84300 ASSAY OF URINE SODIUM: CPT

## 2020-10-02 PROCEDURE — 82570 ASSAY OF URINE CREATININE: CPT

## 2020-10-02 PROCEDURE — 63600175 PHARM REV CODE 636 W HCPCS: Performed by: SURGERY

## 2020-10-02 PROCEDURE — 36415 COLL VENOUS BLD VENIPUNCTURE: CPT

## 2020-10-02 PROCEDURE — 80053 COMPREHEN METABOLIC PANEL: CPT

## 2020-10-02 PROCEDURE — 87070 CULTURE OTHR SPECIMN AEROBIC: CPT

## 2020-10-02 PROCEDURE — 83735 ASSAY OF MAGNESIUM: CPT

## 2020-10-02 PROCEDURE — 84100 ASSAY OF PHOSPHORUS: CPT

## 2020-10-02 PROCEDURE — 97803 MED NUTRITION INDIV SUBSEQ: CPT

## 2020-10-02 PROCEDURE — 97116 GAIT TRAINING THERAPY: CPT

## 2020-10-02 PROCEDURE — 85025 COMPLETE CBC W/AUTO DIFF WBC: CPT

## 2020-10-02 PROCEDURE — 63600175 PHARM REV CODE 636 W HCPCS: Performed by: FAMILY MEDICINE

## 2020-10-02 PROCEDURE — 21400001 HC TELEMETRY ROOM

## 2020-10-02 PROCEDURE — 25000003 PHARM REV CODE 250

## 2020-10-02 RX ORDER — POTASSIUM CHLORIDE 20 MEQ/1
40 TABLET, EXTENDED RELEASE ORAL ONCE
Status: COMPLETED | OUTPATIENT
Start: 2020-10-02 | End: 2020-10-02

## 2020-10-02 RX ORDER — VANCOMYCIN HCL IN 5 % DEXTROSE 1G/250ML
1000 PLASTIC BAG, INJECTION (ML) INTRAVENOUS
Status: DISCONTINUED | OUTPATIENT
Start: 2020-10-02 | End: 2020-10-04

## 2020-10-02 RX ORDER — HYDROMORPHONE HYDROCHLORIDE 1 MG/ML
1 INJECTION, SOLUTION INTRAMUSCULAR; INTRAVENOUS; SUBCUTANEOUS EVERY 6 HOURS PRN
Status: DISCONTINUED | OUTPATIENT
Start: 2020-10-02 | End: 2020-10-05 | Stop reason: HOSPADM

## 2020-10-02 RX ORDER — LANOLIN ALCOHOL/MO/W.PET/CERES
400 CREAM (GRAM) TOPICAL ONCE
Status: COMPLETED | OUTPATIENT
Start: 2020-10-02 | End: 2020-10-02

## 2020-10-02 RX ORDER — HYDROMORPHONE HYDROCHLORIDE 1 MG/ML
1 INJECTION, SOLUTION INTRAMUSCULAR; INTRAVENOUS; SUBCUTANEOUS ONCE
Status: COMPLETED | OUTPATIENT
Start: 2020-10-02 | End: 2020-10-02

## 2020-10-02 RX ORDER — POTASSIUM CHLORIDE 1.5 G/1.58G
40 POWDER, FOR SOLUTION ORAL ONCE
Status: DISCONTINUED | OUTPATIENT
Start: 2020-10-02 | End: 2020-10-02

## 2020-10-02 RX ADMIN — VANCOMYCIN HYDROCHLORIDE 1000 MG: 1 INJECTION, POWDER, LYOPHILIZED, FOR SOLUTION INTRAVENOUS at 05:10

## 2020-10-02 RX ADMIN — HYDROMORPHONE HYDROCHLORIDE 1 MG: 1 INJECTION, SOLUTION INTRAMUSCULAR; INTRAVENOUS; SUBCUTANEOUS at 11:10

## 2020-10-02 RX ADMIN — SIMVASTATIN 20 MG: 20 TABLET, FILM COATED ORAL at 09:10

## 2020-10-02 RX ADMIN — TRIAMTERENE AND HYDROCHLOROTHIAZIDE 1 CAPSULE: 25; 37.5 CAPSULE ORAL at 10:10

## 2020-10-02 RX ADMIN — FAMOTIDINE 20 MG: 20 TABLET ORAL at 10:10

## 2020-10-02 RX ADMIN — MAGNESIUM OXIDE 400 MG (241.3 MG MAGNESIUM) TABLET 400 MG: TABLET at 10:10

## 2020-10-02 RX ADMIN — FERROUS SULFATE TAB EC 325 MG (65 MG FE EQUIVALENT) 325 MG: 325 (65 FE) TABLET DELAYED RESPONSE at 10:10

## 2020-10-02 RX ADMIN — METOPROLOL TARTRATE 100 MG: 50 TABLET, FILM COATED ORAL at 10:10

## 2020-10-02 RX ADMIN — POTASSIUM CHLORIDE 40 MEQ: 1500 TABLET, EXTENDED RELEASE ORAL at 11:10

## 2020-10-02 RX ADMIN — MUPIROCIN: 20 OINTMENT TOPICAL at 10:10

## 2020-10-02 RX ADMIN — MUPIROCIN: 20 OINTMENT TOPICAL at 06:10

## 2020-10-02 RX ADMIN — METRONIDAZOLE 500 MG: 500 TABLET ORAL at 02:10

## 2020-10-02 RX ADMIN — ENOXAPARIN SODIUM 40 MG: 40 INJECTION SUBCUTANEOUS at 05:10

## 2020-10-02 RX ADMIN — METOPROLOL TARTRATE 100 MG: 50 TABLET, FILM COATED ORAL at 09:10

## 2020-10-02 RX ADMIN — CLINDAMYCIN IN 5 PERCENT DEXTROSE 600 MG: 12 INJECTION, SOLUTION INTRAVENOUS at 10:10

## 2020-10-02 RX ADMIN — METRONIDAZOLE 500 MG: 500 TABLET ORAL at 09:10

## 2020-10-02 RX ADMIN — CLINDAMYCIN IN 5 PERCENT DEXTROSE 600 MG: 12 INJECTION, SOLUTION INTRAVENOUS at 06:10

## 2020-10-02 RX ADMIN — METRONIDAZOLE 500 MG: 500 TABLET ORAL at 06:10

## 2020-10-02 RX ADMIN — HYDROCODONE BITARTRATE AND ACETAMINOPHEN 1 TABLET: 7.5; 325 TABLET ORAL at 04:10

## 2020-10-02 RX ADMIN — CLINDAMYCIN IN 5 PERCENT DEXTROSE 600 MG: 12 INJECTION, SOLUTION INTRAVENOUS at 11:10

## 2020-10-02 RX ADMIN — MUPIROCIN: 20 OINTMENT TOPICAL at 09:10

## 2020-10-02 RX ADMIN — AMITRIPTYLINE HYDROCHLORIDE 25 MG: 25 TABLET, FILM COATED ORAL at 10:10

## 2020-10-02 RX ADMIN — CEFEPIME HYDROCHLORIDE 1 G: 1 INJECTION, SOLUTION INTRAVENOUS at 02:10

## 2020-10-02 RX ADMIN — HYDROMORPHONE HYDROCHLORIDE 1 MG: 1 INJECTION, SOLUTION INTRAMUSCULAR; INTRAVENOUS; SUBCUTANEOUS at 06:10

## 2020-10-02 RX ADMIN — IBUPROFEN 400 MG: 400 TABLET, FILM COATED ORAL at 10:10

## 2020-10-02 RX ADMIN — CEFEPIME HYDROCHLORIDE 1 G: 1 INJECTION, SOLUTION INTRAVENOUS at 03:10

## 2020-10-02 RX ADMIN — FENOFIBRATE 145 MG: 145 TABLET ORAL at 06:10

## 2020-10-02 NOTE — PLAN OF CARE
Pt is able to take a little more weight on LLE and amb in room with RW.    REC:  OP-will also need wd care  DME:  RW -defer to OT for bathing DME

## 2020-10-02 NOTE — PROGRESS NOTES
Progress Note  Family Medicine    Admit Date: 9/25/2020  Attending: Dr. White    [facility] Day: 8    SUBJECTIVE:   HPI: Patient is a 39 y.o.  female with PMHx of HTN and HLD presenting with fevers and chills starting last night. Pt  Reports associated nausea and vomiting with headache and left leg swelling. Pt reports fevers and chills persisted this morning and  Was told by Dr. Griffith to come to the ED. Pt reports left leg skin changes for a couple of months and she has been taking antifungal medications, po and cream, to treat the wound. Pt reports using compression socks but noticed clear discharge on the socks.      In ED, patient was febrile with nausea. Pt given zofran and started IVF. Lab work presented hypokalemia of 2.7, ESR 42,  and WBC  Of  17. Pt given onedose of 40mEq K repletion. Blood cultures were ordered. Pt admitted to observation for hypokalemia and cellulitis.      Interval History:  Pt feeling tired this am, but reports she was up late last night. Does complain of increased L leg pain after walking with PT yesterday and that the pain is not well controlled at this time. Leg is tender on the anterior surface. Otherwise, she has no other complaints. Her appetite is approaching baseline. Denies any fevers, chills, SOB, chest pain, abdominal pain, or any urinary symptoms.     Review of Systems:  As per Subjective  Constitutional: denies fever and chills  Eyes: denies visual changes and blurred vision  Respiratory: denies cough, shortness of breath and wheezing  Cardiovascular: denies chest pain and palpitations  Gastrointestinal: denies nausea, vomiting, abdominal pain and change in bowel habits  Genitourinary: denies difficulty urinating and changes in urination  Musculoskeletal: denies joint pain and decreased ROM  Neurological: denies numbness, tingling, headache and dizziness  Behavioral/Psych: denies anxiety and depression  Skin: denies rash, lesions, and easy  bleeding/bruising    Scheduled Meds:   ceFEPime (MAXIPIME) IVPB  1 g Intravenous Q12H    clindamycin (CLEOCIN) IVPB  600 mg Intravenous Q8H    enoxparin  40 mg Subcutaneous Q24H    famotidine  20 mg Oral Daily    fenofibrate  145 mg Oral Daily    ferrous sulfate  325 mg Oral Daily    metoprolol tartrate  100 mg Oral BID    metroNIDAZOLE  500 mg Oral Q8H    mupirocin   Topical (Top) BID    simvastatin  20 mg Oral QHS    triamterene-hydrochlorothiazide 37.5-25 mg  1 capsule Oral Daily     Continuous Infusions:   lactated ringers 125 mL/hr at 10/01/20 2322     PRN Meds:acetaminophen, amitriptyline, HYDROcodone-acetaminophen, hydrOXYzine HCL, ibuprofen, loperamide, melatonin, ondansetron, sodium chloride 0.9%, Pharmacy to dose Vancomycin consult **AND** vancomycin - pharmacy to dose    Review of patient's allergies indicates:  No Known Allergies      OBJECTIVE:     Vitals(Most Recent)      BP  Min: 123/58  Max: 172/79  Temp  Av °F (37.2 °C)  Min: 98.5 °F (36.9 °C)  Max: 99.4 °F (37.4 °C)  Pulse  Av.4  Min: 83  Max: 97  Resp  Av  Min: 16  Max: 20  Weight  Av.9 kg (297 lb 6.4 oz)  Min: 134.9 kg (297 lb 6.4 oz)  Max: 134.9 kg (297 lb 6.4 oz)             Vitals(Qyhb26y)  Temp:  [98.5 °F (36.9 °C)-99.4 °F (37.4 °C)]   Pulse:  [83-97]   Resp:  [16-20]   BP: (123-172)/(58-79)     I & O(Stav99r)    Intake/Output Summary (Last 24 hours) at 10/2/2020 0829  Last data filed at 10/2/2020 0600  Gross per 24 hour   Intake 3758.75 ml   Output --   Net 3758.75 ml       Physical Exam:  Gen: No apparent distress, well nourished and developed, appears stated age  CV: RRR, S1 and S2 present, no murmurs appreciated on exam  Resp: normal respiratory effort. Exam limited by body habitus, pt unable to sit up or lean forward this am. CTA in bilateral anterior lung fields  Abd: non-tender, non-distended with normal bowel sounds  Neuro: A&O x4, spontaneous movement of all extremities, sensation intact  MSK: full  ROM UE and LE bilaterally  Skin: LLE with skin breakdown and drainage from lateral leg. Warm to touch below the knee L>R. Erythema and indurated on L posterior calf that is tender to palpation. Erythema improved from initial marked area. Bandage in place, clean and intact. LLE still with edema, but skin is compressible and not tense.  Psych: logical thought process, good eye contact    Laboratory:  CBC:   Recent Labs   Lab 10/02/20  0633   WBC 12.84*   RBC 3.38*   HGB 8.0*   HCT 26.3*   *   MCV 78*   MCH 23.7*   MCHC 30.4*     CMP:   Recent Labs   Lab 10/02/20  0633   *   CALCIUM 7.9*   ALBUMIN 2.0*   PROT 7.0      K 3.3*   CO2 26      BUN 11   CREATININE 2.3*   ALKPHOS 56   ALT 35   AST 20   BILITOT 0.4     Labs within the past 24 hours have been reviewed.    Imaging:   Retroperitoneal US ordered for today.    ASSESSMENT/PLAN:   40yo F with PMHx of HTN and HLD presenting with fevers, chills, and L leg swelling associated with erythema, tenderness, and skin breakdown with drainage.     Left leg cellulitis  Persistent skin wound  With draining wound.  Warm to touch  Failed antifungal treatments  LLE MRI showed soft tissue swelling and scattered linear tracking of fluid  LLE US shows no evidence of DVT     Plan:  Antipyretics  Encouraging PO intake.   Blood cultures ngtd  Vanc continued, appreciate pharmacy recs  D/c zosyn  Consulted ID, recs appreciated: added cefepime, flagyl, and clindamycin  Appreciate surgery recs        MAYUR (acute kidney injury)  Pt baseline cr 0.9, Cr 2.3 today despite increased fluid rate from 50cc/hr to 125cc/hr     Plan:  Daily CMP to monitor Cr  Plan to obtain UA, Urine urea nitrogen, urine creatinine, urine sodium, and urine protein/creatinine ratio to look for intrinsic causes of MAYUR  Plan to obtain retroperitoneal US        SIRS (systemic inflammatory response syndrome)  Afebrile last 24hrs     Plan:  Broad spectrum antibiotics, on Vanc, cefepime, and flagyl.  Clindamycin added per ID recs yesterday  IVF with 2 bore IVs  Antipyretics PRN  Appreciate ID recs     Hyperlipidemia  Restart home med  On fenofibrate 145mg PO daily and simvastatin 20mg PO QHS     Hypokalemia  K  2.7 on admission, K 3.3 this am  EKG: no changes  Asymptomatic     Plan:   Trend BMP  Continuing to replace po K+ and Mg today        HTN (hypertension)  BP stable   Continue home meds     FEN- 125mL/hr LR, K+ and Mg today, cardiac  DVT- enoxaparin  GI- famotidine  CODE- FULL  DISPO- home pending clinical improvement, ID and surgery recs    Cl Washington DO  [employer] [facility], PGY-1

## 2020-10-02 NOTE — PT/OT/SLP PROGRESS
Physical Therapy Treatment    Patient Name:  Mohsen Julien   MRN:  [id number]    Recommendations:     Discharge Recommendations:  outpatient PT(with  care)   Discharge Equipment Recommendations: walker, rolling(defer to OT for bathing DME)   Barriers to discharge: None    Assessment:     Mohsen Julien is a 39 y.o. female admitted with a medical diagnosis of Left leg cellulitis.  She presents with the following impairments/functional limitations:  weakness, pain, edema, impaired skin, decreased lower extremity function, decreased ROM . Pt is able to take a little more weight on LLE and amb in room with RW    Rehab Prognosis: Good; patient would benefit from acute skilled PT services to address these deficits and reach maximum level of function.    Recent Surgery: * No surgery found *      Plan:     During this hospitalization, patient to be seen 5 x/week to address the identified rehab impairments via gait training, therapeutic activities, therapeutic exercises and progress toward the following goals:    · Plan of Care Expires:  10/10/20    Subjective     Chief Complaint: leg pain  Patient/Family Comments/goals: Is it good that its leaking (drainage on pillow that L leg elevated on)  Pain/Comfort:  · Pain Rating 1: 5/10  · Location - Side 1: Left  · Location - Orientation 1: generalized  · Location 1: leg  · Pain Addressed 1: Cessation of Activity, Reposition  · Pain Rating Post-Intervention 1: 5/10      Objective:     Communicated with nurse prior to session.  Patient found HOB elevated with peripheral IV upon PT entry to room.     General Precautions: Standard, fall   Orthopedic Precautions:N/A   Braces: N/A     Functional Mobility:  · Bed Mobility:  assist with LLE only    · Supine to Sit: minimum assistance  · Sit to Supine: minimum assistance  · Transfers:     · Sit to Stand:  stand by assistance with no AD  · Gait: pt amb 5' to bathroom and then 20' in room with RW and SBA.  Pt able to put some weight  on LLE but shortened step and stride length on R,   · Balance: good sit and stand      AM-PAC 6 CLICK MOBILITY  Turning over in bed (including adjusting bedclothes, sheets and blankets)?: 3  Sitting down on and standing up from a chair with arms (e.g., wheelchair, bedside commode, etc.): 3  Moving from lying on back to sitting on the side of the bed?: 3  Moving to and from a bed to a chair (including a wheelchair)?: 3  Need to walk in hospital room?: 3  Climbing 3-5 steps with a railing?: 3  Basic Mobility Total Score: 18       Therapeutic Activities and Exercises:   to bathroom and gait as above.  Instructed pt in ankle pumps hourly.  LLE elevated on pillows with blue pad 2 strike thru drainage     Patient left HOB elevated with all lines intact, call button in reach, bed alarm on, nurse notified and daughter present..    GOALS:   Multidisciplinary Problems     Physical Therapy Goals        Problem: Physical Therapy Goal    Goal Priority Disciplines Outcome Goal Variances Interventions   Physical Therapy Goal     PT, PT/OT Ongoing, Progressing     Description: Goals to be met by: 10/10/2020     Patient will increase functional independence with mobility by performing:    Supine<>sit mod Ind  Sit<>stand mod Ind  Pt to ambulate with least restrictive AD household distances  Pt to perform LE TE x 15 reps BLE for reconditioning                     Time Tracking:     PT Received On: 10/02/20  PT Start Time: 1511     PT Stop Time: 1550  PT Total Time (min): 39 min     Billable Minutes: Gait Training 12 and Therapeutic Activity 19  + time spent on toilet   Treatment Type: Treatment  PT/PTA: PT     PTA Visit Number: 0     Alexandra Alford, PT  10/02/2020

## 2020-10-02 NOTE — PLAN OF CARE
AAO x4. Scheduled medications administered per MD order. No complaints of pain. Afebrile. No adverse reactions to antibiotics. Voiding without difficulty. Safety maintained. Will continue to monitor.

## 2020-10-02 NOTE — PROGRESS NOTES
"[facility]  Adult Nutrition  Progress Note    SUMMARY       Recommendations    Recommendation:   1. Continue current cardiac diet order.   2. Addition of Boost Plus Strawberry BID.   3. Addition of Paddy Fruit Punch BID.   4. RD to follow up and monitor intake    Goals:   Pt to meet >/=50% PO intake by RD follow up    Nutrition Goal Status: progressing towards goal  Communication of RD Recs: other (comment)(POC)    Reason for Assessment    Reason For Assessment: RD follow-up  Diagnosis: (left leg cellulitis)  Relevant Medical History: HTN, fibromyalgia, thyroid disease, anemia, cholecystectomy  Interdisciplinary Rounds: did not attend  General Information Comments: Pt in room with family member. On a cardiac diet, ate minimal breakfast, does not typically eat at home. Consumed % of dinner and lunch yesterday. Pt Willing to Drink Boost Plus Strawberry and Paddy Fruit punch. Denies N/V/D/C. PIV in plac.e NFPE completed today 10/2: pt is well nourished.   Nutrition Discharge Planning: d/c on cardiac diet    Nutrition Risk Screen    Nutrition Risk Screen: large or nonhealing wound, burn or pressure injury    Nutrition/Diet History    Food Preferences: no spiritual, cultural, or Voodoo food preferences noted at this time  Spiritual, Cultural Beliefs, Jew Practices, Values that Affect Care: no  Food Allergies: NKFA  Factors Affecting Nutritional Intake: None identified at this time    Anthropometrics    Temp: 98.4 °F (36.9 °C)  Height Method: Stated  Height: 5' 4" (162.6 cm)  Height (inches): 64 in  Weight Method: Bed Scale  Weight: 134.9 kg (297 lb 6.4 oz)  Weight (lb): 297.4 lb  Ideal Body Weight (IBW), Female: 120 lb  % Ideal Body Weight, Female (lb): 242.51 %  BMI (Calculated): 51  BMI Grade: greater than 40 - morbid obesity       Lab/Procedures/Meds    Pertinent Labs Reviewed: reviewed  Pertinent Labs Comments: H/H 8.0/26.1, K 3.1, Cr 2.2, eGFR 31, Glu 133, Ca 8.1, Phos 2.5, Alb 2.1, " ALT 46  Pertinent Medications Reviewed: reviewed  Pertinent Medications Comments: cefepimine, enoxaparin, finofiibrate, ferrous sulfate, metoprolol tartate, metronidazole, mupurocin, simvastatin, triamterene- hydroxhlorothiazide    Physical Findings/Assessment    Russell Score: 20  Wound: lateral malleolus/ankle    Estimated/Assessed Needs    Weight Used For Calorie Calculations: 54.5 kg (120 lb 2.4 oz)(IBW)  Energy Calorie Requirements (kcal): 1907(35 kcal/kg (IBW))  Energy Need Method: Kcal/kg  Protein Requirements: 87(1.6 g/kg (IBW))  Weight Used For Protein Calculations: 54.5 kg (120 lb 2.4 oz)(IBW)     Estimated Fluid Requirement Method: RDA Method  RDA Method (mL): 1907         Nutrition Prescription Ordered    [facility]    Evaluation of Received Nutrient/Fluid Intake    IV Fluid (mL): 3000(lactated ringers)  I/O: +3758.8  Energy Calories Required: not meeting needs  Protein Required: not meeting needs  Fluid Required: meeting needs  Comments: LBM 10/1  Tolerance: tolerating  % Intake of Estimated Energy Needs: 50 - 75 %  % Meal Intake: Other: % for lunch and dinner    Nutrition Risk    Level of Risk/Frequency of Follow-up: (2 x/week)     Assessment and Plan    MAYUR (acute kidney injury)  Contributing Nutrition Diagnosis  Inadequate oral intake    Related to (etiology):   Nausea and vomitting    Signs and Symptoms (as evidenced by):   Pt chart review showing pt with Nausea and vomiting upon admission, pt's daughter reporting pt consuming less than 25% of her meals.      Interventions:  Collaboration with other providers  Commercial Beverage: Boost Plus BID  Modified Beverage: Paddy BID     Nutrition Diagnosis Status:   Improving           Monitor and Evaluation    Food and Nutrient Intake: energy intake  Food and Nutrient Adminstration: diet order  Knowledge/Beliefs/Attitudes: food and nutrition knowledge/skill  Physical Activity and Function: nutrition-related ADLs and  IADLs  Anthropometric Measurements: weight, weight change, body mass index  Biochemical Data, Medical Tests and Procedures: electrolyte and renal panel, glucose/endocrine profile  Nutrition-Focused Physical Findings: overall appearance     Malnutrition Assessment                 Orbital Region (Subcutaneous Fat Loss): well nourished  Upper Arm Region (Subcutaneous Fat Loss): well nourished  Thoracic and Lumbar Region: well nourished   [place] Region (Muscle Loss): well nourished  Clavicle Bone Region (Muscle Loss): well nourished  Clavicle and Acromion Bone Region (Muscle Loss): well nourished  Scapular Bone Region (Muscle Loss): well nourished  Dorsal Hand (Muscle Loss): well nourished       Subcutaneous Fat Loss (Final Summary): well nourished  Muscle Loss Evaluation (Final Summary): well nourished         Nutrition Follow-Up    RD Follow-up?: Yes

## 2020-10-02 NOTE — PROGRESS NOTES
Pharmacokinetic Assessment Follow Up: IV Vancomycin    Vancomycin serum concentration assessment(s):    The random level was drawn correctly and can be used to guide therapy at this time. The measurement is within the desired definitive target range of 10 to 15 mcg/mL.    Vancomycin Regimen Plan:    Will continue with vancomycin 1000 mg IV q 24 hours. Vancomycin trough is scheduled prior to the 3rd dose on 10/3 at 1600. Goal 10-15 mcg/mL for SSTI.     Drug levels (last 3 results):  Recent Labs   Lab Result Units 09/30/20  0848 10/01/20  0919 10/02/20  1446   Vancomycin, Random ug/mL 16.2 18.1 13.4       Pharmacy will continue to follow and monitor vancomycin.    Please contact pharmacy at extension [phone] for questions regarding this assessment.    Thank you for the consult,   Татьяна Singh       Patient brief summary:  Mohsen Julien is a 39 y.o. female initiated on antimicrobial therapy with IV Vancomycin for treatment of skin & soft tissue infection    The patient's current regimen is pulse dosed    Drug Allergies:   Review of patient's allergies indicates:  No Known Allergies    Actual Body Weight:   134.9 kg    Renal Function:   Estimated Creatinine Clearance: 44.1 mL/min (A) (based on SCr of 2.3 mg/dL (H)).,     Dialysis Method (if applicable):  N/A    CBC (last 72 hours):  Recent Labs   Lab Result Units 09/30/20  0848 10/01/20  0919 10/02/20  0633   WBC K/uL 8.95 10.07 12.84*   Hemoglobin g/dL 7.9* 8.0* 8.0*   Hematocrit % 26.4* 26.1* 26.3*   Platelets K/uL 267 331 404*   Gran% % 64.5 62.4 69.1   Lymph% % 18.3 19.3 14.5*   Mono% % 13.0 12.3 10.4   Eosinophil% % 0.9 0.6 1.5   Basophil% % 0.3 0.3 0.3   Differential Method  Automated automated Automated       Metabolic Panel (last 72 hours):  Recent Labs   Lab Result Units 09/30/20  0848 09/30/20  1718 10/01/20  0919 10/02/20  0633 10/02/20  1329   Sodium mmol/L 137 138 139 138  --    Sodium Urine Random mmol/L  --   --   --   --  70   Potassium mmol/L 3.6  3.6 3.1* 3.3*  --    Chloride mmol/L 101 104 101 102  --    CO2 mmol/L 27 21* 28 26  --    Glucose mg/dL 120* 126* 133* 111*  --    Glucose, UA   --   --   --   --  Negative   BUN, Bld mg/dL 12 13 11 11  --    Creatinine mg/dL 2.2* 2.2* 2.2* 2.3*  --    Creatinine, Random Ur mg/dL  --   --   --   --  67.9  67.9   Albumin g/dL 2.2* 2.1* 2.1* 2.0*  --    Total Bilirubin mg/dL 0.6 0.4 0.4 0.4  --    Alkaline Phosphatase U/L 79 77 71 56  --    AST U/L 43* 34 17 20  --    ALT U/L 73* 63* 46* 35  --    Magnesium mg/dL 2.3  --  2.3 2.3  --    Phosphorus mg/dL 2.3*  --  2.5* 3.6  --        Vancomycin Administrations:  vancomycin given in the last 96 hours                     vancomycin in dextrose 5 % 1 gram/250 mL IVPB 1,000 mg (mg) 1,000 mg New Bag 10/01/20 1552    vancomycin (VANCOCIN) 1,250 mg in dextrose 5 % 250 mL IVPB (mg) 1,250 mg New Bag 09/30/20 1252    vancomycin 1.5 g in dextrose 5 % 250 mL IVPB (ready to mix) (mg) 1,500 mg New Bag 09/29/20 0900                    Microbiologic Results:  Microbiology Results (last 7 days)       Procedure Component Value Units Date/Time    Aerobic culture [[id number]] Collected: 10/02/20 1149    Order Status: Sent Specimen: Wound from Leg, Left Updated: 10/02/20 1218    Culture, Anaerobe [[id number]] Collected: 10/02/20 1149    Order Status: Sent Specimen: Wound from Leg, Left Updated: 10/02/20 1218    Blood culture [[id number]] Collected: 09/27/20 0939    Order Status: Completed Specimen: Blood from Antecubital, Left Arm Updated: 10/01/20 1612     Blood Culture, Routine No Growth to date      No Growth to date      No Growth to date      No Growth to date      No Growth to date    Narrative:      Please collect 2 cultures from 2 separate sites.  Collection has been rescheduled by UAD at 09/27/2020 09:46 Reason:   duplicate order did 2 cultute  Collection has been rescheduled by UAD at 09/27/2020 09:46 Reason:   duplicate order did 2 cultute    Blood culture [[id number]] Collected:  09/27/20 0944    Order Status: Completed Specimen: Blood from Antecubital, Left Arm Updated: 10/01/20 1612     Blood Culture, Routine No Growth to date      No Growth to date      No Growth to date      No Growth to date      No Growth to date    Narrative:      Please collected 2 cultures from 2 separate sites    Blood culture [[id number]] Collected: 09/26/20 0439    Order Status: Completed Specimen: Blood from Peripheral, Left Updated: 10/01/20 1412     Blood Culture, Routine No growth after 5 days.    Blood culture [[id number]] Collected: 09/26/20 0444    Order Status: Completed Specimen: Blood from Peripheral, Left Updated: 10/01/20 1412     Blood Culture, Routine No growth after 5 days.    Blood Culture #2 **CANNOT BE ORDERED STAT** [[id number]] Collected: 09/25/20 1410    Order Status: Completed Specimen: Blood from Peripheral, Antecubital, Left Updated: 09/30/20 2312     Blood Culture, Routine No growth after 5 days.    Blood Culture #1 **CANNOT BE ORDERED STAT** [[id number]] Collected: 09/25/20 1355    Order Status: Completed Specimen: Blood from Peripheral, Antecubital, Right Updated: 09/30/20 2312     Blood Culture, Routine No growth after 5 days.    Aerobic culture [[id number]] Collected: 09/26/20 1316    Order Status: Completed Specimen: Wound from Leg, Left Updated: 09/29/20 1119     Aerobic Bacterial Culture Skin mark,  no predominant organism    Urine Culture High Risk [[id number]]  (Abnormal) Collected: 09/26/20 1131    Order Status: Completed Specimen: Urine, Clean Catch Updated: 09/28/20 1525     Urine Culture, Routine DIPHTHEROIDS  10,000 - 49,999 cfu/ml      Narrative:      Indicated criteria for high risk culture:->Other  Other (specify):->septic patient on broad spectrum abx with  continued fevers    Clostridium difficile EIA [[id number]]     Order Status: Canceled Specimen: Stool     Blood culture [[id number]]     Order Status: Canceled Specimen: Blood     Blood culture [[id number]]     Order Status:  Canceled Specimen: Blood     Blood culture [[id number]]     Order Status: Canceled Specimen: Blood

## 2020-10-02 NOTE — PLAN OF CARE
Patient currently remains on IV abx. Final ID recs are pending at this time. TN will continue to follow.         10/02/20 1617   Discharge Reassessment   Assessment Type Discharge Planning Reassessment   Discharge Plan A Home with family

## 2020-10-02 NOTE — PROGRESS NOTES
"Surgery follow up  BP (!) 119/58 (Patient Position: Lying)   Pulse 79   Temp 98.4 °F (36.9 °C) (Oral)   Resp 20   Ht 5' 4" (1.626 m)   Wt 134.9 kg (297 lb 6.4 oz)   SpO2 98%   Breastfeeding No   BMI 51.05 kg/m²   I/O last 3 completed shifts:  In: 3758.8 [I.V.:3358.8; IV Piggyback:400]  Out: -   I/O this shift:  In: -   Out: 200 [Urine:200]      .lst3c  Recent Results (from the past 336 hour(s))   Basic metabolic panel    Collection Time: 09/26/20  4:02 PM   Result Value Ref Range    Sodium 134 (L) 136 - 145 mmol/L    Potassium 3.8 3.5 - 5.1 mmol/L    Chloride 101 95 - 110 mmol/L    CO2 22 (L) 23 - 29 mmol/L    BUN, Bld 8 6 - 20 mg/dL    Creatinine 0.8 0.5 - 1.4 mg/dL    Calcium 7.7 (L) 8.7 - 10.5 mg/dL    Anion Gap 11 8 - 16 mmol/L   Basic metabolic panel    Collection Time: 09/25/20  9:30 PM   Result Value Ref Range    Sodium 136 136 - 145 mmol/L    Potassium 3.3 (L) 3.5 - 5.1 mmol/L    Chloride 98 95 - 110 mmol/L    CO2 26 23 - 29 mmol/L    BUN, Bld 17 6 - 20 mg/dL    Creatinine 1.0 0.5 - 1.4 mg/dL    Calcium 8.1 (L) 8.7 - 10.5 mg/dL    Anion Gap 12 8 - 16 mmol/L   swelling less , still blisters on the leg with weeping from the wound   Will culture, continue present treatment.  "

## 2020-10-02 NOTE — PROGRESS NOTES
[facility] Infectious Diseases Progress Note    Assessment/Plan:     Mohsen Julien is a 39 y.o. female with PMHx of HTN , HLD, and venous stasis dermatitis who presented to [facility] on  with the CC of nausea, vomiting, and subjective fevers/chills for 2 days. She follows with Dr Griffith for her chronic venous stasis and small wound of the left lower extremity. She was on PO fluconazole as well as a topical antifungal prior to presentation. She feels her wound has enlarged and the edema of the left lower leg has worsened.      Patient was started on vanc and pip-tazo. CT of the left tib-fib with soft tissue edema, no discrete fluid collection. LE venous u/s without DVT. MRI ordered  as patient seemed to clinically worsen, consistent with cellulitis, but fasciitis could not be excluded; therefore, clindamycin added back to regimen.    Recommendations:  - Patient afebrile overnight, continue clindamycin, vancomycin, cefepime (renally dosed).   - Would discontinue flagyl today.  - Patient seems to have begun slowly improving, able to walk with PT yesterday and again today.    Luis Felipe George MD  [facility] Infectious Diseases, PGY-4  Cell: [phone]    Thank you for allowing us to participate in the care of this patient. We will continue to follow along. Case has been discussed with consult staff, who is in agreement with assessment and plan. ID will continue to follow.     Subjective:      No acute events overnight. Able to walk with PT yesterday.      Objective:   Last 24 Hour Vital Signs:  BP  Min: 123/58  Max: 162/69  Temp  Av °F (37.2 °C)  Min: 98.5 °F (36.9 °C)  Max: 99.4 °F (37.4 °C)  Pulse  Av.5  Min: 83  Max: 97  Resp  Av.3  Min: 16  Max: 20  SpO2  Av %  Min: 98 %  Max: 98 %  Weight  Av.9 kg (297 lb 6.4 oz)  Min: 134.9 kg (297 lb 6.4 oz)  Max: 134.9 kg (297 lb 6.4 oz)  I/O last 3 completed shifts:  In: 3758.8 [I.V.:3358.8; IV Piggyback:400]  Out: -     Physical  Exam  Constitutional:       General: She is not in acute distress.     Appearance: Normal appearance. She is obese. She is not ill-appearing or diaphoretic.   HENT:      Head: Normocephalic and atraumatic.      Nose: Nose normal.      Mouth/Throat:      Mouth: Mucous membranes are moist.      Pharynx: Oropharynx is clear. No oropharyngeal exudate or posterior oropharyngeal erythema.   Eyes:      General: No scleral icterus.     Conjunctiva/sclera: Conjunctivae normal.   Neck:      Musculoskeletal: Normal range of motion and neck supple. No neck rigidity.   Cardiovascular:      Rate and Rhythm: Normal rate and regular rhythm.      Heart sounds: Normal heart sounds.   Pulmonary:      Effort: Pulmonary effort is normal.      Breath sounds: Normal breath sounds.   Abdominal:      General: There is no distension.      Palpations: Abdomen is soft.      Tenderness: There is no abdominal tenderness.   Musculoskeletal:         General: Swelling (bilateral, L >> R, improving) and tenderness present. No deformity.      Comments: Wound of medial LLL in c/d/i dressing   Skin:     Coloration: Skin is not jaundiced.      Findings: No lesion or rash.   Neurological:      General: No focal deficit present.      Mental Status: She is alert and oriented to person, place, and time.   Psychiatric:         Mood and Affect: Mood normal.         Behavior: Behavior normal.         Laboratory:  Laboratory Data Reviewed: yes  Pertinent Findings:  Recent Labs   Lab 09/30/20  0848 09/30/20  1718 10/01/20  0919 10/02/20  0633   WBC 8.95  --  10.07 12.84*   HGB 7.9*  --  8.0* 8.0*   HCT 26.4*  --  26.1* 26.3*     --  331 404*   MCV 78*  --  78* 78*   RDW 14.8*  --  14.6* 14.8*    138 139 138   K 3.6 3.6 3.1* 3.3*    104 101 102   CO2 27 21* 28 26   BUN 12 13 11 11   CREATININE 2.2* 2.2* 2.2* 2.3*   * 126* 133* 111*   PROT 6.7 6.9 6.8 7.0   ALBUMIN 2.2* 2.1* 2.1* 2.0*   BILITOT 0.6 0.4 0.4 0.4   AST 43* 34 17 20   ALKPHOS  79 77 71 56   ALT 73* 63* 46* 35         Microbiology Data:  Blood culture 9/25 NGTD  Blood culture 9/26 NGTD  Urine culture 9/26 < 50k cfu diphtheroids  Wound culture 9/26 with normal skin mark  Blood culture 9/27 NGTD    Antimicrobials:  Pip-tazo 9/25 - 9/27  Vancomycin 9/25 - present  Cefepime 9/29 - present  Flagyl 9/29 - present  Clindamycin 9/25, 10/1 - present    Other Results:  Radiology Results:  X-ray Chest 1 View    Result Date: 9/27/2020  EXAMINATION: XR CHEST 1 VIEW CLINICAL HISTORY: Febrile. TECHNIQUE: Single frontal view of the chest was performed. COMPARISON: Multiple prior radiographs of the chest, most recent from 09/25/2020. FINDINGS: The lungs are well expanded and clear. No focal opacities are seen. The pleural spaces are clear.  The cardiac silhouette is borderline enlarged although this may be accentuated by technique.  The visualized osseous structures are unremarkable.     No acute cardiopulmonary abnormality. Electronically signed by: Vignesh Sanchez Date:    09/27/2020 Time:    10:25    X-ray Chest 1 View    Result Date: 9/25/2020  EXAMINATION: XR CHEST 1 VIEW CLINICAL HISTORY: COPD Exacerbation; TECHNIQUE: Single view of the chest was obtained. COMPARISON: Multiple priors, most recent 02/13/2019 FINDINGS: The right costophrenic angles excluded from field of view. Normal cardiomediastinal contour. No focal consolidation, pleural effusion or pneumothorax.     No acute cardiopulmonary abnormality. Electronically signed by: Ivonne Gottlieb Date:    09/25/2020 Time:    16:27    Us Lower Extremity Veins Bilateral    Result Date: 9/25/2020  EXAMINATION: US LOWER EXTREMITY VEINS BILATERAL CLINICAL HISTORY: Other specified soft tissue disorders TECHNIQUE: Duplex and color flow Doppler and dynamic compression was performed of the bilateral lower extremity veins was performed. COMPARISON: None FINDINGS: Right thigh veins: The common femoral, femoral, popliteal, upper greater saphenous, and deep femoral  veins are patent and free of thrombus. The veins are normally compressible and have normal phasic flow and augmentation response. Right calf veins: The visualized calf veins are patent. Left thigh veins: The common femoral, femoral, popliteal, upper greater saphenous, and deep femoral veins are patent and free of thrombus. The veins are normally compressible and have normal phasic flow and augmentation response. Left calf veins: The visualized calf veins are patent. Miscellaneous: None     No evidence of deep venous thrombosis in either lower extremity. Electronically signed by: Charlie Ochoa Jr Date:    09/25/2020 Time:    14:22    Us Lower Extremity Veins Left    Result Date: 9/30/2020  EXAMINATION: US LOWER EXTREMITY VEINS LEFT CLINICAL HISTORY: left leg swelling with blisters r/o dvt; Systemic inflammatory response syndrome (sirs) of non-infectious origin without acute organ dysfunction TECHNIQUE: Duplex and color flow Doppler evaluation and graded compression of the left lower extremity veins was performed. COMPARISON: 09/25/2020 FINDINGS: Duplex and color flow Doppler evaluation does not reveal any evidence of acute venous thrombosis in the common femoral, superficial femoral, greater saphenous, popliteal, peroneal, anterior tibial and posterior tibial veins of the left lower extremity.  There is no reflux to suggest valvular incompetence.  There is subcutaneous edema.  There are partially visualized prominent left inguinal lymph nodes.     No evidence of deep venous thrombosis in the left lower extremity. Subcutaneous edema noting mildly prominent left inguinal lymph nodes. Electronically signed by: Cesar Milton MD Date:    09/30/2020 Time:    13:59    Mri Tibia Fibula Without Contrast Left    Result Date: 9/30/2020  EXAMINATION: MRI TIBIA FIBULA WITHOUT CONTRAST LEFT CLINICAL HISTORY: Lower leg swelling/redness, cellulitis suspected;  Other specified soft tissue disorders TECHNIQUE: Multiplanar,  multisequence images were obtained through the left tibia without the use of intravenous contrast. COMPARISON: None available. FINDINGS: Bone marrow signal appears maintained.  No evidence for fracture or infiltrative marrow process.  There is subcutaneous edema throughout the visualized lower extremity with scattered, linear tracking fluid along the superficial fascial clear.  No significant intermuscular edema or discrete soft tissue collection, noting lack of intravenous contrast.     Generalized soft tissue swelling with scattered, linear tracking fluid along the superficial fascia of the left lower extremity.  Differential etiologies to include noninfectious edema versus cellulitis.  No discrete collection noting noncontrast exam.  Note that component of fasciitis can not be excluded on the basis of imaging. Electronically signed by: Severino Rosen Date:    09/30/2020 Time:    12:59    Ct Thigh W W/o Contrast Left    Result Date: 9/26/2020  EXAMINATION: CT THIGH W W/O CONTRAST LEFT; CT LEG (TIBIA-FIBULA) W W/O CONTRAST LEFT CLINICAL HISTORY: Concern for necrotizing fasciitis.;; Concern for necrotizing fasciitis. Patient with lower leg cellulitis.; TECHNIQUE: CT of the left lower extremity was obtained from the level of the pelvis to the feet.  Images were obtained prior to and following administration of 100 cc IV Omnipaque 350.  Multiplanar reconstructions were obtained and reviewed. COMPARISON: None FINDINGS: There is nonspecific subcutaneous soft tissue edema in the left lower extremity below the level of the knee.  No fluid collections visualized to suggest abscess.  No soft tissue gas visualized to suggest necrotizing fasciitis.  No acute or suspicious osseous findings demonstrated.  Visualized musculature appears grossly normal. There is an IUD in place within the uterus.  No adnexal masses visualized.  The urinary bladder appears unremarkable. There are multiple nonspecific enlarged left external iliac  and inguinal lymph nodes present measuring up to 15 mm and 19 mm in axial short dimension respectively.  Visualized vasculature appears grossly within normal limits.     1. Nonspecific subcutaneous soft tissue edema in the left lower leg possibly representing cellulitis, correlate clinically.  No definite abscess or evidence of necrotizing fasciitis. 2. Nonspecific left external iliac chain and inguinal adenopathy, likely reactive in nature. Electronically signed by: Humberto Love MD Date:    09/26/2020 Time:    12:34    Ct Leg (tibia-fibula) W W/o Contrast Left    Result Date: 9/26/2020  EXAMINATION: CT THIGH W W/O CONTRAST LEFT; CT LEG (TIBIA-FIBULA) W W/O CONTRAST LEFT CLINICAL HISTORY: Concern for necrotizing fasciitis.;; Concern for necrotizing fasciitis. Patient with lower leg cellulitis.; TECHNIQUE: CT of the left lower extremity was obtained from the level of the pelvis to the feet.  Images were obtained prior to and following administration of 100 cc IV Omnipaque 350.  Multiplanar reconstructions were obtained and reviewed. COMPARISON: None FINDINGS: There is nonspecific subcutaneous soft tissue edema in the left lower extremity below the level of the knee.  No fluid collections visualized to suggest abscess.  No soft tissue gas visualized to suggest necrotizing fasciitis.  No acute or suspicious osseous findings demonstrated.  Visualized musculature appears grossly normal. There is an IUD in place within the uterus.  No adnexal masses visualized.  The urinary bladder appears unremarkable. There are multiple nonspecific enlarged left external iliac and inguinal lymph nodes present measuring up to 15 mm and 19 mm in axial short dimension respectively.  Visualized vasculature appears grossly within normal limits.     1. Nonspecific subcutaneous soft tissue edema in the left lower leg possibly representing cellulitis, correlate clinically.  No definite abscess or evidence of necrotizing fasciitis. 2.  Nonspecific left external iliac chain and inguinal adenopathy, likely reactive in nature. Electronically signed by: Humberto Love MD Date:    09/26/2020 Time:    12:34    Mammo Digital Screening Bilat W/ Butch    Result Date: 9/14/2020  Result: Mammo Digital Screening Bilat w/ Butch History: Patient is 39 y.o. and is seen for a screening mammogram. Films Compared: Prior images (if available) were compared. Findings: This procedure was performed using tomosynthesis. Computer-aided detection was utilized in the interpretation of this examination.  The breasts have scattered areas of fibroglandular density. There is no evidence of suspicious masses, microcalcifications or architectural distortion. 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     No mammographic evidence of malignancy. BI-RADS Category 1: Negative Recommendation: Routine screening mammogram in 1 year is recommended. Your estimated lifetime risk of breast cancer (to age 85) based on Tyrer-Cuzick risk assessment model is Tyrer-Cuzick: 8.52 %. According to the [employer], patients with a lifetime breast cancer risk of 20% or higher might benefit from supplemental screening tests.       Current Medications:     Infusions:       Scheduled:   ceFEPime (MAXIPIME) IVPB  1 g Intravenous Q12H    clindamycin (CLEOCIN) IVPB  600 mg Intravenous Q8H    enoxparin  40 mg Subcutaneous Q24H    famotidine  20 mg Oral Daily    fenofibrate  145 mg Oral Daily    ferrous sulfate  325 mg Oral Daily    metoprolol tartrate  100 mg Oral BID    metroNIDAZOLE  500 mg Oral Q8H    mupirocin   Topical (Top) BID    simvastatin  20 mg Oral QHS    triamterene-hydrochlorothiazide 37.5-25 mg  1 capsule Oral Daily        PRN:  acetaminophen, amitriptyline, HYDROcodone-acetaminophen, HYDROmorphone, hydrOXYzine HCL, ibuprofen, loperamide, melatonin, ondansetron, sodium chloride 0.9%, Pharmacy to dose Vancomycin consult **AND** vancomycin - pharmacy to dose

## 2020-10-02 NOTE — PLAN OF CARE
Recommendation:   1. Continue current cardiac diet order.   2. Addition of Boost Plus Strawberry BID.   3. Addition of Paddy Fruit Punch BID.   4. RD to follow up and monitor intake    Goals:   Pt to meet >/=50% PO intake by RD follow up    Nutrition Goal Status: progressing towards goal  Communication of RD Recs: other (comment)(POC)      Problem: Wound  Goal: Optimal Wound Healing  Outcome: Ongoing, Progressing     Problem: Oral Intake Inadequate (Acute Kidney Injury/Impairment)  Goal: Optimal Nutrition Intake  Outcome: Ongoing, Progressing

## 2020-10-02 NOTE — PLAN OF CARE
Patient A&Ox4. On IV antibiotics.Dressing on left leg changed. Wound sample sent to lab for aerobic and anaerobic culture. Urine sample sent to lab. On strict I&O.Dr Griffith put in order for pain meds q6. Call light within reach. Bed alarm on.

## 2020-10-02 NOTE — PT/OT/SLP PROGRESS
Occupational Therapy  Visit Attempt     Patient Name:  Mohsen Julien   MRN:  [id number]    Patient not seen today secondary to Pain. Pt found in bathroom. Requesting time to complete task. Therapist waiting outside of room. Open re-entrance into room, pt found [facility] and reporting increased pain and inability to tolerate therapy at this time. Will follow-up as available.    Romy Claros OT  10/2/2020

## 2020-10-03 ENCOUNTER — ANESTHESIA EVENT (OUTPATIENT)
Dept: MEDSURG UNIT | Facility: HOSPITAL | Age: 40
DRG: 871 | End: 2020-10-03
Payer: MEDICAID

## 2020-10-03 ENCOUNTER — ANESTHESIA (OUTPATIENT)
Dept: MEDSURG UNIT | Facility: HOSPITAL | Age: 40
DRG: 871 | End: 2020-10-03
Payer: MEDICAID

## 2020-10-03 LAB
ALBUMIN SERPL BCP-MCNC: 2 G/DL (ref 3.5–5.2)
ALP SERPL-CCNC: 51 U/L (ref 55–135)
ALT SERPL W/O P-5'-P-CCNC: 28 U/L (ref 10–44)
ANION GAP SERPL CALC-SCNC: 11 MMOL/L (ref 8–16)
AST SERPL-CCNC: 19 U/L (ref 10–40)
BASOPHILS # BLD AUTO: 0.04 K/UL (ref 0–0.2)
BASOPHILS NFR BLD: 0.4 % (ref 0–1.9)
BILIRUB SERPL-MCNC: 0.4 MG/DL (ref 0.1–1)
BUN SERPL-MCNC: 11 MG/DL (ref 6–20)
CALCIUM SERPL-MCNC: 8 MG/DL (ref 8.7–10.5)
CHLORIDE SERPL-SCNC: 102 MMOL/L (ref 95–110)
CO2 SERPL-SCNC: 25 MMOL/L (ref 23–29)
CREAT SERPL-MCNC: 2.1 MG/DL (ref 0.5–1.4)
DIFFERENTIAL METHOD: ABNORMAL
EOSINOPHIL # BLD AUTO: 0.3 K/UL (ref 0–0.5)
EOSINOPHIL NFR BLD: 2.6 % (ref 0–8)
ERYTHROCYTE [DISTWIDTH] IN BLOOD BY AUTOMATED COUNT: 15.1 % (ref 11.5–14.5)
EST. GFR  (AFRICAN AMERICAN): 33 ML/MIN/1.73 M^2
EST. GFR  (NON AFRICAN AMERICAN): 29 ML/MIN/1.73 M^2
GLUCOSE SERPL-MCNC: 119 MG/DL (ref 70–110)
HCT VFR BLD AUTO: 27 % (ref 37–48.5)
HGB BLD-MCNC: 7.8 G/DL (ref 12–16)
IMM GRANULOCYTES # BLD AUTO: 0.27 K/UL (ref 0–0.04)
IMM GRANULOCYTES NFR BLD AUTO: 2.4 % (ref 0–0.5)
LYMPHOCYTES # BLD AUTO: 1.3 K/UL (ref 1–4.8)
LYMPHOCYTES NFR BLD: 11.5 % (ref 18–48)
MAGNESIUM SERPL-MCNC: 2.4 MG/DL (ref 1.6–2.6)
MCH RBC QN AUTO: 23.9 PG (ref 27–31)
MCHC RBC AUTO-ENTMCNC: 28.9 G/DL (ref 32–36)
MCV RBC AUTO: 83 FL (ref 82–98)
MONOCYTES # BLD AUTO: 1.1 K/UL (ref 0.3–1)
MONOCYTES NFR BLD: 9.5 % (ref 4–15)
NEUTROPHILS # BLD AUTO: 8.3 K/UL (ref 1.8–7.7)
NEUTROPHILS NFR BLD: 73.6 % (ref 38–73)
NRBC BLD-RTO: 0 /100 WBC
PHOSPHATE SERPL-MCNC: 3.7 MG/DL (ref 2.7–4.5)
PLATELET # BLD AUTO: 365 K/UL (ref 150–350)
PMV BLD AUTO: 9.7 FL (ref 9.2–12.9)
POTASSIUM SERPL-SCNC: 3.8 MMOL/L (ref 3.5–5.1)
PROT SERPL-MCNC: 7.2 G/DL (ref 6–8.4)
RBC # BLD AUTO: 3.26 M/UL (ref 4–5.4)
SODIUM SERPL-SCNC: 138 MMOL/L (ref 136–145)
WBC # BLD AUTO: 11.23 K/UL (ref 3.9–12.7)

## 2020-10-03 PROCEDURE — 21400001 HC TELEMETRY ROOM

## 2020-10-03 PROCEDURE — 83735 ASSAY OF MAGNESIUM: CPT

## 2020-10-03 PROCEDURE — 25000003 PHARM REV CODE 250: Performed by: STUDENT IN AN ORGANIZED HEALTH CARE EDUCATION/TRAINING PROGRAM

## 2020-10-03 PROCEDURE — 63600175 PHARM REV CODE 636 W HCPCS: Performed by: STUDENT IN AN ORGANIZED HEALTH CARE EDUCATION/TRAINING PROGRAM

## 2020-10-03 PROCEDURE — 97530 THERAPEUTIC ACTIVITIES: CPT | Mod: CQ

## 2020-10-03 PROCEDURE — 25000003 PHARM REV CODE 250

## 2020-10-03 PROCEDURE — 63600175 PHARM REV CODE 636 W HCPCS: Performed by: SURGERY

## 2020-10-03 PROCEDURE — 36000 PLACE NEEDLE IN VEIN: CPT | Performed by: ANESTHESIOLOGY

## 2020-10-03 PROCEDURE — 80053 COMPREHEN METABOLIC PANEL: CPT

## 2020-10-03 PROCEDURE — 84100 ASSAY OF PHOSPHORUS: CPT

## 2020-10-03 PROCEDURE — 63600175 PHARM REV CODE 636 W HCPCS: Performed by: FAMILY MEDICINE

## 2020-10-03 PROCEDURE — 85025 COMPLETE CBC W/AUTO DIFF WBC: CPT

## 2020-10-03 PROCEDURE — 97116 GAIT TRAINING THERAPY: CPT | Mod: CQ

## 2020-10-03 PROCEDURE — 25000003 PHARM REV CODE 250: Performed by: FAMILY MEDICINE

## 2020-10-03 RX ORDER — HYDROCODONE BITARTRATE AND ACETAMINOPHEN 10; 325 MG/1; MG/1
1 TABLET ORAL EVERY 6 HOURS
Status: DISCONTINUED | OUTPATIENT
Start: 2020-10-03 | End: 2020-10-05 | Stop reason: HOSPADM

## 2020-10-03 RX ADMIN — TRIAMTERENE AND HYDROCHLOROTHIAZIDE 1 CAPSULE: 25; 37.5 CAPSULE ORAL at 09:10

## 2020-10-03 RX ADMIN — MUPIROCIN: 20 OINTMENT TOPICAL at 09:10

## 2020-10-03 RX ADMIN — CEFEPIME HYDROCHLORIDE 1 G: 1 INJECTION, SOLUTION INTRAVENOUS at 03:10

## 2020-10-03 RX ADMIN — METOPROLOL TARTRATE 100 MG: 50 TABLET, FILM COATED ORAL at 08:10

## 2020-10-03 RX ADMIN — HYDROMORPHONE HYDROCHLORIDE 1 MG: 1 INJECTION, SOLUTION INTRAMUSCULAR; INTRAVENOUS; SUBCUTANEOUS at 03:10

## 2020-10-03 RX ADMIN — FERROUS SULFATE TAB EC 325 MG (65 MG FE EQUIVALENT) 325 MG: 325 (65 FE) TABLET DELAYED RESPONSE at 09:10

## 2020-10-03 RX ADMIN — MUPIROCIN: 20 OINTMENT TOPICAL at 08:10

## 2020-10-03 RX ADMIN — ENOXAPARIN SODIUM 40 MG: 40 INJECTION SUBCUTANEOUS at 04:10

## 2020-10-03 RX ADMIN — HYDROCODONE BITARTRATE AND ACETAMINOPHEN 1 TABLET: 7.5; 325 TABLET ORAL at 12:10

## 2020-10-03 RX ADMIN — AMITRIPTYLINE HYDROCHLORIDE 25 MG: 25 TABLET, FILM COATED ORAL at 08:10

## 2020-10-03 RX ADMIN — HYDROCODONE BITARTRATE AND ACETAMINOPHEN 1 TABLET: 10; 325 TABLET ORAL at 11:10

## 2020-10-03 RX ADMIN — METOPROLOL TARTRATE 100 MG: 50 TABLET, FILM COATED ORAL at 09:10

## 2020-10-03 RX ADMIN — CLINDAMYCIN IN 5 PERCENT DEXTROSE 600 MG: 12 INJECTION, SOLUTION INTRAVENOUS at 03:10

## 2020-10-03 RX ADMIN — HYDROMORPHONE HYDROCHLORIDE 1 MG: 1 INJECTION, SOLUTION INTRAMUSCULAR; INTRAVENOUS; SUBCUTANEOUS at 05:10

## 2020-10-03 RX ADMIN — Medication 6 MG: at 12:10

## 2020-10-03 RX ADMIN — CLINDAMYCIN IN 5 PERCENT DEXTROSE 600 MG: 12 INJECTION, SOLUTION INTRAVENOUS at 09:10

## 2020-10-03 RX ADMIN — HYDROCODONE BITARTRATE AND ACETAMINOPHEN 1 TABLET: 10; 325 TABLET ORAL at 07:10

## 2020-10-03 RX ADMIN — FENOFIBRATE 145 MG: 145 TABLET ORAL at 11:10

## 2020-10-03 RX ADMIN — VANCOMYCIN HYDROCHLORIDE 1000 MG: 1 INJECTION, POWDER, LYOPHILIZED, FOR SOLUTION INTRAVENOUS at 04:10

## 2020-10-03 RX ADMIN — METRONIDAZOLE 500 MG: 500 TABLET ORAL at 05:10

## 2020-10-03 RX ADMIN — FAMOTIDINE 20 MG: 20 TABLET ORAL at 09:10

## 2020-10-03 RX ADMIN — SIMVASTATIN 20 MG: 20 TABLET, FILM COATED ORAL at 08:10

## 2020-10-03 RX ADMIN — Medication 6 MG: at 08:10

## 2020-10-03 RX ADMIN — HYDROMORPHONE HYDROCHLORIDE 1 MG: 1 INJECTION, SOLUTION INTRAMUSCULAR; INTRAVENOUS; SUBCUTANEOUS at 11:10

## 2020-10-03 NOTE — CONSULTS
[facility] Consult Note    Date of Admit: 2020    Chief Complaint/Reason for Consult     Nonoliguric MAYUR stage II    Subjective:      History of Present Illness:  40 yo female, PMH of HTN, HLD, chronic venous stasis who follows w/ Dr. Griffith, who we were consulted on for nonoliguric MAYUR stage II. Pt presented with nausea, vomiting, fevers and worsening LE edema and L leg pain and sweeling/erythema. Has been taking diflucan at home. Her bl Cr is ~1, however on  it bumped to 2.2. She had CT w/ contrast on . Also has been on multiple abx (vanc, cefepime, clinda). Appears to also have been receiving a few doses of ibuprofen. FENA/FEUrea intrinsic and UA bland.    Past Medical History:  Past Medical History:   Diagnosis Date    Anemia     Arthritis     Fibromyalgia     Hypertension     Thyroid disease        Past Surgical History:  Past Surgical History:   Procedure Laterality Date    APPENDECTOMY       SECTION      CHOLECYSTECTOMY         Allergies:  Review of patient's allergies indicates:  No Known Allergies    Home Medications:  Prior to Admission medications    Medication Sig Start Date End Date Taking? Authorizing Provider   amitriptyline (ELAVIL) 25 MG tablet TAKE 1 to 2 TABLETS BY MOUTH EVERY NIGHT AT BEDTIME AS NEEDED FOR SLEEP DISTURBANCE 20 Yes NADINE Leyva   methocarbamoL (ROBAXIN) 750 MG Tab Take 1 tablet (750 mg total) by mouth 3 (three) times daily as needed for muscle spasms. 20  Yes    metoprolol tartrate (LOPRESSOR) 100 MG tablet TAKE ONE TABLET BY MOUTH TWO TIMES A DAY FOR BLOOD PRESSURE 20  Yes    potassium chloride (KLOR-CON) 10 MEQ TbSR take one tablet  by mouth daily for potassium replacment 19  Yes    simvastatin (ZOCOR) 20 MG tablet Take 1 tablet (20 mg total) by mouth every evening at bedtime to lower cholesterol 19 Yes Barbie Pino MD   traMADoL (ULTRAM) 50 mg tablet TAKE 1 TABLET BY MOUTH TWICE DAILY  8/20/20  Yes Ana Griffith MD   triamterene-hydrochlorothiazide 37.5-25 mg (MAXZIDE-25) 37.5-25 mg per tablet TAKE ONE TABLET BY MOUTH ONCE DAILY FOR BLOOD PRESSURE 4/29/20  Yes Ana Griffith MD   amoxicillin (AMOXIL) 500 MG capsule Take 1 capsule by mouth 3 times a day until finished 3/15/19      betamethasone valerate 0.1% (VALISONE) 0.1 % Crea as needed.  8/26/16   Historical Provider   cephALEXin (KEFLEX) 250 MG capsule Take 3 capsules (750 mg total) by mouth 2 (two) times daily. 11/14/18   Barbie Pino MD   clotrimazole-betamethasone 1-0.05% (LOTRISONE) cream Apply topically 2 (two) times daily. 5/29/19   Ana Griffith MD   clotrimazole-betamethasone 1-0.05% (LOTRISONE) cream Apply topically locally  as directed daily 5/19/20   Ana Griffith MD   ergocalciferol (ERGOCALCIFEROL) 50,000 unit Cap TAKE 1 CAPSULE  BY MOUTH  WEEKLY for vitamin d  Patient not taking: Reported on 7/29/2020 4/16/19      fenofibrate 160 MG Tab Take 1 tablet (160 mg total) by mouth once daily for hypertriglyceridemia 2/19/19 2/19/20  Barbie Pino MD   ferrous sulfate 325 mg (65 mg iron) Tab tablet 325 mg once daily.  8/26/16   Historical Provider   flu vac [id number] 36mos up,PF, 60 mcg (15 mcg x 4)/0.5 mL Syrg TO BE ADMINISTERED BY PHARMACIST 9/5/18   Colton Browning, Nadja   flu vacc [id number] 6mos up,PF, (FLUARIX QUAD [id number], PF,) 60 mcg (15 mcg x 4)/0.5 mL Syrg inject by [facility] 10/2/19   Dewayne Berry, Nadja   fluconazole (DIFLUCAN) 150 MG Tab Take 1 tablet (150 mg total) by mouth every 72 hours.  Patient not taking: Reported on 7/29/2020 6/19/19   Ana Griffith MD   fluconazole (DIFLUCAN) 50 MG Tab TAKE 1 TABLET BY MOUTH DAILY 5/19/20   Ana Griffith MD   FLUZONE QUAD [id number], PF, 60 mcg (15 mcg x 4)/0.5 mL Syrg  1/13/17   Historical Provider   ibuprofen (ADVIL,MOTRIN) 800 MG tablet Take 1 tablet (800 mg total) by mouth 3 (three) times daily.  Patient not taking:  Reported on 7/29/2020 1/27/19   Marylu Vasques PA-C   ibuprofen (ADVIL,MOTRIN) 800 MG tablet Take 1 tablet by mouth every 8hours (no more than 4 times a day) as needed for pain  Patient not taking: Reported on 7/29/2020 3/15/19      metoprolol tartrate (LOPRESSOR) 100 MG tablet  8/26/16   Historical Provider   MIRENA 20 mcg/24 hr (5 years) IUD TO BE INSERTED ONE TIME BY PRESCRIBER. ROUTE INTRAUTERINE. 4/5/17   Historical Provider   mupirocin (BACTROBAN) 2 % ointment APPLY AND GENTLY MASSAGE INTO AFFECTED AREA(S) TWICE DAILY. 2/13/19      oxyCODONE-acetaminophen (PERCOCET)  mg per tablet TAKE 1 TABLET BY MOUTH EVERY 4 TO 6 HOURS AS NEEDED FOR PAIN  Patient not taking: Reported on 7/29/2020 4/4/19   Tomasz Iniguez DPM   OPW TEST CLAIM - DO NOT FILL OPW test claim. Do not fill.  Patient not taking: Reported on 7/29/2020 7/27/20   Dewayne Berry, PharmD   sulfamethoxazole-trimethoprim 800-160mg (BACTRIM DS) 800-160 mg Tab Take 1 tablet by mouth every 12 (twelve) hours for uti 5/9/18   Pankaj Cha MD   sulfamethoxazole-trimethoprim 800-160mg (BACTRIM DS) 800-160 mg Tab TAKE 1 TABLET BY MOUTH TWICE DAILY 2/22/19      tramadol (ULTRAM) 50 mg tablet  4/13/15   Historical Provider   triamterene-hydrochlorothiazide 37.5-25 mg (MAXZIDE-25) 37.5-25 mg per tablet Take 1 tablet by mouth once daily.    Historical Provider   triamterene-hydrochlorothiazide 37.5-25 mg (MAXZIDE-25) 37.5-25 mg per tablet TAKE ONE TABLET BY MOUTH ONCE DAILY FOR BLOOD PRESSURE 1/25/19          Family History:  Family History   Problem Relation Age of Onset    Stroke Mother     Colon cancer Maternal Aunt        Social History:  Social History     Tobacco Use    Smoking status: Never Smoker    Smokeless tobacco: Never Used   Substance Use Topics    Alcohol use: No    Drug use: No       Review of Systems:  Pertinent positives and negatives are listed in HPI.  All other systems are reviewed and are negative.     Objective:   Last 24  Hour Vital Signs:  BP  Min: 115/56  Max: 140/71  Temp  Av.6 °F (37 °C)  Min: 98.1 °F (36.7 °C)  Max: 99.1 °F (37.3 °C)  Pulse  Av.7  Min: 79  Max: 92  Resp  Av.6  Min: 14  Max: 20  SpO2  Av.2 %  Min: 96 %  Max: 100 %  Body mass index is 51.05 kg/m².  I/O last 3 completed shifts:  In: 3908.8 [P.O.:100; I.V.:3358.8; IV Piggyback:450]  Out: 1400 [Urine:1400]    Physical Examination:  Gen: NAD, AAOx3  HEENT: NCAT, EOMI, PERRL, MM, pink conjunctiva  Neck: no thyroidmegaly, no LAD  Cardio: RRR, normal S1/S2, no MRG, JVP wnl  Lungs: CTAB  Abd: obese, soft non tender, non distended  Ext: LLE with erythema and swelling, wrapped in bandage  Skin: LLE erythema, warm  Neuro:  UE/LE motor 5/5, sensation intact  Psych: conversational, responsive      Laboratory:  Most Recent Data:  CBC:   Lab Results   Component Value Date    WBC 11.23 10/03/2020    HGB 7.8 (L) 10/03/2020    HCT 27.0 (L) 10/03/2020     (H) 10/03/2020    MCV 83 10/03/2020    RDW 15.1 (H) 10/03/2020     BMP:   Lab Results   Component Value Date     10/03/2020    K 3.8 10/03/2020     10/03/2020    CO2 25 10/03/2020    BUN 11 10/03/2020    CREATININE 2.1 (H) 10/03/2020     (H) 10/03/2020    CALCIUM 8.0 (L) 10/03/2020    MG 2.4 10/03/2020    PHOS 3.7 10/03/2020     LFTs:   Lab Results   Component Value Date    PROT 7.2 10/03/2020    ALBUMIN 2.0 (L) 10/03/2020    BILITOT 0.4 10/03/2020    AST 19 10/03/2020    ALKPHOS 51 (L) 10/03/2020    ALT 28 10/03/2020     Coags: No results found for: INR, PROTIME, PTT  Urinalysis:   Lab Results   Component Value Date    LABURIN DIPHTHEROIDS  10,000 - 49,999 cfu/ml   (A) 2020    COLORU Yellow 10/02/2020    SPECGRAV 1.010 10/02/2020    NITRITE Negative 10/02/2020    KETONESU Negative 10/02/2020    UROBILINOGEN Negative 10/02/2020    WBCUA 2 2020       Trended Lab Data:  Recent Labs   Lab 10/01/20  0919 10/02/20  0633 10/03/20  0508   WBC 10.07 12.84* 11.23   HGB 8.0* 8.0* 7.8*    HCT 26.1* 26.3* 27.0*    404* 365*   MCV 78* 78* 83   RDW 14.6* 14.8* 15.1*    138 138   K 3.1* 3.3* 3.8    102 102   CO2 28 26 25   BUN 11 11 11   CREATININE 2.2* 2.3* 2.1*   * 111* 119*   PROT 6.8 7.0 7.2   ALBUMIN 2.1* 2.0* 2.0*   BILITOT 0.4 0.4 0.4   AST 17 20 19   ALKPHOS 71 56 51*   ALT 46* 35 28           Radiology:  Imaging Results          X-Ray Chest 1 View (Final result)  Result time 09/25/20 16:27:41    Final result by Ivonne Gottlieb MD (09/25/20 16:27:41)                 Impression:      No acute cardiopulmonary abnormality.      Electronically signed by: Ivonne Gottlieb  Date:    09/25/2020  Time:    16:27             Narrative:    EXAMINATION:  XR CHEST 1 VIEW    CLINICAL HISTORY:  COPD Exacerbation;    TECHNIQUE:  Single view of the chest was obtained.    COMPARISON:  Multiple priors, most recent 02/13/2019    FINDINGS:  The right costophrenic angles excluded from field of view.    Normal cardiomediastinal contour. No focal consolidation, pleural effusion or pneumothorax.                               US Lower Extremity Veins Bilateral (Final result)  Result time 09/25/20 14:22:39    Final result by Charlie Mike Jr., MD (09/25/20 14:22:39)                 Impression:      No evidence of deep venous thrombosis in either lower extremity.      Electronically signed by: Charlie Ochoa Jr  Date:    09/25/2020  Time:    14:22             Narrative:    EXAMINATION:  US LOWER EXTREMITY VEINS BILATERAL    CLINICAL HISTORY:  Other specified soft tissue disorders    TECHNIQUE:  Duplex and color flow Doppler and dynamic compression was performed of the bilateral lower extremity veins was performed.    COMPARISON:  None    FINDINGS:  Right thigh veins: The common femoral, femoral, popliteal, upper greater saphenous, and deep femoral veins are patent and free of thrombus. The veins are normally compressible and have normal phasic flow and augmentation response.    Right calf  veins: The visualized calf veins are patent.    Left thigh veins: The common femoral, femoral, popliteal, upper greater saphenous, and deep femoral veins are patent and free of thrombus. The veins are normally compressible and have normal phasic flow and augmentation response.    Left calf veins: The visualized calf veins are patent.    Miscellaneous: None                                   Assessment and Plan:      Mohsen Julien is a 39 y.o.female with  Patient Active Problem List    Diagnosis Date Noted    MAYUR (acute kidney injury) 09/30/2020    Left leg cellulitis 09/26/2020    Hypokalemia 09/25/2020    Hyperlipidemia 09/25/2020    SIRS (systemic inflammatory response syndrome) 09/25/2020    Fungal infection of skin 06/19/2019    Venous stasis dermatitis of left lower extremity     HTN (hypertension) 05/08/2013    Morbidly obese 05/08/2013         Nonoliguric MAYUR stage II  -Suspect ATN multifactorial, RAYO, abx, infx, NSAIDs  -Has received IVF with mild improvement, so could be ATN. Now getting worse despite all the abx so unlikely to be AIN  -Renal US w/ inc RI's  -UOP 1.4L Cr seems to be stable now and slightly improved, if this is truly ATN we just have to wait on it to improve. Avoid renally toxic meds. Strict I/O's    HTN  -Cont metoprolol    Dave Cary  [facility]

## 2020-10-03 NOTE — ANESTHESIA PROCEDURE NOTES
Peripheral IV Insertion    Diagnosis: I99.8 Other disorder of circulatory system    Patient location during procedure: floor  Procedure start time: 10/2/2020 8:00 PM  Timeout: 10/2/2020 8:00 PM    Staffing  Authorizing Provider: Ml Chandler MD  Performing Provider: Ml Chandler MD    Anesthesiologist was present at the time of the procedure.    Preanesthetic Checklist  Completed: patient identified, IV checked, risks and benefits discussed and monitors and equipment checkedPeripheral IV Insertion  Skin Prep: chlorhexidine gluconate  Local Infiltration: none  Orientation: right  Location: forearm  Catheter Size: 20 G  Catheter placement by Anatomical landmarks. Heme positive aspiration all ports.Insertion Attempts: 3  Assessment  Dressing: secured with tape and tegaderm  Patient: Tolerated well  Line flushed easily.

## 2020-10-03 NOTE — PLAN OF CARE
Problem: Physical Therapy Goal  Goal: Physical Therapy Goal  Description: Goals to be met by: 10/10/2020     Patient will increase functional independence with mobility by performing:    Supine<>sit mod Ind  Sit<>stand mod Ind  Pt to ambulate with least restrictive AD household distances  Pt to perform LE TE x 15 reps BLE for reconditioning    Outcome: Ongoing, Progressing   Pt continues to work toward achieving all established goals. Able to perform ambulation training ~55-60 ft with RW requiring SBA.

## 2020-10-03 NOTE — PLAN OF CARE
Pt alert and oriented x4. Afebrile throughout day. Daily wound care to LLE. Ambulated in rea w/ PT. Up to toilet w/ standby assist. Up in chair throughout day. Tolerating cardiac diet. Pain managed by prn dilaudid and scheduled norco. IV antibiotics administered per MAR. Safety maintained w/ bed alarm on and call light w/in reach.

## 2020-10-03 NOTE — SUBJECTIVE & OBJECTIVE
Interval History: Pain continues to be poorly controlled. Required 1 dose of dilaudid yesterday which significantly improved her pain. Pt also reports she has been able to ambulate more than previously and that is likely causing her worsening her pain. Otherwise doing well. US LE without DVT and US kidney did not show hydronephrosis. UOP remains good. Tolerating po well.     Review of Systems   Constitutional: Negative for appetite change and fever.   HENT: Negative for congestion, ear pain and sore throat.    Eyes: Negative for redness and visual disturbance.   Respiratory: Negative for cough, shortness of breath and wheezing.    Cardiovascular: Negative for chest pain and leg swelling.   Gastrointestinal: Negative for abdominal pain, diarrhea and nausea.   Genitourinary: Negative for decreased urine volume and difficulty urinating.   Musculoskeletal: Negative for back pain and neck pain.   Neurological: Negative for dizziness, light-headedness and headaches.   Hematological: Negative for adenopathy. Does not bruise/bleed easily.   Psychiatric/Behavioral: Negative for behavioral problems and sleep disturbance.     Objective:     Vital Signs (Most Recent):  Temp: 98.9 °F (37.2 °C) (10/03/20 0516)  Pulse: 92 (10/03/20 0516)  Resp: 20 (10/03/20 0534)  BP: 129/83 (10/03/20 0516)  SpO2: 99 % (10/03/20 0516) Vital Signs (24h Range):  Temp:  [98.1 °F (36.7 °C)-99.1 °F (37.3 °C)] 98.9 °F (37.2 °C)  Pulse:  [79-94] 92  Resp:  [14-20] 20  SpO2:  [96 %-100 %] 99 %  BP: (115-140)/(56-83) 129/83     Weight: 134.9 kg (297 lb 6.4 oz)  Body mass index is 51.05 kg/m².    Intake/Output Summary (Last 24 hours) at 10/3/2020 0736  Last data filed at 10/3/2020 0340  Gross per 24 hour   Intake 150 ml   Output 1400 ml   Net -1250 ml      Physical Exam  Vitals signs and nursing note reviewed.   Constitutional:       General: She is not in acute distress.     Appearance: Normal appearance.   HENT:      Head: Normocephalic and atraumatic.       Right Ear: External ear normal.      Left Ear: External ear normal.      Nose: Nose normal. No congestion.      Mouth/Throat:      Mouth: Mucous membranes are moist.      Pharynx: Oropharynx is clear.   Eyes:      Extraocular Movements: Extraocular movements intact.      Conjunctiva/sclera: Conjunctivae normal.   Neck:      Musculoskeletal: Normal range of motion. No muscular tenderness.   Cardiovascular:      Rate and Rhythm: Normal rate and regular rhythm.      Pulses: Normal pulses.      Heart sounds: No murmur.   Pulmonary:      Effort: Pulmonary effort is normal. No respiratory distress.      Breath sounds: No wheezing.   Abdominal:      General: Abdomen is flat. Bowel sounds are normal.      Palpations: Abdomen is soft.      Tenderness: There is no abdominal tenderness. There is no guarding.   Musculoskeletal: Normal range of motion.         General: No swelling.      Right lower leg: No edema.      Left lower leg: No edema.   Lymphadenopathy:      Cervical: No cervical adenopathy.   Skin:     General: Skin is warm and dry.      Capillary Refill: Capillary refill takes less than 2 seconds.      Findings: Erythema and rash present.      Comments: L LE wound dressing clean, dry and intact. Erythema and edema continues to improve.    Neurological:      General: No focal deficit present.      Mental Status: She is alert and oriented to person, place, and time.      Sensory: No sensory deficit.      Coordination: Coordination normal.   Psychiatric:         Mood and Affect: Mood normal.         Behavior: Behavior normal.         Significant Labs:   CBC:   Recent Labs   Lab 10/01/20  0919 10/02/20  0633 10/03/20  0508   WBC 10.07 12.84* 11.23   HGB 8.0* 8.0* 7.8*   HCT 26.1* 26.3* 27.0*    404* 365*     CMP:   Recent Labs   Lab 10/01/20  0919 10/02/20  0633 10/03/20  0508    138 138   K 3.1* 3.3* 3.8    102 102   CO2 28 26 25   * 111* 119*   BUN 11 11 11   CREATININE 2.2* 2.3* 2.1*    CALCIUM 8.1* 7.9* 8.0*   PROT 6.8 7.0 7.2   ALBUMIN 2.1* 2.0* 2.0*   BILITOT 0.4 0.4 0.4   ALKPHOS 71 56 51*   AST 17 20 19   ALT 46* 35 28   ANIONGAP 10 10 11   EGFRNONAA 27* 26* 29*     Magnesium:   Recent Labs   Lab 10/01/20  0919 10/02/20  0633 10/03/20  0508   MG 2.3 2.3 2.4     All pertinent labs within the past 24 hours have been reviewed.    Significant Imaging: U/S: I have reviewed all pertinent results/findings within the past 24 hours and my personal findings are:  US L LE and kidney without acute findings of DVT or hydronephrosis

## 2020-10-03 NOTE — PROGRESS NOTES
[facility] Medicine  Progress Note    Patient Name: Mohsen Julien  MRN: [id number]  Patient Class: IP- Inpatient   Admission Date: 9/25/2020  Length of Stay: 6 days  Attending Physician: Jeremy White III, MD  Primary Care Provider: Christopher Diaz MD        Subjective:     Principal Problem:Left leg cellulitis        HPI:  Patient is a 39 y.o.  female with PMHx of HTN and HLD presenting with fevers and chills starting last night. Pt  Reports associated nausea and vomiting with headache and left leg swelling. Pt reports fevers and chills persisted this morning and  Was told by Dr. Griffith to come to the ED. Pt reports left leg skin changes for a couple of months and she has been taking antifungal medications, po and cream, to treat the wound. Pt reports using compression socks but noticed clear discharge on the socks.     In ED, patient was febrile with nausea. Pt given zofran and started IVF. Lab work presented hypokalemia of 2.7, ESR 42,  and WBC  Of  17. Pt given onedose of 40mEq K repletion. Blood cultures were ordered. Pt admitted to observation for hypokalemia and cellulitis.     Overview/Hospital Course:  Pt initially met SIRS criteria and was started on Vanc and zosyn. She then developed MAYUR and zosyn was discontinued, flagyl and cefipime was initiated. Blood cultures without any growth and UA did not grow any non-skin mark. Creatinine remained elevated at 2.2 so mIVF increased on 10/1/2020. MRI consistent with cellulitis, however cannot rule out fascitis so clindamycin added to antibiotic regimen 10/1/2020. Clindamycin added to antibiotic regimen. Elevated Creatinine persisting despite increase in mIVF, investigating intrinsic causes of MAYUR.    Interval History: Pain continues to be poorly controlled. Required 1 dose of dilaudid yesterday which significantly improved her pain. Pt also reports she has been able to ambulate more than previously and that  is likely causing her worsening her pain. Otherwise doing well. US LE without DVT and US kidney did not show hydronephrosis. UOP remains good. Tolerating po well.     Review of Systems   Constitutional: Negative for appetite change and fever.   HENT: Negative for congestion, ear pain and sore throat.    Eyes: Negative for redness and visual disturbance.   Respiratory: Negative for cough, shortness of breath and wheezing.    Cardiovascular: Negative for chest pain and leg swelling.   Gastrointestinal: Negative for abdominal pain, diarrhea and nausea.   Genitourinary: Negative for decreased urine volume and difficulty urinating.   Musculoskeletal: Negative for back pain and neck pain.   Neurological: Negative for dizziness, light-headedness and headaches.   Hematological: Negative for adenopathy. Does not bruise/bleed easily.   Psychiatric/Behavioral: Negative for behavioral problems and sleep disturbance.     Objective:     Vital Signs (Most Recent):  Temp: 98.9 °F (37.2 °C) (10/03/20 0516)  Pulse: 92 (10/03/20 0516)  Resp: 20 (10/03/20 0534)  BP: 129/83 (10/03/20 0516)  SpO2: 99 % (10/03/20 0516) Vital Signs (24h Range):  Temp:  [98.1 °F (36.7 °C)-99.1 °F (37.3 °C)] 98.9 °F (37.2 °C)  Pulse:  [79-94] 92  Resp:  [14-20] 20  SpO2:  [96 %-100 %] 99 %  BP: (115-140)/(56-83) 129/83     Weight: 134.9 kg (297 lb 6.4 oz)  Body mass index is 51.05 kg/m².    Intake/Output Summary (Last 24 hours) at 10/3/2020 0736  Last data filed at 10/3/2020 0340  Gross per 24 hour   Intake 150 ml   Output 1400 ml   Net -1250 ml      Physical Exam  Vitals signs and nursing note reviewed.   Constitutional:       General: She is not in acute distress.     Appearance: Normal appearance.   HENT:      Head: Normocephalic and atraumatic.      Right Ear: External ear normal.      Left Ear: External ear normal.      Nose: Nose normal. No congestion.      Mouth/Throat:      Mouth: Mucous membranes are moist.      Pharynx: Oropharynx is clear.   Eyes:       Extraocular Movements: Extraocular movements intact.      Conjunctiva/sclera: Conjunctivae normal.   Neck:      Musculoskeletal: Normal range of motion. No muscular tenderness.   Cardiovascular:      Rate and Rhythm: Normal rate and regular rhythm.      Pulses: Normal pulses.      Heart sounds: No murmur.   Pulmonary:      Effort: Pulmonary effort is normal. No respiratory distress.      Breath sounds: No wheezing.   Abdominal:      General: Abdomen is flat. Bowel sounds are normal.      Palpations: Abdomen is soft.      Tenderness: There is no abdominal tenderness. There is no guarding.   Musculoskeletal: Normal range of motion.         General: No swelling.      Right lower leg: No edema.      Left lower leg: No edema.   Lymphadenopathy:      Cervical: No cervical adenopathy.   Skin:     General: Skin is warm and dry.      Capillary Refill: Capillary refill takes less than 2 seconds.      Findings: Erythema and rash present.      Comments: L LE wound dressing clean, dry and intact. Erythema and edema continues to improve.    Neurological:      General: No focal deficit present.      Mental Status: She is alert and oriented to person, place, and time.      Sensory: No sensory deficit.      Coordination: Coordination normal.   Psychiatric:         Mood and Affect: Mood normal.         Behavior: Behavior normal.         Significant Labs:   CBC:   Recent Labs   Lab 10/01/20  0919 10/02/20  0633 10/03/20  0508   WBC 10.07 12.84* 11.23   HGB 8.0* 8.0* 7.8*   HCT 26.1* 26.3* 27.0*    404* 365*     CMP:   Recent Labs   Lab 10/01/20  0919 10/02/20  0633 10/03/20  0508    138 138   K 3.1* 3.3* 3.8    102 102   CO2 28 26 25   * 111* 119*   BUN 11 11 11   CREATININE 2.2* 2.3* 2.1*   CALCIUM 8.1* 7.9* 8.0*   PROT 6.8 7.0 7.2   ALBUMIN 2.1* 2.0* 2.0*   BILITOT 0.4 0.4 0.4   ALKPHOS 71 56 51*   AST 17 20 19   ALT 46* 35 28   ANIONGAP 10 10 11   EGFRNONAA 27* 26* 29*     Magnesium:   Recent Labs    Lab 10/01/20  0919 10/02/20  0633 10/03/20  0508   MG 2.3 2.3 2.4     All pertinent labs within the past 24 hours have been reviewed.    Significant Imaging: U/S: I have reviewed all pertinent results/findings within the past 24 hours and my personal findings are:  US L LE and kidney without acute findings of DVT or hydronephrosis      Assessment/Plan:      * Left leg cellulitis   -cellulitis vs fascitis. Imaging reassuring not osteomyelitis  - Continue wound care, wound culture pending  - LLE MRI showed soft tissue swelling and scattered linear tracking of fluid  - LLE US showed no evidence of DVT  - Continue antipyretics PRN, afebrile x48hrs  - Blood cultures with no growth to date  - appreciate ID and surgery input  - Continue vanc and cefipime  - Discontinue flagyl and clindamycin per ID recs  - Increase Norco to 10mg q6hrs for pain control  - Continue dilaudid PRN for pain        SIRS (systemic inflammatory response syndrome)  - Afebrile overnight  - Plan as above      MAYUR (acute kidney injury)  - Pt baseline cr 0.9, improved to Cr 2.1 today  - Trend daily CMP  - Strict I's/O's  - UFeUrea suggestive of intrinsic kidney injury  - Kidney US wnl  - Appreciate nephro recs      Hypokalemia  - K  2.7 on admission, improved to  3.8 today  - Continues to be asymptomatic without EKG changes      HTN (hypertension)  - BP stable   - Continue home meds    Hyperlipidemia  - Continue home fenofibrate 145mg PO daily and simvastatin 20mg PO QHS    Morbidly obese            FEN- non, replace as needed, cardiac  DVT- enoxaparin  GI- famotidine  CODE- FULL  DISPO- home pending clinical improvement, ID and surgery recs      Cl Washington DO  Department of Hospital Medicine   [facility]

## 2020-10-03 NOTE — PT/OT/SLP PROGRESS
Physical Therapy Treatment    Patient Name:  Mohsen Julien   MRN:  [id number]    Recommendations:     Discharge Recommendations:  outpatient PT(With wound care)   Discharge Equipment Recommendations: walker, rolling(Defer to OT for bathing DME)   Barriers to discharge: None    Assessment:     Mohsen Julien is a 39 y.o. female admitted with a medical diagnosis of Left leg cellulitis.  She presents with the following impairments/functional limitations:  weakness, impaired endurance, impaired functional mobilty, impaired self care skills, gait instability, impaired balance, decreased lower extremity function, decreased safety awareness, pain, decreased ROM, impaired skin, edema. Pt able to increase ambulation distance this session. Performed ambulation training ~55-60 ft with RW requiring SBA. Demonstrates slow kiran, toe walking on LLE and decreased step length bilaterally. Would benefit from continued PT services to increase pt's independent functional mobility to level prior to admission.    Rehab Prognosis: Good; patient would benefit from acute skilled PT services to address these deficits and reach maximum level of function.    Recent Surgery: * No surgery found *      Plan:     During this hospitalization, patient to be seen 5 x/week to address the identified rehab impairments via gait training, therapeutic activities, therapeutic exercises and progress toward the following goals:    · Plan of Care Expires:  11/10/20    Subjective     Chief Complaint: None Expressed  Patient/Family Comments/goals: None Expressed  Pain/Comfort:  · Pain Rating 1: 7/10  · Location - Side 1: Left  · Location - Orientation 1: generalized  · Location 1: leg  · Pain Addressed 1: Reposition, Distraction, Cessation of Activity  · Pain Rating Post-Intervention 1: 8/10      Objective:     Communicated with nurse prior to session.  Patient found supine with bed alarm, peripheral IV upon PT entry to room.     General Precautions:  "Standard, fall   Orthopedic Precautions:N/A   Braces: N/A     Functional Mobility:  · Bed Mobility:     · Rolling Right: supervision and assist to advance LLE  · Scooting: supervision  · Supine to Sit: contact guard assistance and assist to advance LLE  · Sit to Supine: contact guard assistance  · Transfers:     · Sit to Stand:  stand by assistance with no AD and rolling walker  · Gait:  ~55-60 ft with RW requiring SBA      AM-PAC 6 CLICK MOBILITY  Turning over in bed (including adjusting bedclothes, sheets and blankets)?: 4  Sitting down on and standing up from a chair with arms (e.g., wheelchair, bedside commode, etc.): 3  Moving from lying on back to sitting on the side of the bed?: 3  Moving to and from a bed to a chair (including a wheelchair)?: 3  Need to walk in hospital room?: 3  Climbing 3-5 steps with a railing?: 3  Basic Mobility Total Score: 19       Therapeutic Activities and Exercises:   Pt sat at EOB ~6-8 minutes secondary to stating, "I need to sit here before I get up. My leg needs to get use to being down". Requested to use B/S commode. Ambulated to B/S commode ~8-10 ft with RW requiring SBA. When finished pt statically stood and had daughter clean her. Performed ambulation training ~55-60 ft with RW, IV pole in tow, and SBA. Demonstrates a slow kiran, decreased step length bilaterally, and toe walking on left LE. Pt stated, "My heel hurts that is why I don't put it down". Returned to supine secondary to Dr. Griffith in room to examine LLE wound.    Patient left supine with all lines intact, call button in reach, bed alarm on,  nurse notified and  daughter and Dr. Griffith present..    GOALS:   Multidisciplinary Problems     Physical Therapy Goals        Problem: Physical Therapy Goal    Goal Priority Disciplines Outcome Goal Variances Interventions   Physical Therapy Goal     PT, PT/OT Ongoing, Progressing     Description: Goals to be met by: 10/10/2020     Patient will increase functional " independence with mobility by performing:    Supine<>sit mod Ind  Sit<>stand mod Ind  Pt to ambulate with least restrictive AD household distances  Pt to perform LE TE x 15 reps BLE for reconditioning                     Time Tracking:     PT Received On: 10/03/20  PT Start Time: 1101     PT Stop Time: 1134  PT Total Time (min): 33 min     Billable Minutes: Gait Training  20 and Therapeutic Activity  13    Treatment Type: Treatment  PT/PTA: PTA     PTA Visit Number: 1     Argentina Chin, PTA  10/03/2020

## 2020-10-03 NOTE — PROGRESS NOTES
"Surgery follow up  BP (!) 128/58 (Patient Position: Lying)   Pulse 88   Temp 98.2 °F (36.8 °C) (Oral)   Resp 18   Ht 5' 4" (1.626 m)   Wt 134.9 kg (297 lb 6.4 oz)   SpO2 99%   Breastfeeding No   BMI 51.05 kg/m²   I/O last 3 completed shifts:  In: 3908.8 [P.O.:100; I.V.:3358.8; IV Piggyback:450]  Out: 1400 [Urine:1400]  No intake/output data recorded.      Recent Results (from the past 336 hour(s))   CBC with Automated Differential    Collection Time: 10/03/20  5:08 AM   Result Value Ref Range    WBC 11.23 3.90 - 12.70 K/uL    Hemoglobin 7.8 (L) 12.0 - 16.0 g/dL    Hematocrit 27.0 (L) 37.0 - 48.5 %    Platelets 365 (H) 150 - 350 K/uL   CBC with Automated Differential    Collection Time: 10/02/20  6:33 AM   Result Value Ref Range    WBC 12.84 (H) 3.90 - 12.70 K/uL    Hemoglobin 8.0 (L) 12.0 - 16.0 g/dL    Hematocrit 26.3 (L) 37.0 - 48.5 %    Platelets 404 (H) 150 - 350 K/uL   CBC auto differential    Collection Time: 10/01/20  9:19 AM   Result Value Ref Range    WBC 10.07 3.90 - 12.70 K/uL    Hemoglobin 8.0 (L) 12.0 - 16.0 g/dL    Hematocrit 26.1 (L) 37.0 - 48.5 %    Platelets 331 150 - 350 K/uL     Recent Results (from the past 336 hour(s))   Basic metabolic panel    Collection Time: 09/26/20  4:02 PM   Result Value Ref Range    Sodium 134 (L) 136 - 145 mmol/L    Potassium 3.8 3.5 - 5.1 mmol/L    Chloride 101 95 - 110 mmol/L    CO2 22 (L) 23 - 29 mmol/L    BUN, Bld 8 6 - 20 mg/dL    Creatinine 0.8 0.5 - 1.4 mg/dL    Calcium 7.7 (L) 8.7 - 10.5 mg/dL    Anion Gap 11 8 - 16 mmol/L   Basic metabolic panel    Collection Time: 09/25/20  9:30 PM   Result Value Ref Range    Sodium 136 136 - 145 mmol/L    Potassium 3.3 (L) 3.5 - 5.1 mmol/L    Chloride 98 95 - 110 mmol/L    CO2 26 23 - 29 mmol/L    BUN, Bld 17 6 - 20 mg/dL    Creatinine 1.0 0.5 - 1.4 mg/dL    Calcium 8.1 (L) 8.7 - 10.5 mg/dL    Anion Gap 12 8 - 16 mmol/L   swelling and edema better , still has blisters on medial aspect, less redness, able to ambulate " out of bed, continue present treatment.

## 2020-10-03 NOTE — PLAN OF CARE
Patient is AAO X 4. Appears very depressed and doesn't want to do anything. Expressed that she is frustrated and depressed. IV antibiotics given as prescribed and documented. Left leg dressing is done and was elevated over 3 pillows. Voided freely. Slept well during night. On tele monitor shows NSR. Will continue to monitor patient.

## 2020-10-03 NOTE — ASSESSMENT & PLAN NOTE
- Pt baseline cr 0.9, Cr 2.1 today, no change from yesterday  - Trend daily CMP  - Strict I's/O's  - UFeUrea suggestive of intrinsic kidney injury  - Kidney US wnl  - Nephro consulted, felt kidney injury due to ATN, only thing to do is wait and continue to monitor. -Recommended avoiding nephrotoxic medications.

## 2020-10-04 ENCOUNTER — ANESTHESIA (OUTPATIENT)
Dept: MEDSURG UNIT | Facility: HOSPITAL | Age: 40
DRG: 871 | End: 2020-10-04
Payer: MEDICAID

## 2020-10-04 ENCOUNTER — ANESTHESIA EVENT (OUTPATIENT)
Dept: MEDSURG UNIT | Facility: HOSPITAL | Age: 40
DRG: 871 | End: 2020-10-04
Payer: MEDICAID

## 2020-10-04 LAB
ALBUMIN SERPL BCP-MCNC: 2.5 G/DL (ref 3.5–5.2)
ALP SERPL-CCNC: 58 U/L (ref 55–135)
ALT SERPL W/O P-5'-P-CCNC: 28 U/L (ref 10–44)
ANION GAP SERPL CALC-SCNC: 12 MMOL/L (ref 8–16)
AST SERPL-CCNC: 27 U/L (ref 10–40)
BASOPHILS # BLD AUTO: 0.03 K/UL (ref 0–0.2)
BASOPHILS # BLD AUTO: 0.05 K/UL (ref 0–0.2)
BASOPHILS NFR BLD: 0.2 % (ref 0–1.9)
BASOPHILS NFR BLD: 0.4 % (ref 0–1.9)
BILIRUB SERPL-MCNC: 0.4 MG/DL (ref 0.1–1)
BUN SERPL-MCNC: 12 MG/DL (ref 6–20)
CALCIUM SERPL-MCNC: 8.6 MG/DL (ref 8.7–10.5)
CHLORIDE SERPL-SCNC: 99 MMOL/L (ref 95–110)
CO2 SERPL-SCNC: 28 MMOL/L (ref 23–29)
CREAT SERPL-MCNC: 2.1 MG/DL (ref 0.5–1.4)
DIFFERENTIAL METHOD: ABNORMAL
DIFFERENTIAL METHOD: ABNORMAL
EOSINOPHIL # BLD AUTO: 0.2 K/UL (ref 0–0.5)
EOSINOPHIL # BLD AUTO: 0.2 K/UL (ref 0–0.5)
EOSINOPHIL NFR BLD: 1.4 % (ref 0–8)
EOSINOPHIL NFR BLD: 1.7 % (ref 0–8)
ERYTHROCYTE [DISTWIDTH] IN BLOOD BY AUTOMATED COUNT: 14.7 % (ref 11.5–14.5)
ERYTHROCYTE [DISTWIDTH] IN BLOOD BY AUTOMATED COUNT: 14.9 % (ref 11.5–14.5)
EST. GFR  (AFRICAN AMERICAN): 33 ML/MIN/1.73 M^2
EST. GFR  (NON AFRICAN AMERICAN): 29 ML/MIN/1.73 M^2
GLUCOSE SERPL-MCNC: 102 MG/DL (ref 70–110)
HCT VFR BLD AUTO: 29.6 % (ref 37–48.5)
HCT VFR BLD AUTO: 30.2 % (ref 37–48.5)
HGB BLD-MCNC: 8.7 G/DL (ref 12–16)
HGB BLD-MCNC: 9 G/DL (ref 12–16)
IMM GRANULOCYTES # BLD AUTO: 0.25 K/UL (ref 0–0.04)
IMM GRANULOCYTES # BLD AUTO: 0.28 K/UL (ref 0–0.04)
IMM GRANULOCYTES NFR BLD AUTO: 1.8 % (ref 0–0.5)
IMM GRANULOCYTES NFR BLD AUTO: 2 % (ref 0–0.5)
LYMPHOCYTES # BLD AUTO: 1.5 K/UL (ref 1–4.8)
LYMPHOCYTES # BLD AUTO: 1.6 K/UL (ref 1–4.8)
LYMPHOCYTES NFR BLD: 11.2 % (ref 18–48)
LYMPHOCYTES NFR BLD: 11.3 % (ref 18–48)
MAGNESIUM SERPL-MCNC: 2.6 MG/DL (ref 1.6–2.6)
MCH RBC QN AUTO: 23.5 PG (ref 27–31)
MCH RBC QN AUTO: 23.6 PG (ref 27–31)
MCHC RBC AUTO-ENTMCNC: 29.4 G/DL (ref 32–36)
MCHC RBC AUTO-ENTMCNC: 29.8 G/DL (ref 32–36)
MCV RBC AUTO: 79 FL (ref 82–98)
MCV RBC AUTO: 80 FL (ref 82–98)
MONOCYTES # BLD AUTO: 1 K/UL (ref 0.3–1)
MONOCYTES # BLD AUTO: 1.1 K/UL (ref 0.3–1)
MONOCYTES NFR BLD: 7.5 % (ref 4–15)
MONOCYTES NFR BLD: 8 % (ref 4–15)
NEUTROPHILS # BLD AUTO: 10.5 K/UL (ref 1.8–7.7)
NEUTROPHILS # BLD AUTO: 10.7 K/UL (ref 1.8–7.7)
NEUTROPHILS NFR BLD: 76.9 % (ref 38–73)
NEUTROPHILS NFR BLD: 77.6 % (ref 38–73)
NRBC BLD-RTO: 0 /100 WBC
NRBC BLD-RTO: 0 /100 WBC
PHOSPHATE SERPL-MCNC: 3.7 MG/DL (ref 2.7–4.5)
PLATELET # BLD AUTO: 530 K/UL (ref 150–350)
PLATELET # BLD AUTO: 537 K/UL (ref 150–350)
PMV BLD AUTO: 9.4 FL (ref 9.2–12.9)
PMV BLD AUTO: 9.4 FL (ref 9.2–12.9)
POTASSIUM SERPL-SCNC: 3.4 MMOL/L (ref 3.5–5.1)
PROT SERPL-MCNC: 8.8 G/DL (ref 6–8.4)
RBC # BLD AUTO: 3.71 M/UL (ref 4–5.4)
RBC # BLD AUTO: 3.81 M/UL (ref 4–5.4)
SODIUM SERPL-SCNC: 139 MMOL/L (ref 136–145)
WBC # BLD AUTO: 13.54 K/UL (ref 3.9–12.7)
WBC # BLD AUTO: 13.87 K/UL (ref 3.9–12.7)

## 2020-10-04 PROCEDURE — 63600175 PHARM REV CODE 636 W HCPCS: Performed by: SURGERY

## 2020-10-04 PROCEDURE — 36000 PLACE NEEDLE IN VEIN: CPT | Performed by: ANESTHESIOLOGY

## 2020-10-04 PROCEDURE — 25000003 PHARM REV CODE 250: Performed by: STUDENT IN AN ORGANIZED HEALTH CARE EDUCATION/TRAINING PROGRAM

## 2020-10-04 PROCEDURE — 85025 COMPLETE CBC W/AUTO DIFF WBC: CPT

## 2020-10-04 PROCEDURE — 21400001 HC TELEMETRY ROOM

## 2020-10-04 PROCEDURE — 83735 ASSAY OF MAGNESIUM: CPT

## 2020-10-04 PROCEDURE — 63600175 PHARM REV CODE 636 W HCPCS: Performed by: STUDENT IN AN ORGANIZED HEALTH CARE EDUCATION/TRAINING PROGRAM

## 2020-10-04 PROCEDURE — 84100 ASSAY OF PHOSPHORUS: CPT

## 2020-10-04 PROCEDURE — 80053 COMPREHEN METABOLIC PANEL: CPT

## 2020-10-04 RX ORDER — AMOXICILLIN AND CLAVULANATE POTASSIUM 875; 125 MG/1; MG/1
1 TABLET, FILM COATED ORAL EVERY 12 HOURS
Status: DISCONTINUED | OUTPATIENT
Start: 2020-10-04 | End: 2020-10-05 | Stop reason: HOSPADM

## 2020-10-04 RX ORDER — POTASSIUM CHLORIDE 1.5 G/1.58G
40 POWDER, FOR SOLUTION ORAL ONCE
Status: COMPLETED | OUTPATIENT
Start: 2020-10-04 | End: 2020-10-04

## 2020-10-04 RX ADMIN — FAMOTIDINE 20 MG: 20 TABLET ORAL at 08:10

## 2020-10-04 RX ADMIN — CEFEPIME HYDROCHLORIDE 1 G: 1 INJECTION, SOLUTION INTRAVENOUS at 02:10

## 2020-10-04 RX ADMIN — POTASSIUM CHLORIDE 40 MEQ: 1.5 FOR SOLUTION ORAL at 08:10

## 2020-10-04 RX ADMIN — MUPIROCIN: 20 OINTMENT TOPICAL at 08:10

## 2020-10-04 RX ADMIN — SIMVASTATIN 20 MG: 20 TABLET, FILM COATED ORAL at 09:10

## 2020-10-04 RX ADMIN — HYDROMORPHONE HYDROCHLORIDE 1 MG: 1 INJECTION, SOLUTION INTRAMUSCULAR; INTRAVENOUS; SUBCUTANEOUS at 08:10

## 2020-10-04 RX ADMIN — ENOXAPARIN SODIUM 40 MG: 40 INJECTION SUBCUTANEOUS at 05:10

## 2020-10-04 RX ADMIN — HYDROMORPHONE HYDROCHLORIDE 1 MG: 1 INJECTION, SOLUTION INTRAMUSCULAR; INTRAVENOUS; SUBCUTANEOUS at 09:10

## 2020-10-04 RX ADMIN — METOPROLOL TARTRATE 100 MG: 50 TABLET, FILM COATED ORAL at 09:10

## 2020-10-04 RX ADMIN — FENOFIBRATE 145 MG: 145 TABLET ORAL at 08:10

## 2020-10-04 RX ADMIN — Medication 6 MG: at 09:10

## 2020-10-04 RX ADMIN — METOPROLOL TARTRATE 100 MG: 50 TABLET, FILM COATED ORAL at 08:10

## 2020-10-04 RX ADMIN — AMITRIPTYLINE HYDROCHLORIDE 25 MG: 25 TABLET, FILM COATED ORAL at 09:10

## 2020-10-04 RX ADMIN — HYDROCODONE BITARTRATE AND ACETAMINOPHEN 1 TABLET: 10; 325 TABLET ORAL at 06:10

## 2020-10-04 RX ADMIN — AMOXICILLIN AND CLAVULANATE POTASSIUM 1 TABLET: 875; 125 TABLET, FILM COATED ORAL at 09:10

## 2020-10-04 RX ADMIN — FERROUS SULFATE TAB EC 325 MG (65 MG FE EQUIVALENT) 325 MG: 325 (65 FE) TABLET DELAYED RESPONSE at 08:10

## 2020-10-04 RX ADMIN — HYDROCODONE BITARTRATE AND ACETAMINOPHEN 1 TABLET: 10; 325 TABLET ORAL at 11:10

## 2020-10-04 RX ADMIN — TRIAMTERENE AND HYDROCHLOROTHIAZIDE 1 CAPSULE: 25; 37.5 CAPSULE ORAL at 08:10

## 2020-10-04 RX ADMIN — HYDROCODONE BITARTRATE AND ACETAMINOPHEN 1 TABLET: 10; 325 TABLET ORAL at 12:10

## 2020-10-04 RX ADMIN — MUPIROCIN: 20 OINTMENT TOPICAL at 09:10

## 2020-10-04 RX ADMIN — HYDROMORPHONE HYDROCHLORIDE 1 MG: 1 INJECTION, SOLUTION INTRAMUSCULAR; INTRAVENOUS; SUBCUTANEOUS at 05:10

## 2020-10-04 NOTE — PROGRESS NOTES
[facility] Progress Note    Date of Admit: 2020    Chief Complaint/Reason for Consult     Nonoliguric MAYUR stage II    Subjective:      No complaints. Good UOP. Cr stable  Review of Systems:  Pertinent positives and negatives are listed in HPI.  All other systems are reviewed and are negative.     Objective:   Last 24 Hour Vital Signs:  BP  Min: 112/53  Max: 151/70  Temp  Av.9 °F (37.2 °C)  Min: 98.4 °F (36.9 °C)  Max: 99.2 °F (37.3 °C)  Pulse  Av.4  Min: 82  Max: 92  Resp  Av  Min: 15  Max: 18  SpO2  Av %  Min: 99 %  Max: 99 %  Weight  Av.7 kg (299 lb 2.6 oz)  Min: 135.7 kg (299 lb 2.6 oz)  Max: 135.7 kg (299 lb 2.6 oz)  Body mass index is 51.35 kg/m².  I/O last 3 completed shifts:  In: 150 [P.O.:100; IV Piggyback:50]  Out: 2500 [Urine:2500]    Physical Examination:  Gen: NAD, AAOx3  HEENT: NCAT, EOMI, PERRL, MM, pink conjunctiva  Neck: no thyroidmegaly, no LAD  Cardio: RRR, normal S1/S2, no MRG, JVP wnl  Lungs: CTAB  Abd: obese, soft non tender, non distended  Ext: LLE with erythema and swelling, wrapped in bandage  Skin: LLE erythema, warm  Neuro:  UE/LE motor 5/5, sensation intact  Psych: conversational, responsive      Laboratory:  Most Recent Data:  CBC:   Lab Results   Component Value Date    WBC 13.87 (H) 10/04/2020    WBC 13.54 (H) 10/04/2020    HGB 9.0 (L) 10/04/2020    HGB 8.7 (L) 10/04/2020    HCT 30.2 (L) 10/04/2020    HCT 29.6 (L) 10/04/2020     (H) 10/04/2020     (H) 10/04/2020    MCV 79 (L) 10/04/2020    MCV 80 (L) 10/04/2020    RDW 14.7 (H) 10/04/2020    RDW 14.9 (H) 10/04/2020     BMP:   Lab Results   Component Value Date     10/04/2020    K 3.4 (L) 10/04/2020    CL 99 10/04/2020    CO2 28 10/04/2020    BUN 12 10/04/2020    CREATININE 2.1 (H) 10/04/2020     10/04/2020    CALCIUM 8.6 (L) 10/04/2020    MG 2.6 10/04/2020    PHOS 3.7 10/04/2020     LFTs:   Lab Results   Component Value Date    PROT 8.8 (H) 10/04/2020    ALBUMIN 2.5 (L)  10/04/2020    BILITOT 0.4 10/04/2020    AST 27 10/04/2020    ALKPHOS 58 10/04/2020    ALT 28 10/04/2020     Coags: No results found for: INR, PROTIME, PTT  Urinalysis:   Lab Results   Component Value Date    LABURIN DIPHTHEROIDS  10,000 - 49,999 cfu/ml   (A) 09/26/2020    COLORU Yellow 10/02/2020    SPECGRAV 1.010 10/02/2020    NITRITE Negative 10/02/2020    KETONESU Negative 10/02/2020    UROBILINOGEN Negative 10/02/2020    WBCUA 2 09/27/2020       Trended Lab Data:  Recent Labs   Lab 10/02/20  0633 10/03/20  0508 10/04/20  0550   WBC 12.84* 11.23 13.87*  13.54*   HGB 8.0* 7.8* 9.0*  8.7*   HCT 26.3* 27.0* 30.2*  29.6*   * 365* 537*  530*   MCV 78* 83 79*  80*   RDW 14.8* 15.1* 14.7*  14.9*    138 139   K 3.3* 3.8 3.4*    102 99   CO2 26 25 28   BUN 11 11 12   CREATININE 2.3* 2.1* 2.1*   * 119* 102   PROT 7.0 7.2 8.8*   ALBUMIN 2.0* 2.0* 2.5*   BILITOT 0.4 0.4 0.4   AST 20 19 27   ALKPHOS 56 51* 58   ALT 35 28 28           Radiology:  Imaging Results          X-Ray Chest 1 View (Final result)  Result time 09/25/20 16:27:41    Final result by Ivonne Gottlieb MD (09/25/20 16:27:41)                 Impression:      No acute cardiopulmonary abnormality.      Electronically signed by: Ivonne Gottlieb  Date:    09/25/2020  Time:    16:27             Narrative:    EXAMINATION:  XR CHEST 1 VIEW    CLINICAL HISTORY:  COPD Exacerbation;    TECHNIQUE:  Single view of the chest was obtained.    COMPARISON:  Multiple priors, most recent 02/13/2019    FINDINGS:  The right costophrenic angles excluded from field of view.    Normal cardiomediastinal contour. No focal consolidation, pleural effusion or pneumothorax.                               US Lower Extremity Veins Bilateral (Final result)  Result time 09/25/20 14:22:39    Final result by Charlie Mike Jr., MD (09/25/20 14:22:39)                 Impression:      No evidence of deep venous thrombosis in either lower  extremity.      Electronically signed by: Charlie Ochoa Jr  Date:    09/25/2020  Time:    14:22             Narrative:    EXAMINATION:  US LOWER EXTREMITY VEINS BILATERAL    CLINICAL HISTORY:  Other specified soft tissue disorders    TECHNIQUE:  Duplex and color flow Doppler and dynamic compression was performed of the bilateral lower extremity veins was performed.    COMPARISON:  None    FINDINGS:  Right thigh veins: The common femoral, femoral, popliteal, upper greater saphenous, and deep femoral veins are patent and free of thrombus. The veins are normally compressible and have normal phasic flow and augmentation response.    Right calf veins: The visualized calf veins are patent.    Left thigh veins: The common femoral, femoral, popliteal, upper greater saphenous, and deep femoral veins are patent and free of thrombus. The veins are normally compressible and have normal phasic flow and augmentation response.    Left calf veins: The visualized calf veins are patent.    Miscellaneous: None                                   Assessment and Plan:      Mohsen Julien is a 39 y.o.female with  Patient Active Problem List    Diagnosis Date Noted    MAYUR (acute kidney injury) 09/30/2020    Left leg cellulitis 09/26/2020    Hypokalemia 09/25/2020    Hyperlipidemia 09/25/2020    SIRS (systemic inflammatory response syndrome) 09/25/2020    Fungal infection of skin 06/19/2019    Venous stasis dermatitis of left lower extremity     HTN (hypertension) 05/08/2013    Morbidly obese 05/08/2013         Nonoliguric MAYUR stage II  -Suspect ATN multifactorial, RAYO, abx, infx, NSAIDs  -Has received IVF with mild improvement, so could be ATN. Now getting worse despite all the abx so unlikely to be AIN  -Renal US w/ inc RI's  -UOP 1.4L Cr stable at 2.1. Avoid renally toxic meds. Strict I/O's    HTN  -Cont metoprolol    Dave Cary  [facility]

## 2020-10-04 NOTE — PROGRESS NOTES
[facility] Infectious Diseases Progress Note    Assessment/Plan:     Mohsen Julien is a 39 y.o. female with PMHx of HTN , HLD, and venous stasis dermatitis who presented to [facility] on  with the CC of nausea, vomiting, and subjective fevers/chills for 2 days. She follows with Dr Griffith for her chronic venous stasis and small wound of the left lower extremity. She was on PO fluconazole as well as a topical antifungal prior to presentation. She feels her wound has enlarged and the edema of the left lower leg has worsened.      Patient was started on vanc and pip-tazo. CT of the left tib-fib with soft tissue edema, no discrete fluid collection. LE venous u/s without DVT. MRI ordered  as patient seemed to clinically worsen, consistent with cellulitis, but fasciitis could not be excluded; therefore, clindamycin added back to regimen.    Recommendations:  - Patient continues to improve. Would transition to Augmentin 875-125 BID and monitor overnight.  - If continues to feel well tomorrow, recommend discharging on Augmentin for additional 14 days of therapy (last day 10/18).     Luis Felipe George MD  [facility] Infectious Diseases, PGY-4  Cell: [phone]    Thank you for allowing us to participate in the care of this patient. We will continue to follow along. Case has been discussed with consult staff, who is in agreement with assessment and plan. ID will continue to follow.     Subjective:      No acute events overnight. Up sitting in chair watching [place] game. Disappointed in the game thus far, but excited to return home.      Objective:   Last 24 Hour Vital Signs:  BP  Min: 100/56  Max: 151/70  Temp  Av.8 °F (37.1 °C)  Min: 98.4 °F (36.9 °C)  Max: 99.2 °F (37.3 °C)  Pulse  Av.3  Min: 80  Max: 92  Resp  Av.8  Min: 15  Max: 18  SpO2  Av %  Min: 99 %  Max: 99 %  Weight  Av.7 kg (299 lb 2.6 oz)  Min: 135.7 kg (299 lb 2.6 oz)  Max: 135.7 kg (299 lb 2.6 oz)  I/O last 3 completed shifts:  In:  150 [P.O.:100; IV Piggyback:50]  Out: 2500 [Urine:2500]    Physical Exam  Constitutional:       General: She is not in acute distress.     Appearance: Normal appearance. She is obese. She is not ill-appearing or diaphoretic.   HENT:      Head: Normocephalic and atraumatic.      Nose: Nose normal.      Mouth/Throat:      Mouth: Mucous membranes are moist.      Pharynx: Oropharynx is clear. No oropharyngeal exudate or posterior oropharyngeal erythema.   Eyes:      General: No scleral icterus.     Conjunctiva/sclera: Conjunctivae normal.   Neck:      Musculoskeletal: Normal range of motion and neck supple. No neck rigidity.   Cardiovascular:      Rate and Rhythm: Normal rate and regular rhythm.      Heart sounds: Normal heart sounds.   Pulmonary:      Effort: Pulmonary effort is normal.      Breath sounds: Normal breath sounds.   Abdominal:      General: There is no distension.      Palpations: Abdomen is soft.      Tenderness: There is no abdominal tenderness.   Musculoskeletal:         General: Swelling (improving significantly ) present.      Comments: Wound of medial LLL in c/d/i dressing   Skin:     Coloration: Skin is not jaundiced.      Findings: No lesion or rash.   Neurological:      General: No focal deficit present.      Mental Status: She is alert and oriented to person, place, and time.   Psychiatric:         Mood and Affect: Mood normal.         Behavior: Behavior normal.         Laboratory:  Laboratory Data Reviewed: yes  Pertinent Findings:  Recent Labs   Lab 10/02/20  0633 10/03/20  0508 10/04/20  0550   WBC 12.84* 11.23 13.87*  13.54*   HGB 8.0* 7.8* 9.0*  8.7*   HCT 26.3* 27.0* 30.2*  29.6*   * 365* 537*  530*   MCV 78* 83 79*  80*   RDW 14.8* 15.1* 14.7*  14.9*    138 139   K 3.3* 3.8 3.4*    102 99   CO2 26 25 28   BUN 11 11 12   CREATININE 2.3* 2.1* 2.1*   * 119* 102   PROT 7.0 7.2 8.8*   ALBUMIN 2.0* 2.0* 2.5*   BILITOT 0.4 0.4 0.4   AST 20 19 27   ALKPHOS 56 51*  58   ALT 35 28 28         Microbiology Data:  Blood culture 9/25 NGTD  Blood culture 9/26 NGTD  Urine culture 9/26 < 50k cfu diphtheroids  Wound culture 9/26 with normal skin mark  Blood culture 9/27 NGTD    Antimicrobials:  Pip-tazo 9/25 - 9/27  Vancomycin 9/25 - 10/4  Cefepime 9/29 - 10/4  Flagyl 9/29 - 10/2  Clindamycin 9/25, 10/1 - 10/3  Augmentin 10/4 - present    Other Results:  Radiology Results:  X-ray Chest 1 View    Result Date: 9/27/2020  EXAMINATION: XR CHEST 1 VIEW CLINICAL HISTORY: Febrile. TECHNIQUE: Single frontal view of the chest was performed. COMPARISON: Multiple prior radiographs of the chest, most recent from 09/25/2020. FINDINGS: The lungs are well expanded and clear. No focal opacities are seen. The pleural spaces are clear.  The cardiac silhouette is borderline enlarged although this may be accentuated by technique.  The visualized osseous structures are unremarkable.     No acute cardiopulmonary abnormality. Electronically signed by: Vignesh Sanchez Date:    09/27/2020 Time:    10:25    X-ray Chest 1 View    Result Date: 9/25/2020  EXAMINATION: XR CHEST 1 VIEW CLINICAL HISTORY: COPD Exacerbation; TECHNIQUE: Single view of the chest was obtained. COMPARISON: Multiple priors, most recent 02/13/2019 FINDINGS: The right costophrenic angles excluded from field of view. Normal cardiomediastinal contour. No focal consolidation, pleural effusion or pneumothorax.     No acute cardiopulmonary abnormality. Electronically signed by: Ivonne Gottlieb Date:    09/25/2020 Time:    16:27    Us Lower Extremity Veins Bilateral    Result Date: 9/25/2020  EXAMINATION: US LOWER EXTREMITY VEINS BILATERAL CLINICAL HISTORY: Other specified soft tissue disorders TECHNIQUE: Duplex and color flow Doppler and dynamic compression was performed of the bilateral lower extremity veins was performed. COMPARISON: None FINDINGS: Right thigh veins: The common femoral, femoral, popliteal, upper greater saphenous, and deep femoral  veins are patent and free of thrombus. The veins are normally compressible and have normal phasic flow and augmentation response. Right calf veins: The visualized calf veins are patent. Left thigh veins: The common femoral, femoral, popliteal, upper greater saphenous, and deep femoral veins are patent and free of thrombus. The veins are normally compressible and have normal phasic flow and augmentation response. Left calf veins: The visualized calf veins are patent. Miscellaneous: None     No evidence of deep venous thrombosis in either lower extremity. Electronically signed by: Charlie Ochoa Jr Date:    09/25/2020 Time:    14:22    Us Lower Extremity Veins Left    Result Date: 9/30/2020  EXAMINATION: US LOWER EXTREMITY VEINS LEFT CLINICAL HISTORY: left leg swelling with blisters r/o dvt; Systemic inflammatory response syndrome (sirs) of non-infectious origin without acute organ dysfunction TECHNIQUE: Duplex and color flow Doppler evaluation and graded compression of the left lower extremity veins was performed. COMPARISON: 09/25/2020 FINDINGS: Duplex and color flow Doppler evaluation does not reveal any evidence of acute venous thrombosis in the common femoral, superficial femoral, greater saphenous, popliteal, peroneal, anterior tibial and posterior tibial veins of the left lower extremity.  There is no reflux to suggest valvular incompetence.  There is subcutaneous edema.  There are partially visualized prominent left inguinal lymph nodes.     No evidence of deep venous thrombosis in the left lower extremity. Subcutaneous edema noting mildly prominent left inguinal lymph nodes. Electronically signed by: Cesar Milton MD Date:    09/30/2020 Time:    13:59    Mri Tibia Fibula Without Contrast Left    Result Date: 9/30/2020  EXAMINATION: MRI TIBIA FIBULA WITHOUT CONTRAST LEFT CLINICAL HISTORY: Lower leg swelling/redness, cellulitis suspected;  Other specified soft tissue disorders TECHNIQUE: Multiplanar,  multisequence images were obtained through the left tibia without the use of intravenous contrast. COMPARISON: None available. FINDINGS: Bone marrow signal appears maintained.  No evidence for fracture or infiltrative marrow process.  There is subcutaneous edema throughout the visualized lower extremity with scattered, linear tracking fluid along the superficial fascial clear.  No significant intermuscular edema or discrete soft tissue collection, noting lack of intravenous contrast.     Generalized soft tissue swelling with scattered, linear tracking fluid along the superficial fascia of the left lower extremity.  Differential etiologies to include noninfectious edema versus cellulitis.  No discrete collection noting noncontrast exam.  Note that component of fasciitis can not be excluded on the basis of imaging. Electronically signed by: Severino Rosen Date:    09/30/2020 Time:    12:59    Ct Thigh W W/o Contrast Left    Result Date: 9/26/2020  EXAMINATION: CT THIGH W W/O CONTRAST LEFT; CT LEG (TIBIA-FIBULA) W W/O CONTRAST LEFT CLINICAL HISTORY: Concern for necrotizing fasciitis.;; Concern for necrotizing fasciitis. Patient with lower leg cellulitis.; TECHNIQUE: CT of the left lower extremity was obtained from the level of the pelvis to the feet.  Images were obtained prior to and following administration of 100 cc IV Omnipaque 350.  Multiplanar reconstructions were obtained and reviewed. COMPARISON: None FINDINGS: There is nonspecific subcutaneous soft tissue edema in the left lower extremity below the level of the knee.  No fluid collections visualized to suggest abscess.  No soft tissue gas visualized to suggest necrotizing fasciitis.  No acute or suspicious osseous findings demonstrated.  Visualized musculature appears grossly normal. There is an IUD in place within the uterus.  No adnexal masses visualized.  The urinary bladder appears unremarkable. There are multiple nonspecific enlarged left external iliac  and inguinal lymph nodes present measuring up to 15 mm and 19 mm in axial short dimension respectively.  Visualized vasculature appears grossly within normal limits.     1. Nonspecific subcutaneous soft tissue edema in the left lower leg possibly representing cellulitis, correlate clinically.  No definite abscess or evidence of necrotizing fasciitis. 2. Nonspecific left external iliac chain and inguinal adenopathy, likely reactive in nature. Electronically signed by: Humberto Love MD Date:    09/26/2020 Time:    12:34    Ct Leg (tibia-fibula) W W/o Contrast Left    Result Date: 9/26/2020  EXAMINATION: CT THIGH W W/O CONTRAST LEFT; CT LEG (TIBIA-FIBULA) W W/O CONTRAST LEFT CLINICAL HISTORY: Concern for necrotizing fasciitis.;; Concern for necrotizing fasciitis. Patient with lower leg cellulitis.; TECHNIQUE: CT of the left lower extremity was obtained from the level of the pelvis to the feet.  Images were obtained prior to and following administration of 100 cc IV Omnipaque 350.  Multiplanar reconstructions were obtained and reviewed. COMPARISON: None FINDINGS: There is nonspecific subcutaneous soft tissue edema in the left lower extremity below the level of the knee.  No fluid collections visualized to suggest abscess.  No soft tissue gas visualized to suggest necrotizing fasciitis.  No acute or suspicious osseous findings demonstrated.  Visualized musculature appears grossly normal. There is an IUD in place within the uterus.  No adnexal masses visualized.  The urinary bladder appears unremarkable. There are multiple nonspecific enlarged left external iliac and inguinal lymph nodes present measuring up to 15 mm and 19 mm in axial short dimension respectively.  Visualized vasculature appears grossly within normal limits.     1. Nonspecific subcutaneous soft tissue edema in the left lower leg possibly representing cellulitis, correlate clinically.  No definite abscess or evidence of necrotizing fasciitis. 2.  Nonspecific left external iliac chain and inguinal adenopathy, likely reactive in nature. Electronically signed by: Humberto Love MD Date:    09/26/2020 Time:    12:34    Mammo Digital Screening Bilat W/ Butch    Result Date: 9/14/2020  Result: Mammo Digital Screening Bilat w/ Butch History: Patient is 39 y.o. and is seen for a screening mammogram. Films Compared: Prior images (if available) were compared. Findings: This procedure was performed using tomosynthesis. Computer-aided detection was utilized in the interpretation of this examination.  The breasts have scattered areas of fibroglandular density. There is no evidence of suspicious masses, microcalcifications or architectural distortion. 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     No mammographic evidence of malignancy. BI-RADS Category 1: Negative Recommendation: Routine screening mammogram in 1 year is recommended. Your estimated lifetime risk of breast cancer (to age 85) based on Tyrer-Cuzick risk assessment model is Tyrer-Cuzick: 8.52 %. According to the [employer] Society, patients with a lifetime breast cancer risk of 20% or higher might benefit from supplemental screening tests.       Current Medications:     Infusions:       Scheduled:   ceFEPime (MAXIPIME) IVPB  1 g Intravenous Q12H    enoxparin  40 mg Subcutaneous Q24H    famotidine  20 mg Oral Daily    fenofibrate  145 mg Oral Daily    ferrous sulfate  325 mg Oral Daily    HYDROcodone-acetaminophen  1 tablet Oral Q6H    metoprolol tartrate  100 mg Oral BID    mupirocin   Topical (Top) BID    simvastatin  20 mg Oral QHS    triamterene-hydrochlorothiazide 37.5-25 mg  1 capsule Oral Daily    vancomycin (VANCOCIN) IVPB  1,000 mg Intravenous Q24H        PRN:  acetaminophen, amitriptyline, HYDROmorphone, hydrOXYzine HCL, loperamide, melatonin, ondansetron, sodium chloride 0.9%, Pharmacy to dose Vancomycin consult **AND** vancomycin - pharmacy to dose

## 2020-10-04 NOTE — HPI
Mohsen Julien is a 39 y.o. female with PMHx of HTN , HLD, and venous stasis dermatitis who presented to [facility] on 9/25 with the CC of nausea, vomiting, and subjective fevers/chills for 2 days. She follows with Dr Griffith for her chronic venous stasis and small wound of the left lower extremity. She was on PO fluconazole as well as a topical antifungal prior to presentation. She feels her wound has enlarged and the edema of the left lower leg has worsened.      Patient was started on vanc and pip-tazo. CT of the left tib-fib with soft tissue edema, no discrete fluid collection. LE venous u/s without DVT. MRI ordered 9/30 as patient seemed to clinically worsen, consistent with cellulitis, but fasciitis could not be excluded; therefore, clindamycin added back to regimen.

## 2020-10-04 NOTE — ANESTHESIA PROCEDURE NOTES
Peripheral IV Insertion    Diagnosis: I99.8 Other disorder of circulatory system    Patient location during procedure: floor  Procedure start time: 10/4/2020 7:45 AM  Timeout: 10/4/2020 7:45 AM  Procedure end time: 10/4/2020 8:00 AM    Staffing  Authorizing Provider: Severino Olivera MD  Performing Provider: Severino Olivera MD    Anesthesiologist was present at the time of the procedure.    Preanesthetic Checklist  Completed: patient identified, site marked, surgical consent, pre-op evaluation, timeout performed, IV checked, risks and benefits discussed, monitors and equipment checked and anesthesia consent givenPeripheral IV Insertion  Skin Prep: chlorhexidine gluconate and isopropyl alcohol  Local Infiltration: none  Orientation: left  Location: antecubital  Catheter Size: 20 G  Catheter placement by Ultrasound guidance. Heme positive aspiration all ports.  Vessel Caliber: medium, patent, compressibility normal  Vascular Doppler:  not done  Needle advanced into vessel with real time Ultrasound guidance.Insertion Attempts: 1  Assessment  Dressing: secured with tape and tegaderm  Patient: Tolerated well  Line flushed easily.

## 2020-10-04 NOTE — ASSESSMENT & PLAN NOTE
- K  2.7 on admission, down to 3.4 today from 3.8 yesterday.  -KCl ordered this AM.  -replete as needed.  - Continues to be asymptomatic without EKG changes

## 2020-10-04 NOTE — ASSESSMENT & PLAN NOTE
Mohsen Julien is a 39 y.o. female with PMHx of HTN , HLD, and venous stasis dermatitis who presented to [facility] on 9/25 with the CC of nausea, vomiting, and subjective fevers/chills for 2 days. She follows with Dr Griffith for her chronic venous stasis and small wound of the left lower extremity. She was on PO fluconazole as well as a topical antifungal prior to presentation. She feels her wound has enlarged and the edema of the left lower leg has worsened.      Patient was started on vanc and pip-tazo. CT of the left tib-fib with soft tissue edema, no discrete fluid collection. LE venous u/s without DVT. MRI ordered 9/30 as patient seemed to clinically worsen, consistent with cellulitis, but fasciitis could not be excluded; therefore, clindamycin added back to regimen.     Keep on vanc and cefepime - ID will stop clinda today

## 2020-10-04 NOTE — PROGRESS NOTES
"Surgery follow up  BP (!) 128/58 (BP Location: Right arm, Patient Position: Sitting)   Pulse 85   Temp 98.9 °F (37.2 °C) (Oral)   Resp 18   Ht 5' 4" (1.626 m)   Wt 135.7 kg (299 lb 2.6 oz)   SpO2 99%   Breastfeeding No   BMI 51.35 kg/m²   I/O last 3 completed shifts:  In: 150 [P.O.:100; IV Piggyback:50]  Out: 2500 [Urine:2500]  I/O this shift:  In: -   Out: 900 [Urine:900]  Recent Results (from the past 336 hour(s))   CBC auto differential    Collection Time: 10/04/20  5:50 AM   Result Value Ref Range    WBC 13.54 (H) 3.90 - 12.70 K/uL    Hemoglobin 8.7 (L) 12.0 - 16.0 g/dL    Hematocrit 29.6 (L) 37.0 - 48.5 %    Platelets 530 (H) 150 - 350 K/uL   CBC with Automated Differential    Collection Time: 10/04/20  5:50 AM   Result Value Ref Range    WBC 13.87 (H) 3.90 - 12.70 K/uL    Hemoglobin 9.0 (L) 12.0 - 16.0 g/dL    Hematocrit 30.2 (L) 37.0 - 48.5 %    Platelets 537 (H) 150 - 350 K/uL   CBC with Automated Differential    Collection Time: 10/03/20  5:08 AM   Result Value Ref Range    WBC 11.23 3.90 - 12.70 K/uL    Hemoglobin 7.8 (L) 12.0 - 16.0 g/dL    Hematocrit 27.0 (L) 37.0 - 48.5 %    Platelets 365 (H) 150 - 350 K/uL     Recent Results (from the past 336 hour(s))   Basic metabolic panel    Collection Time: 09/26/20  4:02 PM   Result Value Ref Range    Sodium 134 (L) 136 - 145 mmol/L    Potassium 3.8 3.5 - 5.1 mmol/L    Chloride 101 95 - 110 mmol/L    CO2 22 (L) 23 - 29 mmol/L    BUN, Bld 8 6 - 20 mg/dL    Creatinine 0.8 0.5 - 1.4 mg/dL    Calcium 7.7 (L) 8.7 - 10.5 mg/dL    Anion Gap 11 8 - 16 mmol/L   Basic metabolic panel    Collection Time: 09/25/20  9:30 PM   Result Value Ref Range    Sodium 136 136 - 145 mmol/L    Potassium 3.3 (L) 3.5 - 5.1 mmol/L    Chloride 98 95 - 110 mmol/L    CO2 26 23 - 29 mmol/L    BUN, Bld 17 6 - 20 mg/dL    Creatinine 1.0 0.5 - 1.4 mg/dL    Calcium 8.1 (L) 8.7 - 10.5 mg/dL    Anion Gap 12 8 - 16 mmol/L   swelling and edema subsiding less drainage , continue present " treatment.

## 2020-10-04 NOTE — SUBJECTIVE & OBJECTIVE
Interval History: NAEON. Pt slept well through the night. IV came out at night, Anesthesia has yet to replace IV as of exam this morning. Pt has no complaints, endorsing LLE is doing better, gradually improved since yesterday.Pain is well controlled. Denies F/C, N/V, HA, acute changes in vision, congestion, cough, sore throat, SOB, dyspnea, CP, AP, bowel or bladder issues, or difficulty ambulating. Pt currently on IV Vanc and cefepime abx, ID onboard to provide recs on abx coverage for discharge.     Review of Systems   Constitutional: Positive for activity change (not moving around as much prior to admission.). Negative for appetite change, chills, diaphoresis, fatigue and fever.   HENT: Negative for congestion, rhinorrhea and sore throat.    Eyes: Negative for pain, redness and visual disturbance.   Respiratory: Negative for cough and shortness of breath.    Cardiovascular: Negative for chest pain.   Gastrointestinal: Negative for abdominal pain, blood in stool, constipation, diarrhea, nausea and vomiting.   Genitourinary: Negative for difficulty urinating, dysuria, frequency and hematuria.   Musculoskeletal: Negative for back pain and gait problem.   Skin: Positive for wound (in LLE).   Neurological: Negative for dizziness, weakness, light-headedness and headaches.   Psychiatric/Behavioral: Negative for behavioral problems and decreased concentration. The patient is not nervous/anxious.      Objective:     Vital Signs (Most Recent):  Temp: 99.1 °F (37.3 °C) (10/04/20 0746)  Pulse: 92 (10/04/20 0746)  Resp: 16 (10/04/20 0746)  BP: (!) 112/53 (10/04/20 0746)  SpO2: 99 % (10/04/20 0746) Vital Signs (24h Range):  Temp:  [98.2 °F (36.8 °C)-99.2 °F (37.3 °C)] 99.1 °F (37.3 °C)  Pulse:  [80-92] 92  Resp:  [15-18] 16  SpO2:  [99 %] 99 %  BP: (100-151)/(53-70) 112/53     Weight: 135.7 kg (299 lb 2.6 oz)  Body mass index is 51.35 kg/m².    Intake/Output Summary (Last 24 hours) at 10/4/2020 0748  Last data filed at  10/3/2020 2133  Gross per 24 hour   Intake --   Output 1700 ml   Net -1700 ml      Physical Exam  Vitals signs and nursing note reviewed.   Constitutional:       General: She is not in acute distress.     Appearance: Normal appearance. She is obese. She is not ill-appearing, toxic-appearing or diaphoretic.   HENT:      Head: Normocephalic and atraumatic.      Right Ear: External ear normal.      Left Ear: External ear normal.   Pulmonary:      Effort: Pulmonary effort is normal. No respiratory distress.   Skin:     Capillary Refill: Capillary refill takes less than 2 seconds.      Findings: No erythema or rash.      Comments: L LE wound dressing clean, dry and intact.      Neurological:      Mental Status: She is alert and oriented to person, place, and time.   Psychiatric:         Mood and Affect: Mood normal.         Behavior: Behavior normal.       Significant Labs:   Recent Results (from the past 24 hour(s))   Comprehensive Metabolic Panel (CMP)    Collection Time: 10/04/20  5:50 AM   Result Value Ref Range    Sodium 139 136 - 145 mmol/L    Potassium 3.4 (L) 3.5 - 5.1 mmol/L    Chloride 99 95 - 110 mmol/L    CO2 28 23 - 29 mmol/L    Glucose 102 70 - 110 mg/dL    BUN, Bld 12 6 - 20 mg/dL    Creatinine 2.1 (H) 0.5 - 1.4 mg/dL    Calcium 8.6 (L) 8.7 - 10.5 mg/dL    Total Protein 8.8 (H) 6.0 - 8.4 g/dL    Albumin 2.5 (L) 3.5 - 5.2 g/dL    Total Bilirubin 0.4 0.1 - 1.0 mg/dL    Alkaline Phosphatase 58 55 - 135 U/L    AST 27 10 - 40 U/L    ALT 28 10 - 44 U/L    Anion Gap 12 8 - 16 mmol/L    eGFR if African American 33 (A) >60 mL/min/1.73 m^2    eGFR if non African American 29 (A) >60 mL/min/1.73 m^2   Magnesium    Collection Time: 10/04/20  5:50 AM   Result Value Ref Range    Magnesium 2.6 1.6 - 2.6 mg/dL   Phosphorus    Collection Time: 10/04/20  5:50 AM   Result Value Ref Range    Phosphorus 3.7 2.7 - 4.5 mg/dL   CBC with Automated Differential    Collection Time: 10/04/20  5:50 AM   Result Value Ref Range    WBC  13.87 (H) 3.90 - 12.70 K/uL    RBC 3.81 (L) 4.00 - 5.40 M/uL    Hemoglobin 9.0 (L) 12.0 - 16.0 g/dL    Hematocrit 30.2 (L) 37.0 - 48.5 %    Mean Corpuscular Volume 79 (L) 82 - 98 fL    Mean Corpuscular Hemoglobin 23.6 (L) 27.0 - 31.0 pg    Mean Corpuscular Hemoglobin Conc 29.8 (L) 32.0 - 36.0 g/dL    RDW 14.7 (H) 11.5 - 14.5 %    Platelets 537 (H) 150 - 350 K/uL    MPV 9.4 9.2 - 12.9 fL    Immature Granulocytes 2.0 (H) 0.0 - 0.5 %    Gran # (ANC) 10.7 (H) 1.8 - 7.7 K/uL    Immature Grans (Abs) 0.28 (H) 0.00 - 0.04 K/uL    Lymph # 1.6 1.0 - 4.8 K/uL    Mono # 1.1 (H) 0.3 - 1.0 K/uL    Eos # 0.2 0.0 - 0.5 K/uL    Baso # 0.05 0.00 - 0.20 K/uL    nRBC 0 0 /100 WBC    Gran% 76.9 (H) 38.0 - 73.0 %    Lymph% 11.3 (L) 18.0 - 48.0 %    Mono% 8.0 4.0 - 15.0 %    Eosinophil% 1.4 0.0 - 8.0 %    Basophil% 0.4 0.0 - 1.9 %    Differential Method Automated    CBC auto differential    Collection Time: 10/04/20  5:50 AM   Result Value Ref Range    WBC 13.54 (H) 3.90 - 12.70 K/uL    RBC 3.71 (L) 4.00 - 5.40 M/uL    Hemoglobin 8.7 (L) 12.0 - 16.0 g/dL    Hematocrit 29.6 (L) 37.0 - 48.5 %    Mean Corpuscular Volume 80 (L) 82 - 98 fL    Mean Corpuscular Hemoglobin 23.5 (L) 27.0 - 31.0 pg    Mean Corpuscular Hemoglobin Conc 29.4 (L) 32.0 - 36.0 g/dL    RDW 14.9 (H) 11.5 - 14.5 %    Platelets 530 (H) 150 - 350 K/uL    MPV 9.4 9.2 - 12.9 fL    Immature Granulocytes 1.8 (H) 0.0 - 0.5 %    Gran # (ANC) 10.5 (H) 1.8 - 7.7 K/uL    Immature Grans (Abs) 0.25 (H) 0.00 - 0.04 K/uL    Lymph # 1.5 1.0 - 4.8 K/uL    Mono # 1.0 0.3 - 1.0 K/uL    Eos # 0.2 0.0 - 0.5 K/uL    Baso # 0.03 0.00 - 0.20 K/uL    nRBC 0 0 /100 WBC    Gran% 77.6 (H) 38.0 - 73.0 %    Lymph% 11.2 (L) 18.0 - 48.0 %    Mono% 7.5 4.0 - 15.0 %    Eosinophil% 1.7 0.0 - 8.0 %    Basophil% 0.2 0.0 - 1.9 %    Differential Method Automated        Significant Imaging:   Imaging Results          X-Ray Chest 1 View (Final result)  Result time 09/25/20 16:27:41    Final result by Ivonne NAZARIO  MD Bull (09/25/20 16:27:41)                 Impression:      No acute cardiopulmonary abnormality.      Electronically signed by: Ivonne Gottlieb  Date:    09/25/2020  Time:    16:27             Narrative:    EXAMINATION:  XR CHEST 1 VIEW    CLINICAL HISTORY:  COPD Exacerbation;    TECHNIQUE:  Single view of the chest was obtained.    COMPARISON:  Multiple priors, most recent 02/13/2019    FINDINGS:  The right costophrenic angles excluded from field of view.    Normal cardiomediastinal contour. No focal consolidation, pleural effusion or pneumothorax.                               US Lower Extremity Veins Bilateral (Final result)  Result time 09/25/20 14:22:39    Final result by Charlie Mike Jr., MD (09/25/20 14:22:39)                 Impression:      No evidence of deep venous thrombosis in either lower extremity.      Electronically signed by: Charlie Ochoa Jr  Date:    09/25/2020  Time:    14:22             Narrative:    EXAMINATION:  US LOWER EXTREMITY VEINS BILATERAL    CLINICAL HISTORY:  Other specified soft tissue disorders    TECHNIQUE:  Duplex and color flow Doppler and dynamic compression was performed of the bilateral lower extremity veins was performed.    COMPARISON:  None    FINDINGS:  Right thigh veins: The common femoral, femoral, popliteal, upper greater saphenous, and deep femoral veins are patent and free of thrombus. The veins are normally compressible and have normal phasic flow and augmentation response.    Right calf veins: The visualized calf veins are patent.    Left thigh veins: The common femoral, femoral, popliteal, upper greater saphenous, and deep femoral veins are patent and free of thrombus. The veins are normally compressible and have normal phasic flow and augmentation response.    Left calf veins: The visualized calf veins are patent.    Miscellaneous: None

## 2020-10-04 NOTE — PROGRESS NOTES
[facility] Hospital Medicine  Progress Note    Patient Name: Mohsen Julien  MRN: [id number]  Patient Class: IP- Inpatient   Admission Date: 9/25/2020  Length of Stay: 7 days  Attending Physician: Jeremy White III, MD  Primary Care Provider: Christopher Diaz MD        Subjective:     Principal Problem:Left leg cellulitis        HPI:  Patient is a 39 y.o.  female with PMHx of HTN and HLD presenting with fevers and chills starting last night. Pt  Reports associated nausea and vomiting with headache and left leg swelling. Pt reports fevers and chills persisted this morning and  Was told by Dr. Griffith to come to the ED. Pt reports left leg skin changes for a couple of months and she has been taking antifungal medications, po and cream, to treat the wound. Pt reports using compression socks but noticed clear discharge on the socks.     In ED, patient was febrile with nausea. Pt given zofran and started IVF. Lab work presented hypokalemia of 2.7, ESR 42,  and WBC  Of  17. Pt given onedose of 40mEq K repletion. Blood cultures were ordered. Pt admitted to observation for hypokalemia and cellulitis.     Overview/Hospital Course:  Pt initially met SIRS criteria and was started on Vanc and zosyn. She then developed MAYUR and zosyn was discontinued, flagyl and cefipime was initiated. Blood cultures without any growth and UA did not grow any non-skin mark. Creatinine remained elevated at 2.2 so mIVF increased on 10/1/2020. MRI consistent with cellulitis, however cannot rule out fascitis so clindamycin added to antibiotic regimen 10/1/2020. Clindamycin added to antibiotic regimen. Elevated Creatinine persisting despite increase in mIVF, investigating intrinsic causes of MAYUR.    Interval History: NAEON. Pt slept well through the night. IV came out at night, Anesthesia has yet to replace IV as of exam this morning. Pt has no complaints, endorsing LLE is doing better, gradually improved  since yesterday.Pain is well controlled. Denies F/C, N/V, HA, acute changes in vision, congestion, cough, sore throat, SOB, dyspnea, CP, AP, bowel or bladder issues, or difficulty ambulating. Pt currently on IV Vanc and cefepime abx, ID onboard to provide recs on abx coverage for discharge.     Review of Systems   Constitutional: Positive for activity change (not moving around as much prior to admission.). Negative for appetite change, chills, diaphoresis, fatigue and fever.   HENT: Negative for congestion, rhinorrhea and sore throat.    Eyes: Negative for pain, redness and visual disturbance.   Respiratory: Negative for cough and shortness of breath.    Cardiovascular: Negative for chest pain.   Gastrointestinal: Negative for abdominal pain, blood in stool, constipation, diarrhea, nausea and vomiting.   Genitourinary: Negative for difficulty urinating, dysuria, frequency and hematuria.   Musculoskeletal: Negative for back pain and gait problem.   Skin: Positive for wound (in LLE).   Neurological: Negative for dizziness, weakness, light-headedness and headaches.   Psychiatric/Behavioral: Negative for behavioral problems and decreased concentration. The patient is not nervous/anxious.      Objective:     Vital Signs (Most Recent):  Temp: 99.1 °F (37.3 °C) (10/04/20 0746)  Pulse: 92 (10/04/20 0746)  Resp: 16 (10/04/20 0746)  BP: (!) 112/53 (10/04/20 0746)  SpO2: 99 % (10/04/20 0746) Vital Signs (24h Range):  Temp:  [98.2 °F (36.8 °C)-99.2 °F (37.3 °C)] 99.1 °F (37.3 °C)  Pulse:  [80-92] 92  Resp:  [15-18] 16  SpO2:  [99 %] 99 %  BP: (100-151)/(53-70) 112/53     Weight: 135.7 kg (299 lb 2.6 oz)  Body mass index is 51.35 kg/m².    Intake/Output Summary (Last 24 hours) at 10/4/2020 0748  Last data filed at 10/3/2020 2133  Gross per 24 hour   Intake --   Output 1700 ml   Net -1700 ml      Physical Exam  Vitals signs and nursing note reviewed.   Constitutional:       General: She is not in acute distress.     Appearance:  Normal appearance. She is obese. She is not ill-appearing, toxic-appearing or diaphoretic.   HENT:      Head: Normocephalic and atraumatic.      Right Ear: External ear normal.      Left Ear: External ear normal.   Pulmonary:      Effort: Pulmonary effort is normal. No respiratory distress.   Skin:     Capillary Refill: Capillary refill takes less than 2 seconds.      Findings: No erythema or rash.      Comments: L LE wound dressing clean, dry and intact.      Neurological:      Mental Status: She is alert and oriented to person, place, and time.   Psychiatric:         Mood and Affect: Mood normal.         Behavior: Behavior normal.       Significant Labs:   Recent Results (from the past 24 hour(s))   Comprehensive Metabolic Panel (CMP)    Collection Time: 10/04/20  5:50 AM   Result Value Ref Range    Sodium 139 136 - 145 mmol/L    Potassium 3.4 (L) 3.5 - 5.1 mmol/L    Chloride 99 95 - 110 mmol/L    CO2 28 23 - 29 mmol/L    Glucose 102 70 - 110 mg/dL    BUN, Bld 12 6 - 20 mg/dL    Creatinine 2.1 (H) 0.5 - 1.4 mg/dL    Calcium 8.6 (L) 8.7 - 10.5 mg/dL    Total Protein 8.8 (H) 6.0 - 8.4 g/dL    Albumin 2.5 (L) 3.5 - 5.2 g/dL    Total Bilirubin 0.4 0.1 - 1.0 mg/dL    Alkaline Phosphatase 58 55 - 135 U/L    AST 27 10 - 40 U/L    ALT 28 10 - 44 U/L    Anion Gap 12 8 - 16 mmol/L    eGFR if African American 33 (A) >60 mL/min/1.73 m^2    eGFR if non African American 29 (A) >60 mL/min/1.73 m^2   Magnesium    Collection Time: 10/04/20  5:50 AM   Result Value Ref Range    Magnesium 2.6 1.6 - 2.6 mg/dL   Phosphorus    Collection Time: 10/04/20  5:50 AM   Result Value Ref Range    Phosphorus 3.7 2.7 - 4.5 mg/dL   CBC with Automated Differential    Collection Time: 10/04/20  5:50 AM   Result Value Ref Range    WBC 13.87 (H) 3.90 - 12.70 K/uL    RBC 3.81 (L) 4.00 - 5.40 M/uL    Hemoglobin 9.0 (L) 12.0 - 16.0 g/dL    Hematocrit 30.2 (L) 37.0 - 48.5 %    Mean Corpuscular Volume 79 (L) 82 - 98 fL    Mean Corpuscular Hemoglobin 23.6  (L) 27.0 - 31.0 pg    Mean Corpuscular Hemoglobin Conc 29.8 (L) 32.0 - 36.0 g/dL    RDW 14.7 (H) 11.5 - 14.5 %    Platelets 537 (H) 150 - 350 K/uL    MPV 9.4 9.2 - 12.9 fL    Immature Granulocytes 2.0 (H) 0.0 - 0.5 %    Gran # (ANC) 10.7 (H) 1.8 - 7.7 K/uL    Immature Grans (Abs) 0.28 (H) 0.00 - 0.04 K/uL    Lymph # 1.6 1.0 - 4.8 K/uL    Mono # 1.1 (H) 0.3 - 1.0 K/uL    Eos # 0.2 0.0 - 0.5 K/uL    Baso # 0.05 0.00 - 0.20 K/uL    nRBC 0 0 /100 WBC    Gran% 76.9 (H) 38.0 - 73.0 %    Lymph% 11.3 (L) 18.0 - 48.0 %    Mono% 8.0 4.0 - 15.0 %    Eosinophil% 1.4 0.0 - 8.0 %    Basophil% 0.4 0.0 - 1.9 %    Differential Method Automated    CBC auto differential    Collection Time: 10/04/20  5:50 AM   Result Value Ref Range    WBC 13.54 (H) 3.90 - 12.70 K/uL    RBC 3.71 (L) 4.00 - 5.40 M/uL    Hemoglobin 8.7 (L) 12.0 - 16.0 g/dL    Hematocrit 29.6 (L) 37.0 - 48.5 %    Mean Corpuscular Volume 80 (L) 82 - 98 fL    Mean Corpuscular Hemoglobin 23.5 (L) 27.0 - 31.0 pg    Mean Corpuscular Hemoglobin Conc 29.4 (L) 32.0 - 36.0 g/dL    RDW 14.9 (H) 11.5 - 14.5 %    Platelets 530 (H) 150 - 350 K/uL    MPV 9.4 9.2 - 12.9 fL    Immature Granulocytes 1.8 (H) 0.0 - 0.5 %    Gran # (ANC) 10.5 (H) 1.8 - 7.7 K/uL    Immature Grans (Abs) 0.25 (H) 0.00 - 0.04 K/uL    Lymph # 1.5 1.0 - 4.8 K/uL    Mono # 1.0 0.3 - 1.0 K/uL    Eos # 0.2 0.0 - 0.5 K/uL    Baso # 0.03 0.00 - 0.20 K/uL    nRBC 0 0 /100 WBC    Gran% 77.6 (H) 38.0 - 73.0 %    Lymph% 11.2 (L) 18.0 - 48.0 %    Mono% 7.5 4.0 - 15.0 %    Eosinophil% 1.7 0.0 - 8.0 %    Basophil% 0.2 0.0 - 1.9 %    Differential Method Automated        Significant Imaging:   Imaging Results          X-Ray Chest 1 View (Final result)  Result time 09/25/20 16:27:41    Final result by Ivonne Gottlieb MD (09/25/20 16:27:41)                 Impression:      No acute cardiopulmonary abnormality.      Electronically signed by: Ivonne Gottlieb  Date:    09/25/2020  Time:    16:27             Narrative:     EXAMINATION:  XR CHEST 1 VIEW    CLINICAL HISTORY:  COPD Exacerbation;    TECHNIQUE:  Single view of the chest was obtained.    COMPARISON:  Multiple priors, most recent 02/13/2019    FINDINGS:  The right costophrenic angles excluded from field of view.    Normal cardiomediastinal contour. No focal consolidation, pleural effusion or pneumothorax.                               US Lower Extremity Veins Bilateral (Final result)  Result time 09/25/20 14:22:39    Final result by Charlie Mike Jr., MD (09/25/20 14:22:39)                 Impression:      No evidence of deep venous thrombosis in either lower extremity.      Electronically signed by: Charlie Ochoa Jr  Date:    09/25/2020  Time:    14:22             Narrative:    EXAMINATION:  US LOWER EXTREMITY VEINS BILATERAL    CLINICAL HISTORY:  Other specified soft tissue disorders    TECHNIQUE:  Duplex and color flow Doppler and dynamic compression was performed of the bilateral lower extremity veins was performed.    COMPARISON:  None    FINDINGS:  Right thigh veins: The common femoral, femoral, popliteal, upper greater saphenous, and deep femoral veins are patent and free of thrombus. The veins are normally compressible and have normal phasic flow and augmentation response.    Right calf veins: The visualized calf veins are patent.    Left thigh veins: The common femoral, femoral, popliteal, upper greater saphenous, and deep femoral veins are patent and free of thrombus. The veins are normally compressible and have normal phasic flow and augmentation response.    Left calf veins: The visualized calf veins are patent.    Miscellaneous: None                                    Assessment/Plan:      * Left leg cellulitis   -cellulitis vs fascitis. Imaging reassuring not osteomyelitis  - Continue wound care, wound culture from 10/02 pending  - LLE MRI showed soft tissue swelling and scattered linear tracking of fluid  - LLE US showed no evidence of DVT  -  Continue antipyretics PRN, afebrile x48hrs  - Blood cultures with no growth to date  - appreciate ID and surgery input  - Continue vanc and cefipime  - Discontinue flagyl and clindamycin per ID recs  - Increase Norco to 10mg q6hrs for pain control  - Continue dilaudid PRN for pain        MAYUR (acute kidney injury)  - Pt baseline cr 0.9, Cr 2.1 today, no change from yesterday  - Trend daily CMP  - Strict I's/O's  - UFeUrea suggestive of intrinsic kidney injury  - Kidney US wnl  - Nephro consulted, felt kidney injury due to ATN, only thing to do is wait and continue to monitor. -Recommended avoiding nephrotoxic medications.       SIRS (systemic inflammatory response syndrome)  - Afebrile overnight  - Plan as above      Hyperlipidemia  - Continue home fenofibrate 145mg PO daily and simvastatin 20mg PO QHS    Hypokalemia  - K  2.7 on admission, down to 3.4 today from 3.8 yesterday.  -KCl ordered this AM.  -replete as needed.  - Continues to be asymptomatic without EKG changes      Morbidly obese        HTN (hypertension)  - BP stable   - Continue home meds: lopressor 100mg PO BID      VTE Risk Mitigation (From admission, onward)         Ordered     enoxaparin injection 40 mg  Every 24 hours      09/25/20 1920     IP VTE HIGH RISK PATIENT  Once      09/25/20 1920     Place sequential compression device  Until discontinued      09/25/20 1920                Discharge Planning   SAYRA:      Code Status: Full Code   Is the patient medically ready for discharge?:     Reason for patient still in hospital (select all that apply): Treatment and Consult recommendations  Discharge Plan A: Home with family          James Kapoor MD PGY-1  Department of Hospital Medicine   [facility]

## 2020-10-04 NOTE — SUBJECTIVE & OBJECTIVE
Interval History: Patient doing a bit better today - leg less swollen     Review of Systems   Constitutional: Positive for activity change. Negative for appetite change, chills, diaphoresis and fatigue.   HENT: Negative.    Eyes: Negative.    Respiratory: Negative.    Cardiovascular: Negative.    Gastrointestinal: Negative.    Endocrine: Negative.    Genitourinary: Negative.    Musculoskeletal: Negative.    Skin: Positive for wound.   Allergic/Immunologic: Negative.    Neurological: Negative.    Hematological: Negative.      Objective:     Vital Signs (Most Recent):  Temp: 99.2 °F (37.3 °C) (10/03/20 2021)  Pulse: 89 (10/03/20 2021)  Resp: 18 (10/03/20 2021)  BP: 126/61 (10/03/20 2021)  SpO2: 99 % (10/03/20 1100) Vital Signs (24h Range):  Temp:  [98.2 °F (36.8 °C)-99.2 °F (37.3 °C)] 99.2 °F (37.3 °C)  Pulse:  [80-92] 89  Resp:  [14-20] 18  SpO2:  [96 %-99 %] 99 %  BP: (100-129)/(56-83) 126/61     Weight: 134.9 kg (297 lb 6.4 oz)  Body mass index is 51.05 kg/m².    Estimated Creatinine Clearance: 48.3 mL/min (A) (based on SCr of 2.1 mg/dL (H)).    Physical Exam  Constitutional:       Appearance: She is obese.   HENT:      Head: Normocephalic.   Eyes:      Pupils: Pupils are equal, round, and reactive to light.   Neck:      Musculoskeletal: Normal range of motion.   Cardiovascular:      Rate and Rhythm: Normal rate and regular rhythm.   Pulmonary:      Breath sounds: Normal breath sounds.   Abdominal:      General: There is distension.   Musculoskeletal:         General: Swelling present.   Skin:     Comments: Less erythema, swelling and pain in left leg    Neurological:      Mental Status: She is alert.         Significant Labs: All pertinent labs within the past 24 hours have been reviewed.    Significant Imaging: I have reviewed all pertinent imaging results/findings within the past 24 hours.

## 2020-10-04 NOTE — PROGRESS NOTES
[facility]  Infectious Disease  Progress Note    Patient Name: Mohsen Jluien  MRN: [id number]  Admission Date: 9/25/2020  Length of Stay: 6 days  Attending Physician: Jeremy White III, MD  Primary Care Provider: Christopher Diaz MD    Isolation Status: No active isolations  Assessment/Plan:      * Left leg cellulitis  Mohsen Julien is a 39 y.o. female with PMHx of HTN , HLD, and venous stasis dermatitis who presented to [facility] on 9/25 with the CC of nausea, vomiting, and subjective fevers/chills for 2 days. She follows with Dr Griffith for her chronic venous stasis and small wound of the left lower extremity. She was on PO fluconazole as well as a topical antifungal prior to presentation. She feels her wound has enlarged and the edema of the left lower leg has worsened.      Patient was started on vanc and pip-tazo. CT of the left tib-fib with soft tissue edema, no discrete fluid collection. LE venous u/s without DVT. MRI ordered 9/30 as patient seemed to clinically worsen, consistent with cellulitis, but fasciitis could not be excluded; therefore, clindamycin added back to regimen.     Keep on vanc and cefepime - ID will stop clinda today         Anticipated Disposition:     Thank you for your consult. I will follow-up with patient. Please contact us if you have any additional questions.    Albert Collazo MD  Infectious Disease  [facility]    Subjective:     Principal Problem:Left leg cellulitis    HPI: Mohsen Juilen is a 39 y.o. female with PMHx of HTN , HLD, and venous stasis dermatitis who presented to [facility] on 9/25 with the CC of nausea, vomiting, and subjective fevers/chills for 2 days. She follows with Dr Griffith for her chronic venous stasis and small wound of the left lower extremity. She was on PO fluconazole as well as a topical antifungal prior to presentation. She feels her wound has enlarged and the edema of the left lower leg has worsened.       Patient was started on vanc and pip-tazo. CT of the left tib-fib with soft tissue edema, no discrete fluid collection. LE venous u/s without DVT. MRI ordered 9/30 as patient seemed to clinically worsen, consistent with cellulitis, but fasciitis could not be excluded; therefore, clindamycin added back to regimen.     Interval History: Patient doing a bit better today - leg less swollen     Review of Systems   Constitutional: Positive for activity change. Negative for appetite change, chills, diaphoresis and fatigue.   HENT: Negative.    Eyes: Negative.    Respiratory: Negative.    Cardiovascular: Negative.    Gastrointestinal: Negative.    Endocrine: Negative.    Genitourinary: Negative.    Musculoskeletal: Negative.    Skin: Positive for wound.   Allergic/Immunologic: Negative.    Neurological: Negative.    Hematological: Negative.      Objective:     Vital Signs (Most Recent):  Temp: 99.2 °F (37.3 °C) (10/03/20 2021)  Pulse: 89 (10/03/20 2021)  Resp: 18 (10/03/20 2021)  BP: 126/61 (10/03/20 2021)  SpO2: 99 % (10/03/20 1100) Vital Signs (24h Range):  Temp:  [98.2 °F (36.8 °C)-99.2 °F (37.3 °C)] 99.2 °F (37.3 °C)  Pulse:  [80-92] 89  Resp:  [14-20] 18  SpO2:  [96 %-99 %] 99 %  BP: (100-129)/(56-83) 126/61     Weight: 134.9 kg (297 lb 6.4 oz)  Body mass index is 51.05 kg/m².    Estimated Creatinine Clearance: 48.3 mL/min (A) (based on SCr of 2.1 mg/dL (H)).    Physical Exam  Constitutional:       Appearance: She is obese.   HENT:      Head: Normocephalic.   Eyes:      Pupils: Pupils are equal, round, and reactive to light.   Neck:      Musculoskeletal: Normal range of motion.   Cardiovascular:      Rate and Rhythm: Normal rate and regular rhythm.   Pulmonary:      Breath sounds: Normal breath sounds.   Abdominal:      General: There is distension.   Musculoskeletal:         General: Swelling present.   Skin:     Comments: Less erythema, swelling and pain in left leg    Neurological:      Mental Status: She is  alert.         Significant Labs: All pertinent labs within the past 24 hours have been reviewed.    Significant Imaging: I have reviewed all pertinent imaging results/findings within the past 24 hours.

## 2020-10-04 NOTE — ASSESSMENT & PLAN NOTE
-cellulitis vs fascitis. Imaging reassuring not osteomyelitis  - Continue wound care, wound culture from 10/02 pending  - LLE MRI showed soft tissue swelling and scattered linear tracking of fluid  - LLE US showed no evidence of DVT  - Continue antipyretics PRN, afebrile x48hrs  - Blood cultures with no growth to date  - appreciate ID and surgery input  - Continue vanc and cefipime  - Discontinue flagyl and clindamycin per ID recs  - Increase Norco to 10mg q6hrs for pain control  - Continue dilaudid PRN for pain

## 2020-10-05 VITALS
WEIGHT: 293 LBS | RESPIRATION RATE: 16 BRPM | HEIGHT: 64 IN | DIASTOLIC BLOOD PRESSURE: 74 MMHG | TEMPERATURE: 99 F | OXYGEN SATURATION: 99 % | BODY MASS INDEX: 50.02 KG/M2 | HEART RATE: 87 BPM | SYSTOLIC BLOOD PRESSURE: 162 MMHG

## 2020-10-05 PROBLEM — R65.10 SIRS (SYSTEMIC INFLAMMATORY RESPONSE SYNDROME): Status: RESOLVED | Noted: 2020-09-25 | Resolved: 2020-10-05

## 2020-10-05 PROBLEM — E87.6 HYPOKALEMIA: Status: RESOLVED | Noted: 2020-09-25 | Resolved: 2020-10-05

## 2020-10-05 LAB
ALBUMIN SERPL BCP-MCNC: 2.1 G/DL (ref 3.5–5.2)
ALP SERPL-CCNC: 40 U/L (ref 55–135)
ALT SERPL W/O P-5'-P-CCNC: 20 U/L (ref 10–44)
ANION GAP SERPL CALC-SCNC: 9 MMOL/L (ref 8–16)
ANION GAP SERPL CALC-SCNC: 9 MMOL/L (ref 8–16)
AST SERPL-CCNC: 17 U/L (ref 10–40)
BASOPHILS # BLD AUTO: 0.02 K/UL (ref 0–0.2)
BASOPHILS # BLD AUTO: 0.03 K/UL (ref 0–0.2)
BASOPHILS NFR BLD: 0.2 % (ref 0–1.9)
BASOPHILS NFR BLD: 0.2 % (ref 0–1.9)
BILIRUB SERPL-MCNC: 0.4 MG/DL (ref 0.1–1)
BUN SERPL-MCNC: 11 MG/DL (ref 6–20)
BUN SERPL-MCNC: 11 MG/DL (ref 6–20)
CALCIUM SERPL-MCNC: 8.2 MG/DL (ref 8.7–10.5)
CALCIUM SERPL-MCNC: 8.7 MG/DL (ref 8.7–10.5)
CHLORIDE SERPL-SCNC: 100 MMOL/L (ref 95–110)
CHLORIDE SERPL-SCNC: 97 MMOL/L (ref 95–110)
CO2 SERPL-SCNC: 28 MMOL/L (ref 23–29)
CO2 SERPL-SCNC: 28 MMOL/L (ref 23–29)
CREAT SERPL-MCNC: 1.8 MG/DL (ref 0.5–1.4)
CREAT SERPL-MCNC: 1.8 MG/DL (ref 0.5–1.4)
DIFFERENTIAL METHOD: ABNORMAL
DIFFERENTIAL METHOD: ABNORMAL
EOSINOPHIL # BLD AUTO: 0.1 K/UL (ref 0–0.5)
EOSINOPHIL # BLD AUTO: 0.1 K/UL (ref 0–0.5)
EOSINOPHIL NFR BLD: 1 % (ref 0–8)
EOSINOPHIL NFR BLD: 1 % (ref 0–8)
ERYTHROCYTE [DISTWIDTH] IN BLOOD BY AUTOMATED COUNT: 14.8 % (ref 11.5–14.5)
ERYTHROCYTE [DISTWIDTH] IN BLOOD BY AUTOMATED COUNT: 14.9 % (ref 11.5–14.5)
EST. GFR  (AFRICAN AMERICAN): 40 ML/MIN/1.73 M^2
EST. GFR  (AFRICAN AMERICAN): 40 ML/MIN/1.73 M^2
EST. GFR  (NON AFRICAN AMERICAN): 34 ML/MIN/1.73 M^2
EST. GFR  (NON AFRICAN AMERICAN): 34 ML/MIN/1.73 M^2
GLUCOSE SERPL-MCNC: 108 MG/DL (ref 70–110)
GLUCOSE SERPL-MCNC: 115 MG/DL (ref 70–110)
HCT VFR BLD AUTO: 24.3 % (ref 37–48.5)
HCT VFR BLD AUTO: 24.5 % (ref 37–48.5)
HGB BLD-MCNC: 7.3 G/DL (ref 12–16)
HGB BLD-MCNC: 7.4 G/DL (ref 12–16)
IMM GRANULOCYTES # BLD AUTO: 0.17 K/UL (ref 0–0.04)
IMM GRANULOCYTES # BLD AUTO: 0.19 K/UL (ref 0–0.04)
IMM GRANULOCYTES NFR BLD AUTO: 1.3 % (ref 0–0.5)
IMM GRANULOCYTES NFR BLD AUTO: 1.5 % (ref 0–0.5)
LYMPHOCYTES # BLD AUTO: 1.6 K/UL (ref 1–4.8)
LYMPHOCYTES # BLD AUTO: 1.7 K/UL (ref 1–4.8)
LYMPHOCYTES NFR BLD: 12.5 % (ref 18–48)
LYMPHOCYTES NFR BLD: 13.2 % (ref 18–48)
MAGNESIUM SERPL-MCNC: 2.3 MG/DL (ref 1.6–2.6)
MCH RBC QN AUTO: 23.5 PG (ref 27–31)
MCH RBC QN AUTO: 23.8 PG (ref 27–31)
MCHC RBC AUTO-ENTMCNC: 29.8 G/DL (ref 32–36)
MCHC RBC AUTO-ENTMCNC: 30.5 G/DL (ref 32–36)
MCV RBC AUTO: 78 FL (ref 82–98)
MCV RBC AUTO: 79 FL (ref 82–98)
MONOCYTES # BLD AUTO: 1.2 K/UL (ref 0.3–1)
MONOCYTES # BLD AUTO: 1.3 K/UL (ref 0.3–1)
MONOCYTES NFR BLD: 10 % (ref 4–15)
MONOCYTES NFR BLD: 9.4 % (ref 4–15)
NEUTROPHILS # BLD AUTO: 9.4 K/UL (ref 1.8–7.7)
NEUTROPHILS # BLD AUTO: 9.7 K/UL (ref 1.8–7.7)
NEUTROPHILS NFR BLD: 74.3 % (ref 38–73)
NEUTROPHILS NFR BLD: 75.4 % (ref 38–73)
NRBC BLD-RTO: 0 /100 WBC
NRBC BLD-RTO: 0 /100 WBC
PHOSPHATE SERPL-MCNC: 3 MG/DL (ref 2.7–4.5)
PLATELET # BLD AUTO: 492 K/UL (ref 150–350)
PLATELET # BLD AUTO: 494 K/UL (ref 150–350)
PMV BLD AUTO: 9.5 FL (ref 9.2–12.9)
PMV BLD AUTO: 9.8 FL (ref 9.2–12.9)
POTASSIUM SERPL-SCNC: 3.6 MMOL/L (ref 3.5–5.1)
POTASSIUM SERPL-SCNC: 4.6 MMOL/L (ref 3.5–5.1)
PROT SERPL-MCNC: 7.6 G/DL (ref 6–8.4)
RBC # BLD AUTO: 3.1 M/UL (ref 4–5.4)
RBC # BLD AUTO: 3.11 M/UL (ref 4–5.4)
SODIUM SERPL-SCNC: 134 MMOL/L (ref 136–145)
SODIUM SERPL-SCNC: 137 MMOL/L (ref 136–145)
WBC # BLD AUTO: 12.71 K/UL (ref 3.9–12.7)
WBC # BLD AUTO: 12.9 K/UL (ref 3.9–12.7)

## 2020-10-05 PROCEDURE — 63600175 PHARM REV CODE 636 W HCPCS: Performed by: STUDENT IN AN ORGANIZED HEALTH CARE EDUCATION/TRAINING PROGRAM

## 2020-10-05 PROCEDURE — 80048 BASIC METABOLIC PNL TOTAL CA: CPT

## 2020-10-05 PROCEDURE — 97530 THERAPEUTIC ACTIVITIES: CPT | Mod: CQ

## 2020-10-05 PROCEDURE — 63600175 PHARM REV CODE 636 W HCPCS: Performed by: SURGERY

## 2020-10-05 PROCEDURE — 63600175 PHARM REV CODE 636 W HCPCS: Performed by: FAMILY MEDICINE

## 2020-10-05 PROCEDURE — 84100 ASSAY OF PHOSPHORUS: CPT

## 2020-10-05 PROCEDURE — 83735 ASSAY OF MAGNESIUM: CPT

## 2020-10-05 PROCEDURE — 99232 SBSQ HOSP IP/OBS MODERATE 35: CPT | Mod: ,,, | Performed by: INTERNAL MEDICINE

## 2020-10-05 PROCEDURE — 36415 COLL VENOUS BLD VENIPUNCTURE: CPT

## 2020-10-05 PROCEDURE — 25000003 PHARM REV CODE 250: Performed by: STUDENT IN AN ORGANIZED HEALTH CARE EDUCATION/TRAINING PROGRAM

## 2020-10-05 PROCEDURE — 90471 IMMUNIZATION ADMIN: CPT | Performed by: FAMILY MEDICINE

## 2020-10-05 PROCEDURE — 85025 COMPLETE CBC W/AUTO DIFF WBC: CPT | Mod: 91

## 2020-10-05 PROCEDURE — 97116 GAIT TRAINING THERAPY: CPT | Mod: CQ

## 2020-10-05 PROCEDURE — 99232 PR SUBSEQUENT HOSPITAL CARE,LEVL II: ICD-10-PCS | Mod: ,,, | Performed by: INTERNAL MEDICINE

## 2020-10-05 PROCEDURE — 80053 COMPREHEN METABOLIC PANEL: CPT

## 2020-10-05 PROCEDURE — 90686 IIV4 VACC NO PRSV 0.5 ML IM: CPT | Performed by: FAMILY MEDICINE

## 2020-10-05 RX ORDER — FUROSEMIDE 10 MG/ML
40 INJECTION INTRAMUSCULAR; INTRAVENOUS ONCE
Status: COMPLETED | OUTPATIENT
Start: 2020-10-05 | End: 2020-10-05

## 2020-10-05 RX ORDER — HYDROCODONE BITARTRATE AND ACETAMINOPHEN 10; 325 MG/1; MG/1
1 TABLET ORAL EVERY 6 HOURS PRN
Qty: 20 TABLET | Refills: 0 | Status: SHIPPED | OUTPATIENT
Start: 2020-10-05 | End: 2020-10-05

## 2020-10-05 RX ORDER — HYDROCODONE BITARTRATE AND ACETAMINOPHEN 10; 325 MG/1; MG/1
1 TABLET ORAL EVERY 6 HOURS PRN
Qty: 20 TABLET | Refills: 0 | Status: SHIPPED | OUTPATIENT
Start: 2020-10-05 | End: 2020-10-22 | Stop reason: SDUPTHER

## 2020-10-05 RX ORDER — POTASSIUM CHLORIDE 20 MEQ/1
40 TABLET, EXTENDED RELEASE ORAL ONCE
Status: COMPLETED | OUTPATIENT
Start: 2020-10-05 | End: 2020-10-05

## 2020-10-05 RX ORDER — AMOXICILLIN AND CLAVULANATE POTASSIUM 875; 125 MG/1; MG/1
1 TABLET, FILM COATED ORAL EVERY 12 HOURS
Qty: 26 TABLET | Refills: 0 | Status: SHIPPED | OUTPATIENT
Start: 2020-10-05 | End: 2020-10-12 | Stop reason: SDUPTHER

## 2020-10-05 RX ORDER — FUROSEMIDE 40 MG/1
40 TABLET ORAL DAILY
Qty: 14 TABLET | Refills: 0 | Status: SHIPPED | OUTPATIENT
Start: 2020-10-05 | End: 2020-10-12 | Stop reason: SDUPTHER

## 2020-10-05 RX ADMIN — HYDROCODONE BITARTRATE AND ACETAMINOPHEN 1 TABLET: 10; 325 TABLET ORAL at 05:10

## 2020-10-05 RX ADMIN — POTASSIUM CHLORIDE 40 MEQ: 1500 TABLET, EXTENDED RELEASE ORAL at 01:10

## 2020-10-05 RX ADMIN — HYDROCODONE BITARTRATE AND ACETAMINOPHEN 1 TABLET: 10; 325 TABLET ORAL at 12:10

## 2020-10-05 RX ADMIN — FUROSEMIDE 40 MG: 10 INJECTION, SOLUTION INTRAVENOUS at 12:10

## 2020-10-05 RX ADMIN — ENOXAPARIN SODIUM 40 MG: 40 INJECTION SUBCUTANEOUS at 05:10

## 2020-10-05 RX ADMIN — TRIAMTERENE AND HYDROCHLOROTHIAZIDE 1 CAPSULE: 25; 37.5 CAPSULE ORAL at 10:10

## 2020-10-05 RX ADMIN — HYDROMORPHONE HYDROCHLORIDE 1 MG: 1 INJECTION, SOLUTION INTRAMUSCULAR; INTRAVENOUS; SUBCUTANEOUS at 10:10

## 2020-10-05 RX ADMIN — METOPROLOL TARTRATE 100 MG: 50 TABLET, FILM COATED ORAL at 10:10

## 2020-10-05 RX ADMIN — HYDROCODONE BITARTRATE AND ACETAMINOPHEN 1 TABLET: 10; 325 TABLET ORAL at 01:10

## 2020-10-05 RX ADMIN — AMOXICILLIN AND CLAVULANATE POTASSIUM 1 TABLET: 875; 125 TABLET, FILM COATED ORAL at 10:10

## 2020-10-05 RX ADMIN — FERROUS SULFATE TAB EC 325 MG (65 MG FE EQUIVALENT) 325 MG: 325 (65 FE) TABLET DELAYED RESPONSE at 10:10

## 2020-10-05 RX ADMIN — INFLUENZA VIRUS VACCINE 0.5 ML: 15; 15; 15; 15 SUSPENSION INTRAMUSCULAR at 06:10

## 2020-10-05 RX ADMIN — FENOFIBRATE 145 MG: 145 TABLET ORAL at 12:10

## 2020-10-05 RX ADMIN — FAMOTIDINE 20 MG: 20 TABLET ORAL at 10:10

## 2020-10-05 RX ADMIN — MUPIROCIN: 20 OINTMENT TOPICAL at 10:10

## 2020-10-05 NOTE — NURSING
Pt being discharged home in stable condition. AVS packet given to pt. TERRIE Guerra to go over discharged instructions with pt.

## 2020-10-05 NOTE — PROGRESS NOTES
[facility] Progress Note    Date of Admit: 2020    Chief Complaint/Reason for Consult     Nonoliguric MAYUR stage II    Subjective:      Well this morning. No complaints. LE pain and tenderness is improving. Creatinine improving.    Review of Systems:  Pertinent positives and negatives are listed in HPI.  All other systems are reviewed and are negative.     Objective:   Last 24 Hour Vital Signs:  BP  Min: 112/70  Max: 130/71  Temp  Av.9 °F (37.2 °C)  Min: 98.5 °F (36.9 °C)  Max: 99.2 °F (37.3 °C)  Pulse  Av.5  Min: 85  Max: 98  Resp  Av.5  Min: 15  Max: 18  SpO2  Av %  Min: 99 %  Max: 99 %  Body mass index is 51.35 kg/m².  I/O last 3 completed shifts:  In: 140 [P.O.:140]  Out: 3750 [Urine:3750]    Physical Examination:  Gen: NAD, AAOx3  HEENT: NCAT, EOMI, PERRL, MM, pink conjunctiva  Neck: no thyroidmegaly, no LAD  Cardio: RRR, normal S1/S2, systolic murmur   Lungs: CTAB  Abd: obese, soft non tender, non distended  Ext: LLE with erythema and swelling, wrapped in bandage  Skin: LLE erythema, warm  Neuro:  UE/LE motor 5/5, sensation intact  Psych: conversational, responsive      Laboratory:  Most Recent Data:  CBC:   Lab Results   Component Value Date    WBC 12.90 (H) 10/05/2020    WBC 12.71 (H) 10/05/2020    HGB 7.4 (L) 10/05/2020    HGB 7.3 (L) 10/05/2020    HCT 24.3 (L) 10/05/2020    HCT 24.5 (L) 10/05/2020     (H) 10/05/2020     (H) 10/05/2020    MCV 78 (L) 10/05/2020    MCV 79 (L) 10/05/2020    RDW 14.8 (H) 10/05/2020    RDW 14.9 (H) 10/05/2020     BMP:   Lab Results   Component Value Date     10/05/2020    K 3.6 10/05/2020     10/05/2020    CO2 28 10/05/2020    BUN 11 10/05/2020    CREATININE 1.8 (H) 10/05/2020     10/05/2020    CALCIUM 8.2 (L) 10/05/2020    MG 2.3 10/05/2020    PHOS 3.0 10/05/2020     LFTs:   Lab Results   Component Value Date    PROT 7.6 10/05/2020    ALBUMIN 2.1 (L) 10/05/2020    BILITOT 0.4 10/05/2020    AST 17 10/05/2020    ALKPHOS  40 (L) 10/05/2020    ALT 20 10/05/2020     Urinalysis:   Lab Results   Component Value Date    LABURIN DIPHTHEROIDS  10,000 - 49,999 cfu/ml   (A) 09/26/2020    COLORU Yellow 10/02/2020    SPECGRAV 1.010 10/02/2020    NITRITE Negative 10/02/2020    KETONESU Negative 10/02/2020    UROBILINOGEN Negative 10/02/2020    WBCUA 2 09/27/2020       Trended Lab Data:  Recent Labs   Lab 10/03/20  0508 10/04/20  0550 10/05/20  0640   WBC 11.23 13.87*  13.54* 12.90*  12.71*   HGB 7.8* 9.0*  8.7* 7.4*  7.3*   HCT 27.0* 30.2*  29.6* 24.3*  24.5*   * 537*  530* 494*  492*   MCV 83 79*  80* 78*  79*   RDW 15.1* 14.7*  14.9* 14.8*  14.9*    139 137   K 3.8 3.4* 3.6    99 100   CO2 25 28 28   BUN 11 12 11   CREATININE 2.1* 2.1* 1.8*   * 102 108   PROT 7.2 8.8* 7.6   ALBUMIN 2.0* 2.5* 2.1*   BILITOT 0.4 0.4 0.4   AST 19 27 17   ALKPHOS 51* 58 40*   ALT 28 28 20           Radiology:  Imaging Results          X-Ray Chest 1 View (Final result)  Result time 09/25/20 16:27:41    Final result by Ivonne Gottlieb MD (09/25/20 16:27:41)                 Impression:      No acute cardiopulmonary abnormality.      Electronically signed by: Ivonne Gottlieb  Date:    09/25/2020  Time:    16:27             Narrative:    EXAMINATION:  XR CHEST 1 VIEW    CLINICAL HISTORY:  COPD Exacerbation;    TECHNIQUE:  Single view of the chest was obtained.    COMPARISON:  Multiple priors, most recent 02/13/2019    FINDINGS:  The right costophrenic angles excluded from field of view.    Normal cardiomediastinal contour. No focal consolidation, pleural effusion or pneumothorax.                               US Lower Extremity Veins Bilateral (Final result)  Result time 09/25/20 14:22:39    Final result by Charlie Mike Jr., MD (09/25/20 14:22:39)                 Impression:      No evidence of deep venous thrombosis in either lower extremity.      Electronically signed by: Charlie Ochoa  Jr  Date:    09/25/2020  Time:    14:22             Narrative:    EXAMINATION:  US LOWER EXTREMITY VEINS BILATERAL    CLINICAL HISTORY:  Other specified soft tissue disorders    TECHNIQUE:  Duplex and color flow Doppler and dynamic compression was performed of the bilateral lower extremity veins was performed.    COMPARISON:  None    FINDINGS:  Right thigh veins: The common femoral, femoral, popliteal, upper greater saphenous, and deep femoral veins are patent and free of thrombus. The veins are normally compressible and have normal phasic flow and augmentation response.    Right calf veins: The visualized calf veins are patent.    Left thigh veins: The common femoral, femoral, popliteal, upper greater saphenous, and deep femoral veins are patent and free of thrombus. The veins are normally compressible and have normal phasic flow and augmentation response.    Left calf veins: The visualized calf veins are patent.    Miscellaneous: None                                   Assessment and Plan:      Mohsen Julien is a 39 y.o.female with  Patient Active Problem List    Diagnosis Date Noted    MAYUR (acute kidney injury) 09/30/2020    Left leg cellulitis 09/26/2020    Hypokalemia 09/25/2020    Hyperlipidemia 09/25/2020    SIRS (systemic inflammatory response syndrome) 09/25/2020    Fungal infection of skin 06/19/2019    Venous stasis dermatitis of left lower extremity     HTN (hypertension) 05/08/2013    Morbidly obese 05/08/2013       Nonoliguric MAYUR stage II  -Suspect ATN multifactorial, RAYO, abx, infx, NSAIDs  -Renal US w/ inc RI's  -Creatinine improved this AM to 1.8, stable  -UOP 2.9L  -Avoid renally toxic meds.   -continue to trend Cr  -Strict I/O's    HTN  -stable, continue strict BP control    Spoke with family medicine about possibly keeping patient 1 more night for diuresis. Patient wants to go home and will be followed closely in next couple days with family medicine in clinic. They will be giving  1x Lasix IV 40mg  And then follow up BMP later today. Patient will start Lasix 40 mg PO qd at home. She must have close follow-up in clinic for assessment of kidney function and fluid status. Okay with discharge following BMP this afternoon for assessment.    Severino Garnett MD   [facility]

## 2020-10-05 NOTE — DISCHARGE SUMMARY
[facility] Hospital Medicine  Discharge Summary      Patient Name: Mohsen Julien  MRN: [id number]  Admission Date: 9/25/2020  Hospital Length of Stay: 8 days  Discharge Date and Time:  10/05/2020 6:55 PM  Attending Physician: Ivette Dupree MD   Discharging Provider: Cl Washington DO  Primary Care Provider: Christopher Diaz MD      HPI:   Patient is a 39 y.o.  female with PMHx of HTN and HLD presenting with fevers and chills starting last night. Pt  Reports associated nausea and vomiting with headache and left leg swelling. Pt reports fevers and chills persisted this morning and  Was told by Dr. Griffith to come to the ED. Pt reports left leg skin changes for a couple of months and she has been taking antifungal medications, po and cream, to treat the wound. Pt reports using compression socks but noticed clear discharge on the socks.     In ED, patient was febrile with nausea. Pt given zofran and started IVF. Lab work presented hypokalemia of 2.7, ESR 42,  and WBC  Of  17. Pt given onedose of 40mEq K repletion. Blood cultures were ordered. Pt admitted to observation for hypokalemia and cellulitis.     * No surgery found *      Hospital Course:   Pt initially met SIRS criteria and was started on Vanc and zosyn. She then developed MAYUR and zosyn was discontinued, flagyl and cefipime was initiated. Blood cultures without any growth and UA did not grow any non-skin mark. Creatinine remained elevated at 2.2 so mIVF increased on 10/1/2020. MRI consistent with cellulitis, however cannot rule out fascitis so clindamycin added to antibiotic regimen 10/1/2020. Elevated Creatinine persisted despite increase in mIVF,so further work-up done to investigate. FeUrea suggestive of intrinsic causes of MAYUR. Nephrology consulted and suspected ATN, recommended avoiding nephrotoxic agents and to closely monitor outpatient for improvement and resolution. Creatinine decreased to 1.8 on  day of discharge with outpatient plan to increase lasix dose to 40mg po daily and repeat BMP and follow-up with PCP later this week. IV antibiotics discontinued and po augmentin initiated 10/4/2020. Pt tolerated oral antibiotics well, remained afebrile and kidney function improved with continued adequate UOP therefore was stable for discharge. Pt will need to continue oral augmentin until 10/18 and have close following of kidney function outpatient. Pain continued to be an issue during hospitalization requiring 10mg Norco q6hrs with PRN dilaudid. Pain was increased as she became more mobile and worked with PT during hospitalization. Dr. Griffith is managing her pain medications outpatient so plan to give short course of Norco until she can be seen by him.      Consults:   Consults (From admission, onward)        Status Ordering Provider     Inpatient consult to Anesthesiology  Once     Provider:  Humberto Allison MD    Acknowledged GIOVANNA REBOLLEDO     Inpatient consult to Anesthesiology  Once     Provider:  (Not yet assigned)    Acknowledged MARGARET SCALES     Inpatient consult to General Surgery  Once     Provider:  Ana Griffith MD    Acknowledged RODOLFO ALMEIDA     Inpatient consult to Infectious Diseases  Once     Provider:  Luis Felipe Schwarz MD    Completed RODOLFO ALMEIDA     Inpatient consult to Nephrology-[facility]  Once     Provider:  (Not yet assigned)    Completed JB ROBERTS          No new Assessment & Plan notes have been filed under this hospital service since the last note was generated.  Service: Hospital Medicine    Final Active Diagnoses:    Diagnosis Date Noted POA    PRINCIPAL PROBLEM:  Left leg cellulitis [L03.116] 09/26/2020 Yes    MAYUR (acute kidney injury) [N17.9] 09/30/2020 No    HTN (hypertension) [I10] 05/08/2013 Yes    Hyperlipidemia [E78.5] 09/25/2020 Yes    Morbidly obese [E66.01] 05/08/2013 Yes      Problems Resolved During this Admission:    Diagnosis Date Noted Date  Resolved POA    SIRS (systemic inflammatory response syndrome) [R65.10] 09/25/2020 10/05/2020 Yes    Hypokalemia [E87.6] 09/25/2020 10/05/2020 Yes       Discharged Condition: stable    Disposition: Home or Self Care    Follow Up:  Follow-up Information     Christopher Diaz MD On 10/19/2020.    Specialty: Family Medicine  Why: 9:20 am   - you will see Mr. Mccray, PA   - you are on a wait list for a sooner apt     Contact information:  [address]  [address]  [place] [address]  [phone]             Ana Griffith MD In 1 week.    Specialties: General Surgery, Surgery  Contact information:  [address]  [address]  [place] [address]  [phone]                 Patient Instructions:      Comprehensive metabolic panel   Standing Status: Future Standing Exp. Date: 12/04/21     Basic metabolic panel   Standing Status: Future Standing Exp. Date: 12/04/21     Diet renal     Notify your health care provider if you experience any of the following:  temperature >100.4     Notify your health care provider if you experience any of the following:  persistent nausea and vomiting or diarrhea     Notify your health care provider if you experience any of the following:  severe uncontrolled pain     Notify your health care provider if you experience any of the following:  severe persistent headache     Notify your health care provider if you experience any of the following:  increased confusion or weakness     Notify your health care provider if you experience any of the following:  redness, tenderness, or signs of infection (pain, swelling, redness, odor or green/yellow discharge around incision site)     Activity as tolerated       Significant Diagnostic Studies: Labs:   CMP   Recent Labs   Lab 10/04/20  0550 10/05/20  0640 10/05/20  1550    137 134*   K 3.4* 3.6 4.6   CL 99 100 97   CO2 28 28 28    108 115*   BUN 12 11 11   CREATININE 2.1* 1.8* 1.8*   CALCIUM 8.6* 8.2* 8.7   PROT 8.8* 7.6  --     ALBUMIN 2.5* 2.1*  --    BILITOT 0.4 0.4  --    ALKPHOS 58 40*  --    AST 27 17  --    ALT 28 20  --    ANIONGAP 12 9 9   ESTGFRAFRICA 33* 40* 40*   EGFRNONAA 29* 34* 34*   , CBC   Recent Labs   Lab 10/04/20  0550 10/05/20  0640   WBC 13.87*  13.54* 12.90*  12.71*   HGB 9.0*  8.7* 7.4*  7.3*   HCT 30.2*  29.6* 24.3*  24.5*   *  530* 494*  492*    and All labs within the past 24 hours have been reviewed  Microbiology:   Blood Culture   Lab Results   Component Value Date    LABBLOO No growth after 5 days. 09/27/2020    and Wound Culture: negative  Radiology:    9/30/2020  MRI Tibia Fibula Without Contrast: Generalized soft tissue swelling with scattered, linear tracking fluid along the superficial fascia of the left lower extremity.  Differential etiologies to include noninfectious edema versus cellulitis.  No discrete collection noting noncontrast exam.  Note that component of fasciitis can not be excluded on the basis of imaging.    Pending Diagnostic Studies:     None         Medications:  Reconciled Home Medications:      Medication List      START taking these medications    amoxicillin-clavulanate 875-125mg 875-125 mg per tablet  Commonly known as: AUGMENTIN  Take 1 tablet by mouth every 12 (twelve) hours. for 13 days     furosemide 40 MG tablet  Commonly known as: LASIX  Take 1 tablet (40 mg total) by mouth once daily. for 14 days     HYDROcodone-acetaminophen  mg per tablet  Commonly known as: NORCO  Take 1 tablet by mouth every 6 (six) hours as needed for Pain.        CHANGE how you take these medications    clotrimazole-betamethasone 1-0.05% cream  Commonly known as: LOTRISONE  Apply topically 2 (two) times daily.  What changed: Another medication with the same name was removed. Continue taking this medication, and follow the directions you see here.     metoprolol tartrate 100 MG tablet  Commonly known as: LOPRESSOR  TAKE ONE TABLET BY MOUTH TWO TIMES A DAY FOR BLOOD PRESSURE  What  changed: Another medication with the same name was removed. Continue taking this medication, and follow the directions you see here.     traMADoL 50 mg tablet  Commonly known as: ULTRAM  TAKE 1 TABLET BY MOUTH TWICE DAILY  What changed: Another medication with the same name was removed. Continue taking this medication, and follow the directions you see here.     triamterene-hydrochlorothiazide 37.5-25 mg 37.5-25 mg per tablet  Commonly known as: MAXZIDE-25  TAKE ONE TABLET BY MOUTH ONCE DAILY FOR BLOOD PRESSURE  What changed: Another medication with the same name was removed. Continue taking this medication, and follow the directions you see here.        CONTINUE taking these medications    amitriptyline 25 MG tablet  Commonly known as: ELAVIL  TAKE 1 to 2 TABLETS BY MOUTH EVERY NIGHT AT BEDTIME AS NEEDED FOR SLEEP DISTURBANCE     fenofibrate 160 MG Tab  Take 1 tablet (160 mg total) by mouth once daily for hypertriglyceridemia     ferrous sulfate 325 mg (65 mg iron) Tab tablet  Commonly known as: FEOSOL  325 mg once daily.     methocarbamoL 750 MG Tab  Commonly known as: ROBAXIN  Take 1 tablet (750 mg total) by mouth 3 (three) times daily as needed for muscle spasms.     MIRENA 20 mcg/24 hours (5 yrs) 52 mg IUD  Generic drug: levonorgestreL  TO BE INSERTED ONE TIME BY PRESCRIBER. ROUTE INTRAUTERINE.     mupirocin 2 % ointment  Commonly known as: BACTROBAN  APPLY AND GENTLY MASSAGE INTO AFFECTED AREA(S) TWICE DAILY.     potassium chloride 10 MEQ Tbsr  Commonly known as: KLOR-CON  take one tablet  by mouth daily for potassium replacment     simvastatin 20 MG tablet  Commonly known as: ZOCOR  Take 1 tablet (20 mg total) by mouth every evening at bedtime to lower cholesterol        STOP taking these medications    amoxicillin 500 MG capsule  Commonly known as: AMOXIL     betamethasone valerate 0.1% 0.1 % Crea  Commonly known as: VALISONE     cephALEXin 250 MG capsule  Commonly known as: KEFLEX     flu vacc [id number]  6mos up(PF) 60 mcg (15 mcg x 4)/0.5 mL Syrg  Commonly known as: FLUARIX QUAD [id number] (PF)     fluconazole 150 MG Tab  Commonly known as: DIFLUCAN     fluconazole 50 MG Tab  Commonly known as: DIFLUCAN     FLUZONE QUAD 7384-8592 (PF) 60 mcg (15 mcg x 4)/0.5 mL Syrg  Generic drug: flu vac [id number] 36mos up(PF)     FLUZONE QUAD 9496-6895 (PF) 60 mcg (15 mcg x 4)/0.5 mL Syrg  Generic drug: flu vac [id number] 36mos up(PF)     ibuprofen 800 MG tablet  Commonly known as: ADVIL,MOTRIN     OPW TEST CLAIM - DO NOT FILL     oxyCODONE-acetaminophen  mg per tablet  Commonly known as: PERCOCET     sulfamethoxazole-trimethoprim 800-160mg 800-160 mg Tab  Commonly known as: BACTRIM DS     VITAMIN D2 50,000 unit Cap  Generic drug: ergocalciferol            Indwelling Lines/Drains at time of discharge:   Lines/Drains/Airways     None                 Time spent on the discharge of patient: >30 minutes  Patient was seen and examined on the date of discharge and determined to be suitable for discharge.         Cl Washington DO  Department of Hospital Medicine  [facility]

## 2020-10-05 NOTE — SUBJECTIVE & OBJECTIVE
Interval History: Pt doing well this morning. Remained afebrile overnight. Tolerated po augmentin well. Continues to have good UOP without dysuria or hematuria. Pain well controlled.     Review of Systems   Constitutional: Negative for appetite change and fever.   HENT: Negative for congestion, ear pain and sore throat.    Eyes: Negative for redness and visual disturbance.   Respiratory: Negative for cough, shortness of breath and wheezing.    Cardiovascular: Negative for chest pain and leg swelling.   Gastrointestinal: Negative for abdominal pain, diarrhea and nausea.   Genitourinary: Negative for decreased urine volume and difficulty urinating.   Musculoskeletal: Negative for back pain and neck pain.   Neurological: Negative for dizziness, light-headedness and headaches.   Hematological: Negative for adenopathy. Does not bruise/bleed easily.   Psychiatric/Behavioral: Negative for behavioral problems and sleep disturbance.     Objective:     Vital Signs (Most Recent):  Temp: 99 °F (37.2 °C) (10/05/20 0536)  Pulse: 95 (10/05/20 0536)  Resp: 18 (10/05/20 0539)  BP: 112/70 (10/05/20 0536)  SpO2: 99 % (10/04/20 1158) Vital Signs (24h Range):  Temp:  [98.9 °F (37.2 °C)-99.2 °F (37.3 °C)] 99 °F (37.2 °C)  Pulse:  [85-98] 95  Resp:  [15-18] 18  SpO2:  [99 %] 99 %  BP: (112-130)/(53-71) 112/70     Weight: 135.7 kg (299 lb 2.6 oz)  Body mass index is 51.35 kg/m².    Intake/Output Summary (Last 24 hours) at 10/5/2020 0641  Last data filed at 10/5/2020 0000  Gross per 24 hour   Intake 140 ml   Output 2950 ml   Net -2810 ml      Physical Exam  Vitals signs and nursing note reviewed.   Constitutional:       General: She is not in acute distress.     Appearance: Normal appearance.   HENT:      Head: Normocephalic and atraumatic.      Right Ear: External ear normal.      Left Ear: External ear normal.      Nose: Nose normal. No congestion.      Mouth/Throat:      Mouth: Mucous membranes are moist.      Pharynx: Oropharynx is  clear.   Eyes:      Extraocular Movements: Extraocular movements intact.      Conjunctiva/sclera: Conjunctivae normal.   Neck:      Musculoskeletal: Normal range of motion. No muscular tenderness.   Cardiovascular:      Rate and Rhythm: Normal rate and regular rhythm.      Pulses: Normal pulses.      Heart sounds: No murmur.   Pulmonary:      Effort: Pulmonary effort is normal. No respiratory distress.      Breath sounds: No wheezing.   Abdominal:      General: Abdomen is flat. Bowel sounds are normal.      Palpations: Abdomen is soft.      Tenderness: There is no abdominal tenderness. There is no guarding.   Musculoskeletal: Normal range of motion.         General: No swelling.      Right lower leg: No edema.      Left lower leg: No edema.   Lymphadenopathy:      Cervical: No cervical adenopathy.   Skin:     General: Skin is warm and dry.      Capillary Refill: Capillary refill takes less than 2 seconds.      Findings: Erythema and rash present.      Comments: L LE wound dressing clean, dry and intact. Erythema and edema continues to improve.    Neurological:      General: No focal deficit present.      Mental Status: She is alert and oriented to person, place, and time.      Sensory: No sensory deficit.      Coordination: Coordination normal.   Psychiatric:         Mood and Affect: Mood normal.         Behavior: Behavior normal.         Significant Labs:   CBC:   Recent Labs   Lab 10/04/20  0550   WBC 13.87*  13.54*   HGB 9.0*  8.7*   HCT 30.2*  29.6*   *  530*     CMP:   Recent Labs   Lab 10/04/20  0550      K 3.4*   CL 99   CO2 28      BUN 12   CREATININE 2.1*   CALCIUM 8.6*   PROT 8.8*   ALBUMIN 2.5*   BILITOT 0.4   ALKPHOS 58   AST 27   ALT 28   ANIONGAP 12   EGFRNONAA 29*     All pertinent labs within the past 24 hours have been reviewed.    Significant Imaging: I have reviewed all pertinent imaging results/findings within the past 24 hours.

## 2020-10-05 NOTE — PROGRESS NOTES
[facility] Infectious Diseases Progress Note    Assessment/Plan:     Mohsen Julien is a 39 y.o. female with PMHx of HTN , HLD, and venous stasis dermatitis who presented to [facility] on  with the CC of nausea, vomiting, and subjective fevers/chills for 2 days. She follows with Dr Griffith for her chronic venous stasis and small wound of the left lower extremity. She was on PO fluconazole as well as a topical antifungal prior to presentation. She feels her wound has enlarged and the edema of the left lower leg has worsened.      Patient was started on vanc and pip-tazo. CT of the left tib-fib with soft tissue edema, no discrete fluid collection. LE venous u/s without DVT. MRI ordered  as patient seemed to clinically worsen, consistent with cellulitis, but fasciitis could not be excluded; therefore, clindamycin added back to regimen.    Recommendations:  - Continue Augmentin 875-125 BID, recommend discharging on Augmentin for additional 14 days of therapy (last day 10/18).     Luis Felipe Georeg MD  [facility] Infectious Diseases, PGY-4  Cell: [phone]    Thank you for allowing us to participate in the care of this patient. We will continue to follow along. Case has been discussed with consult staff, who is in agreement with assessment and plan. ID will sign off.     Subjective:      Doing well today. Anxious to return home. No complaints.     Objective:   Last 24 Hour Vital Signs:  BP  Min: 112/70  Max: 130/71  Temp  Av.9 °F (37.2 °C)  Min: 98.5 °F (36.9 °C)  Max: 99.2 °F (37.3 °C)  Pulse  Av  Min: 85  Max: 98  Resp  Av.5  Min: 15  Max: 18  I/O last 3 completed shifts:  In: 140 [P.O.:140]  Out: 3750 [Urine:3750]    Physical Exam  Constitutional:       General: She is not in acute distress.     Appearance: Normal appearance. She is obese. She is not ill-appearing or diaphoretic.   HENT:      Head: Normocephalic and atraumatic.      Nose: Nose normal.      Mouth/Throat:      Mouth: Mucous membranes  are moist.      Pharynx: Oropharynx is clear. No oropharyngeal exudate or posterior oropharyngeal erythema.   Eyes:      General: No scleral icterus.     Conjunctiva/sclera: Conjunctivae normal.   Neck:      Musculoskeletal: Normal range of motion and neck supple. No neck rigidity.   Cardiovascular:      Rate and Rhythm: Normal rate and regular rhythm.      Heart sounds: Normal heart sounds.   Pulmonary:      Effort: Pulmonary effort is normal.      Breath sounds: Normal breath sounds.   Abdominal:      General: There is no distension.      Palpations: Abdomen is soft.      Tenderness: There is no abdominal tenderness.   Musculoskeletal:         General: Swelling (improving significantly ) present.      Comments: Dressing off. Leg with some blistering/chronic skin changes.   No drainage, minimal erythema appreciated  Edema improving.    Skin:     Coloration: Skin is not jaundiced.      Findings: No lesion or rash.   Neurological:      General: No focal deficit present.      Mental Status: She is alert and oriented to person, place, and time.   Psychiatric:         Mood and Affect: Mood normal.         Behavior: Behavior normal.         Laboratory:  Laboratory Data Reviewed: yes  Pertinent Findings:  Recent Labs   Lab 10/03/20  0508 10/04/20  0550 10/05/20  0640   WBC 11.23 13.87*  13.54* 12.90*  12.71*   HGB 7.8* 9.0*  8.7* 7.4*  7.3*   HCT 27.0* 30.2*  29.6* 24.3*  24.5*   * 537*  530* 494*  492*   MCV 83 79*  80* 78*  79*   RDW 15.1* 14.7*  14.9* 14.8*  14.9*    139 137   K 3.8 3.4* 3.6    99 100   CO2 25 28 28   BUN 11 12 11   CREATININE 2.1* 2.1* 1.8*   * 102 108   PROT 7.2 8.8* 7.6   ALBUMIN 2.0* 2.5* 2.1*   BILITOT 0.4 0.4 0.4   AST 19 27 17   ALKPHOS 51* 58 40*   ALT 28 28 20         Microbiology Data:  Blood culture 9/25 NGTD  Blood culture 9/26 NGTD  Urine culture 9/26 < 50k cfu diphtheroids  Wound culture 9/26 with normal skin mark  Blood culture 9/27  NGTD    Antimicrobials:  Pip-tazo 9/25 - 9/27  Vancomycin 9/25 - 10/4  Cefepime 9/29 - 10/4  Flagyl 9/29 - 10/2  Clindamycin 9/25, 10/1 - 10/3  Augmentin 10/4 - present    Other Results:  Radiology Results:  X-ray Chest 1 View    Result Date: 9/27/2020  EXAMINATION: XR CHEST 1 VIEW CLINICAL HISTORY: Febrile. TECHNIQUE: Single frontal view of the chest was performed. COMPARISON: Multiple prior radiographs of the chest, most recent from 09/25/2020. FINDINGS: The lungs are well expanded and clear. No focal opacities are seen. The pleural spaces are clear.  The cardiac silhouette is borderline enlarged although this may be accentuated by technique.  The visualized osseous structures are unremarkable.     No acute cardiopulmonary abnormality. Electronically signed by: Vignesh Sanchez Date:    09/27/2020 Time:    10:25    X-ray Chest 1 View    Result Date: 9/25/2020  EXAMINATION: XR CHEST 1 VIEW CLINICAL HISTORY: COPD Exacerbation; TECHNIQUE: Single view of the chest was obtained. COMPARISON: Multiple priors, most recent 02/13/2019 FINDINGS: The right costophrenic angles excluded from field of view. Normal cardiomediastinal contour. No focal consolidation, pleural effusion or pneumothorax.     No acute cardiopulmonary abnormality. Electronically signed by: Ivonne Gottlieb Date:    09/25/2020 Time:    16:27    Us Lower Extremity Veins Bilateral    Result Date: 9/25/2020  EXAMINATION: US LOWER EXTREMITY VEINS BILATERAL CLINICAL HISTORY: Other specified soft tissue disorders TECHNIQUE: Duplex and color flow Doppler and dynamic compression was performed of the bilateral lower extremity veins was performed. COMPARISON: None FINDINGS: Right thigh veins: The common femoral, femoral, popliteal, upper greater saphenous, and deep femoral veins are patent and free of thrombus. The veins are normally compressible and have normal phasic flow and augmentation response. Right calf veins: The visualized calf veins are patent. Left thigh veins:  The common femoral, femoral, popliteal, upper greater saphenous, and deep femoral veins are patent and free of thrombus. The veins are normally compressible and have normal phasic flow and augmentation response. Left calf veins: The visualized calf veins are patent. Miscellaneous: None     No evidence of deep venous thrombosis in either lower extremity. Electronically signed by: Charlie Ochoa Jr Date:    09/25/2020 Time:    14:22    Us Lower Extremity Veins Left    Result Date: 9/30/2020  EXAMINATION: US LOWER EXTREMITY VEINS LEFT CLINICAL HISTORY: left leg swelling with blisters r/o dvt; Systemic inflammatory response syndrome (sirs) of non-infectious origin without acute organ dysfunction TECHNIQUE: Duplex and color flow Doppler evaluation and graded compression of the left lower extremity veins was performed. COMPARISON: 09/25/2020 FINDINGS: Duplex and color flow Doppler evaluation does not reveal any evidence of acute venous thrombosis in the common femoral, superficial femoral, greater saphenous, popliteal, peroneal, anterior tibial and posterior tibial veins of the left lower extremity.  There is no reflux to suggest valvular incompetence.  There is subcutaneous edema.  There are partially visualized prominent left inguinal lymph nodes.     No evidence of deep venous thrombosis in the left lower extremity. Subcutaneous edema noting mildly prominent left inguinal lymph nodes. Electronically signed by: Cesar Milton MD Date:    09/30/2020 Time:    13:59    Mri Tibia Fibula Without Contrast Left    Result Date: 9/30/2020  EXAMINATION: MRI TIBIA FIBULA WITHOUT CONTRAST LEFT CLINICAL HISTORY: Lower leg swelling/redness, cellulitis suspected;  Other specified soft tissue disorders TECHNIQUE: Multiplanar, multisequence images were obtained through the left tibia without the use of intravenous contrast. COMPARISON: None available. FINDINGS: Bone marrow signal appears maintained.  No evidence for fracture or  infiltrative marrow process.  There is subcutaneous edema throughout the visualized lower extremity with scattered, linear tracking fluid along the superficial fascial clear.  No significant intermuscular edema or discrete soft tissue collection, noting lack of intravenous contrast.     Generalized soft tissue swelling with scattered, linear tracking fluid along the superficial fascia of the left lower extremity.  Differential etiologies to include noninfectious edema versus cellulitis.  No discrete collection noting noncontrast exam.  Note that component of fasciitis can not be excluded on the basis of imaging. Electronically signed by: Severino Rosen Date:    09/30/2020 Time:    12:59    Ct Thigh W W/o Contrast Left    Result Date: 9/26/2020  EXAMINATION: CT THIGH W W/O CONTRAST LEFT; CT LEG (TIBIA-FIBULA) W W/O CONTRAST LEFT CLINICAL HISTORY: Concern for necrotizing fasciitis.;; Concern for necrotizing fasciitis. Patient with lower leg cellulitis.; TECHNIQUE: CT of the left lower extremity was obtained from the level of the pelvis to the feet.  Images were obtained prior to and following administration of 100 cc IV Omnipaque 350.  Multiplanar reconstructions were obtained and reviewed. COMPARISON: None FINDINGS: There is nonspecific subcutaneous soft tissue edema in the left lower extremity below the level of the knee.  No fluid collections visualized to suggest abscess.  No soft tissue gas visualized to suggest necrotizing fasciitis.  No acute or suspicious osseous findings demonstrated.  Visualized musculature appears grossly normal. There is an IUD in place within the uterus.  No adnexal masses visualized.  The urinary bladder appears unremarkable. There are multiple nonspecific enlarged left external iliac and inguinal lymph nodes present measuring up to 15 mm and 19 mm in axial short dimension respectively.  Visualized vasculature appears grossly within normal limits.     1. Nonspecific subcutaneous soft  tissue edema in the left lower leg possibly representing cellulitis, correlate clinically.  No definite abscess or evidence of necrotizing fasciitis. 2. Nonspecific left external iliac chain and inguinal adenopathy, likely reactive in nature. Electronically signed by: Humberto Love MD Date:    09/26/2020 Time:    12:34    Ct Leg (tibia-fibula) W W/o Contrast Left    Result Date: 9/26/2020  EXAMINATION: CT THIGH W W/O CONTRAST LEFT; CT LEG (TIBIA-FIBULA) W W/O CONTRAST LEFT CLINICAL HISTORY: Concern for necrotizing fasciitis.;; Concern for necrotizing fasciitis. Patient with lower leg cellulitis.; TECHNIQUE: CT of the left lower extremity was obtained from the level of the pelvis to the feet.  Images were obtained prior to and following administration of 100 cc IV Omnipaque 350.  Multiplanar reconstructions were obtained and reviewed. COMPARISON: None FINDINGS: There is nonspecific subcutaneous soft tissue edema in the left lower extremity below the level of the knee.  No fluid collections visualized to suggest abscess.  No soft tissue gas visualized to suggest necrotizing fasciitis.  No acute or suspicious osseous findings demonstrated.  Visualized musculature appears grossly normal. There is an IUD in place within the uterus.  No adnexal masses visualized.  The urinary bladder appears unremarkable. There are multiple nonspecific enlarged left external iliac and inguinal lymph nodes present measuring up to 15 mm and 19 mm in axial short dimension respectively.  Visualized vasculature appears grossly within normal limits.     1. Nonspecific subcutaneous soft tissue edema in the left lower leg possibly representing cellulitis, correlate clinically.  No definite abscess or evidence of necrotizing fasciitis. 2. Nonspecific left external iliac chain and inguinal adenopathy, likely reactive in nature. Electronically signed by: Humbreto Love MD Date:    09/26/2020 Time:    12:34    Mammo Digital Screening Bilat W/  Butch    Result Date: 9/14/2020  Result: Mammo Digital Screening Bilat w/ Butch History: Patient is 39 y.o. and is seen for a screening mammogram. Films Compared: Prior images (if available) were compared. Findings: This procedure was performed using tomosynthesis. Computer-aided detection was utilized in the interpretation of this examination.  The breasts have scattered areas of fibroglandular density. There is no evidence of suspicious masses, microcalcifications or architectural distortion. 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     No mammographic evidence of malignancy. BI-RADS Category 1: Negative Recommendation: Routine screening mammogram in 1 year is recommended. Your estimated lifetime risk of breast cancer (to age 85) based on Tyrer-Cuzick risk assessment model is Tyrer-Cuzick: 8.52 %. According to the [employer], patients with a lifetime breast cancer risk of 20% or higher might benefit from supplemental screening tests.       Current Medications:     Infusions:       Scheduled:   amoxicillin-clavulanate 875-125mg  1 tablet Oral Q12H    enoxparin  40 mg Subcutaneous Q24H    famotidine  20 mg Oral Daily    fenofibrate  145 mg Oral Daily    ferrous sulfate  325 mg Oral Daily    HYDROcodone-acetaminophen  1 tablet Oral Q6H    metoprolol tartrate  100 mg Oral BID    mupirocin   Topical (Top) BID    simvastatin  20 mg Oral QHS    triamterene-hydrochlorothiazide 37.5-25 mg  1 capsule Oral Daily        PRN:  acetaminophen, amitriptyline, HYDROmorphone, hydrOXYzine HCL, loperamide, melatonin, ondansetron, sodium chloride 0.9%

## 2020-10-05 NOTE — PT/OT/SLP PROGRESS
"Occupational Therapy  Visit Attempt    Patient Name:  Mohsen Julien   MRN:  [id number]    Patient not seen today secondary to Pain(Patient reporting 10/10 pain in LLE and is "waiting for pain to come down" before working with therapy. Will follow-up as time permits.).    JELANI Whitten  10/5/2020  "

## 2020-10-05 NOTE — ASSESSMENT & PLAN NOTE
- K  2.7 on admission, pending this AM  -replete as needed.  - Continues to be asymptomatic without EKG changes

## 2020-10-05 NOTE — PROGRESS NOTES
[facility] Medicine  Progress Note    Patient Name: Mohsen Julien  MRN: [id number]  Patient Class: IP- Inpatient   Admission Date: 9/25/2020  Length of Stay: 8 days  Attending Physician: Jeremy White III, MD  Primary Care Provider: Christopher Diaz MD        Subjective:     Principal Problem:Left leg cellulitis        HPI:  Patient is a 39 y.o.  female with PMHx of HTN and HLD presenting with fevers and chills starting last night. Pt  Reports associated nausea and vomiting with headache and left leg swelling. Pt reports fevers and chills persisted this morning and  Was told by Dr. Griffith to come to the ED. Pt reports left leg skin changes for a couple of months and she has been taking antifungal medications, po and cream, to treat the wound. Pt reports using compression socks but noticed clear discharge on the socks.     In ED, patient was febrile with nausea. Pt given zofran and started IVF. Lab work presented hypokalemia of 2.7, ESR 42,  and WBC  Of  17. Pt given onedose of 40mEq K repletion. Blood cultures were ordered. Pt admitted to observation for hypokalemia and cellulitis.     Overview/Hospital Course:  Pt initially met SIRS criteria and was started on Vanc and zosyn. She then developed MAYUR and zosyn was discontinued, flagyl and cefipime was initiated. Blood cultures without any growth and UA did not grow any non-skin mark. Creatinine remained elevated at 2.2 so mIVF increased on 10/1/2020. MRI consistent with cellulitis, however cannot rule out fascitis so clindamycin added to antibiotic regimen 10/1/2020. Clindamycin added to antibiotic regimen. Elevated Creatinine persisting despite increase in mIVF, investigating intrinsic causes of MAYUR. Nephrology consulted and suspect ATN. ID consulted and recommended discontinuation of clindamycin.    Interval History: Pt doing well this morning. Remained afebrile overnight. Tolerated po augmentin well. Continues  to have good UOP without dysuria or hematuria. Pain well controlled.     Review of Systems   Constitutional: Negative for appetite change and fever.   HENT: Negative for congestion, ear pain and sore throat.    Eyes: Negative for redness and visual disturbance.   Respiratory: Negative for cough, shortness of breath and wheezing.    Cardiovascular: Negative for chest pain and leg swelling.   Gastrointestinal: Negative for abdominal pain, diarrhea and nausea.   Genitourinary: Negative for decreased urine volume and difficulty urinating.   Musculoskeletal: Negative for back pain and neck pain.   Neurological: Negative for dizziness, light-headedness and headaches.   Hematological: Negative for adenopathy. Does not bruise/bleed easily.   Psychiatric/Behavioral: Negative for behavioral problems and sleep disturbance.     Objective:     Vital Signs (Most Recent):  Temp: 99 °F (37.2 °C) (10/05/20 0536)  Pulse: 95 (10/05/20 0536)  Resp: 18 (10/05/20 0539)  BP: 112/70 (10/05/20 0536)  SpO2: 99 % (10/04/20 1158) Vital Signs (24h Range):  Temp:  [98.9 °F (37.2 °C)-99.2 °F (37.3 °C)] 99 °F (37.2 °C)  Pulse:  [85-98] 95  Resp:  [15-18] 18  SpO2:  [99 %] 99 %  BP: (112-130)/(53-71) 112/70     Weight: 135.7 kg (299 lb 2.6 oz)  Body mass index is 51.35 kg/m².    Intake/Output Summary (Last 24 hours) at 10/5/2020 0641  Last data filed at 10/5/2020 0000  Gross per 24 hour   Intake 140 ml   Output 2950 ml   Net -2810 ml      Physical Exam  Vitals signs and nursing note reviewed.   Constitutional:       General: She is not in acute distress.     Appearance: Normal appearance.   HENT:      Head: Normocephalic and atraumatic.      Right Ear: External ear normal.      Left Ear: External ear normal.      Nose: Nose normal. No congestion.      Mouth/Throat:      Mouth: Mucous membranes are moist.      Pharynx: Oropharynx is clear.   Eyes:      Extraocular Movements: Extraocular movements intact.      Conjunctiva/sclera: Conjunctivae normal.    Neck:      Musculoskeletal: Normal range of motion. No muscular tenderness.   Cardiovascular:      Rate and Rhythm: Normal rate and regular rhythm.      Pulses: Normal pulses.      Heart sounds: No murmur.   Pulmonary:      Effort: Pulmonary effort is normal. No respiratory distress.      Breath sounds: No wheezing.   Abdominal:      General: Abdomen is flat. Bowel sounds are normal.      Palpations: Abdomen is soft.      Tenderness: There is no abdominal tenderness. There is no guarding.   Musculoskeletal: Normal range of motion.         General: No swelling.      Right lower leg: No edema.      Left lower leg: No edema.   Lymphadenopathy:      Cervical: No cervical adenopathy.   Skin:     General: Skin is warm and dry.      Capillary Refill: Capillary refill takes less than 2 seconds.      Findings: Erythema and rash present.      Comments: L LE wound dressing clean, dry and intact. Erythema and edema continues to improve.    Neurological:      General: No focal deficit present.      Mental Status: She is alert and oriented to person, place, and time.      Sensory: No sensory deficit.      Coordination: Coordination normal.   Psychiatric:         Mood and Affect: Mood normal.         Behavior: Behavior normal.         Significant Labs:   CBC:   Recent Labs   Lab 10/04/20  0550   WBC 13.87*  13.54*   HGB 9.0*  8.7*   HCT 30.2*  29.6*   *  530*     CMP:   Recent Labs   Lab 10/04/20  0550      K 3.4*   CL 99   CO2 28      BUN 12   CREATININE 2.1*   CALCIUM 8.6*   PROT 8.8*   ALBUMIN 2.5*   BILITOT 0.4   ALKPHOS 58   AST 27   ALT 28   ANIONGAP 12   EGFRNONAA 29*     All pertinent labs within the past 24 hours have been reviewed.    Significant Imaging: I have reviewed all pertinent imaging results/findings within the past 24 hours.      Assessment/Plan:      * Left leg cellulitis   -cellulitis vs fascitis. Imaging reassuring not osteomyelitis  - Continue wound care, wound culture from 10/02  pending  - LLE MRI showed soft tissue swelling and scattered linear tracking of fluid  - LLE US showed no evidence of DVT  - Continue antipyretics PRN, afebrile x48hrs  - Blood cultures with no growth to date  - appreciate ID and surgery input  - Tolerated po augmentin well yesterday, plan to continue outpatient for total of 14 days (last day 10/18)  - Continue Norco to 10mg q6hrs for pain control  - Continue dilaudid PRN for pain  - As long as she remains stable and afebrile appropriate for discharge        SIRS (systemic inflammatory response syndrome)  - Afebrile overnight  - Plan as above  - Resolved      MAYUR (acute kidney injury)  - Pt baseline cr 0.9, Cr 2.1 today, no change from yesterday  - Trend daily CMP  - Strict I's/O's  - UFeUrea suggestive of intrinsic kidney injury  - Kidney US wnl  - Nephro consulted, felt kidney injury due to ATN, only thing to do is wait and continue to monitor.  -Recommended avoiding nephrotoxic medications.   - Appreciate nephro discharge recs      Hypokalemia  - K  2.7 on admission, pending this AM  -replete as needed.  - Continues to be asymptomatic without EKG changes      HTN (hypertension)  - BP stable   - Continue home meds: lopressor 100mg PO BID    Hyperlipidemia  - Continue home fenofibrate 145mg PO daily and simvastatin 20mg PO QHS    Morbidly obese          FEN- none, replace as needed, cardiac  DVT- enoxaparin  GI- famotidine  CODE- FULL  DISPO- home pending nephro recs      Cl Washington DO  Department of Hospital Medicine   [facility]

## 2020-10-05 NOTE — PLAN OF CARE
Problem: Physical Therapy Goal  Goal: Physical Therapy Goal  Description: Goals to be met by: 10/10/2020     Patient will increase functional independence with mobility by performing:    Supine<>sit mod Ind  Sit<>stand mod Ind  Pt to ambulate with least restrictive AD household distances  Pt to perform LE TE x 15 reps BLE for reconditioning    Outcome: Ongoing, Progressing

## 2020-10-05 NOTE — PLAN OF CARE
pt's chart reviewed -- per MD report - pt may be discharged to home today   remains on PO abx.     per initial d/c assessment:    patient lives at home with family and is independent with ADL's. No DME or HH noted. Per patient, upon discharge her family will provide transportation home and will be available to help as needed.     Future Appointments   Date Time Provider Department Center   10/19/2020  9:20 AM Vern Mccray PA-C [facility]     pt is on a wait list for a sooner apt         10/05/20 1305   Discharge Reassessment   Assessment Type Discharge Planning Reassessment

## 2020-10-05 NOTE — ASSESSMENT & PLAN NOTE
- Pt baseline cr 0.9, Cr 2.1 today, no change from yesterday  - Trend daily CMP  - Strict I's/O's  - UFeUrea suggestive of intrinsic kidney injury  - Kidney US wnl  - Nephro consulted, felt kidney injury due to ATN, only thing to do is wait and continue to monitor.  -Recommended avoiding nephrotoxic medications.   - Appreciate nephro discharge recs

## 2020-10-05 NOTE — PLAN OF CARE
Pt alert and oriented x4. Pain to LLE managed by dilaudid. Tolerating diet well. Up to bedside commode w/ standby assist. Dressings to LLE changed. Safety maintained.

## 2020-10-05 NOTE — PT/OT/SLP PROGRESS
Physical Therapy Treatment    Patient Name:  Mohsen Julien   MRN:  [id number]    Recommendations:     Discharge Recommendations:  outpatient PT   Discharge Equipment Recommendations: walker, rolling   Barriers to discharge: decreased mobility,strength and endurance    Assessment:     Mohsen Julien is a 39 y.o. female admitted with a medical diagnosis of Left leg cellulitis.  She presents with the following impairments/functional limitations:  weakness, impaired endurance, impaired functional mobilty, gait instability, impaired balance, decreased lower extremity function, pain, decreased ROM, impaired coordination, impaired skin,pt limited by L le pain with WB,pt progressing toward goals and will benefit from continuing PT services upon discharge.    Rehab Prognosis: Fair; patient would benefit from acute skilled PT services to address these deficits and reach maximum level of function.    Recent Surgery: * No surgery found *      Plan:     During this hospitalization, patient to be seen 5 x/week to address the identified rehab impairments via gait training, therapeutic activities, therapeutic exercises and progress toward the following goals:    · Plan of Care Expires:  11/10/20    Subjective     Chief Complaint: n/a  Patient/Family Comments/goals: pt has been here almost a week.  Pain/Comfort:  · Pain Rating 1: (no rating)  · Location - Side 1: Left  · Location - Orientation 1: generalized  · Location 1: leg  · Pain Addressed 1: Reposition, Distraction      Objective:     Communicated with nsg prior to session.  Patient found up in chair with telemetry upon PT entry to room.     General Precautions: Standard, fall   Orthopedic Precautions:N/A   Braces: N/A     Functional Mobility:  · Transfers:     · Sit to Stand:  supervision with no AD  · Toilet Transfer: contact guard assistance with  no AD  using  ambulation  · Gait: amb ~10' X 1 w/o AD and CGA and ~65' X 1 with RW and S  · Balance: fair standing balance  with RW      AM-PAC 6 CLICK MOBILITY  Turning over in bed (including adjusting bedclothes, sheets and blankets)?: 4  Sitting down on and standing up from a chair with arms (e.g., wheelchair, bedside commode, etc.): 3  Moving from lying on back to sitting on the side of the bed?: 3  Moving to and from a bed to a chair (including a wheelchair)?: 3  Need to walk in hospital room?: 3  Climbing 3-5 steps with a railing?: 2  Basic Mobility Total Score: 18       Therapeutic Activities and Exercises: pt is independent with cleaning up post using bathroom.       Patient left up in chair with all lines intact and call button in reach..    GOALS: see general POC  Multidisciplinary Problems     Physical Therapy Goals        Problem: Physical Therapy Goal    Goal Priority Disciplines Outcome Goal Variances Interventions   Physical Therapy Goal     PT, PT/OT Ongoing, Progressing     Description: Goals to be met by: 10/10/2020     Patient will increase functional independence with mobility by performing:    Supine<>sit mod Ind  Sit<>stand mod Ind  Pt to ambulate with least restrictive AD household distances  Pt to perform LE TE x 15 reps BLE for reconditioning                     Time Tracking:     PT Received On: 10/05/20  PT Start Time: 1327     PT Stop Time: 1351  PT Total Time (min): 24 min     Billable Minutes: Gait Training 15 and Therapeutic Activity 9    Treatment Type: Treatment  PT/PTA: PTA     PTA Visit Number: 2     Navdeep Willis, OSCAR  10/05/2020

## 2020-10-05 NOTE — ASSESSMENT & PLAN NOTE
-cellulitis vs fascitis. Imaging reassuring not osteomyelitis  - Continue wound care, wound culture from 10/02 pending  - LLE MRI showed soft tissue swelling and scattered linear tracking of fluid  - LLE US showed no evidence of DVT  - Continue antipyretics PRN, afebrile x48hrs  - Blood cultures with no growth to date  - appreciate ID and surgery input  - Tolerated po augmentin well yesterday, plan to continue outpatient for total of 14 days (last day 10/18)  - Continue Norco to 10mg q6hrs for pain control  - Continue dilaudid PRN for pain  - As long as she remains stable and afebrile appropriate for discharge

## 2020-10-05 NOTE — PLAN OF CARE
Problem: Adult Inpatient Plan of Care  Goal: Plan of Care Review  Outcome: Ongoing, Progressing     VIRTUAL NURSE:  Cued into patient's room.  Permission received per patient to turn camera to view patient.  Introduced as VN for night shift that will be working with floor nurse and nursing assistant.  Educated patient on VN's role in patient care and  VIP model.  Plan of care reviewed with patient.  Education per flowsheet.   Informed patient that staff will round on them every 2 hours but to use call light for any other needs they may have; informed of fall risk and fall precautions.  Patient verbalized understanding.  Call light within reach; bed siderails up x3.  Opportunity given for questions and questions answered.  Patient denies complaints, but requesting ice and water; BRIANA Nicole notified.  Will cont to monitor and intervene as needed.    Labs, notes, orders, and careplan reviewed.

## 2020-10-05 NOTE — PROGRESS NOTES
"Surgery follow up  BP (!) 162/74 (BP Location: Right arm, Patient Position: Lying)   Pulse 87   Temp 98.5 °F (36.9 °C) (Oral)   Resp 18   Ht 5' 4" (1.626 m)   Wt 135.7 kg (299 lb 2.6 oz)   SpO2 99%   Breastfeeding No   BMI 51.35 kg/m²   I/O last 3 completed shifts:  In: 140 [P.O.:140]  Out: 3750 [Urine:3750]  No intake/output data recorded.      Recent Results (from the past 336 hour(s))   CBC auto differential    Collection Time: 10/05/20  6:40 AM   Result Value Ref Range    WBC 12.71 (H) 3.90 - 12.70 K/uL    Hemoglobin 7.3 (L) 12.0 - 16.0 g/dL    Hematocrit 24.5 (L) 37.0 - 48.5 %    Platelets 492 (H) 150 - 350 K/uL   CBC with Automated Differential    Collection Time: 10/05/20  6:40 AM   Result Value Ref Range    WBC 12.90 (H) 3.90 - 12.70 K/uL    Hemoglobin 7.4 (L) 12.0 - 16.0 g/dL    Hematocrit 24.3 (L) 37.0 - 48.5 %    Platelets 494 (H) 150 - 350 K/uL   CBC auto differential    Collection Time: 10/04/20  5:50 AM   Result Value Ref Range    WBC 13.54 (H) 3.90 - 12.70 K/uL    Hemoglobin 8.7 (L) 12.0 - 16.0 g/dL    Hematocrit 29.6 (L) 37.0 - 48.5 %    Platelets 530 (H) 150 - 350 K/uL     Recent Results (from the past 336 hour(s))   Basic metabolic panel    Collection Time: 10/05/20  3:50 PM   Result Value Ref Range    Sodium 134 (L) 136 - 145 mmol/L    Potassium 4.6 3.5 - 5.1 mmol/L    Chloride 97 95 - 110 mmol/L    CO2 28 23 - 29 mmol/L    BUN, Bld 11 6 - 20 mg/dL    Creatinine 1.8 (H) 0.5 - 1.4 mg/dL    Calcium 8.7 8.7 - 10.5 mg/dL    Anion Gap 9 8 - 16 mmol/L   Basic metabolic panel    Collection Time: 09/26/20  4:02 PM   Result Value Ref Range    Sodium 134 (L) 136 - 145 mmol/L    Potassium 3.8 3.5 - 5.1 mmol/L    Chloride 101 95 - 110 mmol/L    CO2 22 (L) 23 - 29 mmol/L    BUN, Bld 8 6 - 20 mg/dL    Creatinine 0.8 0.5 - 1.4 mg/dL    Calcium 7.7 (L) 8.7 - 10.5 mg/dL    Anion Gap 11 8 - 16 mmol/L   Basic metabolic panel    Collection Time: 09/25/20  9:30 PM   Result Value Ref Range    Sodium 136 136 - " 145 mmol/L    Potassium 3.3 (L) 3.5 - 5.1 mmol/L    Chloride 98 95 - 110 mmol/L    CO2 26 23 - 29 mmol/L    BUN, Bld 17 6 - 20 mg/dL    Creatinine 1.0 0.5 - 1.4 mg/dL    Calcium 8.1 (L) 8.7 - 10.5 mg/dL    Anion Gap 12 8 - 16 mmol/L   swelling and edema subsiding , blistering improving , continue local treatment.

## 2020-10-06 LAB — BACTERIA SPEC AEROBE CULT: NO GROWTH

## 2020-10-06 NOTE — PLAN OF CARE
Patient has received discharge instructions. Prescriptions received. Instructions reviewed with pt using teachback method. All questions answered to pt satisfaction. IV access and telemetry removed per floor nurse. Transport requested for discharge.     19:55 pt is no longer present in room.  Requested pt to contact md regarding continuation of ointments ordered prior to admission .  Orders at discharge stated to continue.     Call placed after discharge to the pt delivered to her voice mail at the number she supplied at discharge.    Requested pt contact dr Griffith  with regards to these ointments per the pt portal .   in addition , a message was left for dr griffith to contact the pr regarding the same.

## 2020-10-07 ENCOUNTER — PATIENT OUTREACH (OUTPATIENT)
Dept: ADMINISTRATIVE | Facility: CLINIC | Age: 40
End: 2020-10-07

## 2020-10-07 NOTE — PATIENT INSTRUCTIONS
Discharge Instructions for Cellulitis  You have been diagnosed with cellulitis. This is an infection in the deepest layer of the skin. In some cases, the infection also affects the muscle. Cellulitis is caused by bacteria. The bacteria can enter the body through broken skin. This can happen with a cut, scratch, animal bite, or an insect bite that has been scratched. You may have been treated in the hospital with antibiotics and fluids. You will likely be given a prescription for antibiotics to take at home. This sheet will help you take care of yourself at home.  Home care  When you are home:  · Take the prescribed antibiotic medicine you are given as directed until it is gone. Take it even if you feel better. It treats the infection and stops it from returning. Not taking all the medicine can make future infections hard to treat.  · Keep the infected area clean.  · When possible, raise the infected area above the level of your heart. This helps keep swelling down.  · Talk with your healthcare provider if you are in pain. Ask what kind of over-the-counter medicine you can take for pain.  · Apply clean bandages as advised.  · Take your temperature once a day for a week.  · Wash your hands often to prevent spreading the infection.  In the future, wash your hands before and after you touch cuts, scratches, or bandages. This will help prevent infection.   When to call your healthcare provider  Call your healthcare provider immediately if you have any of the following:  · Difficulty or pain when moving the joints above or below the infected area  · Discharge or pus draining from the area  · Fever of 100.4°F (38°C) or higher, or as directed by your healthcare provider  · Pain that gets worse in or around the infected   · Redness that gets worse in or around the infected area, particularly if the area of redness expands to a wider area  · Shaking chills  · Swelling of the infected area  · Vomiting   Date Last Reviewed:  8/1/2016  © 0259-8589 [employer] [address] [place] [address], [place] [address]. All rights reserved. This information is not intended as a substitute for professional medical care. Always follow your healthcare professional's instructions.

## 2020-10-12 ENCOUNTER — LAB VISIT (OUTPATIENT)
Dept: LAB | Facility: HOSPITAL | Age: 40
End: 2020-10-12
Attending: OBSTETRICS & GYNECOLOGY
Payer: MEDICAID

## 2020-10-12 DIAGNOSIS — L03.119 CELLULITIS OF LEG: Primary | ICD-10-CM

## 2020-10-12 DIAGNOSIS — L03.116 CELLULITIS OF LEFT LEG: ICD-10-CM

## 2020-10-12 DIAGNOSIS — Z01.419 ROUTINE GYNECOLOGICAL EXAMINATION: ICD-10-CM

## 2020-10-12 DIAGNOSIS — R65.10 SIRS (SYSTEMIC INFLAMMATORY RESPONSE SYNDROME): ICD-10-CM

## 2020-10-12 LAB
ANION GAP SERPL CALC-SCNC: 13 MMOL/L (ref 8–16)
BASOPHILS # BLD AUTO: 0.08 K/UL (ref 0–0.2)
BASOPHILS NFR BLD: 0.8 % (ref 0–1.9)
BUN SERPL-MCNC: 17 MG/DL (ref 6–20)
CALCIUM SERPL-MCNC: 9.6 MG/DL (ref 8.7–10.5)
CHLORIDE SERPL-SCNC: 97 MMOL/L (ref 95–110)
CO2 SERPL-SCNC: 25 MMOL/L (ref 23–29)
CREAT SERPL-MCNC: 1.7 MG/DL (ref 0.5–1.4)
DIFFERENTIAL METHOD: ABNORMAL
EOSINOPHIL # BLD AUTO: 0.1 K/UL (ref 0–0.5)
EOSINOPHIL NFR BLD: 0.9 % (ref 0–8)
ERYTHROCYTE [DISTWIDTH] IN BLOOD BY AUTOMATED COUNT: 14.6 % (ref 11.5–14.5)
EST. GFR  (AFRICAN AMERICAN): 42 ML/MIN/1.73 M^2
EST. GFR  (NON AFRICAN AMERICAN): 37 ML/MIN/1.73 M^2
GLUCOSE SERPL-MCNC: 99 MG/DL (ref 70–110)
HCT VFR BLD AUTO: 27.5 % (ref 37–48.5)
HGB BLD-MCNC: 8 G/DL (ref 12–16)
IMM GRANULOCYTES # BLD AUTO: 0.08 K/UL (ref 0–0.04)
IMM GRANULOCYTES NFR BLD AUTO: 0.8 % (ref 0–0.5)
LYMPHOCYTES # BLD AUTO: 2.4 K/UL (ref 1–4.8)
LYMPHOCYTES NFR BLD: 22.9 % (ref 18–48)
MCH RBC QN AUTO: 23.2 PG (ref 27–31)
MCHC RBC AUTO-ENTMCNC: 29.1 G/DL (ref 32–36)
MCV RBC AUTO: 80 FL (ref 82–98)
MONOCYTES # BLD AUTO: 1 K/UL (ref 0.3–1)
MONOCYTES NFR BLD: 9.3 % (ref 4–15)
NEUTROPHILS # BLD AUTO: 7 K/UL (ref 1.8–7.7)
NEUTROPHILS NFR BLD: 65.3 % (ref 38–73)
NRBC BLD-RTO: 0 /100 WBC
PLATELET # BLD AUTO: 577 K/UL (ref 150–350)
PMV BLD AUTO: 9.1 FL (ref 9.2–12.9)
POTASSIUM SERPL-SCNC: 3.5 MMOL/L (ref 3.5–5.1)
RBC # BLD AUTO: 3.45 M/UL (ref 4–5.4)
SODIUM SERPL-SCNC: 135 MMOL/L (ref 136–145)
WBC # BLD AUTO: 10.65 K/UL (ref 3.9–12.7)

## 2020-10-12 PROCEDURE — 36415 COLL VENOUS BLD VENIPUNCTURE: CPT

## 2020-10-12 PROCEDURE — 80048 BASIC METABOLIC PNL TOTAL CA: CPT

## 2020-10-12 PROCEDURE — 86703 HIV-1/HIV-2 1 RESULT ANTBDY: CPT

## 2020-10-12 PROCEDURE — 85025 COMPLETE CBC W/AUTO DIFF WBC: CPT

## 2020-10-13 LAB — HIV 1+2 AB+HIV1 P24 AG SERPL QL IA: NEGATIVE

## 2020-10-14 LAB — BACTERIA SPEC ANAEROBE CULT: NORMAL

## 2020-10-19 ENCOUNTER — LAB VISIT (OUTPATIENT)
Dept: LAB | Facility: HOSPITAL | Age: 40
End: 2020-10-19
Attending: FAMILY MEDICINE
Payer: MEDICAID

## 2020-10-19 ENCOUNTER — OFFICE VISIT (OUTPATIENT)
Dept: FAMILY MEDICINE | Facility: HOSPITAL | Age: 40
End: 2020-10-19
Attending: FAMILY MEDICINE
Payer: MEDICAID

## 2020-10-19 VITALS
HEIGHT: 64 IN | DIASTOLIC BLOOD PRESSURE: 77 MMHG | BODY MASS INDEX: 44.79 KG/M2 | HEART RATE: 83 BPM | SYSTOLIC BLOOD PRESSURE: 123 MMHG | WEIGHT: 262.38 LBS

## 2020-10-19 DIAGNOSIS — Z00.00 HEALTHCARE MAINTENANCE: ICD-10-CM

## 2020-10-19 DIAGNOSIS — L03.116 LEFT LEG CELLULITIS: ICD-10-CM

## 2020-10-19 DIAGNOSIS — L03.116 LEFT LEG CELLULITIS: Primary | ICD-10-CM

## 2020-10-19 DIAGNOSIS — N17.9 AKI (ACUTE KIDNEY INJURY): ICD-10-CM

## 2020-10-19 DIAGNOSIS — E78.5 HYPERLIPIDEMIA, UNSPECIFIED HYPERLIPIDEMIA TYPE: ICD-10-CM

## 2020-10-19 DIAGNOSIS — I10 ESSENTIAL HYPERTENSION: ICD-10-CM

## 2020-10-19 LAB
ALBUMIN SERPL BCP-MCNC: 3.4 G/DL (ref 3.5–5.2)
ALP SERPL-CCNC: 84 U/L (ref 55–135)
ALT SERPL W/O P-5'-P-CCNC: 67 U/L (ref 10–44)
ANION GAP SERPL CALC-SCNC: 11 MMOL/L (ref 8–16)
AST SERPL-CCNC: 46 U/L (ref 10–40)
BILIRUB SERPL-MCNC: 0.3 MG/DL (ref 0.1–1)
BUN SERPL-MCNC: 16 MG/DL (ref 6–20)
CALCIUM SERPL-MCNC: 9.3 MG/DL (ref 8.7–10.5)
CHLORIDE SERPL-SCNC: 104 MMOL/L (ref 95–110)
CHOLEST SERPL-MCNC: 133 MG/DL (ref 120–199)
CHOLEST/HDLC SERPL: 7.4 {RATIO} (ref 2–5)
CO2 SERPL-SCNC: 24 MMOL/L (ref 23–29)
CREAT SERPL-MCNC: 1.3 MG/DL (ref 0.5–1.4)
CRP SERPL-MCNC: 19.2 MG/L (ref 0–8.2)
ERYTHROCYTE [SEDIMENTATION RATE] IN BLOOD BY WESTERGREN METHOD: >120 MM/HR (ref 0–20)
EST. GFR  (AFRICAN AMERICAN): 58 ML/MIN/1.73 M^2
EST. GFR  (NON AFRICAN AMERICAN): 51 ML/MIN/1.73 M^2
GLUCOSE SERPL-MCNC: 120 MG/DL (ref 70–110)
HDLC SERPL-MCNC: 18 MG/DL (ref 40–75)
HDLC SERPL: 13.5 % (ref 20–50)
LDLC SERPL CALC-MCNC: 44.2 MG/DL (ref 63–159)
NONHDLC SERPL-MCNC: 115 MG/DL
POTASSIUM SERPL-SCNC: 3.1 MMOL/L (ref 3.5–5.1)
PROT SERPL-MCNC: 9.2 G/DL (ref 6–8.4)
SODIUM SERPL-SCNC: 139 MMOL/L (ref 136–145)
TRIGL SERPL-MCNC: 354 MG/DL (ref 30–150)

## 2020-10-19 PROCEDURE — 36415 COLL VENOUS BLD VENIPUNCTURE: CPT

## 2020-10-19 PROCEDURE — 80061 LIPID PANEL: CPT

## 2020-10-19 PROCEDURE — 85652 RBC SED RATE AUTOMATED: CPT

## 2020-10-19 PROCEDURE — 86803 HEPATITIS C AB TEST: CPT

## 2020-10-19 PROCEDURE — 99214 OFFICE O/P EST MOD 30 MIN: CPT | Performed by: PHYSICIAN ASSISTANT

## 2020-10-19 PROCEDURE — 80053 COMPREHEN METABOLIC PANEL: CPT

## 2020-10-19 PROCEDURE — 86140 C-REACTIVE PROTEIN: CPT

## 2020-10-19 NOTE — PROGRESS NOTES
Subjective:       Patient ID: Mohsen Julien is a 40 y.o. female.    Chief Complaint: Hospital Follow Up    40 y.o. Black female with PMH of HTN and HLD here today for hospital follow up after being admitted for LLE cellulitis and MAYUR. Labs with hypokalemia of 2.7, ESR 42,  and WBC of 17. Blood Cx and UA without growth. MRI consistent with cellulitis. IV abx, Cr continued to improve. Switched to PO Augmentin x2 weeks. Being followed by Dr. Griffith outpatient for wound care.     Today the patient is unaccompanied during the visit. She had follow up with Dr. Griffith last week and was instructed to stop taking abx, as they were giving her diarrhea. Overall she denies any issues. No n/v/d, fever, chills, CP, SOB. Will also see Dr. Griffith today for follow up.     HTN:  Taking Maxzide 37.5-25mg. BP well controlled at this time.     HLD:  Previously taking statin and fenofibrate. Unclear if she is taking either. Will check lipids today.     Previously followed by Dr. Pino, but needs new PCP.  Flu shot done while inpatient.    Past Medical History:   Diagnosis Date    Anemia     Arthritis     Fibromyalgia     Hypertension     Thyroid disease      Family History   Problem Relation Age of Onset    Stroke Mother     Colon cancer Maternal Aunt      Social History     Socioeconomic History    Marital status: Single     Spouse name: Not on file    Number of children: Not on file    Years of education: Not on file    Highest education level: Not on file   Occupational History    Not on file   Social Needs    Financial resource strain: Not on file    Food insecurity     Worry: Not on file     Inability: Not on file    Transportation needs     Medical: Not on file     Non-medical: Not on file   Tobacco Use    Smoking status: Never Smoker    Smokeless tobacco: Never Used   Substance and Sexual Activity    Alcohol use: No    Drug use: No    Sexual activity: Yes     Partners: Male     Birth  control/protection: Implant   Lifestyle    Physical activity     Days per week: Not on file     Minutes per session: Not on file    Stress: Not on file   Relationships    Social connections     Talks on phone: Not on file     Gets together: Not on file     Attends Mosque service: Not on file     Active member of club or organization: Not on file     Attends meetings of clubs or organizations: Not on file     Relationship status: Not on file   Other Topics Concern    Not on file   Social History Narrative    Not on file     Past Surgical History:   Procedure Laterality Date    APPENDECTOMY       SECTION      CHOLECYSTECTOMY       Review of Systems   Constitutional: Negative.    HENT: Negative.    Eyes: Negative.    Respiratory: Negative.    Cardiovascular: Positive for leg swelling (Left lower leg swelling).   Gastrointestinal: Negative.    Endocrine: Negative.    Genitourinary: Negative.    Musculoskeletal: Positive for arthralgias and gait problem.   Skin: Positive for color change and wound.   Allergic/Immunologic: Negative.    Hematological: Negative.    Psychiatric/Behavioral: Negative.        Objective:      Vitals:    10/19/20 0942   BP: 123/77   Pulse: 83     Physical Exam  Vitals signs and nursing note reviewed.   Constitutional:       General: She is not in acute distress.     Appearance: She is well-developed. She is not diaphoretic.   HENT:      Head: Normocephalic and atraumatic.   Eyes:      General: No scleral icterus.     Extraocular Movements: Extraocular movements intact.      Conjunctiva/sclera: Conjunctivae normal.   Neck:      Trachea: No tracheal deviation.   Cardiovascular:      Rate and Rhythm: Normal rate.   Pulmonary:      Effort: Pulmonary effort is normal. No respiratory distress.   Musculoskeletal: Normal range of motion.         General: No tenderness or deformity.   Skin:     General: Skin is warm and dry.      Comments: LLE with acute on chronic venous stasis skin  changes; swelling improved, no erythema, warmth, purulent d/c   Neurological:      General: No focal deficit present.      Mental Status: She is alert and oriented to person, place, and time.      Motor: No weakness.      Coordination: Coordination normal.   Psychiatric:         Mood and Affect: Mood normal.         Behavior: Behavior normal.         Thought Content: Thought content normal.         Judgment: Judgment normal.         Results for orders placed or performed in visit on 10/12/20   Comprehensive metabolic panel   Result Value Ref Range    Sodium 135 (L) 136 - 145 mmol/L    Potassium 3.6 3.5 - 5.1 mmol/L    Chloride 97 95 - 110 mmol/L    CO2 24 23 - 29 mmol/L    Glucose 99 70 - 110 mg/dL    BUN, Bld 18 6 - 20 mg/dL    Creatinine 1.7 (H) 0.5 - 1.4 mg/dL    Calcium 9.5 8.7 - 10.5 mg/dL    Total Protein 10.0 (H) 6.0 - 8.4 g/dL    Albumin 3.1 (L) 3.5 - 5.2 g/dL    Total Bilirubin 0.3 0.1 - 1.0 mg/dL    Alkaline Phosphatase 57 55 - 135 U/L    AST 35 10 - 40 U/L    ALT 45 (H) 10 - 44 U/L    Anion Gap 14 8 - 16 mmol/L    eGFR if African American 42 (A) >60 mL/min/1.73 m^2    eGFR if non African American 37 (A) >60 mL/min/1.73 m^2     Assessment:       1. Left leg cellulitis    2. MAYUR (acute kidney injury)    3. Essential hypertension    4. Hyperlipidemia, unspecified hyperlipidemia type    5. Healthcare maintenance        Plan:       Left leg cellulitis   -Continue to follow with Dr. Griffith; improved per patient  -     Sedimentation rate; Future; Expected date: 10/19/2020  -     C-reactive protein; Future; Expected date: 10/19/2020    MAYUR (acute kidney injury)  -     Comprehensive Metabolic Panel; Future; Expected date: 10/19/2020    Essential hypertension   -Continue current medication. Well controlled today    Hyperlipidemia, unspecified hyperlipidemia type  -     Lipid Panel; Future; Expected date: 10/19/2020    Healthcare maintenance  -     Hepatitis C Antibody; Future; Expected date: 10/19/2020      Future  Appointments   Date Time Provider Department Center   11/20/2020  8:40 AM Maryanne Hallman MD [facility]     Follow up in about 4 weeks (around 11/16/2020).

## 2020-10-20 LAB — HCV AB SERPL QL IA: NEGATIVE

## 2020-12-30 ENCOUNTER — IMMUNIZATION (OUTPATIENT)
Dept: INTERNAL MEDICINE | Facility: CLINIC | Age: 40
End: 2020-12-30
Payer: MEDICAID

## 2020-12-30 DIAGNOSIS — Z23 NEED FOR VACCINATION: ICD-10-CM

## 2020-12-30 PROCEDURE — 91300 COVID-19, MRNA, LNP-S, PF, 30 MCG/0.3 ML DOSE VACCINE: CPT | Mod: ,,, | Performed by: FAMILY MEDICINE

## 2020-12-30 PROCEDURE — 0001A COVID-19, MRNA, LNP-S, PF, 30 MCG/0.3 ML DOSE VACCINE: CPT | Mod: CV19,,, | Performed by: FAMILY MEDICINE

## 2020-12-30 PROCEDURE — 91300 COVID-19, MRNA, LNP-S, PF, 30 MCG/0.3 ML DOSE VACCINE: ICD-10-PCS | Mod: ,,, | Performed by: FAMILY MEDICINE

## 2020-12-30 PROCEDURE — 0001A COVID-19, MRNA, LNP-S, PF, 30 MCG/0.3 ML DOSE VACCINE: ICD-10-PCS | Mod: CV19,,, | Performed by: FAMILY MEDICINE

## 2021-01-20 ENCOUNTER — IMMUNIZATION (OUTPATIENT)
Dept: INTERNAL MEDICINE | Facility: CLINIC | Age: 41
End: 2021-01-20
Payer: MEDICAID

## 2021-01-20 DIAGNOSIS — Z23 NEED FOR VACCINATION: Primary | ICD-10-CM

## 2021-01-20 PROCEDURE — 0002A COVID-19, MRNA, LNP-S, PF, 30 MCG/0.3 ML DOSE VACCINE: CPT | Mod: PBBFAC | Performed by: FAMILY MEDICINE

## 2021-01-20 PROCEDURE — 91300 COVID-19, MRNA, LNP-S, PF, 30 MCG/0.3 ML DOSE VACCINE: CPT | Mod: PBBFAC | Performed by: FAMILY MEDICINE

## 2021-03-21 ENCOUNTER — HOSPITAL ENCOUNTER (EMERGENCY)
Facility: HOSPITAL | Age: 41
Discharge: HOME | End: 2021-03-21
Attending: EMERGENCY MEDICINE
Payer: MEDICAID

## 2021-03-21 VITALS
DIASTOLIC BLOOD PRESSURE: 80 MMHG | BODY MASS INDEX: 46.07 KG/M2 | WEIGHT: 260 LBS | TEMPERATURE: 99 F | SYSTOLIC BLOOD PRESSURE: 140 MMHG | RESPIRATION RATE: 20 BRPM | HEART RATE: 80 BPM | HEIGHT: 63 IN | OXYGEN SATURATION: 99 %

## 2021-03-21 DIAGNOSIS — S62.525A CLOSED NONDISPLACED FRACTURE OF DISTAL PHALANX OF LEFT THUMB, INITIAL ENCOUNTER: Primary | ICD-10-CM

## 2021-03-21 DIAGNOSIS — M54.50 ACUTE BILATERAL LOW BACK PAIN WITHOUT SCIATICA: ICD-10-CM
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
## 2021-03-21 PROCEDURE — 25000003 PHARM REV CODE 250: Performed by: EMERGENCY MEDICINE

## 2021-03-21 PROCEDURE — 99284 EMERGENCY DEPT VISIT MOD MDM: CPT | Mod: 25

## 2021-03-21 PROCEDURE — 96372 THER/PROPH/DIAG INJ SC/IM: CPT

## 2021-03-21 PROCEDURE — 63600175 PHARM REV CODE 636 W HCPCS: Performed by: EMERGENCY MEDICINE

## 2021-03-21 RX ORDER — NAPROXEN 500 MG/1
500 TABLET ORAL 2 TIMES DAILY PRN
Qty: 14 TABLET | Refills: 0 | Status: SHIPPED | OUTPATIENT
Start: 2021-03-21 | End: 2022-04-06

## 2021-03-21 RX ORDER — KETOROLAC TROMETHAMINE 30 MG/ML
30 INJECTION, SOLUTION INTRAMUSCULAR; INTRAVENOUS
Status: COMPLETED | OUTPATIENT
Start: 2021-03-21 | End: 2021-03-21

## 2021-03-21 RX ORDER — DIAZEPAM 5 MG/1
5 TABLET ORAL
Status: COMPLETED | OUTPATIENT
Start: 2021-03-21 | End: 2021-03-21

## 2021-03-21 RX ORDER — METHOCARBAMOL 500 MG/1
1000 TABLET, FILM COATED ORAL 3 TIMES DAILY PRN
Qty: 30 TABLET | Refills: 0 | Status: SHIPPED | OUTPATIENT
Start: 2021-03-21 | End: 2021-03-26

## 2021-03-21 RX ADMIN — DIAZEPAM 5 MG: 5 TABLET ORAL at 01:03

## 2021-03-21 RX ADMIN — KETOROLAC TROMETHAMINE 30 MG: 30 INJECTION, SOLUTION INTRAMUSCULAR at 01:03

## 2021-03-24 ENCOUNTER — OFFICE VISIT (OUTPATIENT)
Dept: ORTHOPEDICS | Facility: CLINIC | Age: 41
End: 2021-03-24
Payer: MEDICAID

## 2021-03-24 VITALS
DIASTOLIC BLOOD PRESSURE: 88 MMHG | HEART RATE: 90 BPM | RESPIRATION RATE: 18 BRPM | WEIGHT: 274 LBS | BODY MASS INDEX: 48.54 KG/M2 | SYSTOLIC BLOOD PRESSURE: 140 MMHG

## 2021-03-24 DIAGNOSIS — S62.525A CLOSED NONDISPLACED FRACTURE OF DISTAL PHALANX OF LEFT THUMB, INITIAL ENCOUNTER: Primary | ICD-10-CM

## 2021-03-24 PROCEDURE — 99214 OFFICE O/P EST MOD 30 MIN: CPT | Mod: PBBFAC,PN | Performed by: PHYSICIAN ASSISTANT

## 2021-03-24 PROCEDURE — 99999 PR PBB SHADOW E&M-EST. PATIENT-LVL IV: CPT | Mod: PBBFAC,,, | Performed by: PHYSICIAN ASSISTANT

## 2021-03-24 PROCEDURE — 99999 PR PBB SHADOW E&M-EST. PATIENT-LVL IV: ICD-10-PCS | Mod: PBBFAC,,, | Performed by: PHYSICIAN ASSISTANT

## 2021-03-24 PROCEDURE — 99203 PR OFFICE/OUTPT VISIT, NEW, LEVL III, 30-44 MIN: ICD-10-PCS | Mod: S$PBB,,, | Performed by: PHYSICIAN ASSISTANT

## 2021-03-24 PROCEDURE — 99203 OFFICE O/P NEW LOW 30 MIN: CPT | Mod: S$PBB,,, | Performed by: PHYSICIAN ASSISTANT

## 2021-04-09 ENCOUNTER — HOSPITAL ENCOUNTER (OUTPATIENT)
Dept: RADIOLOGY | Facility: HOSPITAL | Age: 41
Discharge: HOME | End: 2021-04-09
Attending: PHYSICIAN ASSISTANT
Payer: MEDICAID

## 2021-04-09 DIAGNOSIS — S62.525A CLOSED NONDISPLACED FRACTURE OF DISTAL PHALANX OF LEFT THUMB, INITIAL ENCOUNTER: ICD-10-CM

## 2021-04-09 PROCEDURE — 73140 XR FINGER 2 OR MORE VIEWS: ICD-10-PCS | Mod: 26,LT,, | Performed by: RADIOLOGY

## 2021-04-09 PROCEDURE — 73140 X-RAY EXAM OF FINGER(S): CPT | Mod: TC,FY

## 2021-04-09 PROCEDURE — 73140 X-RAY EXAM OF FINGER(S): CPT | Mod: 26,LT,, | Performed by: RADIOLOGY

## 2021-04-20 DIAGNOSIS — I10 ESSENTIAL (PRIMARY) HYPERTENSION: ICD-10-CM

## 2021-04-20 RX ORDER — TRIAMTERENE/HYDROCHLOROTHIAZID 37.5-25 MG
1 TABLET ORAL DAILY
Qty: 90 TABLET | Refills: 3 | OUTPATIENT
Start: 2021-04-20

## 2021-07-26 ENCOUNTER — OFFICE VISIT (OUTPATIENT)
Dept: OBSTETRICS AND GYNECOLOGY | Facility: CLINIC | Age: 41
End: 2021-07-26
Payer: MEDICAID

## 2021-07-26 VITALS
BODY MASS INDEX: 49.68 KG/M2 | SYSTOLIC BLOOD PRESSURE: 112 MMHG | WEIGHT: 280.44 LBS | DIASTOLIC BLOOD PRESSURE: 72 MMHG

## 2021-07-26 DIAGNOSIS — Z30.431 IUD CHECK UP: Primary | ICD-10-CM

## 2021-07-26 PROCEDURE — 99999 PR PBB SHADOW E&M-EST. PATIENT-LVL III: CPT | Mod: PBBFAC,,, | Performed by: OBSTETRICS & GYNECOLOGY

## 2021-07-26 PROCEDURE — 99499 UNLISTED E&M SERVICE: CPT | Mod: S$PBB,,, | Performed by: OBSTETRICS & GYNECOLOGY

## 2021-07-26 PROCEDURE — 99499 NO LOS: ICD-10-PCS | Mod: S$PBB,,, | Performed by: OBSTETRICS & GYNECOLOGY

## 2021-07-26 PROCEDURE — 99213 OFFICE O/P EST LOW 20 MIN: CPT | Mod: PBBFAC,PO | Performed by: OBSTETRICS & GYNECOLOGY

## 2021-07-26 PROCEDURE — 99999 PR PBB SHADOW E&M-EST. PATIENT-LVL III: ICD-10-PCS | Mod: PBBFAC,,, | Performed by: OBSTETRICS & GYNECOLOGY

## 2021-09-17 ENCOUNTER — LAB VISIT (OUTPATIENT)
Dept: LAB | Facility: HOSPITAL | Age: 41
End: 2021-09-17
Attending: FAMILY MEDICINE
Payer: MEDICAID

## 2021-09-17 DIAGNOSIS — R73.9 HYPERGLYCEMIA: ICD-10-CM

## 2021-09-17 DIAGNOSIS — E78.2 MIXED HYPERLIPIDEMIA: Primary | ICD-10-CM

## 2021-09-17 DIAGNOSIS — Z01.84 IMMUNITY STATUS TESTING: ICD-10-CM

## 2021-09-17 DIAGNOSIS — E55.9 VITAMIN D DEFICIENCY: ICD-10-CM

## 2021-09-17 DIAGNOSIS — M15.9 GENERALIZED OSTEOARTHROSIS, INVOLVING MULTIPLE SITES: ICD-10-CM

## 2021-09-17 DIAGNOSIS — E53.8 VITAMIN B12 DEFICIENCY (NON ANEMIC): ICD-10-CM

## 2021-09-17 DIAGNOSIS — D64.9 ANEMIA, UNSPECIFIED: ICD-10-CM

## 2021-09-17 LAB
25(OH)D3+25(OH)D2 SERPL-MCNC: 17 NG/ML (ref 30–96)
ALBUMIN SERPL BCP-MCNC: 3.6 G/DL (ref 3.5–5.2)
ALP SERPL-CCNC: 113 U/L (ref 55–135)
ALT SERPL W/O P-5'-P-CCNC: 17 U/L (ref 10–44)
ANION GAP SERPL CALC-SCNC: 12 MMOL/L (ref 8–16)
AST SERPL-CCNC: 17 U/L (ref 10–40)
BASOPHILS # BLD AUTO: 0.03 K/UL (ref 0–0.2)
BASOPHILS NFR BLD: 0.5 % (ref 0–1.9)
BILIRUB SERPL-MCNC: 0.3 MG/DL (ref 0.1–1)
BUN SERPL-MCNC: 11 MG/DL (ref 6–20)
CALCIUM SERPL-MCNC: 9.3 MG/DL (ref 8.7–10.5)
CHLORIDE SERPL-SCNC: 99 MMOL/L (ref 95–110)
CHOLEST SERPL-MCNC: 179 MG/DL (ref 120–199)
CHOLEST/HDLC SERPL: 6.2 {RATIO} (ref 2–5)
CO2 SERPL-SCNC: 29 MMOL/L (ref 23–29)
CREAT SERPL-MCNC: 0.9 MG/DL (ref 0.5–1.4)
DIFFERENTIAL METHOD: ABNORMAL
EOSINOPHIL # BLD AUTO: 0.2 K/UL (ref 0–0.5)
EOSINOPHIL NFR BLD: 2.8 % (ref 0–8)
ERYTHROCYTE [DISTWIDTH] IN BLOOD BY AUTOMATED COUNT: 14.2 % (ref 11.5–14.5)
EST. GFR  (AFRICAN AMERICAN): >60 ML/MIN/1.73 M^2
EST. GFR  (NON AFRICAN AMERICAN): >60 ML/MIN/1.73 M^2
ESTIMATED AVG GLUCOSE: 97 MG/DL (ref 68–131)
FERRITIN SERPL-MCNC: 99 NG/ML (ref 20–300)
GLUCOSE SERPL-MCNC: 127 MG/DL (ref 70–110)
HBA1C MFR BLD: 5 % (ref 4–5.6)
HCT VFR BLD AUTO: 41.4 % (ref 37–48.5)
HDLC SERPL-MCNC: 29 MG/DL (ref 40–75)
HDLC SERPL: 16.2 % (ref 20–50)
HGB BLD-MCNC: 12.3 G/DL (ref 12–16)
IMM GRANULOCYTES # BLD AUTO: 0.01 K/UL (ref 0–0.04)
IMM GRANULOCYTES NFR BLD AUTO: 0.2 % (ref 0–0.5)
LDLC SERPL CALC-MCNC: ABNORMAL MG/DL (ref 63–159)
LYMPHOCYTES # BLD AUTO: 2.1 K/UL (ref 1–4.8)
LYMPHOCYTES NFR BLD: 34.6 % (ref 18–48)
MCH RBC QN AUTO: 23.3 PG (ref 27–31)
MCHC RBC AUTO-ENTMCNC: 29.7 G/DL (ref 32–36)
MCV RBC AUTO: 78 FL (ref 82–98)
MONOCYTES # BLD AUTO: 0.5 K/UL (ref 0.3–1)
MONOCYTES NFR BLD: 8.7 % (ref 4–15)
NEUTROPHILS # BLD AUTO: 3.2 K/UL (ref 1.8–7.7)
NEUTROPHILS NFR BLD: 53.2 % (ref 38–73)
NONHDLC SERPL-MCNC: 150 MG/DL
NRBC BLD-RTO: 0 /100 WBC
PLATELET # BLD AUTO: 289 K/UL (ref 150–450)
PMV BLD AUTO: 10.2 FL (ref 9.2–12.9)
POTASSIUM SERPL-SCNC: 3.2 MMOL/L (ref 3.5–5.1)
PROT SERPL-MCNC: 8.3 G/DL (ref 6–8.4)
RBC # BLD AUTO: 5.29 M/UL (ref 4–5.4)
SODIUM SERPL-SCNC: 140 MMOL/L (ref 136–145)
T4 FREE SERPL-MCNC: 0.93 NG/DL (ref 0.71–1.51)
TRIGL SERPL-MCNC: 689 MG/DL (ref 30–150)
TSH SERPL DL<=0.005 MIU/L-ACNC: 0.67 UIU/ML (ref 0.4–4)
URATE SERPL-MCNC: 9.3 MG/DL (ref 2.4–5.7)
VIT B12 SERPL-MCNC: 576 PG/ML (ref 210–950)
WBC # BLD AUTO: 6.09 K/UL (ref 3.9–12.7)

## 2021-09-17 PROCEDURE — 82728 ASSAY OF FERRITIN: CPT | Performed by: INTERNAL MEDICINE

## 2021-09-17 PROCEDURE — 84443 ASSAY THYROID STIM HORMONE: CPT | Performed by: INTERNAL MEDICINE

## 2021-09-17 PROCEDURE — 84439 ASSAY OF FREE THYROXINE: CPT | Performed by: INTERNAL MEDICINE

## 2021-09-17 PROCEDURE — 80061 LIPID PANEL: CPT | Performed by: INTERNAL MEDICINE

## 2021-09-17 PROCEDURE — 85025 COMPLETE CBC W/AUTO DIFF WBC: CPT | Performed by: INTERNAL MEDICINE

## 2021-09-17 PROCEDURE — 36415 COLL VENOUS BLD VENIPUNCTURE: CPT | Performed by: INTERNAL MEDICINE

## 2021-09-17 PROCEDURE — 80053 COMPREHEN METABOLIC PANEL: CPT | Performed by: INTERNAL MEDICINE

## 2021-09-17 PROCEDURE — 82607 VITAMIN B-12: CPT | Performed by: INTERNAL MEDICINE

## 2021-09-17 PROCEDURE — 86038 ANTINUCLEAR ANTIBODIES: CPT | Performed by: INTERNAL MEDICINE

## 2021-09-17 PROCEDURE — 83036 HEMOGLOBIN GLYCOSYLATED A1C: CPT | Performed by: INTERNAL MEDICINE

## 2021-09-17 PROCEDURE — 84550 ASSAY OF BLOOD/URIC ACID: CPT | Performed by: INTERNAL MEDICINE

## 2021-09-17 PROCEDURE — 82306 VITAMIN D 25 HYDROXY: CPT | Performed by: INTERNAL MEDICINE

## 2021-09-17 PROCEDURE — 86769 SARS-COV-2 COVID-19 ANTIBODY: CPT | Performed by: INTERNAL MEDICINE

## 2021-09-20 LAB
ANA SER QL IF: NORMAL
SARS-COV-2 IGG SERPL IA-ACNC: 380.2 AU/ML
SARS-COV-2 IGG SERPL QL IA: POSITIVE

## 2021-12-16 ENCOUNTER — IMMUNIZATION (OUTPATIENT)
Dept: INTERNAL MEDICINE | Facility: CLINIC | Age: 41
End: 2021-12-16
Payer: MEDICAID

## 2021-12-16 DIAGNOSIS — Z23 NEED FOR VACCINATION: Primary | ICD-10-CM

## 2021-12-16 PROCEDURE — 0004A COVID-19, MRNA, LNP-S, PF, 30 MCG/0.3 ML DOSE VACCINE: CPT | Mod: PBBFAC,PO

## 2022-01-10 ENCOUNTER — TELEPHONE (OUTPATIENT)
Dept: NEUROLOGY | Facility: HOSPITAL | Age: 42
End: 2022-01-10
Payer: MEDICAID

## 2022-01-10 NOTE — TELEPHONE ENCOUNTER
Clinic appt rescheduled with pt to Wednesday, January 19, 2022 at 830am.  Pt repeated date and time correctly.

## 2022-01-19 ENCOUNTER — PATIENT MESSAGE (OUTPATIENT)
Dept: NEUROLOGY | Facility: HOSPITAL | Age: 42
End: 2022-01-19

## 2022-01-19 ENCOUNTER — TELEPHONE (OUTPATIENT)
Dept: NEUROLOGY | Facility: HOSPITAL | Age: 42
End: 2022-01-19
Payer: MEDICAID

## 2022-01-19 ENCOUNTER — OFFICE VISIT (OUTPATIENT)
Dept: NEUROLOGY | Facility: HOSPITAL | Age: 42
End: 2022-01-19
Attending: INTERNAL MEDICINE
Payer: MEDICAID

## 2022-01-19 VITALS
HEIGHT: 64 IN | WEIGHT: 281 LBS | DIASTOLIC BLOOD PRESSURE: 78 MMHG | TEMPERATURE: 98 F | HEART RATE: 103 BPM | SYSTOLIC BLOOD PRESSURE: 140 MMHG | BODY MASS INDEX: 47.97 KG/M2

## 2022-01-19 DIAGNOSIS — E66.01 CLASS 3 SEVERE OBESITY WITHOUT SERIOUS COMORBIDITY WITH BODY MASS INDEX (BMI) OF 45.0 TO 49.9 IN ADULT, UNSPECIFIED OBESITY TYPE: ICD-10-CM

## 2022-01-19 DIAGNOSIS — Z01.818 PRE-OP TESTING: ICD-10-CM

## 2022-01-19 DIAGNOSIS — R12 HEARTBURN: Primary | ICD-10-CM

## 2022-01-19 PROCEDURE — 99215 OFFICE O/P EST HI 40 MIN: CPT | Performed by: INTERNAL MEDICINE

## 2022-01-19 NOTE — TELEPHONE ENCOUNTER
Pt received clarification of need for barium esophagram as workup for bariatric procedure.  Barium esophagram ordered, pt to schedule.  Pre procedure covid 19 test scheduled on Friday, February 18, 2022 at 830am at [facility].  Egd scheduled on Monday, February 21, 2022 at [facility].  Instructions give to eat light meals on day prior to procedure with nothing to eat or drink after midnight.  Notified Endoscopy staff will contact 2-3 days with arrival time.  Acknowledged understanding.  Sent also via pt portal.

## 2022-01-19 NOTE — PROGRESS NOTES
"[facility]    CC: Heartburn    HPI 41 y.o. female with PMHx of anemia, HTN, fibromyalgia, and class III obesity (BMI 48.23) who presents for chronic, intermittent heartburn without associated alarm symptoms. She is undergoing evaluation for bariatric surgery with Dr. Antonio Dempsey at [facility] and was requested to undergo EGD as part of her pre-operative workup. She complains of heartburn once weekly or less and denies dysphagia/odynophagia, hematemesis, melena, or hematochezia. She is not on PPI therapy. Denies use of NSAIDs, not taking any blood thinners or anticoagulant medications. Denies alcohol use, drug use, or tobacco use. No family history of gastric or colon cancer. Has never undergone EGD or colonoscopy in the past.    Prior surgeries include laparoscopic cholecystectomy in 1999 and 2 C-sections. Her BMI is currently 48.23 and she weighs 280 lbs. Of note, seen in our GI clinic in 2016 for iron deficiency anemia, now taking daily iron supplement, and labs with resolution of iron deficiency. 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      Past Medical History  Past Medical History:   Diagnosis Date    Anemia     Arthritis     Fibromyalgia     Hypertension     Thyroid disease        Review of Systems  General ROS: negative for chills, fever or weight loss  Cardiovascular ROS: no chest pain or dyspnea on exertion  Gastrointestinal ROS: no abdominal pain, change in bowel habits, or black/ bloody stools    Physical Examination  BP (!) 140/78 (BP Location: Right arm, Patient Position: Sitting, BP Method: Large (Automatic))   Pulse 103   Temp 98.3 °F (36.8 °C) (Oral)   Ht 5' 4" (1.626 m)   Wt 127.4 kg (280 lb 15.6 oz)   BMI 48.23 kg/m²   General appearance: alert, cooperative, no distress, obese body habitus  Lungs: clear to auscultation bilaterally, no increased work of breathing  Heart: regular rate and rhythm without rub; no peripheral edema   Abdomen: " soft, non-tender; abdominal obesity present    Labs:  Lab Results   Component Value Date    WBC 6.09 09/17/2021    HGB 12.3 09/17/2021    HCT 41.4 09/17/2021    MCV 78 (L) 09/17/2021     09/17/2021       Lab Results   Component Value Date    FERRITIN 99 09/17/2021       Assessment:   41 y.o. female with PMHx of anemia, HTN, fibromyalgia, and class III obesity (BMI 48.23) who presents for chronic, intermittent heartburn without associated alarm symptoms. She is undergoing evaluation for bariatric surgery with Dr. Antonio Dempsey at [facility] and was requested to undergo EGD as part of evaluation of her GERD which is a chronic problem with recent exacerbation.     Plan:  - Will plan for EGD in February 2022 to evaluate heartburn as part of the patient's pre-operative evaluation prior to bariatric surgery.  - Patient to call our office to inform us if a barium esophagram is also needed as part of her pre-operative workup. If this is the case, we can arrange for this study in addition to above.  - If EGD is unremarkable, can trial PPI 30 minutes before daily for 6-8 weeks. If not effective, can increase to twice daily use or add H2 blocker.  - Encouraged weight loss with lifestyle modifications and aerobic exercise.  - Recommend screening colonoscopy (average risk) at age 45.  - This is Dr. Griffith's nurse.    Adis Huitron MD   [address], [address]   [place] [address] [phone]

## 2022-01-19 NOTE — PATIENT INSTRUCTIONS
You are scheduled for an EGD on _________________________________    You should eat light meals the day before the procedure and nothing to eat or drink after midnight the night before your procedure.    You will need to be at the 1st floor admission desk at the hospital on ________________________

## 2022-01-19 NOTE — TELEPHONE ENCOUNTER
----- Message from Gill Fuentes sent at 1/19/2022 10:56 AM CST -----  Contact: [phone]/ Self  GI     Type: Requesting to speak with nurse    Who Called: Pt   Regarding: discuss test   Would the patient rather a call back or a response via [facility]? Call back  Best Call Back Number: [phone]   Additional Information: n/a

## 2022-02-03 ENCOUNTER — TELEPHONE (OUTPATIENT)
Dept: NEUROLOGY | Facility: HOSPITAL | Age: 42
End: 2022-02-03
Payer: MEDICAID

## 2022-02-03 ENCOUNTER — HOSPITAL ENCOUNTER (OUTPATIENT)
Dept: RADIOLOGY | Facility: HOSPITAL | Age: 42
Discharge: HOME | End: 2022-02-03
Attending: INTERNAL MEDICINE
Payer: MEDICAID

## 2022-02-03 DIAGNOSIS — Z01.818 PRE-OP TESTING: ICD-10-CM

## 2022-02-03 DIAGNOSIS — R12 HEARTBURN: ICD-10-CM

## 2022-02-03 DIAGNOSIS — E66.01 CLASS 3 SEVERE OBESITY WITHOUT SERIOUS COMORBIDITY WITH BODY MASS INDEX (BMI) OF 45.0 TO 49.9 IN ADULT, UNSPECIFIED OBESITY TYPE: ICD-10-CM

## 2022-02-03 PROCEDURE — 25500020 PHARM REV CODE 255: Performed by: INTERNAL MEDICINE

## 2022-02-03 PROCEDURE — 74220 X-RAY XM ESOPHAGUS 1CNTRST: CPT | Mod: TC

## 2022-02-03 PROCEDURE — 74220 FL ESOPHAGRAM COMPLETE: ICD-10-PCS | Mod: 26,,, | Performed by: RADIOLOGY

## 2022-02-03 PROCEDURE — 74220 X-RAY XM ESOPHAGUS 1CNTRST: CPT | Mod: 26,,, | Performed by: RADIOLOGY

## 2022-02-03 PROCEDURE — A9698 NON-RAD CONTRAST MATERIALNOC: HCPCS | Performed by: INTERNAL MEDICINE

## 2022-02-03 RX ADMIN — BARIUM SULFATE 355 ML: 0.6 SUSPENSION ORAL at 08:02

## 2022-02-03 NOTE — TELEPHONE ENCOUNTER
----- Message from Adis Huitron MD sent at 2/3/2022  4:08 PM CST -----  Please schedule this patient for an audio visit next Wednesday afternoon

## 2022-02-09 ENCOUNTER — OFFICE VISIT (OUTPATIENT)
Dept: NEUROLOGY | Facility: HOSPITAL | Age: 42
End: 2022-02-09
Attending: INTERNAL MEDICINE
Payer: MEDICAID

## 2022-02-09 DIAGNOSIS — K21.9 GASTROESOPHAGEAL REFLUX DISEASE, UNSPECIFIED WHETHER ESOPHAGITIS PRESENT: Primary | ICD-10-CM

## 2022-02-09 NOTE — PROGRESS NOTES
Established Patient - Audio Only Telehealth Visit     The patient location is: work   The chief complaint leading to consultation is: heartburn, f/u esophagram   Visit type: Virtual visit with audio only (telephone)  Total time spent with patient: 15 minutes        The reason for the audio only service rather than synchronous audio and video virtual visit was related to technical difficulties or patient preference/necessity.     Each patient to whom I provide medical services by telemedicine is:  (1) informed of the relationship between the physician and patient and the respective role of any other health care provider with respect to management of the patient; and (2) notified that they may decline to receive medical services by telemedicine and may withdraw from such care at any time. Patient verbally consented to receive this service via voice-only telephone call.       HPI:   41 y.o. female with PMHx of anemia, HTN, fibromyalgia, and class III obesity (BMI 48.23) who presents for chronic, intermittent heartburn without associated alarm symptoms. She is undergoing evaluation for bariatric surgery with Dr. Antonio Dempsey at [facility] at [facility] and was requested to undergo EGD, esopahgram as part of her pre-operative workup. She complains of heartburn once weekly or less and denies dysphagia/odynophagia, hematemesis, melena, or hematochezia. Currently uses prn sean seltzer or tums for occasional heartburn.   Recently completed esophagram which is normal.     Assessment and plan:    40 yo F with anemia, obesity, chronic mild heartburn who initially presented for bariatric pre-operative workup.     Normal esophagram.     EGD 2/21/22    Weight loss    Can consider daily PPI in future       This service was not originating from a related E/M service provided within the previous 7 days nor will  to an E/M service or procedure within the next 24 hours or my soonest available  appointment.  Prevailing standard of care was able to be met in this audio-only visit.

## 2022-02-10 ENCOUNTER — OFFICE VISIT (OUTPATIENT)
Dept: FAMILY MEDICINE | Facility: HOSPITAL | Age: 42
End: 2022-02-10
Attending: FAMILY MEDICINE
Payer: MEDICAID

## 2022-02-10 VITALS
HEART RATE: 87 BPM | BODY MASS INDEX: 49.68 KG/M2 | SYSTOLIC BLOOD PRESSURE: 138 MMHG | HEIGHT: 64 IN | WEIGHT: 291 LBS | DIASTOLIC BLOOD PRESSURE: 87 MMHG

## 2022-02-10 DIAGNOSIS — I10 ESSENTIAL (PRIMARY) HYPERTENSION: Primary | ICD-10-CM

## 2022-02-10 DIAGNOSIS — E07.9 THYROID DISEASE: ICD-10-CM

## 2022-02-10 DIAGNOSIS — Z12.31 ENCOUNTER FOR SCREENING MAMMOGRAM FOR BREAST CANCER: ICD-10-CM

## 2022-02-10 DIAGNOSIS — M10.9 GOUT, UNSPECIFIED CAUSE, UNSPECIFIED CHRONICITY, UNSPECIFIED SITE: ICD-10-CM

## 2022-02-10 DIAGNOSIS — E78.1 PURE HYPERTRIGLYCERIDEMIA: ICD-10-CM

## 2022-02-10 DIAGNOSIS — E66.01 CLASS 3 SEVERE OBESITY WITH SERIOUS COMORBIDITY AND BODY MASS INDEX (BMI) OF 45.0 TO 49.9 IN ADULT, UNSPECIFIED OBESITY TYPE: ICD-10-CM

## 2022-02-10 PROBLEM — N17.9 AKI (ACUTE KIDNEY INJURY): Status: RESOLVED | Noted: 2020-09-30 | Resolved: 2022-02-10

## 2022-02-10 PROBLEM — E78.5 HYPERLIPIDEMIA: Chronic | Status: ACTIVE | Noted: 2020-09-25

## 2022-02-10 PROBLEM — L03.116 LEFT LEG CELLULITIS: Status: RESOLVED | Noted: 2020-09-26 | Resolved: 2022-02-10

## 2022-02-10 PROBLEM — L03.90 CELLULITIS: Status: RESOLVED | Noted: 2020-09-25 | Resolved: 2022-02-10

## 2022-02-10 PROCEDURE — 99213 OFFICE O/P EST LOW 20 MIN: CPT | Performed by: STUDENT IN AN ORGANIZED HEALTH CARE EDUCATION/TRAINING PROGRAM

## 2022-02-10 RX ORDER — FENOFIBRATE 160 MG/1
160 TABLET ORAL DAILY
Qty: 90 TABLET | Refills: 3 | Status: SHIPPED | OUTPATIENT
Start: 2022-02-10 | End: 2023-02-20 | Stop reason: SDUPTHER

## 2022-02-10 RX ORDER — PHENTERMINE HYDROCHLORIDE 37.5 MG/1
37.5 TABLET ORAL
Qty: 30 TABLET | Refills: 2 | Status: SHIPPED | OUTPATIENT
Start: 2022-02-10 | End: 2022-04-27 | Stop reason: SDUPTHER

## 2022-02-10 RX ORDER — ALLOPURINOL 300 MG/1
300 TABLET ORAL DAILY
Qty: 30 TABLET | Refills: 1 | Status: SHIPPED | OUTPATIENT
Start: 2022-02-10 | End: 2022-04-07 | Stop reason: SDUPTHER

## 2022-02-10 RX ORDER — TRIAMTERENE/HYDROCHLOROTHIAZID 37.5-25 MG
1 TABLET ORAL DAILY
Qty: 90 TABLET | Refills: 3 | Status: SHIPPED | OUTPATIENT
Start: 2022-02-10 | End: 2023-02-17 | Stop reason: SDUPTHER

## 2022-02-10 NOTE — PROGRESS NOTES
Subjective:      Patient ID: Mohsen Julien is a 41 y.o. female    Chief Complaint   Patient presents with    Establish Care     Needs blood work      HPI  41 y.o. female with a PMHx as documented below presents to clinic today for the following:  - Pt currently working w/ Bariatric Surgery on preparations for gastric sleeve procedure. Pt states that she needs to establish care with PCP in addition to losing a certain amount of weight before the procedure. She is currently enrolled in an exercise program at [employer] and she has an appointment for dietary counseling on the . She is interested in trying Adipex to help with weight loss before her procedure.   - Due for routine blood work and mammogram. Medication refills requested, as below.     Review of Systems   Constitutional: Negative for chills and fever.   Respiratory: Negative for shortness of breath.    Cardiovascular: Negative for chest pain.   Gastrointestinal: Negative for constipation, diarrhea, nausea and vomiting.     Past Medical History:   Diagnosis Date    Anemia     Arthritis     Fibromyalgia     Gout     HLD (hyperlipidemia)     HTN (hypertension) 2013    Peripheral neuropathy     Thyroid disease     Venous stasis dermatitis of left lower extremity      Past Surgical History:   Procedure Laterality Date    APPENDECTOMY       SECTION      CHOLECYSTECTOMY       Review of patient's allergies indicates:  No Known Allergies    Family History   Problem Relation Age of Onset    Stroke Mother     Colon cancer Maternal Aunt      Social History     Tobacco Use    Smoking status: Never Smoker    Smokeless tobacco: Never Used   Substance Use Topics    Alcohol use: No    Drug use: No     Currently on File with [facility]:   Most Recent Immunizations   Administered Date(s) Administered    COVID-19, MRNA, LN-S, PF (Pfizer) (Purple Cap) 2021    Influenza - Quadrivalent - PF *Preferred* (6 months and older)  10/27/2021     Objective:      Vitals:    02/10/22 0836   BP: 138/87   Pulse: 87     Body mass index is 49.95 kg/m².    Physical Exam  Vitals reviewed.   Constitutional:       General: She is not in acute distress.  HENT:      Head: Normocephalic and atraumatic.   Cardiovascular:      Rate and Rhythm: Normal rate.   Pulmonary:      Effort: Pulmonary effort is normal. No respiratory distress.   Skin:     General: Skin is warm and dry.   Neurological:      General: No focal deficit present.      Mental Status: She is alert and oriented to person, place, and time. Mental status is at baseline.        Assessment:       1. Essential (primary) hypertension    2. Gout, unspecified cause, unspecified chronicity, unspecified site    3. Thyroid disease    4. Pure hypertriglyceridemia    5. Encounter for screening mammogram for breast cancer    6. Class 3 severe obesity with serious comorbidity and body mass index (BMI) of 45.0 to 49.9 in adult, unspecified obesity type      Plan:       Mohsen was seen today for establish care.    Diagnoses and all orders for this visit:    Essential (primary) hypertension  -     triamterene-hydrochlorothiazide 37.5-25 mg (MAXZIDE-25) 37.5-25 mg per tablet; Take 1 tablet by mouth once daily.  -     CBC Auto Differential; Future  -     Comprehensive Metabolic Panel; Future    Gout, unspecified cause, unspecified chronicity, unspecified site  -     allopurinoL (ZYLOPRIM) 300 MG tablet; Take 1 tablet (300 mg total) by mouth once daily.    Thyroid disease  -     TSH; Future    Pure hypertriglyceridemia  -     fenofibrate 160 MG Tab; Take 1 tablet (160 mg total) by mouth once daily for hypertriglyceridemia  -     Lipid Panel; Future    Encounter for screening mammogram for breast cancer  -     Mammo Digital Screening Bilat; Future    Class 3 severe obesity with serious comorbidity and body mass index (BMI) of 45.0 to 49.9 in adult, unspecified obesity type  -     phentermine (ADIPEX-P) 37.5 mg  tablet; Take 1 tablet (37.5 mg total) by mouth before breakfast.  -     Hemoglobin A1C; Future       Follow up in about 4 weeks (around 3/10/2022), or if symptoms worsen or fail to improve.    Annika Hill MD  [facility] PGY-3  02/08/2022

## 2022-02-16 ENCOUNTER — LAB VISIT (OUTPATIENT)
Dept: LAB | Facility: HOSPITAL | Age: 42
End: 2022-02-16
Attending: STUDENT IN AN ORGANIZED HEALTH CARE EDUCATION/TRAINING PROGRAM
Payer: MEDICAID

## 2022-02-16 DIAGNOSIS — E66.01 CLASS 3 SEVERE OBESITY WITH SERIOUS COMORBIDITY AND BODY MASS INDEX (BMI) OF 45.0 TO 49.9 IN ADULT, UNSPECIFIED OBESITY TYPE: ICD-10-CM

## 2022-02-16 DIAGNOSIS — I10 ESSENTIAL (PRIMARY) HYPERTENSION: ICD-10-CM

## 2022-02-16 DIAGNOSIS — E78.1 PURE HYPERTRIGLYCERIDEMIA: ICD-10-CM

## 2022-02-16 DIAGNOSIS — E07.9 THYROID DISEASE: ICD-10-CM

## 2022-02-16 LAB
ALBUMIN SERPL BCP-MCNC: 3.4 G/DL (ref 3.5–5.2)
ALP SERPL-CCNC: 87 U/L (ref 55–135)
ALT SERPL W/O P-5'-P-CCNC: 14 U/L (ref 10–44)
ANION GAP SERPL CALC-SCNC: 12 MMOL/L (ref 8–16)
AST SERPL-CCNC: 13 U/L (ref 10–40)
BASOPHILS # BLD AUTO: 0.03 K/UL (ref 0–0.2)
BASOPHILS NFR BLD: 0.5 % (ref 0–1.9)
BILIRUB SERPL-MCNC: 0.4 MG/DL (ref 0.1–1)
BUN SERPL-MCNC: 14 MG/DL (ref 6–20)
CALCIUM SERPL-MCNC: 9.1 MG/DL (ref 8.7–10.5)
CHLORIDE SERPL-SCNC: 105 MMOL/L (ref 95–110)
CHOLEST SERPL-MCNC: 129 MG/DL (ref 120–199)
CHOLEST/HDLC SERPL: 4.3 {RATIO} (ref 2–5)
CO2 SERPL-SCNC: 21 MMOL/L (ref 23–29)
CREAT SERPL-MCNC: 0.9 MG/DL (ref 0.5–1.4)
DIFFERENTIAL METHOD: ABNORMAL
EOSINOPHIL # BLD AUTO: 0.1 K/UL (ref 0–0.5)
EOSINOPHIL NFR BLD: 2.1 % (ref 0–8)
ERYTHROCYTE [DISTWIDTH] IN BLOOD BY AUTOMATED COUNT: 14.4 % (ref 11.5–14.5)
EST. GFR  (AFRICAN AMERICAN): >60 ML/MIN/1.73 M^2
EST. GFR  (NON AFRICAN AMERICAN): >60 ML/MIN/1.73 M^2
ESTIMATED AVG GLUCOSE: 94 MG/DL (ref 68–131)
GLUCOSE SERPL-MCNC: 95 MG/DL (ref 70–110)
HBA1C MFR BLD: 4.9 % (ref 4–5.6)
HCT VFR BLD AUTO: 40.5 % (ref 37–48.5)
HDLC SERPL-MCNC: 30 MG/DL (ref 40–75)
HDLC SERPL: 23.3 % (ref 20–50)
HGB BLD-MCNC: 11.9 G/DL (ref 12–16)
IMM GRANULOCYTES # BLD AUTO: 0.02 K/UL (ref 0–0.04)
IMM GRANULOCYTES NFR BLD AUTO: 0.3 % (ref 0–0.5)
LDLC SERPL CALC-MCNC: 53.8 MG/DL (ref 63–159)
LYMPHOCYTES # BLD AUTO: 2.6 K/UL (ref 1–4.8)
LYMPHOCYTES NFR BLD: 44.8 % (ref 18–48)
MCH RBC QN AUTO: 23.2 PG (ref 27–31)
MCHC RBC AUTO-ENTMCNC: 29.4 G/DL (ref 32–36)
MCV RBC AUTO: 79 FL (ref 82–98)
MONOCYTES # BLD AUTO: 0.4 K/UL (ref 0.3–1)
MONOCYTES NFR BLD: 7.5 % (ref 4–15)
NEUTROPHILS # BLD AUTO: 2.6 K/UL (ref 1.8–7.7)
NEUTROPHILS NFR BLD: 44.8 % (ref 38–73)
NONHDLC SERPL-MCNC: 99 MG/DL
NRBC BLD-RTO: 0 /100 WBC
PLATELET # BLD AUTO: 239 K/UL (ref 150–450)
PMV BLD AUTO: 10.3 FL (ref 9.2–12.9)
POTASSIUM SERPL-SCNC: 3.5 MMOL/L (ref 3.5–5.1)
PROT SERPL-MCNC: 7.3 G/DL (ref 6–8.4)
RBC # BLD AUTO: 5.12 M/UL (ref 4–5.4)
SODIUM SERPL-SCNC: 138 MMOL/L (ref 136–145)
TRIGL SERPL-MCNC: 226 MG/DL (ref 30–150)
TSH SERPL DL<=0.005 MIU/L-ACNC: 0.74 UIU/ML (ref 0.4–4)
WBC # BLD AUTO: 5.83 K/UL (ref 3.9–12.7)

## 2022-02-16 PROCEDURE — 84443 ASSAY THYROID STIM HORMONE: CPT | Performed by: STUDENT IN AN ORGANIZED HEALTH CARE EDUCATION/TRAINING PROGRAM

## 2022-02-16 PROCEDURE — 80053 COMPREHEN METABOLIC PANEL: CPT | Performed by: STUDENT IN AN ORGANIZED HEALTH CARE EDUCATION/TRAINING PROGRAM

## 2022-02-16 PROCEDURE — 36415 COLL VENOUS BLD VENIPUNCTURE: CPT | Performed by: STUDENT IN AN ORGANIZED HEALTH CARE EDUCATION/TRAINING PROGRAM

## 2022-02-16 PROCEDURE — 83036 HEMOGLOBIN GLYCOSYLATED A1C: CPT | Performed by: STUDENT IN AN ORGANIZED HEALTH CARE EDUCATION/TRAINING PROGRAM

## 2022-02-16 PROCEDURE — 80061 LIPID PANEL: CPT | Performed by: STUDENT IN AN ORGANIZED HEALTH CARE EDUCATION/TRAINING PROGRAM

## 2022-02-16 PROCEDURE — 85025 COMPLETE CBC W/AUTO DIFF WBC: CPT | Performed by: STUDENT IN AN ORGANIZED HEALTH CARE EDUCATION/TRAINING PROGRAM

## 2022-02-17 ENCOUNTER — TELEPHONE (OUTPATIENT)
Dept: ENDOSCOPY | Facility: HOSPITAL | Age: 42
End: 2022-02-17
Payer: MEDICAID

## 2022-02-17 NOTE — TELEPHONE ENCOUNTER
Left voice and text messages instructing patient to call dept @ [phone] between 8am-4pm.    Arrival time to be given @ 1015/EGD  Covid - pnd, 02/18  (Message sent via [facility] portal)

## 2022-02-18 ENCOUNTER — LAB VISIT (OUTPATIENT)
Dept: FAMILY MEDICINE | Facility: CLINIC | Age: 42
End: 2022-02-18
Payer: MEDICAID

## 2022-02-18 ENCOUNTER — TELEPHONE (OUTPATIENT)
Dept: ENDOSCOPY | Facility: HOSPITAL | Age: 42
End: 2022-02-18
Payer: MEDICAID

## 2022-02-18 DIAGNOSIS — Z01.818 PRE-OP TESTING: ICD-10-CM

## 2022-02-18 PROCEDURE — U0003 INFECTIOUS AGENT DETECTION BY NUCLEIC ACID (DNA OR RNA); SEVERE ACUTE RESPIRATORY SYNDROME CORONAVIRUS 2 (SARS-COV-2) (CORONAVIRUS DISEASE [COVID-19]), AMPLIFIED PROBE TECHNIQUE, MAKING USE OF HIGH THROUGHPUT TECHNOLOGIES AS DESCRIBED BY CMS-2020-01-R: HCPCS | Performed by: INTERNAL MEDICINE

## 2022-02-18 PROCEDURE — U0005 INFEC AGEN DETEC AMPLI PROBE: HCPCS | Performed by: INTERNAL MEDICINE

## 2022-02-18 NOTE — TELEPHONE ENCOUNTER
Spoke with patient about arrival time @ 1015.   Covid test = pnd, 02/15    NPO status reviewed: Patient must have nothing to eat after midnight.      Medications: Do not take Insulin or oral diabetic medications the day of the procedure.  Take as prescribed: heart, seizure and blood pressure medication in the morning with a sip of water (less than an ounce).  Take any breathing medications and bring inhalers to hospital with you Leave all valuables and jewelry at home.     Wear comfortable clothes to procedure to change into hospital gown You cannot drive for 24 hours after your procedure because you will receive sedation for your procedure to make you comfortable.  A ride must be provided at discharge.

## 2022-02-19 LAB — SARS-COV-2 RNA RESP QL NAA+PROBE: NOT DETECTED

## 2022-02-21 ENCOUNTER — HOSPITAL ENCOUNTER (OUTPATIENT)
Facility: HOSPITAL | Age: 42
Discharge: HOME | End: 2022-02-21
Attending: INTERNAL MEDICINE | Admitting: INTERNAL MEDICINE
Payer: MEDICAID

## 2022-02-21 ENCOUNTER — ANESTHESIA (OUTPATIENT)
Dept: ENDOSCOPY | Facility: HOSPITAL | Age: 42
End: 2022-02-21
Payer: MEDICAID

## 2022-02-21 ENCOUNTER — ANESTHESIA EVENT (OUTPATIENT)
Dept: ENDOSCOPY | Facility: HOSPITAL | Age: 42
End: 2022-02-21
Payer: MEDICAID

## 2022-02-21 VITALS
OXYGEN SATURATION: 100 % | HEIGHT: 64 IN | WEIGHT: 272 LBS | HEART RATE: 90 BPM | BODY MASS INDEX: 46.44 KG/M2 | RESPIRATION RATE: 17 BRPM | TEMPERATURE: 98 F | SYSTOLIC BLOOD PRESSURE: 142 MMHG | DIASTOLIC BLOOD PRESSURE: 75 MMHG

## 2022-02-21 DIAGNOSIS — K21.9 GERD (GASTROESOPHAGEAL REFLUX DISEASE): ICD-10-CM

## 2022-02-21 DIAGNOSIS — R12 HEARTBURN: Primary | ICD-10-CM

## 2022-02-21 DIAGNOSIS — K21.9 GASTROESOPHAGEAL REFLUX DISEASE, UNSPECIFIED WHETHER ESOPHAGITIS PRESENT: ICD-10-CM

## 2022-02-21 DIAGNOSIS — K29.70 GASTRITIS, PRESENCE OF BLEEDING UNSPECIFIED, UNSPECIFIED CHRONICITY, UNSPECIFIED GASTRITIS TYPE: ICD-10-CM

## 2022-02-21 DIAGNOSIS — K21.9 GASTROESOPHAGEAL REFLUX DISEASE WITHOUT ESOPHAGITIS: Primary | ICD-10-CM

## 2022-02-21 LAB
B-HCG UR QL: NEGATIVE
CTP QC/QA: YES

## 2022-02-21 PROCEDURE — 37000009 HC ANESTHESIA EA ADD 15 MINS: Performed by: INTERNAL MEDICINE

## 2022-02-21 PROCEDURE — 00731 ANES UPR GI NDSC PX NOS: CPT | Performed by: INTERNAL MEDICINE

## 2022-02-21 PROCEDURE — 63600175 PHARM REV CODE 636 W HCPCS: Performed by: NURSE ANESTHETIST, CERTIFIED REGISTERED

## 2022-02-21 PROCEDURE — 43239 EGD BIOPSY SINGLE/MULTIPLE: CPT | Performed by: INTERNAL MEDICINE

## 2022-02-21 PROCEDURE — 27201012 HC FORCEPS, HOT/COLD, DISP: Performed by: INTERNAL MEDICINE

## 2022-02-21 PROCEDURE — 37000008 HC ANESTHESIA 1ST 15 MINUTES: Performed by: INTERNAL MEDICINE

## 2022-02-21 PROCEDURE — 88305 TISSUE EXAM BY PATHOLOGIST: ICD-10-PCS | Mod: 26,,, | Performed by: PATHOLOGY

## 2022-02-21 PROCEDURE — 81025 URINE PREGNANCY TEST: CPT | Performed by: INTERNAL MEDICINE

## 2022-02-21 PROCEDURE — 25000003 PHARM REV CODE 250: Performed by: INTERNAL MEDICINE

## 2022-02-21 PROCEDURE — 88305 TISSUE EXAM BY PATHOLOGIST: CPT | Performed by: PATHOLOGY

## 2022-02-21 PROCEDURE — 88305 TISSUE EXAM BY PATHOLOGIST: CPT | Mod: 26,,, | Performed by: PATHOLOGY

## 2022-02-21 PROCEDURE — 25000003 PHARM REV CODE 250: Performed by: NURSE ANESTHETIST, CERTIFIED REGISTERED

## 2022-02-21 RX ORDER — PROPOFOL 10 MG/ML
VIAL (ML) INTRAVENOUS
Status: DISCONTINUED | OUTPATIENT
Start: 2022-02-21 | End: 2022-02-21

## 2022-02-21 RX ORDER — SODIUM CHLORIDE 0.9 % (FLUSH) 0.9 %
10 SYRINGE (ML) INJECTION
Status: DISCONTINUED | OUTPATIENT
Start: 2022-02-21 | End: 2022-02-21 | Stop reason: HOSPADM

## 2022-02-21 RX ORDER — LIDOCAINE HCL/PF 100 MG/5ML
SYRINGE (ML) INTRAVENOUS
Status: DISCONTINUED | OUTPATIENT
Start: 2022-02-21 | End: 2022-02-21

## 2022-02-21 RX ORDER — SODIUM CHLORIDE 9 MG/ML
INJECTION, SOLUTION INTRAVENOUS CONTINUOUS
Status: DISCONTINUED | OUTPATIENT
Start: 2022-02-21 | End: 2022-02-21 | Stop reason: HOSPADM

## 2022-02-21 RX ORDER — PANTOPRAZOLE SODIUM 40 MG/1
40 TABLET, DELAYED RELEASE ORAL DAILY
Qty: 30 TABLET | Refills: 5 | Status: SHIPPED | OUTPATIENT
Start: 2022-02-21 | End: 2023-02-20 | Stop reason: SDUPTHER

## 2022-02-21 RX ORDER — PROPOFOL 10 MG/ML
VIAL (ML) INTRAVENOUS CONTINUOUS PRN
Status: DISCONTINUED | OUTPATIENT
Start: 2022-02-21 | End: 2022-02-21

## 2022-02-21 RX ADMIN — SODIUM CHLORIDE: 0.9 INJECTION, SOLUTION INTRAVENOUS at 11:02

## 2022-02-21 RX ADMIN — PROPOFOL 200 MCG/KG/MIN: 10 INJECTION, EMULSION INTRAVENOUS at 12:02

## 2022-02-21 RX ADMIN — LIDOCAINE HYDROCHLORIDE 75 MG: 20 INJECTION, SOLUTION INTRAVENOUS at 12:02

## 2022-02-21 RX ADMIN — PROPOFOL 70 MG: 10 INJECTION, EMULSION INTRAVENOUS at 12:02

## 2022-02-21 NOTE — H&P
[facility]    CC: Heartburn    HPI 41 y.o. female with PMHx of anemia, HTN, fibromyalgia, and class III obesity (BMI 48.23) who presents for chronic, intermittent heartburn without associated alarm symptoms. She is undergoing evaluation for bariatric surgery with Dr. Antonio Dempsey at [facility] at [facility] and was requested to undergo EGD as part of her pre-operative workup. She complains of heartburn once weekly or less and denies dysphagia/odynophagia, hematemesis, melena, or hematochezia. She is not on PPI therapy. Denies use of NSAIDs, not taking any blood thinners or anticoagulant medications. Denies alcohol use, drug use, or tobacco use. No family history of gastric or colon cancer. Has never undergone EGD or colonoscopy in the past.    Prior surgeries include laparoscopic cholecystectomy in 1999 and 2 C-sections. Her BMI is currently 48.23 and she weighs 280 lbs. Of note, seen in our GI clinic in 2016 for iron deficiency anemia, now taking daily iron supplement, and labs with resolution of iron deficiency. 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      Past Medical History  Past Medical History:   Diagnosis Date    Anemia     Arthritis     Fibromyalgia     Gout     HLD (hyperlipidemia)     HTN (hypertension) 5/8/2013    Peripheral neuropathy     Thyroid disease     Venous stasis dermatitis of left lower extremity        Review of Systems  General ROS: negative for chills, fever or weight loss  Cardiovascular ROS: no chest pain or dyspnea on exertion  Gastrointestinal ROS: no abdominal pain, change in bowel habits, or black/ bloody stools    Physical Examination  There were no vitals taken for this visit.  General appearance: alert, cooperative, no distress, obese body habitus  Lungs: clear to auscultation bilaterally, no increased work of breathing  Heart: regular rate and rhythm without rub; no peripheral edema   Abdomen: soft, non-tender; abdominal obesity  present    Labs:  Lab Results   Component Value Date    WBC 5.83 02/16/2022    HGB 11.9 (L) 02/16/2022    HCT 40.5 02/16/2022    MCV 79 (L) 02/16/2022     02/16/2022       Lab Results   Component Value Date    FERRITIN 99 09/17/2021       Assessment:   41 y.o. female with PMHx of anemia, HTN, fibromyalgia, and class III obesity (BMI 48.23) who presents for chronic, intermittent heartburn without associated alarm symptoms. She is undergoing evaluation for bariatric surgery with Dr. Antonio Dempsey at [facility] and was requested to undergo EGD as part of evaluation of her GERD which is a chronic problem with recent exacerbation.     Plan:  - EGD today to evaluate heartburn as part of the patient's pre-operative evaluation prior to bariatric surgery.  - Patient to call our office to inform us if a barium esophagram is also needed as part of her pre-operative workup. If this is the case, we can arrange for this study in addition to above.  - If EGD is unremarkable, can trial PPI 30 minutes before daily for 6-8 weeks. If not effective, can increase to twice daily use or add H2 blocker.  - Encouraged weight loss with lifestyle modifications and aerobic exercise.  - Recommend screening colonoscopy (average risk) at age 45.  - This is Dr. Griffith's nurse.    Adis Huitron MD   [address] [place] [address], [address]   [place] [address] [phone]

## 2022-02-21 NOTE — ANESTHESIA PREPROCEDURE EVALUATION
2022  Mohsen Julien is a 41 y.o., female for EGD under MAC    Past Medical History:   Diagnosis Date    Anemia     Arthritis     Fibromyalgia     Gout     HLD (hyperlipidemia)     HTN (hypertension) 2013    Peripheral neuropathy     Thyroid disease     Venous stasis dermatitis of left lower extremity      Past Surgical History:   Procedure Laterality Date    APPENDECTOMY       SECTION      CHOLECYSTECTOMY             Pre-op Assessment    I have reviewed the Patient Summary Reports.    I have reviewed the NPO Status.   I have reviewed the Medications.     Review of Systems  Social:  Non-Smoker        Physical Exam    Airway:  Mallampati: III           Anesthesia Plan  Type of Anesthesia, risks & benefits discussed:    Anesthesia Type: MAC, Gen Natural Airway  Informed Consent: Informed consent signed with the Patient and all parties understand the risks and agree with anesthesia plan.  All questions answered.   ASA Score: 3    Ready For Surgery From Anesthesia Perspective.     .

## 2022-02-21 NOTE — TRANSFER OF CARE
"Anesthesia Transfer of Care Note    Patient: Mohsen Julien    Procedure(s) Performed: Procedure(s) (LRB):  EGD (ESOPHAGOGASTRODUODENOSCOPY) (N/A)    Patient location: GI    Anesthesia Type: MAC    Transport from OR: Transported from OR on room air with adequate spontaneous ventilation    Post pain: adequate analgesia    Post assessment: no apparent anesthetic complications and tolerated procedure well    Post vital signs: stable    Level of consciousness: responds to stimulation and sedated    Nausea/Vomiting: no nausea/vomiting    Complications: none    Transfer of care protocol was followed      Last vitals:   Visit Vitals  BP (!) 165/89 (BP Location: Left arm, Patient Position: Lying)   Pulse 86   Temp 36.3 °C (97.3 °F) (Skin)   Resp 18   Ht 5' 4" (1.626 m)   Wt 123.4 kg (272 lb)   SpO2 100%   Breastfeeding No   BMI 46.69 kg/m²     "

## 2022-02-21 NOTE — PROVATION PATIENT INSTRUCTIONS
Discharge Summary/Instructions after an Endoscopic Procedure  Patient Name: Mohsen Julien  Patient MRN: [id number]  Patient YOB: 1980  Monday, February 21, 2022  Adis Huitron MD  Dear patient,  As a result of recent federal legislation (The Federal Cures Act), you may   receive lab or pathology results from your procedure in your [facility]   account before your physician is able to contact you. Your physician or   their representative will relay the results to you with their   recommendations at their soonest availability.  Thank you,  Your health is very important to us during the Covid Crisis. Following your   procedure today, you will receive a daily text for 2 weeks asking about   signs or symptoms of Covid 19.  Please respond to this text when you   receive it so we can follow up and keep you as safe as possible.   RESTRICTIONS:  During your procedure today, you received medications for sedation.  These   medications may affect your judgment, balance and coordination.  Therefore,   for 24 hours, you have the following restrictions:   - DO NOT drive a car, operate machinery, make legal/financial decisions,   sign important papers or drink alcohol.    ACTIVITY:  Today: no heavy lifting, straining or running due to procedural   sedation/anesthesia.  The following day: return to full activity including work.  DIET:  Eat and drink normally unless instructed otherwise.     TREATMENT FOR COMMON SIDE EFFECTS:  - Mild abdominal pain, nausea, belching, bloating or excessive gas:  rest,   eat lightly and use a heating pad.  - Sore Throat: treat with throat lozenges and/or gargle with warm salt   water.  - Because air was used during the procedure, expelling large amounts of air   from your rectum or belching is normal.  - If a bowel prep was taken, you may not have a bowel movement for 1-3 days.    This is normal.  SYMPTOMS TO WATCH FOR AND REPORT TO YOUR PHYSICIAN:  1. Abdominal pain or bloating, other than  gas cramps.  2. Chest pain.  3. Back pain.  4. Signs of infection such as: chills or fever occurring within 24 hours   after the procedure.  5. Rectal bleeding, which would show as bright red, maroon, or black stools.   (A tablespoon of blood from the rectum is not serious, especially if   hemorrhoids are present.)  6. Vomiting.  7. Weakness or dizziness.  GO DIRECTLY TO THE NEAREST EMERGENCY ROOM IF YOU HAVE ANY OF THE FOLLOWING:      Difficulty breathing              Chills and/or fever over 101 F   Persistent vomiting and/or vomiting blood   Severe abdominal pain   Severe chest pain   Black, tarry stools   Bleeding- more than one tablespoon   Any other symptom or condition that you feel may need urgent attention  Your doctor recommends these additional instructions:  If any biopsies were taken, your doctors clinic will contact you in 1 to 2   weeks with any results.  Discharge to home   Condition stable   Resume previous diet   The signs and symptoms of potential delayed complications were discussed   with the patient. If signs or symptoms of these complications develop, call   the [facility] at [phone].   Return to normal activities tomorrow.  Written discharge instructions were   provided to the patient.   41 y.o. female with history of obesity and GERD presenting for pre-operative   endoscopic evaluation prior to bariatric surgery. Exam revealed   erythematous mucosa in the stomach (biopsied for H. pylori). If biopsies   positive, will treat for eradication. Will prescribe oral PPI daily as well   for GERD. If mild improvement, can consider BID PPI or addition of H2   blocker.   - Discharge patient to home.  For questions, problems or results please call your physician - Adis Huitron MD.  EMERGENCY PHONE NUMBER: [phone],  LAB RESULTS: [phone]  IF A COMPLICATION OR EMERGENCY SITUATION ARISES AND YOU ARE UNABLE TO REACH   YOUR PHYSICIAN - GO DIRECTLY TO THE EMERGENCY  ROOM.  MD Adis Martinez MD  2/21/2022 1:15:22 PM  This report has been verified and signed electronically.  Dear patient,  As a result of recent federal legislation (The Federal Cures Act), you may   receive lab or pathology results from your procedure in your [facility]   account before your physician is able to contact you. Your physician or   their representative will relay the results to you with their   recommendations at their soonest availability.  Thank you,  PROVATION

## 2022-02-21 NOTE — ANESTHESIA POSTPROCEDURE EVALUATION
Anesthesia Post Evaluation    Patient: Mohsen Julien    Procedure(s) Performed: Procedure(s) (LRB):  EGD (ESOPHAGOGASTRODUODENOSCOPY) (N/A)    Final Anesthesia Type: MAC      Patient location during evaluation: GI PACU  Patient participation: Yes- Able to Participate  Level of consciousness: awake and alert  Post-procedure vital signs: reviewed and stable  Pain management: adequate  Airway patency: patent    PONV status at discharge: No PONV  Anesthetic complications: no      Cardiovascular status: hemodynamically stable  Respiratory status: unassisted, spontaneous ventilation and room air  Hydration status: euvolemic  Follow-up not needed.          Vitals Value Taken Time   BP  02/21/22 1244   Temp  02/21/22 1244   Pulse  02/21/22 1244   Resp  02/21/22 1244   SpO2  02/21/22 1244         No case tracking events are documented in the log.      Pain/Taylor Score: No data recorded

## 2022-02-22 ENCOUNTER — TELEPHONE (OUTPATIENT)
Dept: ENDOSCOPY | Facility: HOSPITAL | Age: 42
End: 2022-02-22
Payer: MEDICAID

## 2022-02-24 LAB
FINAL PATHOLOGIC DIAGNOSIS: NORMAL
GROSS: NORMAL
Lab: NORMAL

## 2022-02-24 NOTE — PROGRESS NOTES
I assume primary medical responsibility for this patient. I have reviewed the history, physical, and assessement & treatment plan with the resident and agree that the care is reasonable and necessary. This service has been performed by a resident without the presence of a teaching physician under the primary care exception. If necessary, an addendum of additional findings or evaluation beyond the resident documentation will be noted below.     Collette Ward MD

## 2022-03-09 ENCOUNTER — OFFICE VISIT (OUTPATIENT)
Dept: NEUROLOGY | Facility: HOSPITAL | Age: 42
End: 2022-03-09
Attending: INTERNAL MEDICINE
Payer: MEDICAID

## 2022-03-09 DIAGNOSIS — R10.13 DYSPEPSIA: Primary | ICD-10-CM

## 2022-03-09 NOTE — PROGRESS NOTES
Established Patient - Audio Only Telehealth Visit     The patient location is: home  The chief complaint leading to consultation is: heartburn  Visit type: Virtual visit with audio only (telephone)  Total time spent with patient and reviewing chart: >20 minutes       The reason for the audio only service rather than synchronous audio and video virtual visit was related to technical difficulties or patient preference/necessity.     Each patient to whom I provide medical services by telemedicine is:  (1) informed of the relationship between the physician and patient and the respective role of any other health care provider with respect to management of the patient; and (2) notified that they may decline to receive medical services by telemedicine and may withdraw from such care at any time. Patient verbally consented to receive this service via voice-only telephone call.       HPI: 41 y.o. female with PMHx of GERD, fibromyalgia, obesity (BMI 48.23) here for follow up of heartburn. No alarm symptoms. Being evaluated for bariatric surgery with Dr. Antoino Dempsey at [facility] - requested EGD/esophgaram which were unremarkable (no evidence of hiatal hernia; path without signs of malignancy or H pylori).    Today, reports her pyrosis is well controlled with PPI therapy. Has some bloating and belching which she attributes to drinking ginger ale. Denies nocturnal symptoms, abdominal pain, nausea, emesis, melena, hematochezia.     Assessment and plan:    1. GERD  2. Obesity     -continue pantoprazole 40 mg daily 30 minutes before eating  -no further evaluation for bariatric surgery from GI standpoint  -will bring by form to clinic bariatric surgeon requesting be filled out      RTC PRN     This service was not originating from a related E/M service provided within the previous 7 days nor will  to an E/M service or procedure within the next 24 hours or my soonest available appointment.  Prevailing standard of care was  able to be met in this audio-only visit.

## 2022-04-06 ENCOUNTER — OFFICE VISIT (OUTPATIENT)
Dept: FAMILY MEDICINE | Facility: HOSPITAL | Age: 42
End: 2022-04-06
Attending: FAMILY MEDICINE
Payer: MEDICAID

## 2022-04-06 VITALS
SYSTOLIC BLOOD PRESSURE: 136 MMHG | BODY MASS INDEX: 47.54 KG/M2 | DIASTOLIC BLOOD PRESSURE: 73 MMHG | HEART RATE: 89 BPM | HEIGHT: 64 IN | WEIGHT: 278.44 LBS

## 2022-04-06 DIAGNOSIS — I10 ELEVATED BLOOD PRESSURE READING WITH DIAGNOSIS OF HYPERTENSION: Primary | ICD-10-CM

## 2022-04-06 PROCEDURE — 99215 OFFICE O/P EST HI 40 MIN: CPT | Performed by: STUDENT IN AN ORGANIZED HEALTH CARE EDUCATION/TRAINING PROGRAM

## 2022-04-06 NOTE — PROGRESS NOTES
[facility]               Clinic H&P    Subjective                                                                                                                                                                           Chief Complaint: follow up    Mohsen Julien is a 41 y.o. female who  has a past medical history of Anemia, Arthritis, Fibromyalgia, GERD (gastroesophageal reflux disease), Gout, HLD (hyperlipidemia), HTN (hypertension) (5/8/2013), Peripheral neuropathy, Thyroid disease, and Venous stasis dermatitis of left lower extremity. 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   HENT: Negative for ear pain and sore throat.    Eyes: Negative for discharge and redness.   Respiratory: Negative for cough and chest tightness.    Cardiovascular: Positive for leg swelling (improved). Negative for chest pain.   Gastrointestinal: Negative for abdominal pain and diarrhea.   Genitourinary: Negative for dysuria and hematuria.   Musculoskeletal: Negative for back pain and myalgias.   Skin: Negative for pallor and rash.   Neurological: Negative for dizziness and headaches.   Psychiatric/Behavioral: Negative for  "confusion. The patient is not nervous/anxious.         Objective                                                                                                                                                                             Vitals:    04/06/22 0854 04/06/22 0918   BP: (!) 148/83 136/73   Pulse: 89    Weight: 126.3 kg (278 lb 7.1 oz)    Height: 5' 4" (1.626 m)       Body mass index is 47.79 kg/m².    Physical Exam  Vitals reviewed.   Constitutional:       General: She is not in acute distress.     Appearance: Normal appearance. She is obese.   HENT:      Head: Normocephalic and atraumatic.   Eyes:      General:         Right eye: No discharge.         Left eye: No discharge.      Extraocular Movements: Extraocular movements intact.      Pupils: Pupils are equal, round, and reactive to light.   Cardiovascular:      Rate and Rhythm: Normal rate and regular rhythm.      Pulses: Normal pulses.      Heart sounds: No murmur heard.  Pulmonary:      Effort: No respiratory distress.      Breath sounds: No wheezing.   Abdominal:      General: Bowel sounds are normal.      Palpations: Abdomen is soft.      Tenderness: There is no abdominal tenderness.   Musculoskeletal:         General: Swelling (LE swelling, improved) present. No tenderness.   Skin:     Capillary Refill: Capillary refill takes less than 2 seconds.      Coloration: Skin is not jaundiced.   Neurological:      General: No focal deficit present.      Mental Status: She is alert and oriented to person, place, and time.   Psychiatric:         Mood and Affect: Mood normal.         Behavior: Behavior normal.         Laboratory:  Lab Results   Component Value Date    WBC 5.83 02/16/2022    HGB 11.9 (L) 02/16/2022    HCT 40.5 02/16/2022    MCV 79 (L) 02/16/2022     02/16/2022       Chemistry        Component Value Date/Time     02/16/2022 0821    K 3.5 02/16/2022 0821     02/16/2022 0821    CO2 21 (L) 02/16/2022 0821    BUN 14 02/16/2022 " 0821    CREATININE 0.9 02/16/2022 0821    GLU 95 02/16/2022 0821        Component Value Date/Time    CALCIUM 9.1 02/16/2022 0821    ALKPHOS 87 02/16/2022 0821    AST 13 02/16/2022 0821    ALT 14 02/16/2022 0821    BILITOT 0.4 02/16/2022 0821    ESTGFRAFRICA >60 02/16/2022 0821    EGFRNONAA >60 02/16/2022 0821          Lab Results   Component Value Date    MG 2.3 10/05/2020    PHOS 3.0 10/05/2020     Lab Results   Component Value Date    CHOL 129 02/16/2022    HDL 30 (L) 02/16/2022    LDLCALC 53.8 (L) 02/16/2022    TRIG 226 (H) 02/16/2022     The ASCVD Risk score (Gerald GARCIA Jr., et al., 2013) failed to calculate for the following reasons:    The valid total cholesterol range is 130 to 320 mg/dL     5-7.4% - shared decision making, consider mod-intensity statin  7.5-14.9% - shared decision making, consider mod-high inten statin (if +diab 40-74 yo, initiate or cont mod/high inten statin)  >15% - initiate or cont mod-high inten statin... consider high-inten statin (if +diab 40-74 yo, initiate or cont high inten statin)  if +diab 40-74 yo, + LDL  - initiate or cont mod inten statin  if +diab 40-74 yo, + LDL >190 - initiate or cont high inten statin  Lab Results   Component Value Date    LDLCALC 53.8 (L) 02/16/2022    LDLCALC Invalid, Trig>400.0 09/17/2021    LDLCALC 44.2 (L) 10/19/2020     Patient taking fenofibrate 160 mg    Lab Results   Component Value Date    TSH 0.744 02/16/2022     Lab Results   Component Value Date    HGBA1C 4.9 02/16/2022         Assessment/Plan                                                                                                                                                                Mohsen Julien is a 41 y.o. female who presents to clinic with:    1. Elevated blood pressure reading with diagnosis of hypertension    2. BMI 45.0-49.9, adult         Problem List Items Addressed This Visit    None     Visit Diagnoses     Elevated blood pressure reading with diagnosis of  hypertension    -  Primary    BMI 45.0-49.9, adult            elevated BP reading, redone and improved  Scheduled mammogram appointment  Weight loss of 13 pounds. Educated on diet and increasing exercise  Discussed fiber intake  Plan to follow up in 3 weeks for weight check and if needing refill on adipex    Medication List with Changes/Refills   Current Medications    ALLOPURINOL (ZYLOPRIM) 300 MG TABLET    Take 1 tablet (300 mg total) by mouth once daily.    AMITRIPTYLINE (ELAVIL) 25 MG TABLET    Take 1 to 2 tablets by mouth at bedtime as needed for sleep    CLOTRIMAZOLE-BETAMETHASONE 1-0.05% (LOTRISONE) CREAM    Apply topically locally  as directed daily    FENOFIBRATE 160 MG TAB    Take 1 tablet (160 mg total) by mouth once daily for hypertriglyceridemia    FERROUS SULFATE 325 MG (65 MG IRON) TAB TABLET    325 mg once daily.     FUROSEMIDE (LASIX) 40 MG TABLET    take 1 tablet by mouth daily    IBUPROFEN (ADVIL,MOTRIN) 800 MG TABLET    TAKE 1 TABLET BY MOUTH EVERY 8 HOURS AS NEEDED FOR PAIN    METHOCARBAMOL (ROBAXIN) 750 MG TAB    Take 1 tablet (750 mg total) by mouth 3 (three) times daily as needed for muscle spasms.    METOPROLOL TARTRATE (LOPRESSOR) 100 MG TABLET    TAKE ONE TABLET BY MOUTH TWO TIMES A DAY FOR BLOOD PRESSURE    MIRENA 20 MCG/24 HR (5 YEARS) IUD    TO BE INSERTED ONE TIME BY PRESCRIBER. ROUTE INTRAUTERINE.    MUPIROCIN (BACTROBAN) 2 % OINTMENT    APPLY AND GENTLY MASSAGE INTO AFFECTED AREA(S) TWICE DAILY.    PANTOPRAZOLE (PROTONIX) 40 MG TABLET    Take 1 tablet (40 mg total) by mouth once daily.    PHENTERMINE (ADIPEX-P) 37.5 MG TABLET    Take 1 tablet (37.5 mg total) by mouth before breakfast.    POTASSIUM CHLORIDE (KLOR-CON) 10 MEQ TBSR    Take 1 tablet (10 mEq total) by mouth once daily.    TRAMADOL (ULTRAM) 50 MG TABLET    TAKE 1 TABLET BY MOUTH TWICE DAILY    TRIAMTERENE-HYDROCHLOROTHIAZIDE 37.5-25 MG (MAXZIDE-25) 37.5-25 MG PER TABLET    Take 1 tablet by mouth once daily.   Discontinued  Medications    AMOXICILLIN (AMOXIL) 500 MG CAPSULE    TAKE 2 CAPSULES BY MOUTH STAT, THEN TAKE 1 CAPSULE BY MOUTH FOUR TIMES A DAY UNTIL ALL GONE    AMOXICILLIN-CLAVULANATE 875-125MG (AUGMENTIN) 875-125 MG PER TABLET    Take 1 tablet by mouth 2 (two) times a day.    CLINDAMYCIN (CLEOCIN) 300 MG CAPSULE    Take 1 capsules by mouth 3 times a day x 2 weeks    HYDROCODONE-ACETAMINOPHEN (NORCO)  MG PER TABLET    Take 1 tablet by mouth every 4 hours    HYDROCODONE-ACETAMINOPHEN (NORCO) 5-325 MG PER TABLET    take 1 tablet by mouth every 4  hours    METHOCARBAMOL (ROBAXIN) 750 MG TAB    Take 1 tablet (750 mg total) by mouth 3 (three) times daily as needed.    NAPROXEN (NAPROSYN) 500 MG TABLET    Take 1 tablet (500 mg total) by mouth 2 (two) times daily as needed (pain).    TRAMADOL (ULTRAM) 50 MG TABLET    Take 1-2 tablets ( mg total) by mouth every 4 (four) hours as needed for pain    TRAMADOL (ULTRAM) 50 MG TABLET    Take 1-2 tablets ( mg total) by mouth every 4 (four) hours as needed for pain    TRAMADOL (ULTRAM) 50 MG TABLET    Take 1 tablet (50 mg total) by mouth every 6 (six) hours as needed.       Patient counseled about the importance of healthy dietary habits as well as routine physical activity and exercise for better health outcomes.    The patient's diagnosis, medications, and proper use of medications were dicussed. The importance of close follow up to discuss labs, modify medications, and monitor any potential side effects was also discussed.     Follow up in about 3 weeks (around 4/27/2022) for weight check.     Patient expressed understanding after counseling regarding diagnosis and recommendations.  Case discussed with staff, Dr. Radha Hallman MD  [facility] HO-2  04/06/2022   8:47 AM

## 2022-04-07 DIAGNOSIS — M10.9 GOUT, UNSPECIFIED CAUSE, UNSPECIFIED CHRONICITY, UNSPECIFIED SITE: ICD-10-CM

## 2022-04-18 RX ORDER — ALLOPURINOL 300 MG/1
300 TABLET ORAL DAILY
Qty: 30 TABLET | Refills: 1 | Status: SHIPPED | OUTPATIENT
Start: 2022-04-18 | End: 2022-06-23 | Stop reason: SDUPTHER

## 2022-04-27 ENCOUNTER — OFFICE VISIT (OUTPATIENT)
Dept: FAMILY MEDICINE | Facility: HOSPITAL | Age: 42
End: 2022-04-27
Attending: FAMILY MEDICINE
Payer: MEDICAID

## 2022-04-27 VITALS
HEIGHT: 64 IN | BODY MASS INDEX: 47.31 KG/M2 | WEIGHT: 277.13 LBS | HEART RATE: 83 BPM | DIASTOLIC BLOOD PRESSURE: 73 MMHG | SYSTOLIC BLOOD PRESSURE: 126 MMHG

## 2022-04-27 DIAGNOSIS — R60.0 PERIPHERAL EDEMA: ICD-10-CM

## 2022-04-27 DIAGNOSIS — I10 ESSENTIAL (PRIMARY) HYPERTENSION: ICD-10-CM

## 2022-04-27 DIAGNOSIS — E66.01 CLASS 3 SEVERE OBESITY WITH SERIOUS COMORBIDITY AND BODY MASS INDEX (BMI) OF 45.0 TO 49.9 IN ADULT, UNSPECIFIED OBESITY TYPE: Primary | ICD-10-CM

## 2022-04-27 PROCEDURE — 99214 OFFICE O/P EST MOD 30 MIN: CPT | Performed by: STUDENT IN AN ORGANIZED HEALTH CARE EDUCATION/TRAINING PROGRAM

## 2022-04-27 RX ORDER — PHENTERMINE HYDROCHLORIDE 37.5 MG/1
37.5 TABLET ORAL
Qty: 30 TABLET | Refills: 2 | Status: SHIPPED | OUTPATIENT
Start: 2022-04-27 | End: 2022-07-26

## 2022-04-27 RX ORDER — FUROSEMIDE 40 MG/1
40 TABLET ORAL 2 TIMES DAILY
Qty: 60 TABLET | Refills: 11 | Status: SHIPPED | OUTPATIENT
Start: 2022-04-27 | End: 2023-05-22 | Stop reason: SDUPTHER

## 2022-04-27 NOTE — PROGRESS NOTES
Subjective:       Patient ID: Mohsen Julien is a 41 y.o. female.    Chief Complaint: Follow-up    HPI 42 yo female presents for weight loss follow-up from 4/6 visit. Has been taking Adipex since 2/10/2022. Feels well, like her clothes are fitting more loosely.     Started at 291lbs (2/10). Last weight 278lbs 7oz (4/6), now 277lbs 9oz. Down total 14 pounds.     Past Medical History:   Diagnosis Date    Anemia     Arthritis     Fibromyalgia     GERD (gastroesophageal reflux disease)     Gout     HLD (hyperlipidemia)     HTN (hypertension) 5/8/2013    Peripheral neuropathy     Thyroid disease     Venous stasis dermatitis of left lower extremity      Review of Systems     10 point review of systems was conducted and only the pertinent positives and pertinent negatives are noted above in the HPI section.    Objective:      Vitals:    04/27/22 1333   BP: 126/73   Pulse: 83     Physical Exam  Vitals reviewed.   Constitutional:       General: She is not in acute distress.     Appearance: Normal appearance. She is obese.   HENT:      Head: Normocephalic and atraumatic.   Eyes:      General:         Right eye: No discharge.         Left eye: No discharge.      Extraocular Movements: Extraocular movements intact.      Pupils: Pupils are equal, round, and reactive to light.   Cardiovascular:      Rate and Rhythm: Normal rate and regular rhythm.      Pulses: Normal pulses.      Heart sounds: No murmur heard.  Pulmonary:      Effort: No respiratory distress.      Breath sounds: No wheezing.   Abdominal:      General: Bowel sounds are normal.      Palpations: Abdomen is soft.      Tenderness: There is no abdominal tenderness.   Musculoskeletal:         General: Swelling (LE swelling, improved) present. No tenderness.   Skin:     Capillary Refill: Capillary refill takes less than 2 seconds.      Coloration: Skin is not jaundiced.   Neurological:      General: No focal deficit present.      Mental Status: She is alert  and oriented to person, place, and time.   Psychiatric:         Mood and Affect: Mood normal.         Behavior: Behavior normal.         Assessment:       1. Class 3 severe obesity with serious comorbidity and body mass index (BMI) of 45.0 to 49.9 in adult, unspecified obesity type    2. Essential (primary) hypertension    3. Peripheral edema        Plan:       Class 3 severe obesity with serious comorbidity and body mass index (BMI) of 45.0 to 49.9 in adult, unspecified obesity type  -     phentermine (ADIPEX-P) 37.5 mg tablet; Take 1 tablet (37.5 mg total) by mouth before breakfast.  Dispense: 30 tablet; Refill: 2    Essential (primary) hypertension   Stable. Close to goal <120/80    Peripheral edema  -     furosemide (LASIX) 40 MG tablet; Take 1 tablet (40 mg total) by mouth 2 (two) times daily.  Dispense: 60 tablet; Refill: 11      Follow up in about 3 months (around 7/27/2022), or if symptoms worsen or fail to improve.        Elana Urias MD, [facility] PGY-2  04/27/2022

## 2022-05-18 ENCOUNTER — PATIENT MESSAGE (OUTPATIENT)
Dept: CARDIOLOGY | Facility: CLINIC | Age: 42
End: 2022-05-18
Payer: MEDICAID

## 2022-05-26 ENCOUNTER — PATIENT MESSAGE (OUTPATIENT)
Dept: ORTHOPEDICS | Facility: CLINIC | Age: 42
End: 2022-05-26
Payer: MEDICAID

## 2022-06-23 DIAGNOSIS — M10.9 GOUT, UNSPECIFIED CAUSE, UNSPECIFIED CHRONICITY, UNSPECIFIED SITE: ICD-10-CM

## 2022-06-24 RX ORDER — ALLOPURINOL 300 MG/1
300 TABLET ORAL DAILY
Qty: 30 TABLET | Refills: 1 | Status: SHIPPED | OUTPATIENT
Start: 2022-06-24 | End: 2022-09-09 | Stop reason: SDUPTHER

## 2022-07-29 ENCOUNTER — TELEPHONE (OUTPATIENT)
Dept: OBSTETRICS AND GYNECOLOGY | Facility: CLINIC | Age: 42
End: 2022-07-29

## 2022-07-29 NOTE — TELEPHONE ENCOUNTER
----- Message from Remy Alonso sent at 7/29/2022  8:23 AM CDT -----  Contact: [phone]  Who Called: PT    Regarding: pt was unable to make appointment for her annual this morning would like  to reschedule, pt aware next appt shows October     Would the patient rather a call back or a response via [facility]? Call back    Best Call Back Number: [phone]    Additional Information: n/a

## 2022-08-10 ENCOUNTER — OFFICE VISIT (OUTPATIENT)
Dept: FAMILY MEDICINE | Facility: HOSPITAL | Age: 42
End: 2022-08-10
Payer: MEDICAID

## 2022-08-10 VITALS
DIASTOLIC BLOOD PRESSURE: 80 MMHG | SYSTOLIC BLOOD PRESSURE: 120 MMHG | WEIGHT: 274.06 LBS | HEIGHT: 64 IN | BODY MASS INDEX: 46.79 KG/M2 | HEART RATE: 82 BPM

## 2022-08-10 DIAGNOSIS — J06.9 UPPER RESPIRATORY TRACT INFECTION, UNSPECIFIED TYPE: Primary | ICD-10-CM

## 2022-08-10 PROCEDURE — 99214 OFFICE O/P EST MOD 30 MIN: CPT | Performed by: STUDENT IN AN ORGANIZED HEALTH CARE EDUCATION/TRAINING PROGRAM

## 2022-08-10 RX ORDER — ACETAMINOPHEN/DEXTROMETHORPHAN 1000-30/30
LIQUID (ML) ORAL
Qty: 177 ML | Refills: 0 | Status: SHIPPED | OUTPATIENT
Start: 2022-08-10 | End: 2022-08-24

## 2022-08-10 RX ORDER — BENZONATATE 200 MG/1
200 CAPSULE ORAL 3 TIMES DAILY PRN
Qty: 30 CAPSULE | Refills: 0 | Status: SHIPPED | OUTPATIENT
Start: 2022-08-10 | End: 2022-08-24

## 2022-08-10 NOTE — LETTER
August 10, 2022      [facility]  [address], [address]  [place] [address]  Phone: [phone]  Fax: [phone]       Patient: Mohsen Julien   YOB: 1980  Date of Visit: 08/10/2022    To Whom It May Concern:    Nancy Julien  was at [facility] on 08/10/2022. The patient may return to work on 8/11/2022 without restrictions. If you have any questions or concerns, or if I can be of further assistance, please do not hesitate to contact me.    Sincerely,      Elana Urias MD

## 2022-08-10 NOTE — PROGRESS NOTES
Subjective:       Patient ID: Mohsen Julien is a 41 y.o. female.    Chief Complaint: Sore Throat and Cough (Mucus blood in it)    HPI 40 yo female presents for upper respiratory symptoms. Sore throat, headaches, body aches, sweats x3 days. Coughing up dark reddish sputum. Gayle had runny nose this weekend but no other sick contact.     Past Medical History:   Diagnosis Date    Anemia     Arthritis     Fibromyalgia     GERD (gastroesophageal reflux disease)     Gout     HLD (hyperlipidemia)     HTN (hypertension) 5/8/2013    Peripheral neuropathy     Thyroid disease     Venous stasis dermatitis of left lower extremity      Review of Systems     10 point review of systems was conducted and only the pertinent positives and pertinent negatives are noted above in the HPI section.    Objective:      Vitals:    08/10/22 0959   BP: 120/80   Pulse: 82   Temp 98.6F    Physical Exam  Vitals and nursing note reviewed.   Constitutional:       General: She is not in acute distress.     Appearance: Normal appearance. She is not ill-appearing.   HENT:      Head: Normocephalic and atraumatic.      Right Ear: External ear normal.      Left Ear: External ear normal.      Nose: Congestion present.      Mouth/Throat:      Mouth: Mucous membranes are moist.      Pharynx: Oropharynx is clear. No oropharyngeal exudate or posterior oropharyngeal erythema.   Eyes:      Extraocular Movements: Extraocular movements intact.      Conjunctiva/sclera: Conjunctivae normal.      Pupils: Pupils are equal, round, and reactive to light.   Cardiovascular:      Rate and Rhythm: Normal rate and regular rhythm.      Pulses: Normal pulses.      Heart sounds: Normal heart sounds. No murmur heard.  Pulmonary:      Effort: Pulmonary effort is normal. No respiratory distress.      Breath sounds: Normal breath sounds. No wheezing.   Abdominal:      General: Bowel sounds are normal.      Palpations: Abdomen is soft.      Tenderness: There is no  abdominal tenderness.   Musculoskeletal:         General: Normal range of motion.      Cervical back: Normal range of motion and neck supple.   Lymphadenopathy:      Cervical: No cervical adenopathy.   Skin:     General: Skin is warm.      Capillary Refill: Capillary refill takes less than 2 seconds.   Neurological:      General: No focal deficit present.      Mental Status: She is alert and oriented to person, place, and time.   Psychiatric:         Mood and Affect: Mood normal.         Behavior: Behavior normal.         Assessment:       1. Upper respiratory tract infection, unspecified type        Plan:       Upper respiratory tract infection, unspecified type   Counseled patient to take sick day, retest Friday for COVID if symptoms persist as may be too soon at day three   Provided letter for work excuse   -     POCT COVID-19 Rapid Screening Negative  -     POCT Influenza A/B Negative  -     phenoL (ORAL RELIEF SORE THROAT SPRAY) 1.4 % SprA; by Mucous Membrane route every 2 (two) hours. for 14 days  Dispense: 177 mL; Refill: 0  -     benzonatate (TESSALON) 200 MG capsule; Take 1 capsule (200 mg total) by mouth 3 (three) times daily as needed for Cough.  Dispense: 30 capsule; Refill: 0      Follow up if symptoms worsen or fail to improve.      Elana Urias MD, [facility] PGY-3  08/10/2022

## 2022-08-22 ENCOUNTER — OFFICE VISIT (OUTPATIENT)
Dept: CARDIOLOGY | Facility: CLINIC | Age: 42
End: 2022-08-22
Payer: MEDICAID

## 2022-08-22 ENCOUNTER — OFFICE VISIT (OUTPATIENT)
Dept: OBSTETRICS AND GYNECOLOGY | Facility: CLINIC | Age: 42
End: 2022-08-22
Payer: MEDICAID

## 2022-08-22 VITALS
HEIGHT: 64 IN | BODY MASS INDEX: 46.37 KG/M2 | WEIGHT: 271.63 LBS | DIASTOLIC BLOOD PRESSURE: 86 MMHG | SYSTOLIC BLOOD PRESSURE: 141 MMHG

## 2022-08-22 DIAGNOSIS — Z12.4 CERVICAL CANCER SCREENING: ICD-10-CM

## 2022-08-22 DIAGNOSIS — Z01.818 PREOPERATIVE CLEARANCE: Primary | ICD-10-CM

## 2022-08-22 DIAGNOSIS — Z12.31 VISIT FOR SCREENING MAMMOGRAM: ICD-10-CM

## 2022-08-22 DIAGNOSIS — Z01.419 ROUTINE GYNECOLOGICAL EXAMINATION: Primary | ICD-10-CM

## 2022-08-22 PROCEDURE — 3044F PR MOST RECENT HEMOGLOBIN A1C LEVEL <7.0%: ICD-10-PCS | Mod: CPTII,,, | Performed by: INTERNAL MEDICINE

## 2022-08-22 PROCEDURE — 1159F PR MEDICATION LIST DOCUMENTED IN MEDICAL RECORD: ICD-10-PCS | Mod: CPTII,,, | Performed by: INTERNAL MEDICINE

## 2022-08-22 PROCEDURE — 3044F HG A1C LEVEL LT 7.0%: CPT | Mod: CPTII,,, | Performed by: OBSTETRICS & GYNECOLOGY

## 2022-08-22 PROCEDURE — 3079F DIAST BP 80-89 MM HG: CPT | Mod: CPTII,,, | Performed by: OBSTETRICS & GYNECOLOGY

## 2022-08-22 PROCEDURE — 99212 OFFICE O/P EST SF 10 MIN: CPT | Mod: PBBFAC,27,PN | Performed by: INTERNAL MEDICINE

## 2022-08-22 PROCEDURE — 99999 PR PBB SHADOW E&M-EST. PATIENT-LVL IV: ICD-10-PCS | Mod: PBBFAC,,, | Performed by: OBSTETRICS & GYNECOLOGY

## 2022-08-22 PROCEDURE — 1159F PR MEDICATION LIST DOCUMENTED IN MEDICAL RECORD: ICD-10-PCS | Mod: CPTII,,, | Performed by: OBSTETRICS & GYNECOLOGY

## 2022-08-22 PROCEDURE — 87624 HPV HI-RISK TYP POOLED RSLT: CPT | Performed by: OBSTETRICS & GYNECOLOGY

## 2022-08-22 PROCEDURE — 3079F PR MOST RECENT DIASTOLIC BLOOD PRESSURE 80-89 MM HG: ICD-10-PCS | Mod: CPTII,,, | Performed by: OBSTETRICS & GYNECOLOGY

## 2022-08-22 PROCEDURE — 3077F SYST BP >= 140 MM HG: CPT | Mod: CPTII,,, | Performed by: OBSTETRICS & GYNECOLOGY

## 2022-08-22 PROCEDURE — 3077F PR MOST RECENT SYSTOLIC BLOOD PRESSURE >= 140 MM HG: ICD-10-PCS | Mod: CPTII,,, | Performed by: OBSTETRICS & GYNECOLOGY

## 2022-08-22 PROCEDURE — 99999 PR PBB SHADOW E&M-EST. PATIENT-LVL II: ICD-10-PCS | Mod: PBBFAC,,, | Performed by: INTERNAL MEDICINE

## 2022-08-22 PROCEDURE — 99214 OFFICE O/P EST MOD 30 MIN: CPT | Mod: PBBFAC,PO | Performed by: OBSTETRICS & GYNECOLOGY

## 2022-08-22 PROCEDURE — 99204 PR OFFICE/OUTPT VISIT, NEW, LEVL IV, 45-59 MIN: ICD-10-PCS | Mod: S$PBB,,, | Performed by: INTERNAL MEDICINE

## 2022-08-22 PROCEDURE — 1159F MED LIST DOCD IN RCRD: CPT | Mod: CPTII,,, | Performed by: INTERNAL MEDICINE

## 2022-08-22 PROCEDURE — 99999 PR PBB SHADOW E&M-EST. PATIENT-LVL IV: CPT | Mod: PBBFAC,,, | Performed by: OBSTETRICS & GYNECOLOGY

## 2022-08-22 PROCEDURE — 3008F PR BODY MASS INDEX (BMI) DOCUMENTED: ICD-10-PCS | Mod: CPTII,,, | Performed by: OBSTETRICS & GYNECOLOGY

## 2022-08-22 PROCEDURE — 99204 OFFICE O/P NEW MOD 45 MIN: CPT | Mod: S$PBB,,, | Performed by: INTERNAL MEDICINE

## 2022-08-22 PROCEDURE — 1159F MED LIST DOCD IN RCRD: CPT | Mod: CPTII,,, | Performed by: OBSTETRICS & GYNECOLOGY

## 2022-08-22 PROCEDURE — 3008F BODY MASS INDEX DOCD: CPT | Mod: CPTII,,, | Performed by: OBSTETRICS & GYNECOLOGY

## 2022-08-22 PROCEDURE — 88175 CYTOPATH C/V AUTO FLUID REDO: CPT | Performed by: OBSTETRICS & GYNECOLOGY

## 2022-08-22 PROCEDURE — 99396 PREV VISIT EST AGE 40-64: CPT | Mod: S$PBB,,, | Performed by: OBSTETRICS & GYNECOLOGY

## 2022-08-22 PROCEDURE — 1160F RVW MEDS BY RX/DR IN RCRD: CPT | Mod: CPTII,,, | Performed by: INTERNAL MEDICINE

## 2022-08-22 PROCEDURE — 99396 PR PREVENTIVE VISIT,EST,40-64: ICD-10-PCS | Mod: S$PBB,,, | Performed by: OBSTETRICS & GYNECOLOGY

## 2022-08-22 PROCEDURE — 99999 PR PBB SHADOW E&M-EST. PATIENT-LVL II: CPT | Mod: PBBFAC,,, | Performed by: INTERNAL MEDICINE

## 2022-08-22 PROCEDURE — 3044F HG A1C LEVEL LT 7.0%: CPT | Mod: CPTII,,, | Performed by: INTERNAL MEDICINE

## 2022-08-22 PROCEDURE — 1160F PR REVIEW ALL MEDS BY PRESCRIBER/CLIN PHARMACIST DOCUMENTED: ICD-10-PCS | Mod: CPTII,,, | Performed by: INTERNAL MEDICINE

## 2022-08-22 PROCEDURE — 3044F PR MOST RECENT HEMOGLOBIN A1C LEVEL <7.0%: ICD-10-PCS | Mod: CPTII,,, | Performed by: OBSTETRICS & GYNECOLOGY

## 2022-08-22 NOTE — PROGRESS NOTES
[facility] [address]     SUBJECTIVE:     History of Present Illness:  Patient is a 41 y.o. female presents for preop clearance for bariatric surgery.     Primary Diagnosis:   1. Hypertension: positive  2. DM: none  3. Nonsmoker  4. Family history of early CAD: none  5. Heart disease: none   ROS  1. No chest pains  2. No shotness of breath; no PND or orthopnea  3. No syncope  4. Occasional fast heart beat - very rare  5. Activity: walks 15 mins daily; does all her house work; walks in the stores; tries to go up and down stairs without issues   Review of patient's allergies indicates:  No Known Allergies    Past Medical History:   Diagnosis Date    Anemia     Arthritis     Fibromyalgia     GERD (gastroesophageal reflux disease)     Gout     HLD (hyperlipidemia)     HTN (hypertension) 2013    Peripheral neuropathy     Thyroid disease     Venous stasis dermatitis of left lower extremity        Past Surgical History:   Procedure Laterality Date    APPENDECTOMY       SECTION      CHOLECYSTECTOMY      ESOPHAGOGASTRODUODENOSCOPY N/A 2022    Procedure: EGD (ESOPHAGOGASTRODUODENOSCOPY);  Surgeon: Adis Huitron MD;  Location: [facility];  Service: Endoscopy;  Laterality: N/A;       Family History   Problem Relation Age of Onset    Stroke Mother     Colon cancer Maternal Aunt        Social History     Tobacco Use    Smoking status: Never Smoker    Smokeless tobacco: Never Used   Substance Use Topics    Alcohol use: No    Drug use: No        Past Hospitalization:     Home meds:  Current Outpatient Medications on File Prior to Visit   Medication Sig Dispense Refill    allopurinoL (ZYLOPRIM) 300 MG tablet Take 1 tablet (300 mg total) by mouth once daily. 30 tablet 1    amitriptyline (ELAVIL) 25 MG tablet Take 1 to 2 tablets by mouth at bedtime as needed for sleep 60 tablet 3    benzonatate (TESSALON) 200 MG capsule Take 1 capsule (200 mg total) by mouth 3 (three) times daily as needed for  Cough. 30 capsule 0    clotrimazole-betamethasone 1-0.05% (LOTRISONE) cream Apply topically locally as directed daily 15 g 2    fenofibrate 160 MG Tab Take 1 tablet (160 mg total) by mouth once daily for hypertriglyceridemia 90 tablet 3    ferrous sulfate 325 mg (65 mg iron) Tab tablet 325 mg once daily.       furosemide (LASIX) 40 MG tablet Take 1 tablet (40 mg total) by mouth 2 (two) times daily. 60 tablet 11    ibuprofen (ADVIL,MOTRIN) 800 MG tablet TAKE 1 TABLET BY MOUTH EVERY 8 HOURS AS NEEDED FOR PAIN 20 tablet 0    methocarbamoL (ROBAXIN) 750 MG Tab Take 1 tablet (750 mg total) by mouth 3 (three) times daily as needed for muscle spasms. 90 tablet 2    metoprolol tartrate (LOPRESSOR) 100 MG tablet TAKE ONE TABLET BY MOUTH TWO TIMES A DAY FOR BLOOD PRESSURE 180 tablet 3    MIRENA 20 mcg/24 hr (5 years) IUD TO BE INSERTED ONE TIME BY PRESCRIBER. ROUTE INTRAUTERINE.  0    mupirocin (BACTROBAN) 2 % ointment APPLY AND GENTLY MASSAGE INTO AFFECTED AREA(S) TWICE DAILY. 22 g 3    pantoprazole (PROTONIX) 40 MG tablet Take 1 tablet (40 mg total) by mouth once daily. 30 tablet 5    phenoL (ORAL RELIEF SORE THROAT SPRAY) 1.4 % SprA by Mucous Membrane route every 2 (two) hours. for 14 days 177 mL 0    phentermine (ADIPEX-P) 37.5 mg tablet Take 1 tablet (37.5 mg total) by mouth before breakfast. 30 tablet 2    potassium chloride (KLOR-CON) 10 MEQ TbSR Take 1 tablet (10 mEq total) by mouth once daily. 30 tablet 1    traMADoL (ULTRAM) 50 mg tablet Take 1 tablet (50 mg total) by mouth every 6 (six) hours as needed. 30 tablet 2    traMADoL (ULTRAM) 50 mg tablet Take 1 tablet (50 mg total) by mouth every 6 (six) hours as needed. 30 tablet 2    traMADoL (ULTRAM) 50 mg tablet Take 1 tablet (50 mg total) by mouth every 6 (six) hours as needed. 30 tablet 2    traMADoL (ULTRAM) 50 mg tablet Take 1-2 tablets by mouth every 4 hours as needed for pain 60 tablet 3    triamterene-hydrochlorothiazide 37.5-25 mg  (MAXZIDE-25) 37.5-25 mg per tablet Take 1 tablet by mouth once daily. 90 tablet 3     No current facility-administered medications on file prior to visit.       Cardiac meds:  Lasix 40 mg  Triamterene daily  Metoprolol tartate 100 mg BID  K as needed  Fenofibrate         OBJECTIVE:     Vital Signs (Most Recent)  There were no vitals filed for this visit.      Physical Exam:  Obese patient in no distress   Neck: normal carotids, no bruits; normal JVP  Lungs :clear  Heart: RR, normal S1,S2, no murmurs, no gallops  Abd: no masses; no bruits;   Exts: normal DP and PT pulses bilaterally, no edema noted           LABS    CMP  No results for input(s): K in the last 2160 hours.    Invalid input(s): GFR    LIPID  No results for input(s): HDL, CHOL, TRIG, LDLCALC, CHOLHDL, NONHDL, TOTALCHOLEST in the last 2160 hours.    2/22: TG down from 689 to 226; HDL 18 to 30; LDL low : GFR normal ; A1c old is normal   Diagnostic Results:    None recent     Chart review:      EKGs: 9/2020: normal    ASSESSMENT/PLAN:     1. No cardiac issues; no angina; no failure; normal activity  2. Not diabetic     Plan: echocardiogram   If normal as expected, low risk for surgery     Brielle Riley MD

## 2022-08-22 NOTE — PROGRESS NOTES
"42 yo female who presents for routine gyn visit.  No cycles with Mirena IUD in place since 4/2017.  The patient has no complaints today.  Declines std testing.      ROS:  GENERAL: Denies weight gain or weight loss. Feeling well overall.   SKIN: Denies rash or lesions.   HEAD: Denies head injury or headache.   CHEST: Denies chest pain or shortness of breath.   CARDIOVASCULAR: Denies palpitations or left sided chest pain.   ABDOMEN: No abdominal pain, constipation, diarrhea, nausea, vomiting or rectal bleeding.   URINARY: No frequency, dysuria, hematuria, or burning on urination.  REPRODUCTIVE: no cycle  BREASTS:  denies pain, lumps, or nipple discharge.   HEMATOLOGIC: No easy bruisability or excessive bleeding.   MUSCULOSKELETAL: Denies joint pain or swelling.   NEUROLOGIC: Denies syncope or weakness.   PSYCHIATRIC: Denies depression, anxiety or mood swings.         PE:   Vitals: BP (!) 141/86   Ht 5' 4" (1.626 m)   Wt 123.2 kg (271 lb 9.7 oz)   BMI 46.62 kg/m²   APPEARANCE: Well nourished, well developed, in no acute distress.  SKIN: Normal skin turgor, no lesions.  ABDOMEN: Soft. No tenderness or masses. No hepatosplenomegaly. No hernias. obese  BREASTS: Symmetrical, no skin changes or visible lesions. No palpable masses, nipple discharge or adenopathy bilaterally.  PELVIC: Normal external female genitalia without lesions. Normal hair distribution. Adequate perineal body, normal urethral meatus. Vagina moist and well rugated without lesions, cottage cheese appearing discharge. Cervix pink and without lesions. No significant cystocele or rectocele. Bimanual exam showed uterus normal size, shape, position, mobile and nontender. Adnexa without masses or tenderness. Urethra and bladder normal. IUD string NOT visualized.    AP  Routine gyn  -s/p normal breast exam:   -s/p normal pelvic exam:   -Pap and HPV: collected  -STD testing: gc/chl and HIV  -contraception: Mirena IUD in place since 2017  -pelvic US from 2020 " shows IUD at uterine fundus (report reviewed today).    CARLIE Alvarez MD

## 2022-08-25 DIAGNOSIS — E66.01 CLASS 3 SEVERE OBESITY WITH SERIOUS COMORBIDITY AND BODY MASS INDEX (BMI) OF 45.0 TO 49.9 IN ADULT, UNSPECIFIED OBESITY TYPE: ICD-10-CM

## 2022-08-25 RX ORDER — PHENTERMINE HYDROCHLORIDE 37.5 MG/1
37.5 TABLET ORAL
Qty: 30 TABLET | Refills: 2 | Status: CANCELLED | OUTPATIENT
Start: 2022-08-25 | End: 2022-11-23

## 2022-08-26 ENCOUNTER — HOSPITAL ENCOUNTER (OUTPATIENT)
Dept: CARDIOLOGY | Facility: HOSPITAL | Age: 42
Discharge: HOME | End: 2022-08-26
Attending: INTERNAL MEDICINE
Payer: MEDICAID

## 2022-08-26 VITALS — HEIGHT: 64 IN | WEIGHT: 271 LBS | BODY MASS INDEX: 46.26 KG/M2

## 2022-08-26 DIAGNOSIS — Z01.818 PREOPERATIVE CLEARANCE: ICD-10-CM

## 2022-08-26 PROCEDURE — 25500020 PHARM REV CODE 255: Performed by: INTERNAL MEDICINE

## 2022-08-26 PROCEDURE — C8929 TTE W OR WO FOL WCON,DOPPLER: HCPCS

## 2022-08-26 RX ADMIN — HUMAN ALBUMIN MICROSPHERES AND PERFLUTREN 0.66 MG: 10; .22 INJECTION, SOLUTION INTRAVENOUS at 03:08

## 2022-08-27 LAB
AV INDEX (PROSTH): 0.88
AV MEAN GRADIENT: 5 MMHG
AV PEAK GRADIENT: 8 MMHG
AV VALVE AREA: 3.49 CM2
AV VELOCITY RATIO: 0.79
BSA FOR ECHO PROCEDURE: 2.36 M2
CV ECHO LV RWT: 0.39 CM
DOP CALC AO PEAK VEL: 1.4 M/S
DOP CALC AO VTI: 25.56 CM
DOP CALC LVOT AREA: 4 CM2
DOP CALC LVOT DIAMETER: 2.25 CM
DOP CALC LVOT PEAK VEL: 1.11 M/S
DOP CALC LVOT STROKE VOLUME: 89.26 CM3
DOP CALC MV VTI: 20.97 CM
DOP CALCLVOT PEAK VEL VTI: 22.46 CM
E WAVE DECELERATION TIME: 177.67 MSEC
E/A RATIO: 1.13
E/E' RATIO: 8.67 M/S
ECHO LV POSTERIOR WALL: 0.84 CM (ref 0.6–1.1)
EJECTION FRACTION: 65 %
FRACTIONAL SHORTENING: 31 % (ref 28–44)
INTERVENTRICULAR SEPTUM: 0.86 CM (ref 0.6–1.1)
LA MAJOR: 5.76 CM
LA WIDTH: 3.54 CM
LEFT ATRIUM VOLUME INDEX MOD: 13.9 ML/M2
LEFT ATRIUM VOLUME MOD: 31.06 CM3
LEFT INTERNAL DIMENSION IN SYSTOLE: 2.96 CM (ref 2.1–4)
LEFT VENTRICLE DIASTOLIC VOLUME INDEX: 36.67 ML/M2
LEFT VENTRICLE DIASTOLIC VOLUME: 81.77 ML
LEFT VENTRICLE MASS INDEX: 51 G/M2
LEFT VENTRICLE SYSTOLIC VOLUME INDEX: 15.1 ML/M2
LEFT VENTRICLE SYSTOLIC VOLUME: 33.75 ML
LEFT VENTRICULAR INTERNAL DIMENSION IN DIASTOLE: 4.27 CM (ref 3.5–6)
LEFT VENTRICULAR MASS: 112.85 G
LV LATERAL E/E' RATIO: 8.67 M/S
LV SEPTAL E/E' RATIO: 8.67 M/S
MV PEAK A VEL: 0.69 M/S
MV PEAK E VEL: 0.78 M/S
MV PEAK GRADIENT: 4 MMHG
MV STENOSIS PRESSURE HALF TIME: 51.52 MS
MV VALVE AREA BY CONTINUITY EQUATION: 4.26 CM2
MV VALVE AREA P 1/2 METHOD: 4.27 CM2
PV PEAK VELOCITY: 0.97 CM/S
RA MAJOR: 4.89 CM
RA PRESSURE: 3 MMHG
RA WIDTH: 2.96 CM
RV TISSUE DOPPLER FREE WALL SYSTOLIC VELOCITY 1 (APICAL 4 CHAMBER VIEW): 13.11 CM/S
TDI LATERAL: 0.09 M/S
TDI SEPTAL: 0.09 M/S
TDI: 0.09 M/S

## 2022-08-30 DIAGNOSIS — E66.01 CLASS 3 SEVERE OBESITY DUE TO EXCESS CALORIES WITH SERIOUS COMORBIDITY AND BODY MASS INDEX (BMI) OF 45.0 TO 49.9 IN ADULT: Primary | ICD-10-CM

## 2022-08-30 RX ORDER — PHENTERMINE HYDROCHLORIDE 37.5 MG/1
37.5 TABLET ORAL
Qty: 30 TABLET | Refills: 0 | Status: SHIPPED | OUTPATIENT
Start: 2022-08-30 | End: 2022-10-06

## 2022-08-31 RX ORDER — TRAMADOL HYDROCHLORIDE 50 MG/1
TABLET ORAL
Qty: 60 TABLET | Refills: 3 | Status: CANCELLED | OUTPATIENT
Start: 2022-08-30

## 2022-09-06 DIAGNOSIS — M10.9 GOUT, UNSPECIFIED CAUSE, UNSPECIFIED CHRONICITY, UNSPECIFIED SITE: ICD-10-CM

## 2022-09-06 RX ORDER — ALLOPURINOL 300 MG/1
300 TABLET ORAL DAILY
Qty: 30 TABLET | Refills: 1 | Status: CANCELLED | OUTPATIENT
Start: 2022-09-06

## 2022-09-06 RX ORDER — TRAMADOL HYDROCHLORIDE 50 MG/1
TABLET ORAL
Qty: 60 TABLET | Refills: 3 | Status: CANCELLED | OUTPATIENT
Start: 2022-08-30

## 2022-09-09 DIAGNOSIS — M10.9 GOUT, UNSPECIFIED CAUSE, UNSPECIFIED CHRONICITY, UNSPECIFIED SITE: ICD-10-CM

## 2022-09-13 RX ORDER — ALLOPURINOL 300 MG/1
300 TABLET ORAL DAILY
Qty: 30 TABLET | Refills: 1 | Status: SHIPPED | OUTPATIENT
Start: 2022-09-13

## 2022-09-19 ENCOUNTER — OFFICE VISIT (OUTPATIENT)
Dept: FAMILY MEDICINE | Facility: HOSPITAL | Age: 42
End: 2022-09-19
Payer: MEDICAID

## 2022-09-19 DIAGNOSIS — Z03.89 NO DIAGNOSIS ON AXIS I: Primary | ICD-10-CM

## 2022-09-19 PROCEDURE — 99211 OFF/OP EST MAY X REQ PHY/QHP: CPT | Performed by: PSYCHOLOGIST

## 2022-09-20 NOTE — PROGRESS NOTES
[facility] [address]  [place] [address]    Diagnostic Assessment    A description of the assessment process, the initial 50-55-minute diagnostic appointment, 20-30-minute follow-up appointments, and patient confidentiality were reviewed.  Patient was fully cooperative throughout the interview and was an adequate historian. Patient willingly signed a consent form for treatment and limits of confidentiality were explained in this context.    Note: All information described in this report has been directly reported by the patient in the appointment (unless specifically noted) except for Mental Status, Diagnosis, and Conclusions/Recommendations/Initial Treatment Goals.      Date of Service: 2022  Patient Name:  Mohsen Julien  MRN: [id number]  : 1980  Age:  41  Length of Session: 45 minutes  Present in Session: Patient  Provider: Dianne Chowdary Psy.D.      Identifying Information:  Patient is a 41-year-old  female who is currently living in [place].     Referral Source:  Patient called to schedule an appointment.    Chief Complaint/Presenting Problem:  Patient stated she is seeking mental health clearance for surgery to receive a gastric sleeve.    History of Chief Complaint/Presenting Problem:  Patient stated she began her weight loss process in 2021. She stated she is now entering the last step in her process, with the goal of undergoing surgery to receive a bariatric sleeve. Patient stated she was unsure if needed to see a psychiatrist or psychologist for clearance. 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    Current Significant/Relationship/Partner:  Patient stated she has been romantically involved with her  for 11 years and noted they have been  for 10 years. She described a positive  relationship.    Current Mental Health Symptoms: Patient endorsed/denied changes in the following areas:    Mood: Denied. When describing her mood, patient stated she likes to joke around and stated she is the life of the party.    Anhedonia: Denied. Patient reported enjoying family outings, crafting, and spending time with her grandchild.     Sleep: Denied.    Level of Energy:  Endorsed. Patient reported having increased energy since losing weight.    Appetite: Denied. Patient stated she has been working with a dietician since February 2022 and noted she finished working with the dietician roughly one month ago. Patient stated she has lost roughly 60 lbs. since February. Patient stated she is no longer out of breath after walking too long and noted an increase in energy. When asked, she mentioned her goal weight is 175 lbs. Practitioner inquired if patient had a set time frame to reach her goal. Patient stated she was unsure as she is unsure of when her surgery would be but mentioned she hopes to reach her goal by the Spring or Fall of 2023.     Hopelessness/Worthlessness:  Denied.    Anger/Irritability/Aggression:  Denied.    Concentration/Attention/Hyperactivity: Denied.       Anxiety: Denied. Patient noted her general concern for her family and their wellbeing but denied any anxiety symptoms.    Trauma/Re-experiencing: Denied.    Impulsivity: Denied.    Psychotic symptoms/Valarie/Hypomania: Denied.    Suicidal or Homicidal Ideation: Patient denied any past or present suicidal or homicidal ideation, plan, or intent.     Self-harming behaviors: Patient denied engaging in any past or present self-harming behaviors.    Mental Health History:    Psychiatric History:  Psychiatric Hospitalizations: Denied.    Suicidal ideation/attempts: Denied.    Psychiatric Medications: Patient's chart lists a prescription for amitriptyline 25 mg.    Family Psychiatric History: Not assessed.    Psychotherapy History:  Denied.    Medical History:  Patient's chart lists medical concerns related to hypertension, anemia, arthritis, fibromyalgia, GERD, gout, thyroid concerns, and venous stasis dermatitis in her left leg. Patient stated she was hospitalized in 2018 for roughly 2 weeks following an infection resulting from her venous stasis. Regarding surgical history, patient's chart lists an appendectomy, cholecystectomy, and a .    Current/Past Medications:   Patient's chart lists the following medications: allopurinol 300 mg; amitriptyline 25 mg; 1-0.05% Lotrisone cream; fenofibrate 160 mg; ferrous sulfate 325 mg; furosemide 40 mg; ibuprofen 800 mg; methocarbamol 750 mg; metoprolol tartrate 100 mg; Mirena IUD; mupirocin 2 % ointment; pantoprazole 40 mg; phentermine 37.5 mg; potassium chloride 10 meq tbsr; tramadol 50 mg; and triamterene-hydrochlorothiazide 37.5-25 mg.    Patient stated she previously had a prescription for Neurontin but noted it caused swelling, so she stopped utilizing it.    Substance Use:  Patient denied any alcohol use.    Patient denied any other substance use.    Family substance use: Not assessed.    Family History:       Father: Patient reported her father passed away in . She described a positive relationship with her father.    Mother: Patient described a positive relationship with her mother.     Siblings: Patient reported being the youngest sibling with 2 sisters and 1 brother. She described positive relationships with her family.    Children: Patient reported having 1 daughter and 1 son. She described positive relationships with them.    Patient stated she has a grandson and described a positive relationship with him as well.    Physical, Verbal, or Sexual Abuse: Patient denied any past experiences of verbal, physical, or sexual abuse.    Social/Peer History:  Patient listed her family as her support network.    Occupational/Educational History:  Educational History:   Not  "assessed.    Occupational History:   Patient is currently employed in a full-time capacity.    Current/Past Legal Involvement:  Denied.    Spirituality:  Patient identifies as spiritual.    Expectations/Goals for Treatment:  Patient stated she would like to increase her weight loss. Patient stated she would like to receive her gastric sleeve and get my health together. Patient noted her goal to alleviate the symptoms from her leg and mentioned she was informed that weight loss would help her leg improve.    Mental Status Exam:  Appearance: Patient was appropriately dressed for her session and had good hygiene.  Expressed Mood: joking"  Affect: Normal range and intensity. Affect consistent with expressed mood.  Attitude during Interview: Open and friendly. Candid and cooperative.  Movement and Behavior: No unusual movements or psychomotor changes.  Speech: Normal rate/tone/volume, without pressure.  Eye Contact: Makes eye contact.  Thought processes: Clear, coherent, and organized.  Thought content: No indication of hallucinations, delusions, obsessions, ideas of reference, or symptoms of dissociation.   Suicidal ideation: Denied current thoughts, plan, and intent.  Homicidal ideation: Denied current thoughts, plan, and intent.  Orientation: Oriented x 4 (person, place, time, and situation).  Memory/concentration: WNL.  Insight/Judgment: Strong.    Safety Issues/Plan for Safety/ Risk Management:  Patient denies current fears or concerns for personal safety.  Patient denies current or recent suicidal ideation or behaviors.  Patient denies current or recent homicidal ideation or behaviors.  Patient denies current or recent self-injurious behavior or ideation.  Patient denies other safety concerns.    A safety and risk management plan has not been developed at this time.    Interactive Complexity: None.    Diagnosis:  None.    Conclusions/Recommendations/Treatment Goals:   Patient appears to be in a solid and stable " mindset to receive a gastric sleeve. Patient stated she would like to undergo her gastric sleeve surgery to continue her weight loss journey to ultimately improve the condition of her left leg. Patient stated she has not been informed in detail how weight loss would alleviate her venous symptoms but noted she was advised to lose weight to improve her venous symptoms including decreasing her blood pressure and decreasing the amount of medications she is prescribed. Practitioner inquired about patient's vision for surgery. Patient mentioned her weight goal of 175 lbs. and noted she did not have a designated timeframe to achieve this goal. She stated she was informed that her weight would initially drop rapidly and then taper off. Practitioner inquired about patient's future plan for weight loss and weight maintenance. Patient stated she will continue to exercise and eat healthily. Practitioner advised patient to return with the necessary paperwork from her practitioner's / surgeon's office. Patient agreed and will deliver the required clearance paperwork for practitioner to complete.

## 2022-10-04 ENCOUNTER — PATIENT MESSAGE (OUTPATIENT)
Dept: FAMILY MEDICINE | Facility: HOSPITAL | Age: 42
End: 2022-10-04
Payer: MEDICAID

## 2022-10-04 DIAGNOSIS — M54.31 SCIATICA OF RIGHT SIDE: Primary | ICD-10-CM

## 2022-10-04 RX ORDER — GABAPENTIN 100 MG/1
300 CAPSULE ORAL 3 TIMES DAILY
Qty: 270 CAPSULE | Refills: 11 | Status: SHIPPED | OUTPATIENT
Start: 2022-10-04 | End: 2023-10-16 | Stop reason: SDUPTHER

## 2022-10-19 ENCOUNTER — HOSPITAL ENCOUNTER (OUTPATIENT)
Dept: RADIOLOGY | Facility: HOSPITAL | Age: 42
Discharge: HOME | End: 2022-10-19
Attending: OBSTETRICS & GYNECOLOGY
Payer: MEDICAID

## 2022-10-19 DIAGNOSIS — Z12.31 VISIT FOR SCREENING MAMMOGRAM: ICD-10-CM

## 2022-10-19 PROCEDURE — 77067 SCR MAMMO BI INCL CAD: CPT | Mod: 26,,, | Performed by: RADIOLOGY

## 2022-10-19 PROCEDURE — 77067 SCR MAMMO BI INCL CAD: CPT | Mod: TC

## 2022-10-19 PROCEDURE — 77063 BREAST TOMOSYNTHESIS BI: CPT | Mod: 26,,, | Performed by: RADIOLOGY

## 2022-10-19 PROCEDURE — 77063 BREAST TOMOSYNTHESIS BI: CPT | Mod: TC

## 2022-10-19 PROCEDURE — 77063 MAMMO DIGITAL SCREENING BILAT WITH TOMO: ICD-10-PCS | Mod: 26,,, | Performed by: RADIOLOGY

## 2022-10-19 PROCEDURE — 77067 MAMMO DIGITAL SCREENING BILAT WITH TOMO: ICD-10-PCS | Mod: 26,,, | Performed by: RADIOLOGY

## 2022-11-02 ENCOUNTER — TELEPHONE (OUTPATIENT)
Dept: CARDIOLOGY | Facility: CLINIC | Age: 42
End: 2022-11-02
Payer: MEDICAID

## 2022-11-08 DIAGNOSIS — E66.01 CLASS 3 SEVERE OBESITY WITH SERIOUS COMORBIDITY AND BODY MASS INDEX (BMI) OF 45.0 TO 49.9 IN ADULT, UNSPECIFIED OBESITY TYPE: Primary | ICD-10-CM

## 2022-11-08 RX ORDER — PHENTERMINE HYDROCHLORIDE 37.5 MG/1
37.5 TABLET ORAL
Qty: 30 TABLET | Refills: 2 | Status: SHIPPED | OUTPATIENT
Start: 2022-11-08 | End: 2023-03-02

## 2022-11-21 ENCOUNTER — PATIENT MESSAGE (OUTPATIENT)
Dept: FAMILY MEDICINE | Facility: HOSPITAL | Age: 42
End: 2022-11-21
Payer: MEDICAID

## 2022-11-21 ENCOUNTER — HOSPITAL ENCOUNTER (OUTPATIENT)
Dept: RADIOLOGY | Facility: HOSPITAL | Age: 42
Discharge: HOME | End: 2022-11-21
Attending: SURGERY
Payer: MEDICAID

## 2022-11-21 DIAGNOSIS — I87.2 VENOUS STASIS DERMATITIS OF LEFT LOWER EXTREMITY: ICD-10-CM

## 2022-11-21 PROCEDURE — 93970 US LOWER EXTREMITY VEINS BILATERAL: ICD-10-PCS | Mod: 26,,, | Performed by: RADIOLOGY

## 2022-11-21 PROCEDURE — 93970 EXTREMITY STUDY: CPT | Mod: TC

## 2022-11-21 PROCEDURE — 93970 EXTREMITY STUDY: CPT | Mod: 26,,, | Performed by: RADIOLOGY

## 2022-12-19 ENCOUNTER — OFFICE VISIT (OUTPATIENT)
Dept: FAMILY MEDICINE | Facility: HOSPITAL | Age: 42
End: 2022-12-19
Payer: MEDICAID

## 2022-12-19 VITALS
SYSTOLIC BLOOD PRESSURE: 135 MMHG | HEART RATE: 85 BPM | DIASTOLIC BLOOD PRESSURE: 82 MMHG | HEIGHT: 64 IN | BODY MASS INDEX: 45.17 KG/M2 | WEIGHT: 264.56 LBS

## 2022-12-19 DIAGNOSIS — M10.9 GOUT, UNSPECIFIED CAUSE, UNSPECIFIED CHRONICITY, UNSPECIFIED SITE: ICD-10-CM

## 2022-12-19 DIAGNOSIS — M25.562 ACUTE PAIN OF LEFT KNEE: Primary | ICD-10-CM

## 2022-12-19 DIAGNOSIS — E66.01 MORBID OBESITY: ICD-10-CM

## 2022-12-19 DIAGNOSIS — I10 HYPERTENSION, UNSPECIFIED TYPE: ICD-10-CM

## 2022-12-19 PROCEDURE — 99213 OFFICE O/P EST LOW 20 MIN: CPT

## 2022-12-19 RX ORDER — LIDOCAINE HYDROCHLORIDE 10 MG/ML
1 INJECTION INFILTRATION; PERINEURAL
Status: CANCELLED | OUTPATIENT
Start: 2022-12-19 | End: 2022-12-19

## 2022-12-19 RX ORDER — TRIAMCINOLONE ACETONIDE 40 MG/ML
40 INJECTION, SUSPENSION INTRA-ARTICULAR; INTRAMUSCULAR
Status: DISCONTINUED | OUTPATIENT
Start: 2022-12-19 | End: 2022-12-19 | Stop reason: HOSPADM

## 2022-12-19 RX ORDER — IBUPROFEN 600 MG/1
TABLET ORAL
Qty: 30 TABLET | Refills: 2 | Status: SHIPPED | OUTPATIENT
Start: 2022-12-19 | End: 2023-03-15 | Stop reason: SDUPTHER

## 2022-12-19 RX ORDER — ALLOPURINOL 300 MG/1
300 TABLET ORAL DAILY
Qty: 30 TABLET | Refills: 1 | Status: CANCELLED | OUTPATIENT
Start: 2022-12-19

## 2022-12-19 RX ADMIN — TRIAMCINOLONE ACETONIDE 40 MG: 40 INJECTION, SUSPENSION INTRA-ARTICULAR; INTRAMUSCULAR at 04:12

## 2022-12-19 NOTE — PROGRESS NOTES
"Large Joint Aspiration/Injection: L knee    Date/Time: 12/19/2022 4:05 PM  Performed by: Gilbert Hancock MD  Authorized by: Gilbert Hancock MD     Consent Done?:  Yes (Written)  Indications:  Pain  Site marked: the procedure site was marked (Left knee marked prior to injection)    Timeout: prior to procedure the correct patient, procedure, and site was verified      Local anesthesia used?: Yes    Local anesthetic:  Topical anesthetic    Details:  Needle Size:  25 G  Ultrasonic Guidance for needle placement?: No    Approach:  Anteromedial  Location:  Knee  Site:  L knee  Medications:  40 mg triamcinolone acetonide 40 mg/mL      Procedure:  Left knee intra-articular injection    ANESTHETIC:  Topical ethyl chloride     SURGICAL PREP: Alcohol and Chlorhexidine (allowed appropriate time to dry before proceeding)     Attending physician: Dr. Marroquin     Resident: Gilbert Hancock MD     LOCATION:  Left knee        PREPARATION:  The diagnosis, procedure, alternatives, benefits and risks, including but not limited to: drug reactions, pain, scar, cosmetic defect, local sensation disturbances, and/or  recurrence of present condition were explained to the patient. The patient elected to proceed.     PROCEDURE:  After obtaining both written and verbal consent, and per orders of Dr. Marroquin . 1ml kenalog (40mg) and 4 cc of 1% lidocaine was drawn into a 1 1/2" 25 G syringe. The patient was positioned correctly on the procedure table. Afterwards, a skin marker was used to georgie the injection site of the left knee. Chlorhexidine was applied to the injection site and allowed time to completely dry. Next, ethyl chloride was used as a topical anesthetic and the left knee was injected. The patient tolerated the procedure well without any complications. The injection was given by Dr. Gilbert Hancock. The patient was instructed to remain in clinic for 20 minutes afterwards and to report any adverse reaction to me immediately. The patient " demonstrated immediate relief of pain and was able to tolerate both active and passive ROM. Patient was able to move left knee w/o difficulty. The patient's pain was well controlled prior to leaving the clinic. All questions and concerns were addressed. The patient was encouraged to call the clinic for any additional questions/concerns.     ________________________  Gilbert Hancock MD  [facility] PGY-1  12/19/2022 4:30 PM

## 2022-12-19 NOTE — PROGRESS NOTES
"  History & Physical  [facility]      SUBJECTIVE:     History of Present Illness:  Patient is a 42 y.o. female presents to clinic for left knee pain. Onset 1 week ago, intermittent, 6/10 at worst. Described pain as around joint space. Patient reports ibuprofen 600mg provides only minimal pain relief. Patient has history of increased uric acid but reports has not taken allopurinol in "quite some time." Patient denies fever, diffuse leg pain or redness, severe swelling at this time. Patient with gastric surgery for weight control on 12/28/22 with blood work completed approx 4 days ago with mildly elevated BUN/Cr 20/1.08. Dr. Griffith ordered Bilateral Lower Extremity US 11/21/22 with no evidence of DVT.    Review of Systems   Constitutional:  Negative for fever.   Cardiovascular:  Negative for chest pain.   Musculoskeletal:  Positive for joint pain (left knee).   A 10+ review of systems was performed with pertinent positives and negatives noted above in the history of present illness.  Other systems were negative unless otherwise specified.    Review of patient's allergies indicates:  No Known Allergies    Past Medical History:   Diagnosis Date    Anemia     Arthritis     Fibromyalgia     GERD (gastroesophageal reflux disease)     Gout     HLD (hyperlipidemia)     HTN (hypertension) 5/8/2013    Peripheral neuropathy     Thyroid disease     Venous stasis dermatitis of left lower extremity        Current Outpatient Medications   Medication Instructions    acetaminophen (TYLENOL) 160 mg/5 mL (5 mL) Susp 20 mL by mouth every 4 hours; Do not exceed 5 doses in 24 hours    allopurinoL (ZYLOPRIM) 300 mg, Oral, Daily    amitriptyline (ELAVIL) 25 MG tablet Take 1 to 2 tablets by mouth at bedtime as needed for sleep    clotrimazole-betamethasone 1-0.05% (LOTRISONE) cream Apply topically locally as directed daily    fenofibrate 160 MG Tab Take 1 tablet (160 mg total) by mouth once daily for hypertriglyceridemia    ferrous " sulfate (FEOSOL) 325 mg, Daily    flu vacc [id number] 6mos up,PF, (FLUARIX QUAD [id number], PF,) 60 mcg (15 mcg x 4)/0.5 mL Syrg Intramuscular    furosemide (LASIX) 40 mg, Oral, 2 times daily    gabapentin (NEURONTIN) 250 mg/5 mL solution Take 5ml by mouth every 8 hours    gabapentin (NEURONTIN) 300 mg, Oral, 3 times daily, Take 100 mg three times daily for first 2 days, then take 200 mg three times daily for next 2 days, then increase to 300 mg three times daily.    ibuprofen (ADVIL,MOTRIN) 100 mg/5 mL suspension 20 mL by mouth every 4 hours    ibuprofen (ADVIL,MOTRIN) 600 MG tablet Take 1 tablet by mouth twice a day    ibuprofen (ADVIL,MOTRIN) 800 MG tablet TAKE 1 TABLET BY MOUTH EVERY 8 HOURS AS NEEDED FOR PAIN    methocarbamoL (ROBAXIN) 750 MG Tab Take 1 tablet (750 mg total) by mouth 3 (three) times daily as needed for muscle spasms.    methocarbamoL (ROBAXIN) 750 MG Tab Take 1 tablet by mouth 3 times daily as needed for muscle spasms    metoprolol tartrate (LOPRESSOR) 100 MG tablet TAKE ONE TABLET BY MOUTH TWO TIMES A DAY FOR BLOOD PRESSURE    MIRENA 20 mcg/24 hr (5 years) IUD TO BE INSERTED ONE TIME BY PRESCRIBER. ROUTE INTRAUTERINE.    mupirocin (BACTROBAN) 2 % ointment APPLY AND GENTLY MASSAGE INTO AFFECTED AREA(S) TWICE DAILY.    mupirocin (BACTROBAN) 2 % ointment Apply to affected area as directed    ondansetron (ZOFRAN-ODT) 4 MG TbDL 1 tablet under the tongue every 8 hours; For Nausea    pantoprazole (PROTONIX) 40 MG tablet 1 tablet by mouth daily; take daily for 2 months after surgery    pantoprazole (PROTONIX) 40 mg, Oral, Daily    phentermine (ADIPEX-P) 37.5 mg, Oral, Before breakfast    potassium chloride (KLOR-CON) 10 MEQ TbSR 10 mEq, Oral, Daily    scopolamine (TRANSDERM-SCOP) 1.3-1.5 mg (1 mg over 3 days) 1 patch on the skin as directed; Apply the morning of surgery    traMADoL (ULTRAM) 50 mg tablet Take 1 tablet (50 mg total) by mouth every 6 (six) hours as needed.    traMADoL (ULTRAM) 50 mg tablet  "Take 1 tablet (50 mg total) by mouth every 6 (six) hours as needed.    traMADoL (ULTRAM) 50 mg tablet Take 1 tablet by mouth every 6 hours    traMADoL (ULTRAM) 50 mg tablet take 1 tablet by mouth every 4 to 6 hours as needed for pain    traMADoL (ULTRAM) 50 mg, Oral, Every 6 hours PRN    triamterene-hydrochlorothiazide 37.5-25 mg (MAXZIDE-25) 37.5-25 mg per tablet 1 tablet, Oral, Daily       Past Surgical History:   Procedure Laterality Date    APPENDECTOMY       SECTION      CHOLECYSTECTOMY      ESOPHAGOGASTRODUODENOSCOPY N/A 2022    Procedure: EGD (ESOPHAGOGASTRODUODENOSCOPY);  Surgeon: Adis Huitron MD;  Location: [facility];  Service: Endoscopy;  Laterality: N/A;       Family History   Problem Relation Age of Onset    Stroke Mother     Colon cancer Maternal Aunt        Social History     Tobacco Use    Smoking status: Never    Smokeless tobacco: Never   Substance Use Topics    Alcohol use: No    Drug use: No        OBJECTIVE:     Vital Signs (Most Recent)  Vitals:    22 1454   Weight: 120 kg (264 lb 8.8 oz)   Height: 5' 4" (1.626 m)     BMI: 45.41    Physical Exam:  Physical Exam  Vitals and nursing note reviewed.   Constitutional:       General: She is not in acute distress.     Appearance: Normal appearance. She is obese. She is not ill-appearing or toxic-appearing.   HENT:      Head: Normocephalic.      Right Ear: External ear normal.      Left Ear: External ear normal.      Nose: Nose normal.      Mouth/Throat:      Pharynx: Oropharynx is clear.   Eyes:      Extraocular Movements: Extraocular movements intact.   Cardiovascular:      Rate and Rhythm: Normal rate and regular rhythm.      Pulses: Normal pulses.      Heart sounds: Normal heart sounds.   Pulmonary:      Effort: Pulmonary effort is normal.      Breath sounds: Normal breath sounds.   Abdominal:      General: Abdomen is flat.   Musculoskeletal:      Cervical back: Normal range of motion.      Comments: Left knee with swelling " and mild warmth. Tenderness around joint space. ROM and strength intact and equal bilaterally. Neurovascular intact bilateral.   Skin:     General: Skin is warm.      Capillary Refill: Capillary refill takes less than 2 seconds.      Comments: Venous stasis dermatitis to lower left ankle area.   Neurological:      General: No focal deficit present.      Mental Status: She is alert.   Psychiatric:         Mood and Affect: Mood normal.         Thought Content: Thought content normal.        ASSESSMENT/PLAN:   42 y.o.female presents to clinic for left knee pain. No previous xrays found on chart review. Patient would benefit from xrays at this time as well as knee injection to help control pain (see procedure note). Will also refill patient ibuprofen. Will follow-up with patient based on imaging results, symptom progression, and post surgery.    Counseled patient on the importance maintaining a healthy diet (including at least one-half of food eaten should be whole fruits and vegetables with the core elements of the other half of food  should include grains, whole grains, dairy, protein, and oils with lower saturated fat, as well as minimizing alcohol use and consumption of foods with added sugar, saturated fat, and sodium.)     Counseled patient on importance of exercising at least 150-300 minutes per week (i.e. at least 30 minutes, 3 days a week) of moderate physical activity.       Acute pain of left knee  -     X-Ray Knee 3 View Left; Future; Expected date: 12/19/2022  -     Large Joint Aspiration/Injection  -     Large Joint Aspiration/Injection: L knee  -     ibuprofen (ADVIL,MOTRIN) 600 MG tablet; Take 1 tablet by mouth twice a day  Dispense: 30 tablet; Refill: 2    Morbid obesity    Hypertension, unspecified type  -     POCT URINALYSIS      Provided patient with anticipatory guidance and return precautions. Treatment plan discussed with patient, all questions answered, and patient acknowledged understanding and  verbal agreement.      Follow-up in: 3 months      A total of approximately 45 minutes was spent on patient care during this encounter which included chart review, examining the patient, formulating a treatment plan and documentation.     Complex medical decision making performed due to multiple medical problems addressed during the visit.       Case discussed with Dr. Marroquin.    ________________________  Gilbert Hancock MD  [facility] PGY-1  12/19/2022 2:56 PM

## 2022-12-19 NOTE — PROCEDURES
Large Joint Aspiration/Injection    Date/Time: 12/19/2022 4:03 PM  Performed by: Adis Marroquin MD  Authorized by: Adis Marroquin MD

## 2022-12-20 NOTE — PROGRESS NOTES
I have reviewed the notes, assessments, and/or procedures performed by Dr. Hancock, I concur with her/his documentation of Mohsen Julien. Knee pain most likely osteoarthritis in this lady with morbid obesity. She does have a history of elevated uric acid that needs to be followed.

## 2023-02-11 DIAGNOSIS — I10 ESSENTIAL (PRIMARY) HYPERTENSION: ICD-10-CM

## 2023-02-11 RX ORDER — TRIAMTERENE/HYDROCHLOROTHIAZID 37.5-25 MG
1 TABLET ORAL DAILY
Qty: 90 TABLET | Refills: 3 | Status: CANCELLED | OUTPATIENT
Start: 2023-02-11 | End: 2024-02-06

## 2023-02-13 DIAGNOSIS — I10 ESSENTIAL (PRIMARY) HYPERTENSION: ICD-10-CM

## 2023-02-13 RX ORDER — TRIAMTERENE/HYDROCHLOROTHIAZID 37.5-25 MG
1 TABLET ORAL DAILY
Qty: 90 TABLET | Refills: 3 | Status: CANCELLED | OUTPATIENT
Start: 2023-02-11 | End: 2024-02-06

## 2023-02-14 NOTE — TELEPHONE ENCOUNTER
Refill Routing Note   Medication(s) are not appropriate for processing by [facility] for the following reason(s):       NPP    [facility] action(s):  Route         Medication reconciliation completed: No   Extended chart review required: No  Alert overridden per protocol: No    Care gaps identified:  No       Appointments  past 12m or future 3m with PCP    Date Provider   Last Visit   Visit date not found Christopher Diza MD   Next Visit   Visit date not found Christopher Diaz MD   ED visits in past 90 days: 0        Note composed:7:29 PM 02/13/2023

## 2023-02-20 ENCOUNTER — OFFICE VISIT (OUTPATIENT)
Dept: FAMILY MEDICINE | Facility: HOSPITAL | Age: 43
End: 2023-02-20
Payer: MEDICAID

## 2023-02-20 VITALS
DIASTOLIC BLOOD PRESSURE: 76 MMHG | SYSTOLIC BLOOD PRESSURE: 129 MMHG | HEIGHT: 64 IN | BODY MASS INDEX: 41.77 KG/M2 | WEIGHT: 244.69 LBS | HEART RATE: 77 BPM

## 2023-02-20 DIAGNOSIS — M25.562 ACUTE PAIN OF LEFT KNEE: ICD-10-CM

## 2023-02-20 DIAGNOSIS — E78.1 PURE HYPERTRIGLYCERIDEMIA: ICD-10-CM

## 2023-02-20 DIAGNOSIS — Z90.3 S/P GASTRIC SLEEVE PROCEDURE: ICD-10-CM

## 2023-02-20 DIAGNOSIS — M25.551 RIGHT HIP PAIN: ICD-10-CM

## 2023-02-20 DIAGNOSIS — I10 ESSENTIAL (PRIMARY) HYPERTENSION: ICD-10-CM

## 2023-02-20 PROCEDURE — 99214 OFFICE O/P EST MOD 30 MIN: CPT | Performed by: STUDENT IN AN ORGANIZED HEALTH CARE EDUCATION/TRAINING PROGRAM

## 2023-02-20 RX ORDER — FENOFIBRATE 160 MG/1
160 TABLET ORAL DAILY
Qty: 90 TABLET | Refills: 3 | Status: SHIPPED | OUTPATIENT
Start: 2023-02-20 | End: 2024-02-20

## 2023-02-20 NOTE — PROGRESS NOTES
Subjective:       Patient ID: Mohsen Julien is a 42 y.o. female.    Chief Complaint: Follow-up    HPI 43 yo female     #BMI 42  Reduced from 45.2 in Dec 22 prior to surgery    #s/p bariatric sleeve gastrectomy 12/28/22  Recovering well from surgery    #leg/joint pain  Possible h/o fibromyalgia  Left knee improved s/p injection  Right hip chronic aches    Past Medical History:   Diagnosis Date    Anemia     Arthritis     Fibromyalgia     GERD (gastroesophageal reflux disease)     Gout     HLD (hyperlipidemia)     HTN (hypertension) 5/8/2013    Peripheral neuropathy     Thyroid disease     Venous stasis dermatitis of left lower extremity      Review of Systems     10 point review of systems was conducted and only the pertinent positives and pertinent negatives are noted above in the HPI section.    Objective:      Vitals:    02/20/23 1541   BP: 129/76   Pulse: 77     Physical Exam    Assessment:       1. BMI 40.0-44.9, adult    2. Acute pain of left knee    3. Essential (primary) hypertension    4. Right hip pain    5. S/P gastric sleeve procedure        Plan:       BMI 40.0-44.9, adult  Continue current dietary goals s/p gastric sleeve   -     CBC Auto Differential; Future; Expected date: 02/20/2023  -     Comprehensive Metabolic Panel; Future; Expected date: 02/20/2023  -     Hemoglobin A1C; Future; Expected date: 02/20/2023  -     Phosphorus; Future; Expected date: 02/20/2023  -     PTH, intact; Future; Expected date: 02/20/2023  -     Calcitriol (1,25 di-OH Vitamin D); Future; Expected date: 02/20/2023  -     Vitamin B12; Future; Expected date: 02/20/2023    Acute pain of left knee   Stable    Essential (primary) hypertension   Stable, close to goal    Right hip pain  Continue conservative management warm compress, ibuprofen, stretching  Re-visit physical therapy at next visit   -     X-Ray Hip 2 or 3 views Right (with Pelvis when performed); Future; Expected date: 02/20/2023    S/P gastric sleeve  procedure  -     CBC Auto Differential; Future; Expected date: 02/20/2023  -     Comprehensive Metabolic Panel; Future; Expected date: 02/20/2023  -     Hemoglobin A1C; Future; Expected date: 02/20/2023  -     Phosphorus; Future; Expected date: 02/20/2023  -     PTH, intact; Future; Expected date: 02/20/2023  -     Calcitriol (1,25 di-OH Vitamin D); Future; Expected date: 02/20/2023  -     Vitamin B12; Future; Expected date: 02/20/2023  -     Lipid Panel; Future; Expected date: 02/20/2023  -     Ferritin; Future; Expected date: 02/20/2023  -     Iron and TIBC; Future; Expected date: 02/20/2023  -     Folate; Future; Expected date: 02/20/2023  -     VITAMIN B1; Future; Expected date: 02/20/2023      Follow up in about 4 months (around 6/28/2023), or if symptoms worsen or fail to improve, for lab work, follow up.    First labs due at 3 months post op (March 28, 2023), second labs due at 6 months (June 28, 2023)    Elana Urias MD, [facility] PGY-3  02/20/2023

## 2023-03-15 DIAGNOSIS — M25.562 ACUTE PAIN OF LEFT KNEE: ICD-10-CM

## 2023-03-15 RX ORDER — IBUPROFEN 600 MG/1
600 TABLET ORAL 2 TIMES DAILY
Qty: 60 TABLET | Refills: 11 | Status: SHIPPED | OUTPATIENT
Start: 2023-03-15 | End: 2024-03-23

## 2023-03-15 RX ORDER — PHENTERMINE HYDROCHLORIDE 37.5 MG/1
37.5 TABLET ORAL
Qty: 30 TABLET | Refills: 0 | Status: SHIPPED | OUTPATIENT
Start: 2023-03-15 | End: 2023-06-22 | Stop reason: SDUPTHER

## 2023-05-18 DIAGNOSIS — R60.0 PERIPHERAL EDEMA: ICD-10-CM

## 2023-05-18 RX ORDER — FUROSEMIDE 40 MG/1
40 TABLET ORAL 2 TIMES DAILY
Qty: 60 TABLET | Refills: 11 | Status: CANCELLED | OUTPATIENT
Start: 2023-05-18 | End: 2024-05-17

## 2023-05-22 DIAGNOSIS — R60.0 PERIPHERAL EDEMA: ICD-10-CM

## 2023-05-22 RX ORDER — PHENTERMINE HYDROCHLORIDE 37.5 MG/1
37.5 TABLET ORAL
Qty: 30 TABLET | Refills: 0 | OUTPATIENT
Start: 2023-05-22 | End: 2023-06-21

## 2023-05-22 RX ORDER — FUROSEMIDE 40 MG/1
40 TABLET ORAL 2 TIMES DAILY
Qty: 180 TABLET | Refills: 3 | Status: SHIPPED | OUTPATIENT
Start: 2023-05-22 | End: 2024-05-21

## 2023-06-16 ENCOUNTER — PATIENT MESSAGE (OUTPATIENT)
Dept: PODIATRY | Facility: CLINIC | Age: 43
End: 2023-06-16
Payer: MEDICAID

## 2023-06-22 RX ORDER — PHENTERMINE HYDROCHLORIDE 37.5 MG/1
37.5 TABLET ORAL
Qty: 30 TABLET | Refills: 0 | Status: SHIPPED | OUTPATIENT
Start: 2023-06-22 | End: 2023-07-23

## 2023-07-05 ENCOUNTER — OFFICE VISIT (OUTPATIENT)
Dept: FAMILY MEDICINE | Facility: HOSPITAL | Age: 43
End: 2023-07-05
Payer: MEDICAID

## 2023-07-05 VITALS
WEIGHT: 227.75 LBS | HEIGHT: 63 IN | SYSTOLIC BLOOD PRESSURE: 116 MMHG | DIASTOLIC BLOOD PRESSURE: 76 MMHG | HEART RATE: 79 BPM | BODY MASS INDEX: 40.36 KG/M2

## 2023-07-05 DIAGNOSIS — I10 ESSENTIAL (PRIMARY) HYPERTENSION: ICD-10-CM

## 2023-07-05 PROCEDURE — 99214 OFFICE O/P EST MOD 30 MIN: CPT

## 2023-07-05 NOTE — PROGRESS NOTES
"  [facility] CL[facility]IC NOTE  Follow-up Visit      SUBJECTIVE:     Patient: Mohsen Julien is a 42 y.o. female.    Chief Compliant: Annual visit/lab check      History of Present Illness:  Patient presents for annual visit. Endorses no concerns at this time. Denies headache, shortness of breath, and chest pain. Had gastric surgery done Dec 2022. Reports no complications.      Review of Systems   Constitutional:  Negative for fever.   Respiratory:  Negative for shortness of breath.    Cardiovascular:  Negative for chest pain.   Gastrointestinal:  Negative for abdominal pain.   Neurological:  Negative for headaches.   A 10+ review of systems was performed with pertinent positives and negatives noted above in the history of present illness. Other systems were negative unless otherwise specified.    OBJECTIVE:     Vital Signs (Most Recent)  Vitals:    07/05/23 1440   BP: 116/76   Pulse: 79   Weight: 103.3 kg (227 lb 11.8 oz)   Height: 5' 3" (1.6 m)     BMI: Body mass index is 40.34 kg/m².     Physical Exam:  Physical Exam  Vitals and nursing note reviewed.   Constitutional:       Appearance: Normal appearance.      Comments: Patient appears alert and oriented, sitting comfortably in chair, in no acute distress, non-toxic, non ill appearing, hemodynamically stable with vitals within normal limits.    HENT:      Head: Normocephalic.      Right Ear: External ear normal.      Left Ear: External ear normal.      Nose: Nose normal.      Mouth/Throat:      Mouth: Mucous membranes are moist.      Pharynx: Oropharynx is clear.   Eyes:      Extraocular Movements: Extraocular movements intact.      Conjunctiva/sclera: Conjunctivae normal.   Cardiovascular:      Rate and Rhythm: Normal rate and regular rhythm.      Pulses: Normal pulses.      Heart sounds: Normal heart sounds.      Comments: Radial pulses 2+ bilaterally.  Pulmonary:      Effort: Pulmonary effort is normal. No respiratory distress.      Breath sounds: " Normal breath sounds. No wheezing.   Abdominal:      General: Abdomen is flat.   Musculoskeletal:         General: Normal range of motion.      Cervical back: Normal range of motion.   Skin:     General: Skin is warm.      Capillary Refill: Capillary refill takes less than 2 seconds.   Neurological:      General: No focal deficit present.      Mental Status: She is alert and oriented to person, place, and time.   Psychiatric:         Mood and Affect: Mood normal.         Behavior: Behavior normal.         Thought Content: Thought content normal.        ASSESSMENT:   Mohsen Julien is a 42 y.o. female who presents to clinic to for    1. BMI 40.0-44.9, adult    2. Essential (primary) hypertension         PLAN:   Patient well appearing at this time with benign exam. Will repeat labs at this time and follow-up with results. With history of gastric sleeve surgery, will recheck for vitamin deficiencies as well.    BMI 40.0-44.9, adult    Essential (primary) hypertension        Provided patient with anticipatory guidance and return precautions. Treatment plan discussed with patient, all questions answered, and patient acknowledged understanding and verbal agreement.      Follow-up in: 1 months; or sooner PRN if acute concerns arise.      Case discussed with Dr. RAÚL Garcia    ________________________  Gilbert Hancock MD  [facility] PGY-2

## 2023-07-18 ENCOUNTER — TELEPHONE (OUTPATIENT)
Dept: FAMILY MEDICINE | Facility: HOSPITAL | Age: 43
End: 2023-07-18
Payer: MEDICAID

## 2023-07-18 ENCOUNTER — LAB VISIT (OUTPATIENT)
Dept: LAB | Facility: HOSPITAL | Age: 43
End: 2023-07-18
Attending: STUDENT IN AN ORGANIZED HEALTH CARE EDUCATION/TRAINING PROGRAM
Payer: MEDICAID

## 2023-07-18 DIAGNOSIS — Z90.3 S/P GASTRIC SLEEVE PROCEDURE: ICD-10-CM

## 2023-07-18 LAB
ALBUMIN SERPL BCP-MCNC: 4 G/DL (ref 3.5–5.2)
ALP SERPL-CCNC: 79 U/L (ref 55–135)
ALT SERPL W/O P-5'-P-CCNC: 45 U/L (ref 10–44)
ANION GAP SERPL CALC-SCNC: 14 MMOL/L (ref 8–16)
AST SERPL-CCNC: 35 U/L (ref 10–40)
BASOPHILS NFR BLD: 1 % (ref 0–1.9)
BILIRUB SERPL-MCNC: 0.4 MG/DL (ref 0.1–1)
BUN SERPL-MCNC: 16 MG/DL (ref 6–20)
CALCIUM SERPL-MCNC: 9.7 MG/DL (ref 8.7–10.5)
CHLORIDE SERPL-SCNC: 96 MMOL/L (ref 95–110)
CHOLEST SERPL-MCNC: 185 MG/DL (ref 120–199)
CHOLEST/HDLC SERPL: 4.9 {RATIO} (ref 2–5)
CO2 SERPL-SCNC: 29 MMOL/L (ref 23–29)
CREAT SERPL-MCNC: 0.9 MG/DL (ref 0.5–1.4)
DIFFERENTIAL METHOD: ABNORMAL
EOSINOPHIL NFR BLD: 3 % (ref 0–8)
ERYTHROCYTE [DISTWIDTH] IN BLOOD BY AUTOMATED COUNT: 12.8 % (ref 11.5–14.5)
EST. GFR  (NO RACE VARIABLE): >60 ML/MIN/1.73 M^2
ESTIMATED AVG GLUCOSE: 88 MG/DL (ref 68–131)
FERRITIN SERPL-MCNC: 295 NG/ML (ref 20–300)
FOLATE SERPL-MCNC: >40 NG/ML (ref 4–24)
GLUCOSE SERPL-MCNC: 92 MG/DL (ref 70–110)
HBA1C MFR BLD: 4.7 % (ref 4–5.6)
HCT VFR BLD AUTO: 41.4 % (ref 37–48.5)
HDLC SERPL-MCNC: 38 MG/DL (ref 40–75)
HDLC SERPL: 20.5 % (ref 20–50)
HGB BLD-MCNC: 12.8 G/DL (ref 12–16)
IMM GRANULOCYTES # BLD AUTO: ABNORMAL K/UL (ref 0–0.04)
IMM GRANULOCYTES NFR BLD AUTO: ABNORMAL % (ref 0–0.5)
IRON SERPL-MCNC: 93 UG/DL (ref 30–160)
LDLC SERPL CALC-MCNC: 102 MG/DL (ref 63–159)
LYMPHOCYTES NFR BLD: 60 % (ref 18–48)
MCH RBC QN AUTO: 23.5 PG (ref 27–31)
MCHC RBC AUTO-ENTMCNC: 30.9 G/DL (ref 32–36)
MCV RBC AUTO: 76 FL (ref 82–98)
MONOCYTES NFR BLD: 4 % (ref 4–15)
NEUTROPHILS NFR BLD: 32 % (ref 38–73)
NONHDLC SERPL-MCNC: 147 MG/DL
NRBC BLD-RTO: 0 /100 WBC
PHOSPHATE SERPL-MCNC: 2.5 MG/DL (ref 2.7–4.5)
PLATELET # BLD AUTO: 317 K/UL (ref 150–450)
PLATELET BLD QL SMEAR: ABNORMAL
PMV BLD AUTO: 10.8 FL (ref 9.2–12.9)
POTASSIUM SERPL-SCNC: 2.5 MMOL/L (ref 3.5–5.1)
PROT SERPL-MCNC: 8.2 G/DL (ref 6–8.4)
PTH-INTACT SERPL-MCNC: 62.9 PG/ML (ref 9–77)
RBC # BLD AUTO: 5.44 M/UL (ref 4–5.4)
SATURATED IRON: 23 % (ref 20–50)
SODIUM SERPL-SCNC: 139 MMOL/L (ref 136–145)
TOTAL IRON BINDING CAPACITY: 400 UG/DL (ref 250–450)
TRANSFERRIN SERPL-MCNC: 270 MG/DL (ref 200–375)
TRIGL SERPL-MCNC: 225 MG/DL (ref 30–150)
VIT B12 SERPL-MCNC: 790 PG/ML (ref 210–950)
WBC # BLD AUTO: 6.42 K/UL (ref 3.9–12.7)

## 2023-07-18 PROCEDURE — 80053 COMPREHEN METABOLIC PANEL: CPT | Performed by: STUDENT IN AN ORGANIZED HEALTH CARE EDUCATION/TRAINING PROGRAM

## 2023-07-18 PROCEDURE — 82607 VITAMIN B-12: CPT | Performed by: STUDENT IN AN ORGANIZED HEALTH CARE EDUCATION/TRAINING PROGRAM

## 2023-07-18 PROCEDURE — 82746 ASSAY OF FOLIC ACID SERUM: CPT | Performed by: STUDENT IN AN ORGANIZED HEALTH CARE EDUCATION/TRAINING PROGRAM

## 2023-07-18 PROCEDURE — 84100 ASSAY OF PHOSPHORUS: CPT | Performed by: STUDENT IN AN ORGANIZED HEALTH CARE EDUCATION/TRAINING PROGRAM

## 2023-07-18 PROCEDURE — 83036 HEMOGLOBIN GLYCOSYLATED A1C: CPT | Performed by: STUDENT IN AN ORGANIZED HEALTH CARE EDUCATION/TRAINING PROGRAM

## 2023-07-18 PROCEDURE — 83970 ASSAY OF PARATHORMONE: CPT | Performed by: STUDENT IN AN ORGANIZED HEALTH CARE EDUCATION/TRAINING PROGRAM

## 2023-07-18 PROCEDURE — 84466 ASSAY OF TRANSFERRIN: CPT | Performed by: STUDENT IN AN ORGANIZED HEALTH CARE EDUCATION/TRAINING PROGRAM

## 2023-07-18 PROCEDURE — 80061 LIPID PANEL: CPT | Performed by: STUDENT IN AN ORGANIZED HEALTH CARE EDUCATION/TRAINING PROGRAM

## 2023-07-18 PROCEDURE — 85007 BL SMEAR W/DIFF WBC COUNT: CPT | Performed by: STUDENT IN AN ORGANIZED HEALTH CARE EDUCATION/TRAINING PROGRAM

## 2023-07-18 PROCEDURE — 84425 ASSAY OF VITAMIN B-1: CPT | Performed by: STUDENT IN AN ORGANIZED HEALTH CARE EDUCATION/TRAINING PROGRAM

## 2023-07-18 PROCEDURE — 85027 COMPLETE CBC AUTOMATED: CPT | Performed by: STUDENT IN AN ORGANIZED HEALTH CARE EDUCATION/TRAINING PROGRAM

## 2023-07-18 PROCEDURE — 82728 ASSAY OF FERRITIN: CPT | Performed by: STUDENT IN AN ORGANIZED HEALTH CARE EDUCATION/TRAINING PROGRAM

## 2023-07-18 NOTE — TELEPHONE ENCOUNTER
Pedro from [facility] called with a Critical lab value of Potassium-2.5. High Priority message sent to Dr. Jose Angel Hancock and staff at [facility].Value entered into flowsheets.

## 2023-07-21 ENCOUNTER — HOSPITAL ENCOUNTER (EMERGENCY)
Facility: HOSPITAL | Age: 43
Discharge: HOME | End: 2023-07-21
Attending: EMERGENCY MEDICINE
Payer: MEDICAID

## 2023-07-21 ENCOUNTER — TELEPHONE (OUTPATIENT)
Dept: FAMILY MEDICINE | Facility: HOSPITAL | Age: 43
End: 2023-07-21
Payer: MEDICAID

## 2023-07-21 VITALS
WEIGHT: 235 LBS | OXYGEN SATURATION: 96 % | RESPIRATION RATE: 17 BRPM | TEMPERATURE: 99 F | BODY MASS INDEX: 41.63 KG/M2 | DIASTOLIC BLOOD PRESSURE: 66 MMHG | HEART RATE: 82 BPM | SYSTOLIC BLOOD PRESSURE: 114 MMHG

## 2023-07-21 DIAGNOSIS — E87.6 HYPOKALEMIA: Primary | ICD-10-CM

## 2023-07-21 LAB
ALBUMIN SERPL BCP-MCNC: 4.1 G/DL (ref 3.5–5.2)
ALP SERPL-CCNC: 82 U/L (ref 55–135)
ALT SERPL W/O P-5'-P-CCNC: 47 U/L (ref 10–44)
ANION GAP SERPL CALC-SCNC: 10 MMOL/L (ref 8–16)
ANION GAP SERPL CALC-SCNC: 15 MMOL/L (ref 8–16)
AST SERPL-CCNC: 28 U/L (ref 10–40)
BASOPHILS # BLD AUTO: 0.04 K/UL (ref 0–0.2)
BASOPHILS NFR BLD: 0.5 % (ref 0–1.9)
BILIRUB SERPL-MCNC: 0.3 MG/DL (ref 0.1–1)
BUN SERPL-MCNC: 16 MG/DL (ref 6–20)
BUN SERPL-MCNC: 19 MG/DL (ref 6–20)
CALCIUM SERPL-MCNC: 7.8 MG/DL (ref 8.7–10.5)
CALCIUM SERPL-MCNC: 9.5 MG/DL (ref 8.7–10.5)
CHLORIDE SERPL-SCNC: 103 MMOL/L (ref 95–110)
CHLORIDE SERPL-SCNC: 96 MMOL/L (ref 95–110)
CO2 SERPL-SCNC: 23 MMOL/L (ref 23–29)
CO2 SERPL-SCNC: 26 MMOL/L (ref 23–29)
CREAT SERPL-MCNC: 0.8 MG/DL (ref 0.5–1.4)
CREAT SERPL-MCNC: 1.2 MG/DL (ref 0.5–1.4)
DIFFERENTIAL METHOD: ABNORMAL
EOSINOPHIL # BLD AUTO: 0.2 K/UL (ref 0–0.5)
EOSINOPHIL NFR BLD: 2.1 % (ref 0–8)
ERYTHROCYTE [DISTWIDTH] IN BLOOD BY AUTOMATED COUNT: 12.9 % (ref 11.5–14.5)
EST. GFR  (NO RACE VARIABLE): 57 ML/MIN/1.73 M^2
EST. GFR  (NO RACE VARIABLE): >60 ML/MIN/1.73 M^2
GLUCOSE SERPL-MCNC: 104 MG/DL (ref 70–110)
GLUCOSE SERPL-MCNC: 83 MG/DL (ref 70–110)
HCT VFR BLD AUTO: 41.7 % (ref 37–48.5)
HGB BLD-MCNC: 13.1 G/DL (ref 12–16)
IMM GRANULOCYTES # BLD AUTO: 0.02 K/UL (ref 0–0.04)
IMM GRANULOCYTES NFR BLD AUTO: 0.3 % (ref 0–0.5)
LYMPHOCYTES # BLD AUTO: 3 K/UL (ref 1–4.8)
LYMPHOCYTES NFR BLD: 41.8 % (ref 18–48)
MAGNESIUM SERPL-MCNC: 2.1 MG/DL (ref 1.6–2.6)
MCH RBC QN AUTO: 23.9 PG (ref 27–31)
MCHC RBC AUTO-ENTMCNC: 31.4 G/DL (ref 32–36)
MCV RBC AUTO: 76 FL (ref 82–98)
MONOCYTES # BLD AUTO: 0.6 K/UL (ref 0.3–1)
MONOCYTES NFR BLD: 8.1 % (ref 4–15)
NEUTROPHILS # BLD AUTO: 3.4 K/UL (ref 1.8–7.7)
NEUTROPHILS NFR BLD: 47.2 % (ref 38–73)
NRBC BLD-RTO: 0 /100 WBC
PLATELET # BLD AUTO: 314 K/UL (ref 150–450)
PMV BLD AUTO: 10.3 FL (ref 9.2–12.9)
POTASSIUM SERPL-SCNC: 2.8 MMOL/L (ref 3.5–5.1)
POTASSIUM SERPL-SCNC: 4.7 MMOL/L (ref 3.5–5.1)
PROT SERPL-MCNC: 8.3 G/DL (ref 6–8.4)
RBC # BLD AUTO: 5.49 M/UL (ref 4–5.4)
SODIUM SERPL-SCNC: 136 MMOL/L (ref 136–145)
SODIUM SERPL-SCNC: 137 MMOL/L (ref 136–145)
WBC # BLD AUTO: 7.28 K/UL (ref 3.9–12.7)

## 2023-07-21 PROCEDURE — 99284 EMERGENCY DEPT VISIT MOD MDM: CPT | Mod: 25

## 2023-07-21 PROCEDURE — 80048 BASIC METABOLIC PNL TOTAL CA: CPT | Mod: 91,XB | Performed by: EMERGENCY MEDICINE

## 2023-07-21 PROCEDURE — 93010 EKG 12-LEAD: ICD-10-PCS | Mod: ,,, | Performed by: INTERNAL MEDICINE

## 2023-07-21 PROCEDURE — 63600175 PHARM REV CODE 636 W HCPCS: Performed by: EMERGENCY MEDICINE

## 2023-07-21 PROCEDURE — 96365 THER/PROPH/DIAG IV INF INIT: CPT

## 2023-07-21 PROCEDURE — 93010 ELECTROCARDIOGRAM REPORT: CPT | Mod: ,,, | Performed by: INTERNAL MEDICINE

## 2023-07-21 PROCEDURE — 25000003 PHARM REV CODE 250: Performed by: EMERGENCY MEDICINE

## 2023-07-21 PROCEDURE — 85025 COMPLETE CBC W/AUTO DIFF WBC: CPT | Performed by: PHYSICIAN ASSISTANT

## 2023-07-21 PROCEDURE — 80053 COMPREHEN METABOLIC PANEL: CPT | Performed by: PHYSICIAN ASSISTANT

## 2023-07-21 PROCEDURE — 83735 ASSAY OF MAGNESIUM: CPT | Performed by: PHYSICIAN ASSISTANT

## 2023-07-21 PROCEDURE — 93005 ELECTROCARDIOGRAM TRACING: CPT

## 2023-07-21 RX ORDER — POTASSIUM CHLORIDE 7.45 MG/ML
10 INJECTION INTRAVENOUS
Status: COMPLETED | OUTPATIENT
Start: 2023-07-21 | End: 2023-07-21

## 2023-07-21 RX ORDER — POTASSIUM CHLORIDE 20 MEQ/1
60 TABLET, EXTENDED RELEASE ORAL ONCE
Status: COMPLETED | OUTPATIENT
Start: 2023-07-21 | End: 2023-07-21

## 2023-07-21 RX ORDER — POTASSIUM CHLORIDE 1500 MG/1
20 TABLET, EXTENDED RELEASE ORAL DAILY
Qty: 30 TABLET | Refills: 3 | Status: SHIPPED | OUTPATIENT
Start: 2023-07-21 | End: 2023-07-21

## 2023-07-21 RX ORDER — POTASSIUM CHLORIDE 20 MEQ/1
20 TABLET, EXTENDED RELEASE ORAL DAILY
Qty: 30 TABLET | Refills: 3 | Status: SHIPPED | OUTPATIENT
Start: 2023-07-21 | End: 2023-11-22 | Stop reason: SDUPTHER

## 2023-07-21 RX ADMIN — POTASSIUM CHLORIDE 60 MEQ: 1500 TABLET, EXTENDED RELEASE ORAL at 02:07

## 2023-07-21 RX ADMIN — POTASSIUM CHLORIDE 10 MEQ: 7.45 INJECTION INTRAVENOUS at 02:07

## 2023-07-21 NOTE — TELEPHONE ENCOUNTER
Called patient at 12:22 PM to discuss the critical lab results of K 2.6. Advised her to go to the ED to receive potassium and likely will need a re-check to ensure adequate repletion. All questions answered. Patient verbalize understanding.    Latasha Vides MD  [facility], PGY-2  07/21/2023

## 2023-07-21 NOTE — TELEPHONE ENCOUNTER
Called patient to discuss her critical lab results of K 2.5. She does endorse muscle weakness and cramps more than usual. She states she has been having chest pain but thought it was due to her constant coughing. Discussed with her we need to get a repeat lab now if she can to see if she needs to go to the ED given the lab was drawn 3 days ago. Advised her if results K <3, will recommend going to the ED for IV potassium administration. In the mean time, will need to start on a daily potassium supplementation. Will send in prescription to pharmacy. All questions answered. Return precautions discussed. Patient verbalized understanding.    Latasha Vides MD  [facility], PGY-2  07/21/2023

## 2023-07-21 NOTE — FIRST PROVIDER EVALUATION
Emergency Department TeleTriage Encounter Note      CHIEF COMPLAINT    Chief Complaint   Patient presents with    MD referral      Pt states that she received a call from her doctor stating that she needed to check into ED from critical lab, potassium level low        VITAL SIGNS   Initial Vitals [07/21/23 1301]   BP Pulse Resp Temp SpO2   123/75 86 18 98.6 °F (37 °C) 100 %      MAP       --            ALLERGIES    Review of patient's allergies indicates:  No Known Allergies    PROVIDER TRIAGE NOTE  Patient presents with complaint of abnormal lab. Had routin blood work three days ago. Potassium was low, had it repeated today. No improvement. No supplementation given.      Phy:   Constitutional: well nourished, well developed, appearing stated age, NAD   HEENT: NCAT, symmetrical lids, No obvious facial deformity.  Normal phonation. Normal Conjunctiva   Neck: NAROM   Respiratory: Normal effort.  No obvious use of accessory muscles   Musculoskeletal: Moved upper extremities well   Neuro: Alert, answers questions appropriately    Psych: appropriate mood and affect      Initial orders will be placed and care will be transferred to an alternate provider when patient is roomed for a full evaluation. Any additional orders and the final disposition will be determined by that provider.        ORDERS  Labs Reviewed   CBC W/ AUTO DIFFERENTIAL   COMPREHENSIVE METABOLIC PANEL   MAGNESIUM       ED Orders (720h ago, onward)      Start Ordered     Status Ordering Provider    07/21/23 1316 07/21/23 1315  Saline lock IV  Once         Ordered BARAK BOWENS    07/21/23 1316 07/21/23 1315  EKG 12-lead  Once         Ordered BARAK BOWENS    07/21/23 1316 07/21/23 1315  CBC auto differential  STAT         Ordered BARAK BOWENS    07/21/23 1316 07/21/23 1315  Comprehensive metabolic panel  STAT         Ordered BARAK BOWENS    07/21/23 1316 07/21/23 1315  Magnesium  STAT          Ordered BARAK BOWENS              Virtual Visit Note: The provider triage portion of this emergency department evaluation and documentation was performed via [place], a HIPAA-compliant telemedicine application, in concert with a tele-presenter in the room. A face to face patient evaluation with one of my colleagues will occur once the patient is placed in an emergency department room.      DISCLAIMER: This note was prepared with [employer] voice recognition transcription software. Garbled syntax, mangled pronouns, and other bizarre constructions may be attributed to that software system.

## 2023-07-21 NOTE — ED NOTES
Patient provided with and educated on discharge instructions and follow up care. Patient verbalized understanding. Advised to return to ED as needed. Stable and ambulatory upon departure.

## 2023-07-21 NOTE — ED PROVIDER NOTES
Encounter Date: 2023       History     Chief Complaint   Patient presents with    MD referral      Pt states that she received a call from her doctor stating that she needed to check into ED from critical lab, potassium level low      42-year-old female with a history of hypertension, anemia, GERD presents after outpatient labs showed critically low potassium.  Patient says she is been having muscle cramps and leg pain that she attributed to fibromyalgia.  She says she takes fluid pills but not potassium pills.  No chest pain or shortness of breath.    Review of patient's allergies indicates:  No Known Allergies  Past Medical History:   Diagnosis Date    Anemia     Arthritis     Fibromyalgia     GERD (gastroesophageal reflux disease)     Gout     HLD (hyperlipidemia)     HTN (hypertension) 2013    Peripheral neuropathy     Thyroid disease     Venous stasis dermatitis of left lower extremity      Past Surgical History:   Procedure Laterality Date    APPENDECTOMY       SECTION      CHOLECYSTECTOMY      ESOPHAGOGASTRODUODENOSCOPY N/A 2022    Procedure: EGD (ESOPHAGOGASTRODUODENOSCOPY);  Surgeon: Adis Huitron MD;  Location: [facility];  Service: Endoscopy;  Laterality: N/A;     Family History   Problem Relation Age of Onset    Stroke Mother     Colon cancer Maternal Aunt      Social History     Tobacco Use    Smoking status: Never    Smokeless tobacco: Never   Substance Use Topics    Alcohol use: No    Drug use: No     Review of Systems   Constitutional:  Negative for fever.   Eyes:  Negative for pain.   Respiratory:  Negative for shortness of breath.    Cardiovascular:  Negative for chest pain.   Gastrointestinal:  Negative for abdominal pain, nausea and vomiting.   Genitourinary:  Negative for difficulty urinating.   Musculoskeletal:  Positive for myalgias. Negative for back pain and neck pain.   Neurological:  Negative for headaches.   Psychiatric/Behavioral:  Negative for confusion.       Physical Exam     Initial Vitals [07/21/23 1301]   BP Pulse Resp Temp SpO2   123/75 86 18 98.6 °F (37 °C) 100 %      MAP       --         Physical Exam    Nursing note and vitals reviewed.  HENT:   Head: Atraumatic.   Eyes: Conjunctivae and EOM are normal.   Neck:   Normal range of motion.  Cardiovascular:      Exam reveals no gallop and no friction rub.       No murmur heard.  Pulmonary/Chest: Breath sounds normal. No respiratory distress.   Abdominal: Abdomen is soft. There is no abdominal tenderness.   Musculoskeletal:         General: No edema. Normal range of motion.      Cervical back: Normal range of motion.     Neurological: She is alert and oriented to person, place, and time.   Psychiatric: She has a normal mood and affect.       ED Course   Procedures  Labs Reviewed   CBC W/ AUTO DIFFERENTIAL - Abnormal; Notable for the following components:       Result Value    RBC 5.49 (*)     MCV 76 (*)     MCH 23.9 (*)     MCHC 31.4 (*)     All other components within normal limits   COMPREHENSIVE METABOLIC PANEL - Abnormal; Notable for the following components:    Potassium 2.8 (*)     ALT 47 (*)     eGFR 57 (*)     All other components within normal limits   BASIC METABOLIC PANEL - Abnormal; Notable for the following components:    Calcium 7.8 (*)     All other components within normal limits   MAGNESIUM     EKG Readings: (Independently Interpreted)   Initial Reading: No STEMI. Rhythm: Normal Sinus Rhythm. Heart Rate: 81. Ectopy: No Ectopy. Conduction: Normal.   ECG Results              EKG 12-lead (Final result)  Result time 07/21/23 15:27:06      Final result by Interface, Lab In [facility] (07/21/23 15:27:06)                   Narrative:    Test Reason : E87.6,    Vent. Rate : 081 BPM     Atrial Rate : 081 BPM     P-R Int : 182 ms          QRS Dur : 090 ms      QT Int : 406 ms       P-R-T Axes : 063 -03 025 degrees     QTc Int : 471 ms    Normal sinus rhythm  Nonspecific ST and T wave abnormality  Abnormal  ECG  When compared with ECG of 25-SEP-2020 16:20,  No significant change was found  Confirmed by Gabi Swann MD ([id number]) on 7/21/2023 3:26:50 PM    Referred By: GABRIEL   SELF           Confirmed By:Gabi Swann MD                                  Imaging Results    None          Medications   potassium chloride SA CR tablet 60 mEq (60 mEq Oral Given 7/21/23 1434)   potassium chloride 10 mEq in 100 mL IVPB (0 mEq Intravenous Stopped 7/21/23 1551)     Medical Decision Making:   Initial Assessment:   42-year-old female presenting with hypokalemia on outpatient labs.  Vital signs unremarkable.           ED Course as of 07/21/23 1641   Fri Jul 21, 2023   1344 CBC auto differential(!) [SN]   1409 Potassium(!): 2.8  60meq potassium given. [SN]   1409 Magnesium: 2.1 [SN]   1641 Repeat metabolic panel after oral and IV potassium shows normal potassium.  Patient to follow up with primary care. [SN]      ED Course User Index  [SN] Chencho Arteaga MD                   Clinical Impression:   Final diagnoses:  [E87.6] Hypokalemia (Primary)        ED Disposition Condition    Discharge Stable          ED Prescriptions    None       Follow-up Information       Follow up With Specialties Details Why Contact Info    Gilbert Hancock MD Family Medicine Schedule an appointment as soon as possible for a visit in 3 days  [address], [facility] [address]  [place] [id number] [phone]               Chencho Arteaga MD  07/21/23 1641

## 2023-07-21 NOTE — TELEPHONE ENCOUNTER
Juan Daniel from [facility] called with a Critical lab value of Potassium-2.6. High Priority message sent to Dr.Lauren Vides. Value entered into [employer].

## 2023-07-21 NOTE — ED NOTES
Patient presents to the ED per PCP for evaluation of potassium level. Patient explains she was going in for her 6 month check up and was told after labs drawn that her potassium was critically low. Pt does not remember exact number. Patient denies n/v/d. States she has had one episode of hypokalemia in the past but has never been on supplements. Denies any symptoms except for headache. Patient updated on care plan. Vitals stable. Safety intact. Denies needs. [place]. Dipak.

## 2023-07-25 LAB — VIT B1 BLD-MCNC: 39 UG/L (ref 38–122)

## 2023-08-09 RX ORDER — PHENTERMINE HYDROCHLORIDE 37.5 MG/1
37.5 TABLET ORAL
Qty: 30 TABLET | Refills: 0 | Status: SHIPPED | OUTPATIENT
Start: 2023-08-09 | End: 2023-09-09

## 2023-09-20 ENCOUNTER — OFFICE VISIT (OUTPATIENT)
Dept: FAMILY MEDICINE | Facility: HOSPITAL | Age: 43
End: 2023-09-20
Payer: MEDICAID

## 2023-09-20 VITALS
HEIGHT: 63 IN | DIASTOLIC BLOOD PRESSURE: 81 MMHG | SYSTOLIC BLOOD PRESSURE: 124 MMHG | BODY MASS INDEX: 41.68 KG/M2 | HEART RATE: 77 BPM | WEIGHT: 235.25 LBS

## 2023-09-20 DIAGNOSIS — E66.01 MORBID OBESITY: ICD-10-CM

## 2023-09-20 DIAGNOSIS — L50.9 URTICARIA: Primary | ICD-10-CM

## 2023-09-20 PROCEDURE — 99214 OFFICE O/P EST MOD 30 MIN: CPT

## 2023-09-20 PROCEDURE — 96372 THER/PROPH/DIAG INJ SC/IM: CPT

## 2023-09-20 RX ORDER — METHYLPREDNISOLONE SOD SUCC 125 MG
125 VIAL (EA) INJECTION
Status: COMPLETED | OUTPATIENT
Start: 2023-09-20 | End: 2023-09-20

## 2023-09-20 RX ORDER — PREDNISONE 20 MG/1
20 TABLET ORAL DAILY
Qty: 5 TABLET | Refills: 0 | Status: SHIPPED | OUTPATIENT
Start: 2023-09-20 | End: 2023-09-25

## 2023-09-20 RX ORDER — PHENTERMINE HYDROCHLORIDE 37.5 MG/1
37.5 TABLET ORAL
Qty: 30 TABLET | Refills: 0 | Status: SHIPPED | OUTPATIENT
Start: 2023-09-20 | End: 2023-10-20

## 2023-09-20 RX ADMIN — METHYLPREDNISOLONE SODIUM SUCCINATE 125 MG: 125 INJECTION, POWDER, FOR SOLUTION INTRAMUSCULAR; INTRAVENOUS at 09:09

## 2023-09-20 NOTE — PROGRESS NOTES
"  [facility] FAMILY PRACTICE CLINIC NOTE  Urgent/Acute Visit      SUBJECTIVE:     Patient: Mohsen Julien is a 42 y.o. female.    Chief Compliant:   Chief Complaint   Patient presents with    Rash       History of Present Illness:  Presents with rash. Onset night prior to presentation. Endorses associated redness and pruritis. Denies any known exposures to animals, irritants, or people with similar symptoms. Denies any changes in detergents, soaps, cosmetics. Denies fever, chills, nausea, vomiting, difficulty breathing, feelings of swelling throat. Reports took 2 benadryl last night with improvement this morning since last night.    Patient also requests adipex refill. Been on it chronically. Reports no adverse effects.      Review of Systems   Constitutional:  Negative for fever.   Respiratory:  Negative for shortness of breath.    Cardiovascular:  Negative for chest pain.   Gastrointestinal:  Negative for abdominal pain.   Skin:  Positive for itching and rash.   Neurological:  Negative for headaches.     A 10+ review of systems was performed with pertinent positives and negatives noted above in the history of present illness. Other systems were negative unless otherwise specified.    OBJECTIVE:     Vital Signs (Most Recent)  Vitals:    09/20/23 0851   BP: 124/81   Pulse: 77   Weight: 106.7 kg (235 lb 3.7 oz)   Height: 5' 3" (1.6 m)     BMI: Body mass index is 41.67 kg/m².     Physical Exam:  Physical Exam  Vitals and nursing note reviewed.   Constitutional:       General: She is not in acute distress.     Appearance: Normal appearance. She is obese. She is not ill-appearing, toxic-appearing or diaphoretic.   HENT:      Head: Normocephalic.      Right Ear: External ear normal.      Left Ear: External ear normal.      Nose: Nose normal.      Mouth/Throat:      Pharynx: Oropharynx is clear.   Eyes:      Extraocular Movements: Extraocular movements intact.      Comments: Right eye slightly more closed than left (improved " from picture patient provided)   Cardiovascular:      Rate and Rhythm: Normal rate and regular rhythm.   Pulmonary:      Effort: Pulmonary effort is normal.      Comments: No clinical signs of increased work of breathing, including patient speaking in complete sentences, no accessory muscle use, or tripoding.  Musculoskeletal:      Cervical back: Normal range of motion.   Skin:     General: Skin is warm.      Capillary Refill: Capillary refill takes less than 2 seconds.      Findings: Rash present.      Comments: Rash on right side of forehead, left cheek, and back of left neck. Raised wheals. Erythematous. No vesicles, drainage, warmth.   Neurological:      General: No focal deficit present.      Mental Status: She is alert and oriented to person, place, and time. Mental status is at baseline.   Psychiatric:         Mood and Affect: Mood normal.         Behavior: Behavior normal.         Thought Content: Thought content normal.          ASSESSMENT:   Mohsen Julien is a 42 y.o. female who presents to clinic to for    1. Urticaria    2. Morbid obesity         PLAN:   Rash - unknown irritant. Provided in office steroid shot, short course PO steroids, and recommended patient continued taking benadryl. Counseled patient on ED precautions including but not limited to throat swelling/difficulty breathing and return if not improving or worsening.    Urticaria  -     methylPREDNISolone sodium succinate injection 125 mg  -     predniSONE (DELTASONE) 20 MG tablet; Take 1 tablet (20 mg total) by mouth once daily. for 5 days  Dispense: 5 tablet; Refill: 0    Morbid obesity  -     phentermine (ADIPEX-P) 37.5 mg tablet; Take 1 tablet (37.5 mg total) by mouth before breakfast.  Dispense: 30 tablet; Refill: 0        Provided patient with anticipatory guidance and return precautions. Treatment plan discussed with patient, all questions answered, and patient acknowledged understanding and verbal agreement.      Follow-up in: 1  months; or sooner PRN if acute concerns arise.      Case discussed with Dr. ALMA Raymundo  ________________________  Gilbert Hancock MD  [facility] Family Medicine PGY-2

## 2023-09-20 NOTE — PROGRESS NOTES
I assume primary medical responsibility for this patient. I have reviewed the history, physical, and assessment & treatment plan with the resident and agree that the care is reasonable and necessary. This service has been performed by a resident without the presence of a teaching physician under the primary care exception. If necessary, an addendum of additional findings or evaluation beyond the resident documentation will be noted below.        Jean Marie Raymundo Jr., DO    [facility]

## 2023-09-25 ENCOUNTER — OFFICE VISIT (OUTPATIENT)
Dept: OBSTETRICS AND GYNECOLOGY | Facility: CLINIC | Age: 43
End: 2023-09-25
Payer: MEDICAID

## 2023-09-25 VITALS
WEIGHT: 234.38 LBS | DIASTOLIC BLOOD PRESSURE: 80 MMHG | SYSTOLIC BLOOD PRESSURE: 110 MMHG | BODY MASS INDEX: 41.51 KG/M2

## 2023-09-25 DIAGNOSIS — Z01.419 ROUTINE GYNECOLOGICAL EXAMINATION: Primary | ICD-10-CM

## 2023-09-25 DIAGNOSIS — Z12.4 CERVICAL CANCER SCREENING: ICD-10-CM

## 2023-09-25 DIAGNOSIS — Z12.31 VISIT FOR SCREENING MAMMOGRAM: ICD-10-CM

## 2023-09-25 DIAGNOSIS — Z12.11 COLON CANCER SCREENING: ICD-10-CM

## 2023-09-25 DIAGNOSIS — Z11.3 SCREENING EXAMINATION FOR STD (SEXUALLY TRANSMITTED DISEASE): ICD-10-CM

## 2023-09-25 PROCEDURE — 3074F SYST BP LT 130 MM HG: CPT | Mod: CPTII,,, | Performed by: OBSTETRICS & GYNECOLOGY

## 2023-09-25 PROCEDURE — 87624 HPV HI-RISK TYP POOLED RSLT: CPT | Performed by: OBSTETRICS & GYNECOLOGY

## 2023-09-25 PROCEDURE — 99214 OFFICE O/P EST MOD 30 MIN: CPT | Mod: PBBFAC,PO | Performed by: OBSTETRICS & GYNECOLOGY

## 2023-09-25 PROCEDURE — 99999 PR PBB SHADOW E&M-EST. PATIENT-LVL IV: ICD-10-PCS | Mod: PBBFAC,,, | Performed by: OBSTETRICS & GYNECOLOGY

## 2023-09-25 PROCEDURE — 3044F PR MOST RECENT HEMOGLOBIN A1C LEVEL <7.0%: ICD-10-PCS | Mod: CPTII,,, | Performed by: OBSTETRICS & GYNECOLOGY

## 2023-09-25 PROCEDURE — 87591 N.GONORRHOEAE DNA AMP PROB: CPT | Performed by: OBSTETRICS & GYNECOLOGY

## 2023-09-25 PROCEDURE — 1159F MED LIST DOCD IN RCRD: CPT | Mod: CPTII,,, | Performed by: OBSTETRICS & GYNECOLOGY

## 2023-09-25 PROCEDURE — 88175 CYTOPATH C/V AUTO FLUID REDO: CPT | Performed by: OBSTETRICS & GYNECOLOGY

## 2023-09-25 PROCEDURE — 3044F HG A1C LEVEL LT 7.0%: CPT | Mod: CPTII,,, | Performed by: OBSTETRICS & GYNECOLOGY

## 2023-09-25 PROCEDURE — 99396 PREV VISIT EST AGE 40-64: CPT | Mod: S$PBB,,, | Performed by: OBSTETRICS & GYNECOLOGY

## 2023-09-25 PROCEDURE — 3008F BODY MASS INDEX DOCD: CPT | Mod: CPTII,,, | Performed by: OBSTETRICS & GYNECOLOGY

## 2023-09-25 PROCEDURE — 3079F DIAST BP 80-89 MM HG: CPT | Mod: CPTII,,, | Performed by: OBSTETRICS & GYNECOLOGY

## 2023-09-25 PROCEDURE — 1159F PR MEDICATION LIST DOCUMENTED IN MEDICAL RECORD: ICD-10-PCS | Mod: CPTII,,, | Performed by: OBSTETRICS & GYNECOLOGY

## 2023-09-25 PROCEDURE — 99396 PR PREVENTIVE VISIT,EST,40-64: ICD-10-PCS | Mod: S$PBB,,, | Performed by: OBSTETRICS & GYNECOLOGY

## 2023-09-25 PROCEDURE — 99999 PR PBB SHADOW E&M-EST. PATIENT-LVL IV: CPT | Mod: PBBFAC,,, | Performed by: OBSTETRICS & GYNECOLOGY

## 2023-09-25 PROCEDURE — 3008F PR BODY MASS INDEX (BMI) DOCUMENTED: ICD-10-PCS | Mod: CPTII,,, | Performed by: OBSTETRICS & GYNECOLOGY

## 2023-09-25 PROCEDURE — 3074F PR MOST RECENT SYSTOLIC BLOOD PRESSURE < 130 MM HG: ICD-10-PCS | Mod: CPTII,,, | Performed by: OBSTETRICS & GYNECOLOGY

## 2023-09-25 PROCEDURE — 3079F PR MOST RECENT DIASTOLIC BLOOD PRESSURE 80-89 MM HG: ICD-10-PCS | Mod: CPTII,,, | Performed by: OBSTETRICS & GYNECOLOGY

## 2023-09-25 NOTE — PROGRESS NOTES
43 yo female who presents for routine gyn visit.  No cycles with Mirena IUD in place since 4/2017.  Reports skin change on right breast.  No lumps, bumps, pain in breasts.  Ok with std testing.      ROS:  GENERAL: Denies weight gain or weight loss. Feeling well overall.   SKIN: Denies rash or lesions.   HEAD: Denies head injury or headache.   CHEST: Denies chest pain or shortness of breath.   CARDIOVASCULAR: Denies palpitations or left sided chest pain.   ABDOMEN: No abdominal pain, constipation, diarrhea, nausea, vomiting or rectal bleeding.   URINARY: No frequency, dysuria, hematuria, or burning on urination.  REPRODUCTIVE: no cycle  BREASTS:  denies pain, lumps, or nipple discharge.   HEMATOLOGIC: No easy bruisability or excessive bleeding.   MUSCULOSKELETAL: Denies joint pain or swelling.   NEUROLOGIC: Denies syncope or weakness.   PSYCHIATRIC: Denies depression, anxiety or mood swings.       PE:   Vitals: /80   Wt 106.3 kg (234 lb 5.6 oz)   LMP  (LMP Unknown)   BMI 41.51 kg/m²   APPEARANCE: Well nourished, well developed, in no acute distress.  SKIN: Normal skin turgor, no lesions.  ABDOMEN: Soft. No tenderness or masses. No hepatosplenomegaly. No hernias. obese  BREASTS: Symmetrical, no skin changes or visible lesions. No palpable masses, nipple discharge or adenopathy bilaterally.  PELVIC: Normal external female genitalia without lesions. Normal hair distribution. Adequate perineal body, normal urethral meatus. Vagina moist and well rugated without lesions, cottage cheese appearing discharge. Cervix pink and without lesions. No significant cystocele or rectocele. Bimanual exam showed uterus normal size, shape, position, mobile and nontender. Adnexa without masses or tenderness. Urethra and bladder normal. IUD string NOT visualized.    AP  Routine gyn  -s/p normal breast exam: mammogram ordered  -s/p normal pelvic exam:   -Pap and HPV: collected  -STD testing: gc/chl   -contraception: Mirena IUD in  place since 2017  -pelvic US from 2020 shows IUD at uterine fundus (report reviewed today).  - colon cancer screening  CARLIE Alvarez MD

## 2023-09-25 NOTE — PATIENT INSTRUCTIONS
GI services  --Please contact [facility] [phone]  to schedule colonoscopy/endoscopy  ----Dr. Jacinto  ----Dr. Geronimo    --Please contact [facility] at [phone] to schedule colonoscopy/endoscopy, located in [address]    The [place] location number is [phone]     Phone: [phone]      Colonoscopy  Dr. Mark Apodaca   [facility] [facility] [address]  [place] [address]  Phone: [phone]

## 2023-09-26 DIAGNOSIS — L50.9 URTICARIA: Primary | ICD-10-CM

## 2023-09-26 LAB
C TRACH DNA SPEC QL NAA+PROBE: NOT DETECTED
N GONORRHOEA DNA SPEC QL NAA+PROBE: NOT DETECTED

## 2023-09-26 RX ORDER — PREDNISONE 20 MG/1
20 TABLET ORAL DAILY
Qty: 7 TABLET | Refills: 0 | Status: SHIPPED | OUTPATIENT
Start: 2023-09-26

## 2023-09-30 LAB
HPV HR 12 DNA SPEC QL NAA+PROBE: POSITIVE
HPV16 AG SPEC QL: NEGATIVE
HPV18 DNA SPEC QL NAA+PROBE: NEGATIVE

## 2023-10-03 LAB
FINAL PATHOLOGIC DIAGNOSIS: NORMAL
Lab: NORMAL

## 2023-10-16 DIAGNOSIS — M54.31 SCIATICA OF RIGHT SIDE: ICD-10-CM

## 2023-10-17 RX ORDER — GABAPENTIN 100 MG/1
300 CAPSULE ORAL 3 TIMES DAILY
Qty: 270 CAPSULE | Refills: 11 | Status: SHIPPED | OUTPATIENT
Start: 2023-10-17 | End: 2024-10-16

## 2023-10-23 DIAGNOSIS — E66.01 MORBID OBESITY: ICD-10-CM

## 2023-10-25 RX ORDER — PHENTERMINE HYDROCHLORIDE 37.5 MG/1
37.5 TABLET ORAL
Qty: 30 TABLET | Refills: 0 | OUTPATIENT
Start: 2023-10-25 | End: 2023-11-24

## 2023-10-27 ENCOUNTER — HOSPITAL ENCOUNTER (OUTPATIENT)
Dept: RADIOLOGY | Facility: HOSPITAL | Age: 43
Discharge: HOME | End: 2023-10-27
Attending: OBSTETRICS & GYNECOLOGY
Payer: MEDICAID

## 2023-10-27 DIAGNOSIS — Z01.419 ROUTINE GYNECOLOGICAL EXAMINATION: ICD-10-CM

## 2023-10-27 DIAGNOSIS — Z12.31 VISIT FOR SCREENING MAMMOGRAM: ICD-10-CM

## 2023-10-27 PROCEDURE — 77067 MAMMO DIGITAL SCREENING BILAT WITH TOMO: ICD-10-PCS | Mod: 26,,, | Performed by: RADIOLOGY

## 2023-10-27 PROCEDURE — 77067 SCR MAMMO BI INCL CAD: CPT | Mod: TC

## 2023-10-27 PROCEDURE — 77063 BREAST TOMOSYNTHESIS BI: CPT | Mod: 26,,, | Performed by: RADIOLOGY

## 2023-10-27 PROCEDURE — 77063 MAMMO DIGITAL SCREENING BILAT WITH TOMO: ICD-10-PCS | Mod: 26,,, | Performed by: RADIOLOGY

## 2023-10-27 PROCEDURE — 77067 SCR MAMMO BI INCL CAD: CPT | Mod: 26,,, | Performed by: RADIOLOGY

## 2023-11-22 DIAGNOSIS — E87.6 HYPOKALEMIA: ICD-10-CM

## 2023-11-22 RX ORDER — POTASSIUM CHLORIDE 20 MEQ/1
20 TABLET, EXTENDED RELEASE ORAL DAILY
Qty: 30 TABLET | Refills: 3 | Status: SHIPPED | OUTPATIENT
Start: 2023-11-22 | End: 2024-03-21

## 2023-12-18 DIAGNOSIS — E66.01 CLASS 3 SEVERE OBESITY DUE TO EXCESS CALORIES WITH SERIOUS COMORBIDITY AND BODY MASS INDEX (BMI) OF 45.0 TO 49.9 IN ADULT: Primary | ICD-10-CM

## 2023-12-18 RX ORDER — PHENTERMINE HYDROCHLORIDE 37.5 MG/1
37.5 TABLET ORAL
Qty: 30 TABLET | Refills: 0 | Status: SHIPPED | OUTPATIENT
Start: 2023-12-18 | End: 2024-01-26 | Stop reason: SDUPTHER

## 2024-01-19 DIAGNOSIS — E66.01 CLASS 3 SEVERE OBESITY DUE TO EXCESS CALORIES WITH SERIOUS COMORBIDITY AND BODY MASS INDEX (BMI) OF 45.0 TO 49.9 IN ADULT: ICD-10-CM

## 2024-01-19 RX ORDER — PHENTERMINE HYDROCHLORIDE 37.5 MG/1
37.5 TABLET ORAL
Qty: 30 TABLET | Refills: 0 | OUTPATIENT
Start: 2024-01-19 | End: 2024-02-18

## 2024-01-26 DIAGNOSIS — E66.01 CLASS 3 SEVERE OBESITY DUE TO EXCESS CALORIES WITH SERIOUS COMORBIDITY AND BODY MASS INDEX (BMI) OF 45.0 TO 49.9 IN ADULT: ICD-10-CM

## 2024-01-26 RX ORDER — PHENTERMINE HYDROCHLORIDE 37.5 MG/1
37.5 TABLET ORAL
Qty: 30 TABLET | Refills: 0 | Status: CANCELLED | OUTPATIENT
Start: 2024-01-26 | End: 2024-02-25

## 2024-01-29 DIAGNOSIS — E66.01 CLASS 3 SEVERE OBESITY DUE TO EXCESS CALORIES WITH SERIOUS COMORBIDITY AND BODY MASS INDEX (BMI) OF 45.0 TO 49.9 IN ADULT: ICD-10-CM

## 2024-01-29 RX ORDER — PHENTERMINE HYDROCHLORIDE 37.5 MG/1
37.5 TABLET ORAL
Qty: 30 TABLET | Refills: 0 | Status: CANCELLED | OUTPATIENT
Start: 2024-01-26 | End: 2024-02-25

## 2024-01-30 DIAGNOSIS — E66.01 CLASS 3 SEVERE OBESITY DUE TO EXCESS CALORIES WITH SERIOUS COMORBIDITY AND BODY MASS INDEX (BMI) OF 45.0 TO 49.9 IN ADULT: ICD-10-CM

## 2024-01-30 RX ORDER — PHENTERMINE HYDROCHLORIDE 37.5 MG/1
37.5 TABLET ORAL
Qty: 30 TABLET | Refills: 0 | Status: CANCELLED | OUTPATIENT
Start: 2024-01-26 | End: 2024-02-25

## 2024-01-30 RX ORDER — PHENTERMINE HYDROCHLORIDE 37.5 MG/1
37.5 TABLET ORAL
Qty: 30 TABLET | Refills: 0 | Status: SHIPPED | OUTPATIENT
Start: 2024-01-30 | End: 2024-03-09

## 2024-01-31 ENCOUNTER — PATIENT MESSAGE (OUTPATIENT)
Dept: OBSTETRICS AND GYNECOLOGY | Facility: CLINIC | Age: 44
End: 2024-01-31
Payer: COMMERCIAL

## 2024-02-21 DIAGNOSIS — I10 ESSENTIAL (PRIMARY) HYPERTENSION: ICD-10-CM

## 2024-02-21 RX ORDER — TRIAMTERENE/HYDROCHLOROTHIAZID 37.5-25 MG
1 TABLET ORAL DAILY
Qty: 90 TABLET | Refills: 3 | Status: CANCELLED | OUTPATIENT
Start: 2024-02-20 | End: 2025-02-14

## 2024-03-11 DIAGNOSIS — E66.01 CLASS 3 SEVERE OBESITY DUE TO EXCESS CALORIES WITH SERIOUS COMORBIDITY AND BODY MASS INDEX (BMI) OF 45.0 TO 49.9 IN ADULT: ICD-10-CM

## 2024-03-11 RX ORDER — PHENTERMINE HYDROCHLORIDE 37.5 MG/1
37.5 TABLET ORAL
Qty: 30 TABLET | Refills: 0 | Status: CANCELLED | OUTPATIENT
Start: 2024-03-11 | End: 2024-04-10

## 2024-03-12 DIAGNOSIS — E66.01 CLASS 3 SEVERE OBESITY DUE TO EXCESS CALORIES WITH SERIOUS COMORBIDITY AND BODY MASS INDEX (BMI) OF 45.0 TO 49.9 IN ADULT: ICD-10-CM

## 2024-03-18 RX ORDER — PHENTERMINE HYDROCHLORIDE 37.5 MG/1
37.5 TABLET ORAL
Qty: 30 TABLET | Refills: 0 | OUTPATIENT
Start: 2024-03-18 | End: 2024-04-17

## 2024-03-19 ENCOUNTER — OFFICE VISIT (OUTPATIENT)
Dept: FAMILY MEDICINE | Facility: HOSPITAL | Age: 44
End: 2024-03-19
Payer: COMMERCIAL

## 2024-03-19 VITALS
SYSTOLIC BLOOD PRESSURE: 120 MMHG | WEIGHT: 240.31 LBS | BODY MASS INDEX: 41.03 KG/M2 | DIASTOLIC BLOOD PRESSURE: 80 MMHG | HEIGHT: 64 IN | HEART RATE: 80 BPM

## 2024-03-19 DIAGNOSIS — R63.4 WEIGHT LOSS: Primary | ICD-10-CM

## 2024-03-19 DIAGNOSIS — I10 ESSENTIAL (PRIMARY) HYPERTENSION: ICD-10-CM

## 2024-03-19 DIAGNOSIS — Z12.11 SCREENING FOR MALIGNANT NEOPLASM OF COLON: ICD-10-CM

## 2024-03-19 PROCEDURE — 99214 OFFICE O/P EST MOD 30 MIN: CPT

## 2024-03-19 NOTE — PROGRESS NOTES
"  [facility] FAMILY PRACTICE CLINIC NOTE  Follow-up Visit      SUBJECTIVE:     Patient: Mohsen Julien is a 43 y.o. female.    Chief Compliant:   Chief Complaint   Patient presents with    check up        History of Present Illness:  Weight loss - reports while not seeing decrease in weight #, feels the benefits of increased exercise tolerance, feels waist size decreasing, and overall feels better. Currently on adipex with no side effects.    HTN - chronic and well controlled on current medications. Reports medication compliance.    Review of Systems   Constitutional:  Negative for fever.   Respiratory:  Negative for shortness of breath.    Cardiovascular:  Negative for chest pain.   Gastrointestinal:  Negative for abdominal pain.   Neurological:  Negative for headaches.     A 10+ review of systems was performed with pertinent positives and negatives noted above in the history of present illness. Other systems were negative unless otherwise specified.    OBJECTIVE:     Vital Signs (Most Recent)  Vitals:    03/19/24 1021   BP: 120/80   Pulse: 80   Weight: 109 kg (240 lb 4.8 oz)   Height: 5' 4" (1.626 m)     BMI: Body mass index is 41.25 kg/m².     Physical Exam:  Physical Exam  Constitutional:       Appearance: Normal appearance. She is obese.   HENT:      Head: Normocephalic.      Right Ear: External ear normal.      Left Ear: External ear normal.      Nose: Nose normal.      Mouth/Throat:      Pharynx: Oropharynx is clear.   Eyes:      Extraocular Movements: Extraocular movements intact.   Cardiovascular:      Rate and Rhythm: Normal rate and regular rhythm.      Pulses: Normal pulses.      Heart sounds: Normal heart sounds.   Pulmonary:      Effort: Pulmonary effort is normal. No respiratory distress.      Breath sounds: Normal breath sounds. No wheezing, rhonchi or rales.   Abdominal:      General: Abdomen is flat. There is no distension.      Palpations: Abdomen is soft.      Tenderness: There is no abdominal " tenderness. There is no guarding or rebound.      Comments: Waist circumference at belly button 126 cm   Musculoskeletal:         General: Normal range of motion.   Skin:     General: Skin is warm.   Neurological:      General: No focal deficit present.      Mental Status: She is alert and oriented to person, place, and time. Mental status is at baseline.   Psychiatric:         Mood and Affect: Mood normal.         Behavior: Behavior normal.         Thought Content: Thought content normal.          ASSESSMENT:   Mohsen Julien is a 43 y.o. female who presents to clinic to for    1. Weight loss    2. BMI 40.0-44.9, adult    3. Screening for malignant neoplasm of colon    4. Essential (primary) hypertension         PLAN:     Weight loss  - Reviewed previous labs, tests, and/or imaging.  - Discussed treatment options and switching from adipex with patient at this visit.  - Medications and/or medication refills as below.  - Orders, labs, and imaging ordered as below. Follow-up results with patient.  - Counseled patient on the importance maintaining a healthy diet (including at least one-half of food eaten should be whole fruits and vegetables with the core elements of the other half of food  should include grains, whole grains, dairy, protein, and oils with lower saturated fat, as well as minimizing alcohol use and consumption of foods with added sugar, saturated fat, and sodium.)   - Counseled patient on importance of exercising at least 150-300 minutes per week (i.e. at least 30 minutes, 3 days a week) of moderate physical activity.   -     naltrexone-bupropion (CONTRAVE) 8-90 mg TbSR; Take 1 tablet in morning for 7 days.  Then take 1 tablet twice daily for 7 days.  Then take 2 tablets in morning, 1 tablet at night for 7 days.  Then take 2 tablets twice daily.  Dispense: 60 tablet; Refill: 4  -     Comprehensive Metabolic Panel; Future; Expected date: 03/19/2024  -     CBC W/ AUTO DIFFERENTIAL; Future; Expected  date: 03/19/2024  -     HEMOGLOBIN A1C; Future; Expected date: 03/19/2024  -     TSH; Future; Expected date: 03/19/2024    BMI 40.0-44.9, adult  - Plan as above.  -     naltrexone-bupropion (CONTRAVE) 8-90 mg TbSR; Take 1 tablet in morning for 7 days.  Then take 1 tablet twice daily for 7 days.  Then take 2 tablets in morning, 1 tablet at night for 7 days.  Then take 2 tablets twice daily.  Dispense: 60 tablet; Refill: 4  -     Comprehensive Metabolic Panel; Future; Expected date: 03/19/2024  -     CBC W/ AUTO DIFFERENTIAL; Future; Expected date: 03/19/2024  -     HEMOGLOBIN A1C; Future; Expected date: 03/19/2024  -     TSH; Future; Expected date: 03/19/2024    Screening for malignant neoplasm of colon  - Screening indicated. Discussed with patient. Orders as below. Follow up results.   -     Case Request Endoscopy: COLONOSCOPY    Essential (primary) hypertension  - Chronic condition.  - Well controlled.  - Reviewed previous labs, tests, and/or imaging.  - Orders, labs, and imaging ordered as below. Follow-up results with patient.  - Follow-up with patient on home BP monitoring.  - Explained importance of lifestyle modifications, medication compliance, and recording routine blood pressure monitoring either at home or office in a log to ensure BP is controlled. Given counseling on need to keep blood pressure at least 140/90 and preferably 120/80 which is normal. Explained the increased risks for cardiovascular disease when BP is uncontrolled. Patient given counseling on signs and symptoms of possible elevated blood pressure.        Provided patient with anticipatory guidance and return precautions. Treatment plan discussed with patient, all questions answered, and patient acknowledged understanding and verbal agreement.      Follow-up in: 1 months; or sooner PRN if acute concerns arise.      Case discussed with Dr. RAÚL Garcia    ________________________  Gilbert Hancock MD  [facility] Family Medicine PGY-2

## 2024-03-20 ENCOUNTER — TELEPHONE (OUTPATIENT)
Dept: GASTROENTEROLOGY | Facility: CLINIC | Age: 44
End: 2024-03-20
Payer: COMMERCIAL

## 2024-04-05 DIAGNOSIS — R63.4 WEIGHT LOSS: Primary | ICD-10-CM

## 2024-04-05 DIAGNOSIS — R45.89 SYMPTOMS OF DEPRESSION: ICD-10-CM

## 2024-04-05 RX ORDER — BUPROPION HYDROCHLORIDE 100 MG/1
TABLET ORAL
Qty: 70 TABLET | Refills: 0 | Status: SHIPPED | OUTPATIENT
Start: 2024-04-05 | End: 2024-05-23 | Stop reason: SDUPTHER

## 2024-05-16 DIAGNOSIS — R60.0 PERIPHERAL EDEMA: ICD-10-CM

## 2024-05-16 RX ORDER — FUROSEMIDE 40 MG/1
40 TABLET ORAL 2 TIMES DAILY
Qty: 180 TABLET | Refills: 3 | Status: CANCELLED | OUTPATIENT
Start: 2024-05-16 | End: 2025-05-16

## 2024-05-23 DIAGNOSIS — R45.89 SYMPTOMS OF DEPRESSION: ICD-10-CM

## 2024-05-23 DIAGNOSIS — R63.4 WEIGHT LOSS: ICD-10-CM

## 2024-05-23 RX ORDER — BUPROPION HYDROCHLORIDE 100 MG/1
TABLET ORAL
Qty: 70 TABLET | Refills: 0 | Status: SHIPPED | OUTPATIENT
Start: 2024-05-23

## 2024-07-08 ENCOUNTER — OFFICE VISIT (OUTPATIENT)
Dept: FAMILY MEDICINE | Facility: HOSPITAL | Age: 44
End: 2024-07-08
Payer: COMMERCIAL

## 2024-07-08 ENCOUNTER — HOSPITAL ENCOUNTER (OUTPATIENT)
Dept: RADIOLOGY | Facility: HOSPITAL | Age: 44
Discharge: HOME | End: 2024-07-08
Payer: COMMERCIAL

## 2024-07-08 VITALS
WEIGHT: 253.75 LBS | HEART RATE: 85 BPM | SYSTOLIC BLOOD PRESSURE: 118 MMHG | HEIGHT: 64 IN | DIASTOLIC BLOOD PRESSURE: 76 MMHG | BODY MASS INDEX: 43.32 KG/M2

## 2024-07-08 DIAGNOSIS — M25.562 CHRONIC PAIN OF LEFT KNEE: Primary | ICD-10-CM

## 2024-07-08 DIAGNOSIS — G89.29 CHRONIC PAIN OF LEFT KNEE: Primary | ICD-10-CM

## 2024-07-08 DIAGNOSIS — M25.562 CHRONIC PAIN OF LEFT KNEE: ICD-10-CM

## 2024-07-08 DIAGNOSIS — G89.29 CHRONIC PAIN OF LEFT KNEE: ICD-10-CM

## 2024-07-08 PROCEDURE — 73564 X-RAY EXAM KNEE 4 OR MORE: CPT | Mod: TC,FY,LT

## 2024-07-08 PROCEDURE — 73564 X-RAY EXAM KNEE 4 OR MORE: CPT | Mod: 26,LT,, | Performed by: RADIOLOGY

## 2024-07-08 PROCEDURE — 99215 OFFICE O/P EST HI 40 MIN: CPT

## 2024-07-08 RX ORDER — TRIAMCINOLONE ACETONIDE 40 MG/ML
20 INJECTION, SUSPENSION INTRA-ARTICULAR; INTRAMUSCULAR
Status: DISCONTINUED | OUTPATIENT
Start: 2024-07-08 | End: 2024-07-08 | Stop reason: HOSPADM

## 2024-07-08 RX ORDER — LIDOCAINE HYDROCHLORIDE 10 MG/ML
2 INJECTION INFILTRATION; PERINEURAL
Status: DISCONTINUED | OUTPATIENT
Start: 2024-07-08 | End: 2024-07-08 | Stop reason: HOSPADM

## 2024-07-08 RX ORDER — BUPIVACAINE HYDROCHLORIDE 5 MG/ML
2 INJECTION, SOLUTION EPIDURAL; INTRACAUDAL
Status: DISCONTINUED | OUTPATIENT
Start: 2024-07-08 | End: 2024-07-08 | Stop reason: HOSPADM

## 2024-07-08 RX ORDER — PHENTERMINE HYDROCHLORIDE 37.5 MG/1
37.5 TABLET ORAL
Qty: 30 TABLET | Refills: 0 | Status: SHIPPED | OUTPATIENT
Start: 2024-07-08 | End: 2024-08-09

## 2024-07-08 RX ADMIN — BUPIVACAINE HYDROCHLORIDE 2 ML: 5 INJECTION, SOLUTION EPIDURAL; INTRACAUDAL at 01:07

## 2024-07-08 RX ADMIN — TRIAMCINOLONE ACETONIDE 20 MG: 40 INJECTION, SUSPENSION INTRA-ARTICULAR; INTRAMUSCULAR at 01:07

## 2024-07-08 RX ADMIN — LIDOCAINE HYDROCHLORIDE 2 ML: 10 INJECTION INFILTRATION; PERINEURAL at 01:07

## 2024-07-08 NOTE — PROGRESS NOTES
I assume primary medical responsibility for this patient. I have reviewed the history, physical, and assessment & treatment plan with the resident and agree that the care is reasonable and necessary. This service has been performed by a resident without the presence of a teaching physician under the primary care exception. If necessary, an addendum of additional findings or evaluation beyond the resident documentation will be noted below.        Jean Marie Raymundo Jr., DO

## 2024-07-08 NOTE — PROGRESS NOTES
"  [facility] FAMILY PRACTICE CLINIC NOTE  Follow-up Visit      SUBJECTIVE:     Patient: Mohsen Julien is a 43 y.o. female.    Chief Compliant:   Chief Complaint   Patient presents with    Injections     LEFT    Medication Refill       History of Present Illness:  Chronic knee pain - Chronic. Xrays ordered at last visit but cancelled by patient. Injection preformed at last visit with relief until last week. Denies trauma, fall, twisting, new symptoms different from past pain.    Weight loss - attempted to switch to contrave at last visit with no success 2/2 to price. Adipex use in past with weight loss. Denies side effects.      Review of Systems   Constitutional:  Negative for fever.   Respiratory:  Negative for shortness of breath.    Cardiovascular:  Negative for chest pain.   Gastrointestinal:  Negative for abdominal pain.   Musculoskeletal:  Positive for joint pain.   Neurological:  Negative for headaches.     A 10+ review of systems was performed with pertinent positives and negatives noted above in the history of present illness. Other systems were negative unless otherwise specified.    OBJECTIVE:     Vital Signs (Most Recent)  Vitals:    07/08/24 1312   BP: 118/76   Pulse: 85   Weight: 115.1 kg (253 lb 12 oz)   Height: 5' 4" (1.626 m)     BMI: Body mass index is 43.56 kg/m².     Physical Exam:  Physical Exam  Constitutional:       Appearance: Normal appearance. She is obese.   HENT:      Head: Normocephalic.      Right Ear: External ear normal.      Left Ear: External ear normal.      Nose: Nose normal.      Mouth/Throat:      Pharynx: Oropharynx is clear.   Eyes:      Extraocular Movements: Extraocular movements intact.   Cardiovascular:      Rate and Rhythm: Normal rate and regular rhythm.      Pulses: Normal pulses.   Pulmonary:      Effort: Pulmonary effort is normal.   Abdominal:      Comments: Waist circumference at belly button 126 cm   Musculoskeletal:         General: Normal range of motion.      " Cervical back: Normal range of motion.      Left knee: Tenderness present over the medial joint line. No LCL laxity, MCL laxity, ACL laxity or PCL laxity.Normal meniscus.   Skin:     General: Skin is warm.   Neurological:      General: No focal deficit present.      Mental Status: She is alert and oriented to person, place, and time. Mental status is at baseline.   Psychiatric:         Mood and Affect: Mood normal.         Behavior: Behavior normal.         Thought Content: Thought content normal.          ASSESSMENT:   Mohsen Julien is a 43 y.o. female who presents to clinic to for    1. Chronic pain of left knee    2. BMI 40.0-44.9, adult         PLAN:     Chronic pain of left knee  - Chronic condition.  - Moderately controlled.  - Reviewed previous labs, tests, and/or imaging.  - Discussed treatment plan including Xrays and PT with patient at this visit.  - Medications and/or medication refills as below.  - Orders, labs, and imaging ordered as below. Follow-up results with patient.  - Patient to complete XR prior to next visit.   -     Large Joint Aspiration/Injection: L knee  -     X-Ray Knee Complete 4 or More Views Left; Future; Expected date: 07/08/2024  -     Ambulatory referral/consult to Orthopedics; Future; Expected date: 07/15/2024    BMI 40.0-44.9, adult  - Chronic condition.  - Discussed treatment options with patient at this visit.  - Medications as below.  - Counseled patient on the importance maintaining a healthy diet (including at least one-half of food eaten should be whole fruits and vegetables with the core elements of the other half of food  should include grains, whole grains, dairy, protein, and oils with lower saturated fat, as well as minimizing alcohol use and consumption of foods with added sugar, saturated fat, and sodium.)   - Counseled patient on importance of exercising at least 150-300 minutes per week (i.e. at least 30 minutes, 3 days a week) of moderate physical activity.     -     phentermine (ADIPEX-P) 37.5 mg tablet; Take 1 tablet (37.5 mg total) by mouth before breakfast.  Dispense: 30 tablet; Refill: 0        Provided patient with anticipatory guidance and return precautions. Treatment plan discussed with patient, all questions answered, and patient acknowledged understanding and verbal agreement.      Follow-up in: 1 months; or sooner PRN if acute concerns arise.      Case discussed with Dr. ALMA Raymundo    ________________________  Gilbert Hancock MD  [facility] Family Medicine PGY-3

## 2024-07-08 NOTE — PROGRESS NOTES
Large Joint Aspiration/Injection: L knee    Date/Time: 7/8/2024 1:08 PM    Performed by: Gilbert Hancock MD  Authorized by: Jean Marie Raymundo, DO    Consent Done?:  Yes (Written)  Indications:  Arthritis and pain  Timeout: prior to procedure the correct patient, procedure, and site was verified    Local anesthesia used?: No    Local anesthetic:  Topical anesthetic    Details:  Needle Size:  22 G  Ultrasonic Guidance for needle placement?: No    Approach:  Medial  Location:  Knee  Site:  L knee  Medications:  2 mL LIDOcaine HCL 10 mg/ml (1%) 10 mg/mL (1 %); 2 mL BUPivacaine (PF) 0.5% (5 mg/mL) 0.5 % (5 mg/mL); 20 mg triamcinolone acetonide 40 mg/mL  Patient tolerance:  Patient tolerated the procedure well with no immediate complications     Patient provided with risks, benefits, alternatives. All patient questions answered. Both written and verbal consent obtained. Medications drawn up as ordered. The patient was positioned correctly on the procedure table. Time out was preformed prior to the initiation of the procedure. Marked injection site. ChloraPrep was applied to the injection site and allowed time to completely dry. Next, ethyl chloride was used as a topical anesthetic and the injection area. The needle was inserted into the marked injection area and the medication was injected. Minimal bleeding noted on withdrawal of needle. Band-aid applied. The patient tolerated the procedure well without any complications. The patient was instructed to remain in clinic for 20 minutes afterwards and to report any adverse reaction to me immediately. The patient was able to tolerate both active and passive ROM and able to move knee w/o difficulty. The patient's pain was well controlled prior to leaving the clinic. Standard post-procedure care was explained and all questions and concerns were addressed. The patient was encouraged to call the clinic for any additional questions/concerns. Patient provided with AVS and return  precautions prior to leaving clinic.

## 2024-08-07 RX ORDER — PHENTERMINE HYDROCHLORIDE 37.5 MG/1
37.5 TABLET ORAL
Qty: 30 TABLET | Refills: 0 | Status: CANCELLED | OUTPATIENT
Start: 2024-08-07 | End: 2024-09-06

## 2024-08-08 ENCOUNTER — OFFICE VISIT (OUTPATIENT)
Dept: FAMILY MEDICINE | Facility: HOSPITAL | Age: 44
End: 2024-08-08

## 2024-08-08 VITALS
HEART RATE: 87 BPM | WEIGHT: 245.81 LBS | DIASTOLIC BLOOD PRESSURE: 83 MMHG | BODY MASS INDEX: 41.97 KG/M2 | SYSTOLIC BLOOD PRESSURE: 130 MMHG | HEIGHT: 64 IN

## 2024-08-08 DIAGNOSIS — Z76.89 ENCOUNTER FOR WEIGHT MANAGEMENT: Primary | ICD-10-CM

## 2024-08-08 PROCEDURE — 99215 OFFICE O/P EST HI 40 MIN: CPT

## 2024-08-08 RX ORDER — PHENTERMINE HYDROCHLORIDE 37.5 MG/1
37.5 TABLET ORAL
Qty: 30 TABLET | Refills: 0 | Status: SHIPPED | OUTPATIENT
Start: 2024-08-08 | End: 2024-09-07

## 2024-11-04 ENCOUNTER — OFFICE VISIT (OUTPATIENT)
Dept: FAMILY MEDICINE | Facility: HOSPITAL | Age: 44
End: 2024-11-04

## 2024-11-04 VITALS
HEIGHT: 64 IN | OXYGEN SATURATION: 98 % | WEIGHT: 259.5 LBS | HEART RATE: 73 BPM | SYSTOLIC BLOOD PRESSURE: 112 MMHG | DIASTOLIC BLOOD PRESSURE: 73 MMHG | BODY MASS INDEX: 44.3 KG/M2

## 2024-11-04 DIAGNOSIS — R71.8 MICROCYTOSIS: ICD-10-CM

## 2024-11-04 DIAGNOSIS — I10 ESSENTIAL (PRIMARY) HYPERTENSION: Primary | ICD-10-CM

## 2024-11-04 DIAGNOSIS — Z12.4 SCREENING FOR MALIGNANT NEOPLASM OF CERVIX: ICD-10-CM

## 2024-11-04 DIAGNOSIS — Z97.5 PRESENCE OF MIRENA IUD: ICD-10-CM

## 2024-11-04 PROCEDURE — 99215 OFFICE O/P EST HI 40 MIN: CPT

## 2024-11-04 NOTE — PROGRESS NOTES
"  [facility] FAMILY PRACTICE CLINIC NOTE  Follow-up Visit      SUBJECTIVE:     Patient: Mohsen Julien is a 44 y.o. female.    Chief Compliant:   Chief Complaint   Patient presents with    Gynecologic Exam       History of Present Illness:  Presents today for wellness exam with pap smear. Hx of mirena in place.     Also reports recent immediate family member being diagnosed with a blood disorder that "starts with a T" and remember the exact name was told she should be tested.      Denies any acute concerns or complaints at this time.    Review of Systems   Constitutional:  Negative for fever.   Respiratory:  Negative for shortness of breath.    Cardiovascular:  Negative for chest pain.   Gastrointestinal:  Negative for abdominal pain.   Neurological:  Negative for headaches.     A 10+ review of systems was performed with pertinent positives and negatives noted above in the history of present illness. Other systems were negative unless otherwise specified.    OBJECTIVE:     Vital Signs (Most Recent)  Vitals:    11/04/24 0852   BP: 112/73   Pulse: 73   SpO2: 98%   Weight: 117.7 kg (259 lb 7.7 oz)   Height: 5' 4" (1.626 m)     BMI: Body mass index is 44.54 kg/m².     Physical Exam:  Physical Exam  Vitals and nursing note reviewed. Exam conducted with a chaperone present.   Constitutional:       General: She is not in acute distress.     Appearance: Normal appearance. She is obese. She is not ill-appearing, toxic-appearing or diaphoretic.   HENT:      Head: Normocephalic and atraumatic.      Right Ear: External ear normal.      Left Ear: External ear normal.      Nose: Nose normal.      Mouth/Throat:      Pharynx: Oropharynx is clear.   Eyes:      Extraocular Movements: Extraocular movements intact.   Cardiovascular:      Rate and Rhythm: Normal rate and regular rhythm.   Pulmonary:      Effort: Pulmonary effort is normal. No respiratory distress.   Abdominal:      General: Abdomen is flat. There is no distension.      " Palpations: Abdomen is soft.      Tenderness: There is no abdominal tenderness. There is no guarding or rebound.   Genitourinary:     Comments: Pelvic exam and pap smear completed with chaperone present.   External genitalia without erythema, exudate or discharge. Vaginal vault is without discharge. Cervix is of normal color without lesion. The os is closed. There is no bleeding noted. Pap smear collected. Uterus is noted to be nontender. No cervical motion tenderness is seen. No masses are palpated. The adnexa are without masses or tenderness.    IUD strings not visualized.  Musculoskeletal:         General: Normal range of motion.      Cervical back: Normal range of motion.   Skin:     General: Skin is warm.   Neurological:      General: No focal deficit present.      Mental Status: She is alert and oriented to person, place, and time. Mental status is at baseline.   Psychiatric:         Mood and Affect: Mood normal.         Behavior: Behavior normal.         Thought Content: Thought content normal.          ASSESSMENT:   Mohsen Julien is a 44 y.o. female who presents to clinic to for    1. Essential (primary) hypertension    2. BMI 40.0-44.9, adult    3. Screening for malignant neoplasm of cervix    4. Microcytosis    5. Presence of Mirena IUD         PLAN:     Essential (primary) hypertension  - Chronic condition.  - Well controlled.  - Reviewed previous labs, tests, and/or imaging obtained since last visit.  - Continue current medications.  - Orders, labs, and imaging ordered as below. Follow-up results with patient.   -     TSH; Future; Expected date: 11/04/2024  -     Hemoglobin A1C; Future; Expected date: 11/04/2024  -     CBC W/ AUTO DIFFERENTIAL; Future; Expected date: 11/04/2024  -     Comprehensive Metabolic Panel; Future; Expected date: 11/04/2024  -     Lipid Panel; Future; Expected date: 11/04/2024    BMI 40.0-44.9, adult  - Chronic condition.  - Moderately controlled.  - Reviewed previous labs,  tests, and/or imaging obtained since last visit.  - Continue current medications.  - Orders, labs, and imaging ordered as below. Follow-up results with patient.   -     TSH; Future; Expected date: 11/04/2024  -     Hemoglobin A1C; Future; Expected date: 11/04/2024  -     CBC W/ AUTO DIFFERENTIAL; Future; Expected date: 11/04/2024  -     Comprehensive Metabolic Panel; Future; Expected date: 11/04/2024  -     Lipid Panel; Future; Expected date: 11/04/2024    Screening for malignant neoplasm of cervix  - Screening indicated. Discussed with patient. Orders as below. Follow up results.  - Pap + HPV collected today. Sent off for testing. Follow up results.      Microcytosis  - Chronic condition.  - Uncontrolled.  - Reviewed previous labs, tests, and/or imaging obtained since last visit.  - Discussed possible etiologies with patient including but not limited to iron def anemia, thalassemia (mile given recent family hx and past labs consistent).  - Discussed various diagnostic and treatment options with patient at this visit.  - Orders, labs, and imaging ordered as below. Follow-up results with patient.   -     IRON AND TIBC; Future; Expected date: 11/04/2024  -     FERRITIN; Future; Expected date: 11/04/2024  -     Thalasseima and Hemoglobinopathy Eval; Future; Expected date: 11/04/2024    Presence of Mirena IUD  - Chronic condition.  - Discussed various diagnostic and treatment options with patient at this visit.  - Orders, labs, and imaging ordered as below. Follow-up results with patient.  - Nit visualized on exam today. Especially given likely time to remove, need imaging to identify location. Based on results, likely need OB/GYN referral for removal.   -     US Transvaginal Non OB; Future; Expected date: 11/04/2024        Provided patient with anticipatory guidance and return precautions. Treatment plan discussed with patient, all questions answered, and patient acknowledged understanding and verbal  agreement.      Follow-up in: 1 months; or sooner PRN if acute concerns arise.      Case discussed with Dr. KIM Cuadra    ________________________  Gilbert Hancock MD  [facility] Family Medicine PGY-3

## 2024-11-06 ENCOUNTER — LAB VISIT (OUTPATIENT)
Dept: LAB | Facility: HOSPITAL | Age: 44
End: 2024-11-06

## 2024-11-06 DIAGNOSIS — I10 ESSENTIAL (PRIMARY) HYPERTENSION: ICD-10-CM

## 2024-11-06 DIAGNOSIS — R71.8 MICROCYTOSIS: ICD-10-CM

## 2024-11-06 LAB
ALBUMIN SERPL BCP-MCNC: 3.5 G/DL (ref 3.5–5.2)
ALP SERPL-CCNC: 84 U/L (ref 40–150)
ALT SERPL W/O P-5'-P-CCNC: 20 U/L (ref 10–44)
ANION GAP SERPL CALC-SCNC: 12 MMOL/L (ref 8–16)
AST SERPL-CCNC: 18 U/L (ref 10–40)
BASOPHILS # BLD AUTO: 0.02 K/UL (ref 0–0.2)
BASOPHILS NFR BLD: 0.3 % (ref 0–1.9)
BILIRUB SERPL-MCNC: 0.3 MG/DL (ref 0.1–1)
BUN SERPL-MCNC: 15 MG/DL (ref 6–20)
CALCIUM SERPL-MCNC: 9.1 MG/DL (ref 8.7–10.5)
CHLORIDE SERPL-SCNC: 101 MMOL/L (ref 95–110)
CHOLEST SERPL-MCNC: 168 MG/DL (ref 120–199)
CHOLEST/HDLC SERPL: 4.5 {RATIO} (ref 2–5)
CO2 SERPL-SCNC: 27 MMOL/L (ref 23–29)
CREAT SERPL-MCNC: 0.9 MG/DL (ref 0.5–1.4)
DIFFERENTIAL METHOD BLD: ABNORMAL
EOSINOPHIL # BLD AUTO: 0.2 K/UL (ref 0–0.5)
EOSINOPHIL NFR BLD: 2.6 % (ref 0–8)
ERYTHROCYTE [DISTWIDTH] IN BLOOD BY AUTOMATED COUNT: 14.6 % (ref 11.5–14.5)
EST. GFR  (NO RACE VARIABLE): >60 ML/MIN/1.73 M^2
ESTIMATED AVG GLUCOSE: 85 MG/DL (ref 68–131)
FERRITIN SERPL-MCNC: 121 NG/ML (ref 20–300)
GLUCOSE SERPL-MCNC: 89 MG/DL (ref 70–110)
HBA1C MFR BLD: 4.6 % (ref 4–5.6)
HCT VFR BLD AUTO: 37.7 % (ref 37–48.5)
HDLC SERPL-MCNC: 37 MG/DL (ref 40–75)
HDLC SERPL: 22 % (ref 20–50)
HGB BLD-MCNC: 11.5 G/DL (ref 12–16)
IMM GRANULOCYTES # BLD AUTO: 0.01 K/UL (ref 0–0.04)
IMM GRANULOCYTES NFR BLD AUTO: 0.2 % (ref 0–0.5)
IRON SERPL-MCNC: 93 UG/DL (ref 30–160)
LDLC SERPL CALC-MCNC: 76 MG/DL (ref 63–159)
LYMPHOCYTES # BLD AUTO: 2.3 K/UL (ref 1–4.8)
LYMPHOCYTES NFR BLD: 40.1 % (ref 18–48)
MCH RBC QN AUTO: 24.4 PG (ref 27–31)
MCHC RBC AUTO-ENTMCNC: 30.5 G/DL (ref 32–36)
MCV RBC AUTO: 80 FL (ref 82–98)
MONOCYTES # BLD AUTO: 0.4 K/UL (ref 0.3–1)
MONOCYTES NFR BLD: 6.9 % (ref 4–15)
NEUTROPHILS # BLD AUTO: 2.9 K/UL (ref 1.8–7.7)
NEUTROPHILS NFR BLD: 49.9 % (ref 38–73)
NONHDLC SERPL-MCNC: 131 MG/DL
NRBC BLD-RTO: 0 /100 WBC
PLATELET # BLD AUTO: 266 K/UL (ref 150–450)
PMV BLD AUTO: 9.9 FL (ref 9.2–12.9)
POTASSIUM SERPL-SCNC: 3.1 MMOL/L (ref 3.5–5.1)
PROT SERPL-MCNC: 7.8 G/DL (ref 6–8.4)
RBC # BLD AUTO: 4.71 M/UL (ref 4–5.4)
SATURATED IRON: 28 % (ref 20–50)
SODIUM SERPL-SCNC: 140 MMOL/L (ref 136–145)
TOTAL IRON BINDING CAPACITY: 332 UG/DL (ref 250–450)
TRANSFERRIN SERPL-MCNC: 224 MG/DL (ref 200–375)
TRIGL SERPL-MCNC: 275 MG/DL (ref 30–150)
TSH SERPL DL<=0.005 MIU/L-ACNC: 0.55 UIU/ML (ref 0.4–4)
WBC # BLD AUTO: 5.78 K/UL (ref 3.9–12.7)

## 2024-11-06 PROCEDURE — 83036 HEMOGLOBIN GLYCOSYLATED A1C: CPT

## 2024-11-06 PROCEDURE — 82728 ASSAY OF FERRITIN: CPT

## 2024-11-06 PROCEDURE — 85025 COMPLETE CBC W/AUTO DIFF WBC: CPT

## 2024-11-06 PROCEDURE — 36415 COLL VENOUS BLD VENIPUNCTURE: CPT

## 2024-11-06 PROCEDURE — 80053 COMPREHEN METABOLIC PANEL: CPT

## 2024-11-06 PROCEDURE — 82728 ASSAY OF FERRITIN: CPT | Mod: 91

## 2024-11-06 PROCEDURE — 80061 LIPID PANEL: CPT

## 2024-11-06 PROCEDURE — 84443 ASSAY THYROID STIM HORMONE: CPT

## 2024-11-06 PROCEDURE — 83540 ASSAY OF IRON: CPT

## 2024-11-11 LAB
FERRITIN SERPL-MCNC: 124 MCG/L (ref 6–175)
HGB A MFR BLD ELPH: 97.4 % (ref 95.8–98)
HGB A2 MFR BLD: 2.2 % (ref 2–3.3)
HGB A2+XXX MFR BLD ELPH: NORMAL %
HGB F MFR BLD: 0.4 % (ref 0–0.9)
HGB XXX MFR BLD ELPH: NORMAL %
HPLC HB VARIANT: NORMAL
PATH REV BLD -IMP: NORMAL
PROVIDER SIGNING NAME: NORMAL

## 2024-11-13 DIAGNOSIS — E87.6 HYPOKALEMIA: Primary | ICD-10-CM

## 2024-11-13 RX ORDER — POTASSIUM CHLORIDE 1500 MG/1
20 TABLET, EXTENDED RELEASE ORAL DAILY
Qty: 14 TABLET | Refills: 0 | Status: SHIPPED | OUTPATIENT
Start: 2024-11-13 | End: 2024-11-27

## 2024-11-27 DIAGNOSIS — M54.31 SCIATICA OF RIGHT SIDE: Primary | ICD-10-CM

## 2024-11-27 RX ORDER — GABAPENTIN 100 MG/1
300 CAPSULE ORAL 3 TIMES DAILY
Qty: 270 CAPSULE | Refills: 11 | Status: SHIPPED | OUTPATIENT
Start: 2024-11-27 | End: 2025-11-27

## 2024-12-04 DIAGNOSIS — M54.31 SCIATICA OF RIGHT SIDE: ICD-10-CM

## 2024-12-04 RX ORDER — GABAPENTIN 100 MG/1
300 CAPSULE ORAL 3 TIMES DAILY
Qty: 270 CAPSULE | Refills: 11 | Status: SHIPPED | OUTPATIENT
Start: 2024-12-04 | End: 2025-12-04

## 2024-12-16 ENCOUNTER — OFFICE VISIT (OUTPATIENT)
Dept: OBSTETRICS AND GYNECOLOGY | Facility: CLINIC | Age: 44
End: 2024-12-16

## 2024-12-16 VITALS
DIASTOLIC BLOOD PRESSURE: 85 MMHG | BODY MASS INDEX: 43.56 KG/M2 | WEIGHT: 253.75 LBS | SYSTOLIC BLOOD PRESSURE: 123 MMHG

## 2024-12-16 DIAGNOSIS — Z30.014 ENCOUNTER FOR INITIAL PRESCRIPTION OF INTRAUTERINE CONTRACEPTIVE DEVICE (IUD): ICD-10-CM

## 2024-12-16 DIAGNOSIS — Z01.419 ROUTINE GYNECOLOGICAL EXAMINATION: Primary | ICD-10-CM

## 2024-12-16 PROCEDURE — 99214 OFFICE O/P EST MOD 30 MIN: CPT | Mod: PBBFAC,PO | Performed by: OBSTETRICS & GYNECOLOGY

## 2024-12-16 PROCEDURE — 99396 PREV VISIT EST AGE 40-64: CPT | Mod: S$PBB,,, | Performed by: OBSTETRICS & GYNECOLOGY

## 2024-12-16 PROCEDURE — 99999 PR PBB SHADOW E&M-EST. PATIENT-LVL IV: CPT | Mod: PBBFAC,,, | Performed by: OBSTETRICS & GYNECOLOGY

## 2024-12-16 NOTE — PROGRESS NOTES
45 yo female who presents for routine gyn visit.  No cycles with Mirena IUD in place since 4/2017.  Wants replacement  No gyn complaints.  Had pap completed by PCP.  Mammogram scheduled for 3pm today.      ROS:  GENERAL: Denies weight gain or weight loss. Feeling well overall.   SKIN: Denies rash or lesions.   HEAD: Denies head injury or headache.   CHEST: Denies chest pain or shortness of breath.   CARDIOVASCULAR: Denies palpitations or left sided chest pain.   ABDOMEN: No abdominal pain, constipation, diarrhea, nausea, vomiting or rectal bleeding.   URINARY: No frequency, dysuria, hematuria, or burning on urination.  REPRODUCTIVE: no cycle  BREASTS:  denies pain, lumps, or nipple discharge.   HEMATOLOGIC: No easy bruisability or excessive bleeding.   MUSCULOSKELETAL: Denies joint pain or swelling.   NEUROLOGIC: Denies syncope or weakness.   PSYCHIATRIC: Denies depression, anxiety or mood swings.       PE:   Vitals: /85   Wt 115.1 kg (253 lb 12 oz)   LMP  (LMP Unknown)   BMI 43.56 kg/m²   APPEARANCE: Well nourished, well developed, in no acute distress.  SKIN: Normal skin turgor, no lesions.  ABDOMEN: Soft. No tenderness or masses. No hepatosplenomegaly. No hernias. obese  BREASTS: Symmetrical, no skin changes or visible lesions. No palpable masses, nipple discharge or adenopathy bilaterally.  PELVIC: Normal external female genitalia without lesions. Normal hair distribution. Adequate perineal body, normal urethral meatus. Vagina moist and well rugated without lesions, cottage cheese appearing discharge. Cervix pink and without lesions. No significant cystocele or rectocele. Bimanual exam showed uterus normal size, shape, position, mobile and nontender. Adnexa without masses or tenderness. Urethra and bladder normal. IUD string NOT visualized.    AP  Routine gyn  -s/p normal breast exam: mammogram scheduled  -s/p normal pelvic exam:   -Pap and HPV: collected by PCP - results pending  -STD testing: gc/chl    -contraception: Mirena IUD in place since 2017 - replacement ordered  -pelvic US from 2020 shows IUD at uterine fundus (report reviewed again today).  - colon cancer screening - encouraged to discuss with PCP    CARLIE Alvarez MD

## 2024-12-17 ENCOUNTER — HOSPITAL ENCOUNTER (OUTPATIENT)
Dept: RADIOLOGY | Facility: HOSPITAL | Age: 44
Discharge: HOME | End: 2024-12-17

## 2024-12-17 DIAGNOSIS — Z12.31 ENCOUNTER FOR SCREENING MAMMOGRAM FOR BREAST CANCER: ICD-10-CM

## 2024-12-17 PROCEDURE — 77067 SCR MAMMO BI INCL CAD: CPT | Mod: 26,,, | Performed by: RADIOLOGY

## 2024-12-17 PROCEDURE — 77063 BREAST TOMOSYNTHESIS BI: CPT | Mod: 26,,, | Performed by: RADIOLOGY

## 2024-12-17 PROCEDURE — 77063 BREAST TOMOSYNTHESIS BI: CPT | Mod: TC

## 2025-03-12 ENCOUNTER — OFFICE VISIT (OUTPATIENT)
Dept: FAMILY MEDICINE | Facility: HOSPITAL | Age: 45
End: 2025-03-12

## 2025-03-12 VITALS
SYSTOLIC BLOOD PRESSURE: 139 MMHG | HEART RATE: 92 BPM | BODY MASS INDEX: 43.2 KG/M2 | WEIGHT: 253.06 LBS | DIASTOLIC BLOOD PRESSURE: 80 MMHG | HEIGHT: 64 IN

## 2025-03-12 DIAGNOSIS — J06.9 ACUTE URI: ICD-10-CM

## 2025-03-12 DIAGNOSIS — E87.6 HYPOKALEMIA: ICD-10-CM

## 2025-03-12 DIAGNOSIS — B35.3 TINEA PEDIS OF LEFT FOOT: Primary | ICD-10-CM

## 2025-03-12 PROCEDURE — 99215 OFFICE O/P EST HI 40 MIN: CPT

## 2025-03-12 RX ORDER — KETOCONAZOLE 20 MG/G
CREAM TOPICAL DAILY
Qty: 60 G | Refills: 1 | Status: ON HOLD | OUTPATIENT
Start: 2025-03-12

## 2025-03-12 RX ORDER — TERBINAFINE HYDROCHLORIDE 250 MG/1
250 TABLET ORAL DAILY
Qty: 14 TABLET | Refills: 0 | Status: ON HOLD | OUTPATIENT
Start: 2025-03-12 | End: 2025-03-26

## 2025-03-12 RX ORDER — KETOCONAZOLE 20 MG/G
CREAM TOPICAL DAILY
Qty: 60 G | Refills: 1 | Status: SHIPPED | OUTPATIENT
Start: 2025-03-12 | End: 2025-03-12

## 2025-03-12 NOTE — PROGRESS NOTES
"  [facility] FAMILY PRACTICE CLINIC NOTE  Urgent/Acute Visit      SUBJECTIVE:     Patient: Mohsen Julien is a 44 y.o. female.    Chief Compliant:   Chief Complaint   Patient presents with    Rash       History of Present Illness:  Rash - left foot. Onset unknown but a few months. Reports Dr. Griffith in wound care prescribed diflucan at one point and reported improvement but that it got worse after the daily treatment for one month ended. Denies bleeding, discharge, fever, chills.     Cold like symptoms - endorses congestion, rhinorrhea, sore throat, and intermittent cough. Denies fever, chills, nausea, vomiting, diarrhea, shortness of breath, chest pain. Son and  reported to have similar symptoms. 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      Review of Systems   Constitutional:  Negative for fever.   HENT:  Positive for congestion and sore throat.    Respiratory:  Negative for shortness of breath.    Cardiovascular:  Negative for chest pain.   Gastrointestinal:  Negative for abdominal pain.   Skin:  Positive for rash.   Neurological:  Negative for headaches.     A 10+ review of systems was performed with pertinent positives and negatives noted above in the history of present illness. Other systems were negative unless otherwise specified.    OBJECTIVE:     Vital Signs (Most Recent)  Vitals:    03/12/25 0843   BP: 139/80   Patient Position: Sitting   Pulse: 92   Weight: 114.8 kg (253 lb 1.4 oz)   Height: 5' 4" (1.626 m)     BMI: Body mass index is 43.44 kg/m².     Physical Exam:  Physical Exam  Vitals and nursing note reviewed.   Constitutional:       General: She is not in acute distress.     Appearance: Normal appearance. She is obese. She is not ill-appearing, toxic-appearing or diaphoretic.   HENT:      Head: Normocephalic and atraumatic.      Right Ear: External ear normal.      Left Ear: External ear normal.      Nose: Nose normal.      Mouth/Throat:      Pharynx: Oropharynx is clear. "   Eyes:      Extraocular Movements: Extraocular movements intact.   Cardiovascular:      Rate and Rhythm: Normal rate and regular rhythm.   Pulmonary:      Effort: Pulmonary effort is normal. No respiratory distress.   Musculoskeletal:         General: Normal range of motion.      Cervical back: Normal range of motion.   Skin:     Findings: Rash (see picture) present.   Neurological:      General: No focal deficit present.      Mental Status: She is alert and oriented to person, place, and time. Mental status is at baseline.   Psychiatric:         Mood and Affect: Mood normal.         Behavior: Behavior normal.         Thought Content: Thought content normal.                ASSESSMENT:   Mohsen Julien is a 44 y.o. female who presents to clinic to for    1. Tinea pedis of left foot    2. Acute URI    3. Hypokalemia         PLAN:     Tinea pedis of left foot  - Chronic condition.  - Uncontrolled.  - Reviewed previous labs, tests, and/or imaging obtained since last visit.  - Discussed various diagnostic and treatment options with patient at this visit.  - Medications and/or medication refills as below.   -     ketoconazole (NIZORAL) 2 % cream; Apply topically once daily.  Dispense: 60 g; Refill: 1  -     salicylic acid 2 % Gel; Apply 1 Application topically once daily.  Dispense: 28 g; Refill: 0  -     terbinafine HCL (LAMISIL) 250 mg tablet; Take 1 tablet (250 mg total) by mouth once daily. for 14 days  Dispense: 14 tablet; Refill: 0    Acute URI  - Acute condition.  - Uncontrolled.  - Reviewed previous labs, tests, and/or imaging obtained since last visit.  - Discussed possible etiologies with patient including but not limited to viral URI, sinusitis, less likely PNA.  - Discussed various diagnostic and treatment options with patient at this visit.  - Discussed OTC symptomatic management with patient at this visit.   -     POCT COVID-19 Rapid Screening  -     POCT Influenza A/B Molecular    Hypokalemia  -  Chronic condition.  - Uncontrolled.  - Reviewed previous labs, tests, and/or imaging obtained since last visit.  - Discussed various diagnostic and treatment options with patient at this visit.  - Orders, labs, and imaging ordered as below. Follow-up results with patient.   -     Comprehensive Metabolic Panel; Future; Expected date: 03/12/2025        Provided patient with anticipatory guidance and return precautions. Treatment plan discussed with patient, all questions answered, and patient acknowledged understanding and verbal agreement.      Follow-up in: 1 months; or sooner PRN if acute concerns arise.      ________________________  Gilbert Hancock MD  [facility] Family Medicine PGY-3      Hide Include Location In Plan Question?: No Detail Level: Zone

## 2025-03-13 ENCOUNTER — HOSPITAL ENCOUNTER (INPATIENT)
Facility: HOSPITAL | Age: 45
LOS: 2 days | Discharge: HOME | DRG: 871 | End: 2025-03-16
Attending: EMERGENCY MEDICINE | Admitting: HOSPITALIST
Payer: COMMERCIAL

## 2025-03-13 DIAGNOSIS — R50.9 FEVER: Primary | ICD-10-CM

## 2025-03-13 DIAGNOSIS — R41.82 ALTERED MENTAL STATUS, UNSPECIFIED ALTERED MENTAL STATUS TYPE: ICD-10-CM

## 2025-03-13 DIAGNOSIS — G93.40 ACUTE ENCEPHALOPATHY: ICD-10-CM

## 2025-03-13 PROBLEM — R10.811 RIGHT UPPER QUADRANT ABDOMINAL TENDERNESS WITHOUT REBOUND TENDERNESS: Status: ACTIVE | Noted: 2025-03-13

## 2025-03-13 LAB
ALBUMIN SERPL BCP-MCNC: 3.1 G/DL (ref 3.5–5.2)
ALP SERPL-CCNC: 83 U/L (ref 40–150)
ALT SERPL W/O P-5'-P-CCNC: 40 U/L (ref 10–44)
AMPHET+METHAMPHET UR QL: NEGATIVE
ANION GAP SERPL CALC-SCNC: 15 MMOL/L (ref 8–16)
AST SERPL-CCNC: 34 U/L (ref 10–40)
BACTERIA #/AREA URNS HPF: NORMAL /HPF
BARBITURATES UR QL SCN>200 NG/ML: ABNORMAL
BASOPHILS # BLD AUTO: 0.02 K/UL (ref 0–0.2)
BASOPHILS NFR BLD: 0.2 % (ref 0–1.9)
BENZODIAZ UR QL SCN>200 NG/ML: NEGATIVE
BILIRUB SERPL-MCNC: 1.5 MG/DL (ref 0.1–1)
BILIRUB UR QL STRIP: NEGATIVE
BUN SERPL-MCNC: 17 MG/DL (ref 6–20)
BZE UR QL SCN: NEGATIVE
CALCIUM SERPL-MCNC: 8.9 MG/DL (ref 8.7–10.5)
CANNABINOIDS UR QL SCN: NEGATIVE
CHLORIDE SERPL-SCNC: 100 MMOL/L (ref 95–110)
CLARITY UR: CLEAR
CO2 SERPL-SCNC: 24 MMOL/L (ref 23–29)
COLOR UR: YELLOW
CREAT SERPL-MCNC: 1 MG/DL (ref 0.5–1.4)
CREAT UR-MCNC: 273.8 MG/DL (ref 15–325)
DIFFERENTIAL METHOD BLD: ABNORMAL
EOSINOPHIL # BLD AUTO: 0 K/UL (ref 0–0.5)
EOSINOPHIL NFR BLD: 0 % (ref 0–8)
ERYTHROCYTE [DISTWIDTH] IN BLOOD BY AUTOMATED COUNT: 13.7 % (ref 11.5–14.5)
EST. GFR  (NO RACE VARIABLE): >60 ML/MIN/1.73 M^2
FIO2: 21 %
GLUCOSE SERPL-MCNC: 129 MG/DL (ref 70–110)
GLUCOSE UR QL STRIP: NEGATIVE
HCT VFR BLD AUTO: 35.5 % (ref 37–48.5)
HGB BLD-MCNC: 11 G/DL (ref 12–16)
HGB UR QL STRIP: ABNORMAL
HYALINE CASTS #/AREA URNS LPF: 0 /LPF
IMM GRANULOCYTES # BLD AUTO: 0.06 K/UL (ref 0–0.04)
IMM GRANULOCYTES NFR BLD AUTO: 0.5 % (ref 0–0.5)
INFLUENZA A, MOLECULAR: NEGATIVE
INFLUENZA B, MOLECULAR: NEGATIVE
KETONES UR QL STRIP: NEGATIVE
LDH SERPL L TO P-CCNC: 1.9 MMOL/L (ref 0.5–2.2)
LEUKOCYTE ESTERASE UR QL STRIP: NEGATIVE
LYMPHOCYTES # BLD AUTO: 1.2 K/UL (ref 1–4.8)
LYMPHOCYTES NFR BLD: 10.7 % (ref 18–48)
MAGNESIUM SERPL-MCNC: 1.8 MG/DL (ref 1.6–2.6)
MCH RBC QN AUTO: 24.3 PG (ref 27–31)
MCHC RBC AUTO-ENTMCNC: 31 G/DL (ref 32–36)
MCV RBC AUTO: 78 FL (ref 82–98)
METHADONE UR QL SCN>300 NG/ML: NEGATIVE
MICROSCOPIC COMMENT: NORMAL
MONOCYTES # BLD AUTO: 0.6 K/UL (ref 0.3–1)
MONOCYTES NFR BLD: 5.8 % (ref 4–15)
NEUTROPHILS # BLD AUTO: 9.2 K/UL (ref 1.8–7.7)
NEUTROPHILS NFR BLD: 82.8 % (ref 38–73)
NITRITE UR QL STRIP: NEGATIVE
NRBC BLD-RTO: 0 /100 WBC
OPIATES UR QL SCN: NEGATIVE
PCP UR QL SCN>25 NG/ML: NEGATIVE
PH UR STRIP: 6 [PH] (ref 5–8)
PHOSPHATE SERPL-MCNC: 1.4 MG/DL (ref 2.7–4.5)
PLATELET # BLD AUTO: 188 K/UL (ref 150–450)
PMV BLD AUTO: 10.6 FL (ref 9.2–12.9)
POC PERFORMED BY: NORMAL
POCT GLUCOSE: 123 MG/DL (ref 70–110)
POTASSIUM SERPL-SCNC: 2.2 MMOL/L (ref 3.5–5.1)
PROCALCITONIN SERPL IA-MCNC: 17.68 NG/ML
PROT SERPL-MCNC: 7.6 G/DL (ref 6–8.4)
PROT UR QL STRIP: ABNORMAL
RBC # BLD AUTO: 4.53 M/UL (ref 4–5.4)
RBC #/AREA URNS HPF: 1 /HPF (ref 0–4)
RESPIRATORY INFECTION PANEL SOURCE: NORMAL
SARS-COV-2 RDRP RESP QL NAA+PROBE: NEGATIVE
SODIUM SERPL-SCNC: 139 MMOL/L (ref 136–145)
SP GR UR STRIP: 1.02 (ref 1–1.03)
SPECIMEN SOURCE: NORMAL
SPECIMEN SOURCE: NORMAL
SQUAMOUS #/AREA URNS HPF: 1 /HPF
TOXICOLOGY INFORMATION: ABNORMAL
URN SPEC COLLECT METH UR: ABNORMAL
UROBILINOGEN UR STRIP-ACNC: NEGATIVE EU/DL
WBC # BLD AUTO: 11.07 K/UL (ref 3.9–12.7)
WBC #/AREA URNS HPF: 2 /HPF (ref 0–5)

## 2025-03-13 PROCEDURE — 84145 PROCALCITONIN (PCT): CPT | Performed by: EMERGENCY MEDICINE

## 2025-03-13 PROCEDURE — 96368 THER/DIAG CONCURRENT INF: CPT

## 2025-03-13 PROCEDURE — 93010 ELECTROCARDIOGRAM REPORT: CPT | Mod: ,,, | Performed by: INTERNAL MEDICINE

## 2025-03-13 PROCEDURE — 99900035 HC TECH TIME PER 15 MIN (STAT)

## 2025-03-13 PROCEDURE — 87502 INFLUENZA DNA AMP PROBE: CPT | Performed by: EMERGENCY MEDICINE

## 2025-03-13 PROCEDURE — 82962 GLUCOSE BLOOD TEST: CPT

## 2025-03-13 PROCEDURE — 96361 HYDRATE IV INFUSION ADD-ON: CPT

## 2025-03-13 PROCEDURE — 0202U NFCT DS 22 TRGT SARS-COV-2: CPT

## 2025-03-13 PROCEDURE — 99285 EMERGENCY DEPT VISIT HI MDM: CPT | Mod: 25

## 2025-03-13 PROCEDURE — 63600175 PHARM REV CODE 636 W HCPCS: Performed by: EMERGENCY MEDICINE

## 2025-03-13 PROCEDURE — 93005 ELECTROCARDIOGRAM TRACING: CPT

## 2025-03-13 PROCEDURE — 63600175 PHARM REV CODE 636 W HCPCS

## 2025-03-13 PROCEDURE — 94761 N-INVAS EAR/PLS OXIMETRY MLT: CPT | Mod: XB

## 2025-03-13 PROCEDURE — 96365 THER/PROPH/DIAG IV INF INIT: CPT

## 2025-03-13 PROCEDURE — G0378 HOSPITAL OBSERVATION PER HR: HCPCS

## 2025-03-13 PROCEDURE — 82803 BLOOD GASES ANY COMBINATION: CPT

## 2025-03-13 PROCEDURE — 84100 ASSAY OF PHOSPHORUS: CPT | Performed by: EMERGENCY MEDICINE

## 2025-03-13 PROCEDURE — 84443 ASSAY THYROID STIM HORMONE: CPT | Performed by: EMERGENCY MEDICINE

## 2025-03-13 PROCEDURE — 25000003 PHARM REV CODE 250: Performed by: EMERGENCY MEDICINE

## 2025-03-13 PROCEDURE — 87040 BLOOD CULTURE FOR BACTERIA: CPT | Mod: 59 | Performed by: EMERGENCY MEDICINE

## 2025-03-13 PROCEDURE — 83735 ASSAY OF MAGNESIUM: CPT | Performed by: EMERGENCY MEDICINE

## 2025-03-13 PROCEDURE — 96375 TX/PRO/DX INJ NEW DRUG ADDON: CPT

## 2025-03-13 PROCEDURE — 84439 ASSAY OF FREE THYROXINE: CPT | Performed by: EMERGENCY MEDICINE

## 2025-03-13 PROCEDURE — 81000 URINALYSIS NONAUTO W/SCOPE: CPT | Mod: 59 | Performed by: EMERGENCY MEDICINE

## 2025-03-13 PROCEDURE — 85025 COMPLETE CBC W/AUTO DIFF WBC: CPT | Performed by: EMERGENCY MEDICINE

## 2025-03-13 PROCEDURE — 83605 ASSAY OF LACTIC ACID: CPT

## 2025-03-13 PROCEDURE — 87635 SARS-COV-2 COVID-19 AMP PRB: CPT | Performed by: EMERGENCY MEDICINE

## 2025-03-13 PROCEDURE — 80053 COMPREHEN METABOLIC PANEL: CPT | Performed by: EMERGENCY MEDICINE

## 2025-03-13 PROCEDURE — 80307 DRUG TEST PRSMV CHEM ANLYZR: CPT | Performed by: EMERGENCY MEDICINE

## 2025-03-13 RX ORDER — DEXAMETHASONE SODIUM PHOSPHATE 10 MG/ML
10 INJECTION INTRAMUSCULAR; INTRAVENOUS EVERY 6 HOURS
Status: DISCONTINUED | OUTPATIENT
Start: 2025-03-14 | End: 2025-03-14

## 2025-03-13 RX ORDER — ACETAMINOPHEN 325 MG/1
650 TABLET ORAL EVERY 4 HOURS PRN
Status: DISCONTINUED | OUTPATIENT
Start: 2025-03-13 | End: 2025-03-13

## 2025-03-13 RX ORDER — CLOTRIMAZOLE AND BETAMETHASONE DIPROPIONATE 10; .64 MG/G; MG/G
CREAM TOPICAL 2 TIMES DAILY
Status: DISCONTINUED | OUTPATIENT
Start: 2025-03-14 | End: 2025-03-16 | Stop reason: HOSPADM

## 2025-03-13 RX ORDER — NALOXONE HCL 0.4 MG/ML
0.02 VIAL (ML) INJECTION
Status: DISCONTINUED | OUTPATIENT
Start: 2025-03-13 | End: 2025-03-16 | Stop reason: HOSPADM

## 2025-03-13 RX ORDER — POLYETHYLENE GLYCOL 3350 17 G/17G
17 POWDER, FOR SOLUTION ORAL DAILY PRN
Status: DISCONTINUED | OUTPATIENT
Start: 2025-03-13 | End: 2025-03-16 | Stop reason: HOSPADM

## 2025-03-13 RX ORDER — ACETAMINOPHEN 650 MG/1
650 SUPPOSITORY RECTAL
Status: COMPLETED | OUTPATIENT
Start: 2025-03-13 | End: 2025-03-13

## 2025-03-13 RX ORDER — MAGNESIUM SULFATE HEPTAHYDRATE 40 MG/ML
2 INJECTION, SOLUTION INTRAVENOUS ONCE
Status: COMPLETED | OUTPATIENT
Start: 2025-03-14 | End: 2025-03-14

## 2025-03-13 RX ORDER — DEXAMETHASONE SODIUM PHOSPHATE 10 MG/ML
10 INJECTION INTRAMUSCULAR; INTRAVENOUS ONCE
Status: DISCONTINUED | OUTPATIENT
Start: 2025-03-14 | End: 2025-03-13

## 2025-03-13 RX ORDER — IBUPROFEN 200 MG
16 TABLET ORAL
Status: DISCONTINUED | OUTPATIENT
Start: 2025-03-13 | End: 2025-03-16 | Stop reason: HOSPADM

## 2025-03-13 RX ORDER — ACETAMINOPHEN 650 MG/1
650 SUPPOSITORY RECTAL EVERY 4 HOURS PRN
Status: DISCONTINUED | OUTPATIENT
Start: 2025-03-14 | End: 2025-03-14

## 2025-03-13 RX ORDER — GLUCAGON 1 MG
1 KIT INJECTION
Status: DISCONTINUED | OUTPATIENT
Start: 2025-03-13 | End: 2025-03-16 | Stop reason: HOSPADM

## 2025-03-13 RX ORDER — ONDANSETRON HYDROCHLORIDE 2 MG/ML
4 INJECTION, SOLUTION INTRAVENOUS EVERY 8 HOURS PRN
Status: DISCONTINUED | OUTPATIENT
Start: 2025-03-13 | End: 2025-03-16 | Stop reason: HOSPADM

## 2025-03-13 RX ORDER — CEFTRIAXONE 1 G/1
1 INJECTION, POWDER, FOR SOLUTION INTRAMUSCULAR; INTRAVENOUS
Status: COMPLETED | OUTPATIENT
Start: 2025-03-13 | End: 2025-03-13

## 2025-03-13 RX ORDER — AMOXICILLIN 250 MG
1 CAPSULE ORAL DAILY PRN
Status: DISCONTINUED | OUTPATIENT
Start: 2025-03-13 | End: 2025-03-16 | Stop reason: HOSPADM

## 2025-03-13 RX ORDER — POTASSIUM CHLORIDE 20 MEQ/1
40 TABLET, EXTENDED RELEASE ORAL ONCE
Status: DISCONTINUED | OUTPATIENT
Start: 2025-03-13 | End: 2025-03-13

## 2025-03-13 RX ORDER — POTASSIUM CHLORIDE 7.45 MG/ML
10 INJECTION INTRAVENOUS
Status: COMPLETED | OUTPATIENT
Start: 2025-03-13 | End: 2025-03-14

## 2025-03-13 RX ORDER — IBUPROFEN 200 MG
24 TABLET ORAL
Status: DISCONTINUED | OUTPATIENT
Start: 2025-03-13 | End: 2025-03-16 | Stop reason: HOSPADM

## 2025-03-13 RX ORDER — CEFTRIAXONE 1 G/1
1 INJECTION, POWDER, FOR SOLUTION INTRAMUSCULAR; INTRAVENOUS ONCE
Status: COMPLETED | OUTPATIENT
Start: 2025-03-14 | End: 2025-03-14

## 2025-03-13 RX ORDER — ACETAMINOPHEN 325 MG/1
650 TABLET ORAL
Status: COMPLETED | OUTPATIENT
Start: 2025-03-13 | End: 2025-03-13

## 2025-03-13 RX ORDER — TALC
6 POWDER (GRAM) TOPICAL NIGHTLY PRN
Status: DISCONTINUED | OUTPATIENT
Start: 2025-03-13 | End: 2025-03-16 | Stop reason: HOSPADM

## 2025-03-13 RX ORDER — CEFTRIAXONE 2 G/1
2 INJECTION, POWDER, FOR SOLUTION INTRAMUSCULAR; INTRAVENOUS
Status: DISCONTINUED | OUTPATIENT
Start: 2025-03-14 | End: 2025-03-16 | Stop reason: HOSPADM

## 2025-03-13 RX ORDER — SODIUM CHLORIDE 0.9 % (FLUSH) 0.9 %
5 SYRINGE (ML) INJECTION
Status: DISCONTINUED | OUTPATIENT
Start: 2025-03-13 | End: 2025-03-16 | Stop reason: HOSPADM

## 2025-03-13 RX ADMIN — ACETAMINOPHEN 650 MG: 325 TABLET ORAL at 09:03

## 2025-03-13 RX ADMIN — POTASSIUM CHLORIDE 10 MEQ: 7.46 INJECTION, SOLUTION INTRAVENOUS at 11:03

## 2025-03-13 RX ADMIN — MAGNESIUM SULFATE HEPTAHYDRATE 2 G: 40 INJECTION, SOLUTION INTRAVENOUS at 11:03

## 2025-03-13 RX ADMIN — ACETAMINOPHEN 650 MG: 650 SUPPOSITORY RECTAL at 09:03

## 2025-03-13 RX ADMIN — SODIUM CHLORIDE 1000 ML: 9 INJECTION, SOLUTION INTRAVENOUS at 09:03

## 2025-03-13 RX ADMIN — CEFTRIAXONE SODIUM 1 G: 1 INJECTION, POWDER, FOR SOLUTION INTRAMUSCULAR; INTRAVENOUS at 09:03

## 2025-03-13 RX ADMIN — POTASSIUM CHLORIDE 10 MEQ: 7.46 INJECTION, SOLUTION INTRAVENOUS at 10:03

## 2025-03-14 LAB
ADENOVIRUS: NOT DETECTED
ALBUMIN SERPL BCP-MCNC: 2.7 G/DL (ref 3.5–5.2)
ALP SERPL-CCNC: 78 U/L (ref 40–150)
ALT SERPL W/O P-5'-P-CCNC: 38 U/L (ref 10–44)
ANION GAP SERPL CALC-SCNC: 10 MMOL/L (ref 8–16)
ANION GAP SERPL CALC-SCNC: 11 MMOL/L (ref 8–16)
ANION GAP SERPL CALC-SCNC: 12 MMOL/L (ref 8–16)
AST SERPL-CCNC: 35 U/L (ref 10–40)
BASOPHILS # BLD AUTO: 0.02 K/UL (ref 0–0.2)
BASOPHILS NFR BLD: 0.2 % (ref 0–1.9)
BILIRUB DIRECT SERPL-MCNC: 0.7 MG/DL (ref 0.1–0.3)
BILIRUB SERPL-MCNC: 1.3 MG/DL (ref 0.1–1)
BORDETELLA PARAPERTUSSIS (IS1001): NOT DETECTED
BORDETELLA PERTUSSIS (PTXP): NOT DETECTED
BUN SERPL-MCNC: 13 MG/DL (ref 6–20)
BUN SERPL-MCNC: 14 MG/DL (ref 6–20)
BUN SERPL-MCNC: 15 MG/DL (ref 6–20)
CALCIUM SERPL-MCNC: 8.1 MG/DL (ref 8.7–10.5)
CALCIUM SERPL-MCNC: 8.3 MG/DL (ref 8.7–10.5)
CALCIUM SERPL-MCNC: 8.3 MG/DL (ref 8.7–10.5)
CHLAMYDIA PNEUMONIAE: NOT DETECTED
CHLORIDE SERPL-SCNC: 103 MMOL/L (ref 95–110)
CLARITY CSF: CLEAR
CO2 SERPL-SCNC: 22 MMOL/L (ref 23–29)
CO2 SERPL-SCNC: 24 MMOL/L (ref 23–29)
CO2 SERPL-SCNC: 24 MMOL/L (ref 23–29)
COLOR CSF: COLORLESS
CORONAVIRUS 229E, COMMON COLD VIRUS: NOT DETECTED
CORONAVIRUS HKU1, COMMON COLD VIRUS: NOT DETECTED
CORONAVIRUS NL63, COMMON COLD VIRUS: NOT DETECTED
CORONAVIRUS OC43, COMMON COLD VIRUS: NOT DETECTED
CREAT SERPL-MCNC: 0.8 MG/DL (ref 0.5–1.4)
CREAT SERPL-MCNC: 0.9 MG/DL (ref 0.5–1.4)
CREAT SERPL-MCNC: 0.9 MG/DL (ref 0.5–1.4)
CSF TUBE NUMBER: 1
CSF TUBE NUMBER: 1
DIFFERENTIAL METHOD BLD: ABNORMAL
EOSINOPHIL # BLD AUTO: 0 K/UL (ref 0–0.5)
EOSINOPHIL NFR BLD: 0 % (ref 0–8)
ERYTHROCYTE [DISTWIDTH] IN BLOOD BY AUTOMATED COUNT: 13.7 % (ref 11.5–14.5)
EST. GFR  (NO RACE VARIABLE): >60 ML/MIN/1.73 M^2
FIO2: 21 %
FLUBV RNA NPH QL NAA+NON-PROBE: NOT DETECTED
FOLATE SERPL-MCNC: 17.3 NG/ML (ref 4–24)
GLUCOSE CSF-MCNC: 106 MG/DL (ref 40–70)
GLUCOSE SERPL-MCNC: 132 MG/DL (ref 70–110)
GLUCOSE SERPL-MCNC: 173 MG/DL (ref 70–110)
GLUCOSE SERPL-MCNC: 206 MG/DL (ref 70–110)
HCT VFR BLD AUTO: 32.1 % (ref 37–48.5)
HGB BLD-MCNC: 10 G/DL (ref 12–16)
HIV 1+2 AB+HIV1 P24 AG SERPL QL IA: NORMAL
HPIV1 RNA NPH QL NAA+NON-PROBE: NOT DETECTED
HPIV2 RNA NPH QL NAA+NON-PROBE: NOT DETECTED
HPIV3 RNA NPH QL NAA+NON-PROBE: NOT DETECTED
HPIV4 RNA NPH QL NAA+NON-PROBE: NOT DETECTED
HUMAN METAPNEUMOVIRUS: NOT DETECTED
IMM GRANULOCYTES # BLD AUTO: 0.05 K/UL (ref 0–0.04)
IMM GRANULOCYTES NFR BLD AUTO: 0.5 % (ref 0–0.5)
INFLUENZA A: NOT DETECTED
LACTATE SERPL-SCNC: 1 MMOL/L (ref 0.5–2.2)
LYMPHOCYTES # BLD AUTO: 1 K/UL (ref 1–4.8)
LYMPHOCYTES NFR BLD: 10 % (ref 18–48)
MAGNESIUM SERPL-MCNC: 2.4 MG/DL (ref 1.6–2.6)
MCH RBC QN AUTO: 24.3 PG (ref 27–31)
MCHC RBC AUTO-ENTMCNC: 31.2 G/DL (ref 32–36)
MCV RBC AUTO: 78 FL (ref 82–98)
MONOCYTES # BLD AUTO: 0.5 K/UL (ref 0.3–1)
MONOCYTES NFR BLD: 4.7 % (ref 4–15)
MYCOPLASMA PNEUMONIAE: NOT DETECTED
NEUTROPHILS # BLD AUTO: 8.6 K/UL (ref 1.8–7.7)
NEUTROPHILS NFR BLD: 84.6 % (ref 38–73)
NRBC BLD-RTO: 0 /100 WBC
PCO2 BLDA: 36 MMHG (ref 35–45)
PH SMN: 7.51 [PH] (ref 7.35–7.45)
PHOSPHATE SERPL-MCNC: 1.3 MG/DL (ref 2.7–4.5)
PHOSPHATE SERPL-MCNC: 2 MG/DL (ref 2.7–4.5)
PLATELET # BLD AUTO: 181 K/UL (ref 150–450)
PMV BLD AUTO: 10.5 FL (ref 9.2–12.9)
PO2 BLDA: 47.7 MMHG (ref 40–60)
POC BASE DEFICIT: 5.2 MMOL/L (ref -2–2)
POC HCO3: 28.5 MMOL/L (ref 24–28)
POC PERFORMED BY: ABNORMAL
POC SATURATED O2: 88.1 % (ref 95–100)
POCT GLUCOSE: 210 MG/DL (ref 70–110)
POTASSIUM SERPL-SCNC: 2.2 MMOL/L (ref 3.5–5.1)
POTASSIUM SERPL-SCNC: 2.9 MMOL/L (ref 3.5–5.1)
POTASSIUM SERPL-SCNC: 2.9 MMOL/L (ref 3.5–5.1)
PROT CSF-MCNC: 33 MG/DL (ref 15–40)
PROT SERPL-MCNC: 6.8 G/DL (ref 6–8.4)
RBC # BLD AUTO: 4.11 M/UL (ref 4–5.4)
RBC # CSF: 0 /CU MM
RESPIRATORY INFECTION PANEL SOURCE: NORMAL
RSV RNA NPH QL NAA+NON-PROBE: NOT DETECTED
RV+EV RNA NPH QL NAA+NON-PROBE: NOT DETECTED
SARS-COV-2 RNA RESP QL NAA+PROBE: NOT DETECTED
SODIUM SERPL-SCNC: 137 MMOL/L (ref 136–145)
SODIUM SERPL-SCNC: 137 MMOL/L (ref 136–145)
SODIUM SERPL-SCNC: 138 MMOL/L (ref 136–145)
SPECIMEN SOURCE: ABNORMAL
SPECIMEN VOL CSF: 3 ML
T4 FREE SERPL-MCNC: 1.05 NG/DL (ref 0.71–1.51)
TSH SERPL DL<=0.005 MIU/L-ACNC: 0.07 UIU/ML (ref 0.4–4)
VIT B12 SERPL-MCNC: 389 PG/ML (ref 210–950)
WBC # BLD AUTO: 10.1 K/UL (ref 3.9–12.7)
WBC # CSF: 1 /CU MM (ref 0–5)

## 2025-03-14 PROCEDURE — 87205 SMEAR GRAM STAIN: CPT

## 2025-03-14 PROCEDURE — 25000003 PHARM REV CODE 250: Performed by: HOSPITALIST

## 2025-03-14 PROCEDURE — 87529 HSV DNA AMP PROBE: CPT

## 2025-03-14 PROCEDURE — 63600175 PHARM REV CODE 636 W HCPCS: Performed by: HOSPITALIST

## 2025-03-14 PROCEDURE — 63600175 PHARM REV CODE 636 W HCPCS: Performed by: STUDENT IN AN ORGANIZED HEALTH CARE EDUCATION/TRAINING PROGRAM

## 2025-03-14 PROCEDURE — 36410 VNPNXR 3YR/> PHY/QHP DX/THER: CPT

## 2025-03-14 PROCEDURE — 96366 THER/PROPH/DIAG IV INF ADDON: CPT

## 2025-03-14 PROCEDURE — 84157 ASSAY OF PROTEIN OTHER: CPT

## 2025-03-14 PROCEDURE — 84100 ASSAY OF PHOSPHORUS: CPT

## 2025-03-14 PROCEDURE — 87206 SMEAR FLUORESCENT/ACID STAI: CPT

## 2025-03-14 PROCEDURE — 99900035 HC TECH TIME PER 15 MIN (STAT)

## 2025-03-14 PROCEDURE — 88108 CYTOPATH CONCENTRATE TECH: CPT | Performed by: PATHOLOGY

## 2025-03-14 PROCEDURE — 82248 BILIRUBIN DIRECT: CPT

## 2025-03-14 PROCEDURE — 63600175 PHARM REV CODE 636 W HCPCS

## 2025-03-14 PROCEDURE — 83605 ASSAY OF LACTIC ACID: CPT | Performed by: EMERGENCY MEDICINE

## 2025-03-14 PROCEDURE — 96368 THER/DIAG CONCURRENT INF: CPT

## 2025-03-14 PROCEDURE — C1751 CATH, INF, PER/CENT/MIDLINE: HCPCS

## 2025-03-14 PROCEDURE — 11000001 HC ACUTE MED/SURG PRIVATE ROOM

## 2025-03-14 PROCEDURE — 96376 TX/PRO/DX INJ SAME DRUG ADON: CPT

## 2025-03-14 PROCEDURE — 87116 MYCOBACTERIA CULTURE: CPT

## 2025-03-14 PROCEDURE — 99222 1ST HOSP IP/OBS MODERATE 55: CPT | Mod: 25,,, | Performed by: STUDENT IN AN ORGANIZED HEALTH CARE EDUCATION/TRAINING PROGRAM

## 2025-03-14 PROCEDURE — 83735 ASSAY OF MAGNESIUM: CPT

## 2025-03-14 PROCEDURE — 25000003 PHARM REV CODE 250

## 2025-03-14 PROCEDURE — 88108 CYTOPATH CONCENTRATE TECH: CPT | Mod: 26,,, | Performed by: PATHOLOGY

## 2025-03-14 PROCEDURE — 27000207 HC ISOLATION

## 2025-03-14 PROCEDURE — 84207 ASSAY OF VITAMIN B-6: CPT | Performed by: HOSPITALIST

## 2025-03-14 PROCEDURE — 82945 GLUCOSE OTHER FLUID: CPT

## 2025-03-14 PROCEDURE — 87070 CULTURE OTHR SPECIMN AEROBIC: CPT

## 2025-03-14 PROCEDURE — 96375 TX/PRO/DX INJ NEW DRUG ADDON: CPT

## 2025-03-14 PROCEDURE — 63600175 PHARM REV CODE 636 W HCPCS: Mod: JZ,TB

## 2025-03-14 PROCEDURE — 80053 COMPREHEN METABOLIC PANEL: CPT

## 2025-03-14 PROCEDURE — 85025 COMPLETE CBC W/AUTO DIFF WBC: CPT

## 2025-03-14 PROCEDURE — 82607 VITAMIN B-12: CPT

## 2025-03-14 PROCEDURE — 87529 HSV DNA AMP PROBE: CPT | Mod: 59

## 2025-03-14 PROCEDURE — 82746 ASSAY OF FOLIC ACID SERUM: CPT

## 2025-03-14 PROCEDURE — 83615 LACTATE (LD) (LDH) ENZYME: CPT

## 2025-03-14 PROCEDURE — 80048 BASIC METABOLIC PNL TOTAL CA: CPT | Mod: XB

## 2025-03-14 PROCEDURE — 89051 BODY FLUID CELL COUNT: CPT

## 2025-03-14 PROCEDURE — 009U3ZX DRAINAGE OF SPINAL CANAL, PERCUTANEOUS APPROACH, DIAGNOSTIC: ICD-10-PCS | Performed by: HOSPITALIST

## 2025-03-14 PROCEDURE — 87389 HIV-1 AG W/HIV-1&-2 AB AG IA: CPT

## 2025-03-14 PROCEDURE — 82803 BLOOD GASES ANY COMBINATION: CPT

## 2025-03-14 PROCEDURE — 84425 ASSAY OF VITAMIN B-1: CPT | Performed by: HOSPITALIST

## 2025-03-14 RX ORDER — ACETAMINOPHEN 325 MG/1
650 TABLET ORAL EVERY 6 HOURS PRN
Status: DISCONTINUED | OUTPATIENT
Start: 2025-03-14 | End: 2025-03-16 | Stop reason: HOSPADM

## 2025-03-14 RX ORDER — POTASSIUM CHLORIDE 7.45 MG/ML
10 INJECTION INTRAVENOUS
Status: ACTIVE | OUTPATIENT
Start: 2025-03-14 | End: 2025-03-14

## 2025-03-14 RX ORDER — KETOROLAC TROMETHAMINE 30 MG/ML
15 INJECTION, SOLUTION INTRAMUSCULAR; INTRAVENOUS EVERY 6 HOURS PRN
Status: DISCONTINUED | OUTPATIENT
Start: 2025-03-14 | End: 2025-03-16 | Stop reason: HOSPADM

## 2025-03-14 RX ORDER — DEXAMETHASONE SODIUM PHOSPHATE 10 MG/ML
10 INJECTION INTRAMUSCULAR; INTRAVENOUS EVERY 6 HOURS
Status: DISCONTINUED | OUTPATIENT
Start: 2025-03-14 | End: 2025-03-15

## 2025-03-14 RX ORDER — DIPHENHYDRAMINE HCL 25 MG
25 CAPSULE ORAL ONCE
Status: DISCONTINUED | OUTPATIENT
Start: 2025-03-14 | End: 2025-03-14

## 2025-03-14 RX ORDER — DIPHENHYDRAMINE HCL 25 MG
25 CAPSULE ORAL ONCE
Status: COMPLETED | OUTPATIENT
Start: 2025-03-14 | End: 2025-03-14

## 2025-03-14 RX ORDER — DEXAMETHASONE SODIUM PHOSPHATE 10 MG/ML
10 INJECTION INTRAMUSCULAR; INTRAVENOUS EVERY 6 HOURS
Status: DISCONTINUED | OUTPATIENT
Start: 2025-03-14 | End: 2025-03-14

## 2025-03-14 RX ORDER — LIDOCAINE HYDROCHLORIDE 10 MG/ML
INJECTION, SOLUTION INFILTRATION; PERINEURAL
Status: COMPLETED | OUTPATIENT
Start: 2025-03-14 | End: 2025-03-14

## 2025-03-14 RX ORDER — KETOROLAC TROMETHAMINE 30 MG/ML
15 INJECTION, SOLUTION INTRAMUSCULAR; INTRAVENOUS ONCE
Status: COMPLETED | OUTPATIENT
Start: 2025-03-14 | End: 2025-03-14

## 2025-03-14 RX ORDER — PROCHLORPERAZINE EDISYLATE 5 MG/ML
2.5 INJECTION INTRAMUSCULAR; INTRAVENOUS ONCE
Status: COMPLETED | OUTPATIENT
Start: 2025-03-14 | End: 2025-03-14

## 2025-03-14 RX ORDER — ACETAMINOPHEN 325 MG/1
650 TABLET ORAL ONCE
Status: COMPLETED | OUTPATIENT
Start: 2025-03-14 | End: 2025-03-14

## 2025-03-14 RX ORDER — POTASSIUM CHLORIDE 7.45 MG/ML
10 INJECTION INTRAVENOUS
Status: DISPENSED | OUTPATIENT
Start: 2025-03-14 | End: 2025-03-14

## 2025-03-14 RX ORDER — ENOXAPARIN SODIUM 100 MG/ML
40 INJECTION SUBCUTANEOUS EVERY 24 HOURS
Status: DISCONTINUED | OUTPATIENT
Start: 2025-03-15 | End: 2025-03-16 | Stop reason: HOSPADM

## 2025-03-14 RX ADMIN — ACYCLOVIR SODIUM 550 MG: 50 INJECTION, SOLUTION INTRAVENOUS at 09:03

## 2025-03-14 RX ADMIN — KETOROLAC TROMETHAMINE 15 MG: 30 INJECTION, SOLUTION INTRAMUSCULAR; INTRAVENOUS at 10:03

## 2025-03-14 RX ADMIN — CEFTRIAXONE SODIUM 1 G: 1 INJECTION, POWDER, FOR SOLUTION INTRAMUSCULAR; INTRAVENOUS at 12:03

## 2025-03-14 RX ADMIN — ACETAMINOPHEN 650 MG: 325 TABLET ORAL at 06:03

## 2025-03-14 RX ADMIN — DEXAMETHASONE SODIUM PHOSPHATE 10 MG: 10 INJECTION, SOLUTION INTRAMUSCULAR; INTRAVENOUS at 05:03

## 2025-03-14 RX ADMIN — ACETAMINOPHEN 650 MG: 325 TABLET ORAL at 09:03

## 2025-03-14 RX ADMIN — CEFTRIAXONE SODIUM 2 G: 2 INJECTION, POWDER, FOR SOLUTION INTRAMUSCULAR; INTRAVENOUS at 10:03

## 2025-03-14 RX ADMIN — POTASSIUM CHLORIDE 10 MEQ: 7.46 INJECTION, SOLUTION INTRAVENOUS at 07:03

## 2025-03-14 RX ADMIN — VANCOMYCIN HYDROCHLORIDE 1500 MG: 1.5 INJECTION, POWDER, LYOPHILIZED, FOR SOLUTION INTRAVENOUS at 02:03

## 2025-03-14 RX ADMIN — DIPHENHYDRAMINE HYDROCHLORIDE 25 MG: 25 CAPSULE ORAL at 12:03

## 2025-03-14 RX ADMIN — POTASSIUM CHLORIDE 10 MEQ: 7.46 INJECTION, SOLUTION INTRAVENOUS at 05:03

## 2025-03-14 RX ADMIN — POTASSIUM CHLORIDE 10 MEQ: 7.46 INJECTION, SOLUTION INTRAVENOUS at 09:03

## 2025-03-14 RX ADMIN — POTASSIUM BICARBONATE 40 MEQ: 391 TABLET, EFFERVESCENT ORAL at 05:03

## 2025-03-14 RX ADMIN — ACYCLOVIR SODIUM 550 MG: 50 INJECTION, SOLUTION INTRAVENOUS at 12:03

## 2025-03-14 RX ADMIN — VANCOMYCIN HYDROCHLORIDE 2500 MG: 500 INJECTION, POWDER, LYOPHILIZED, FOR SOLUTION INTRAVENOUS at 02:03

## 2025-03-14 RX ADMIN — PROCHLORPERAZINE EDISYLATE 2.5 MG: 5 INJECTION INTRAMUSCULAR; INTRAVENOUS at 12:03

## 2025-03-14 RX ADMIN — DEXAMETHASONE SODIUM PHOSPHATE 10 MG: 10 INJECTION, SOLUTION INTRAMUSCULAR; INTRAVENOUS at 02:03

## 2025-03-14 RX ADMIN — POTASSIUM BICARBONATE 40 MEQ: 391 TABLET, EFFERVESCENT ORAL at 10:03

## 2025-03-14 RX ADMIN — POTASSIUM CHLORIDE 10 MEQ: 7.46 INJECTION, SOLUTION INTRAVENOUS at 01:03

## 2025-03-14 RX ADMIN — POTASSIUM CHLORIDE 10 MEQ: 7.46 INJECTION, SOLUTION INTRAVENOUS at 04:03

## 2025-03-14 RX ADMIN — CLOTRIMAZOLE AND BETAMETHASONE DIPROPIONATE: 10; .5 CREAM TOPICAL at 09:03

## 2025-03-14 RX ADMIN — ACYCLOVIR SODIUM 550 MG: 50 INJECTION, SOLUTION INTRAVENOUS at 06:03

## 2025-03-14 RX ADMIN — KETOROLAC TROMETHAMINE 15 MG: 30 INJECTION, SOLUTION INTRAMUSCULAR; INTRAVENOUS at 09:03

## 2025-03-14 RX ADMIN — DEXAMETHASONE SODIUM PHOSPHATE 10 MG: 10 INJECTION, SOLUTION INTRAMUSCULAR; INTRAVENOUS at 06:03

## 2025-03-14 RX ADMIN — KETOROLAC TROMETHAMINE 15 MG: 30 INJECTION, SOLUTION INTRAMUSCULAR; INTRAVENOUS at 12:03

## 2025-03-14 RX ADMIN — POTASSIUM PHOSPHATE, MONOBASIC AND POTASSIUM PHOSPHATE, DIBASIC 30 MMOL: 224; 236 INJECTION, SOLUTION, CONCENTRATE INTRAVENOUS at 03:03

## 2025-03-14 RX ADMIN — LIDOCAINE HYDROCHLORIDE 5 ML: 10 INJECTION, SOLUTION INFILTRATION; PERINEURAL at 11:03

## 2025-03-14 RX ADMIN — KETOROLAC TROMETHAMINE 15 MG: 30 INJECTION, SOLUTION INTRAMUSCULAR; INTRAVENOUS at 04:03

## 2025-03-14 RX ADMIN — ACETAMINOPHEN 650 MG: 325 TABLET ORAL at 05:03

## 2025-03-14 RX ADMIN — DEXAMETHASONE SODIUM PHOSPHATE 10 MG: 10 INJECTION, SOLUTION INTRAMUSCULAR; INTRAVENOUS at 12:03

## 2025-03-14 NOTE — PLAN OF CARE
03/14/25 1744   Admission   Initial VN Admission Questions Complete   Communication Issues? None   Shift   Virtual Nurse - Patient Verbalized Approval Of Camera Use   Safety/Activity   Patient Rounds visualized patient;bed in low position;placement of personal items at bedside;bed wheels locked;call light in patient/parent reach;ID band on;clutter free environment maintained

## 2025-03-14 NOTE — ASSESSMENT & PLAN NOTE
"Patient started having AMS 3/13 in the afternoon. Patient is alert, follows some commands, however is not verbal. Shakes head yes/no. Suspect mental status change is secondary to infection. Will need to rule out meningitis. Low suspicion for stroke as patient moves all extremities spontaneously.  TSH 0.072 low, T4 1.05 wnl    - Hypokalemia of 2.2 could be contributing   - CT head no acute abnormalities  - Drug screen positive for barbiturates, however does not fit clinical picture  - B12, B9, B6, B1, VBG pending  - Empiric meningitis coverage. See plan for "SIRS"  - Consider further stroke work-up with CTA    "

## 2025-03-14 NOTE — ED NOTES
Assumed care of pt. Pt resting in bed comfortably. Pt AAOx3, NAD noted, respirations even and unlabored. Partner at bedside. Call light within reach.

## 2025-03-14 NOTE — ASSESSMENT & PLAN NOTE
"IMPROVING  Suspect in setting of meningitis.  Patient started having AMS 3/13 in the afternoon. Patient is alert, follows some commands, however is not verbal. Shakes head yes/no. Suspect mental status change is secondary to infection. Will need to rule out meningitis. Low suspicion for stroke as patient moves all extremities spontaneously.  TSH 0.072 low, T4 1.05 wnl  CT head no acute abnormalities  Drug screen positive for barbiturates, however does not fit clinical picture    Plan:  - B12, B9, B6, B1, VBG pending  - Empiric meningitis coverage. See plan for "SIRS"  - Consider further stroke work-up with CTA pending LP results.   - headache, generalized body pain treated with toradol    "

## 2025-03-14 NOTE — ASSESSMENT & PLAN NOTE
Patient presenting with high fever 104, tachycardia. UA negative, CXR no focal consolidation. Flu/Covid negative. Patient does have left foot rash but does not seem to explain severity of symptoms. Headaches, nausea, encephalopathy concerning for meningitis, would like to rule out with LP.   S/p 1L bolus in ED. Procal 17.68. Lactate wnl. WBC wnl 11, but higher than baseline    Plan:  - Empiric meningitis coverage  - Vancomycin, ceftriaxone 2g q12h, Acyclovir 10mg/kg q8h, Dexamethasone 10mg q6h  - HSV PCR pending  - Blood cultures pending (3/14)  - Respiratory panel pending  - IR consult for LP, pending CSF fluid studies

## 2025-03-14 NOTE — ASSESSMENT & PLAN NOTE
Patient presenting with high fever 104, tachycardia. UA negative, CXR no focal consolidation. Flu/Covid negative. Patient does have left foot rash but does not seem to explain severity of symptoms. Headaches, nausea, encephalopathy concerning for meningitis, would like to rule out with LP.   S/p 1L bolus in ED. Procal 17.68. Lactate wnl. WBC wnl 11, but higher than baseline      - Empiric meningitis coverage  - Vancomycin, ceftriaxone 2g q12h, Acyclovir 10mg/kg q8h, Dexamethasone 10mg q6h  - Blood cultures pending (3/14)  - Respiratory panel pending  - IR consult for LP

## 2025-03-14 NOTE — ASSESSMENT & PLAN NOTE
Patient has had chronic left leg rash for over a year, possible venous stasis or fungal infection. Per chart review it appeared to improve with oral antifungals in the past, however has gotten worse after stopping medications. Area is hyperpigmented and warm to the touch, cannot rule out cellulitis. No large wounds. See media for clinic images from 3/12/25    Fungal infection is unlikely source of sepsis at this time, will hold off on oral antifungals for now.  XR L tib/fib and foot without osseous abnormalities    Plan:  - Clotrimazole-betamethasone topically

## 2025-03-14 NOTE — ED NOTES
Pt is awake. I changed her bed linens and placed a purewick. She still does not want to verbally communicate.

## 2025-03-14 NOTE — NURSING
Rapid Response Nurse Follow-up Note     Chart reviewed, temp trending down from t-max 104.1 --> 100.2. tylenol, abx. K+ 2.2 -> 2.9, additional replacements ordered.  No acute issues at this time.   Team will continue to follow.  Please call Rapid Response RN, Laura Saunders RN with any questions or concerns at [phone].

## 2025-03-14 NOTE — ASSESSMENT & PLAN NOTE
Patient with history of hypokalemia. Initial K 2.2 in ED. Patient's most recent potassium results are listed below.   Recent Labs     03/13/25 2106   K 2.2*     Plan  - Replete potassium per protocol  - Monitor potassium Every 4 hours  - EKG appears stable  - Repleting phos and mg as well

## 2025-03-14 NOTE — SUBJECTIVE & OBJECTIVE
Interval History: Patient evaluated,  at bedside. Patient nodding to pain, weakness. Fever improved to 100.2. LP ordered, to be completed today.     Review of Systems  Objective:     Vital Signs (Most Recent):  Temp: 100.2 °F (37.9 °C) (03/14/25 0604)  Pulse: 101 (03/14/25 0733)  Resp: 20 (03/14/25 0733)  BP: (!) 111/58 (03/14/25 0733)  SpO2: 98 % (03/14/25 0733) Vital Signs (24h Range):  Temp:  [100.2 °F (37.9 °C)-104.1 °F (40.1 °C)] 100.2 °F (37.9 °C)  Pulse:  [] 101  Resp:  [16-29] 20  SpO2:  [96 %-100 %] 98 %  BP: (103-147)/(44-64) 111/58     Weight: 114.8 kg (253 lb)  Body mass index is 43.43 kg/m².    Intake/Output Summary (Last 24 hours) at 3/14/2025 0841  Last data filed at 3/14/2025 0839  Gross per 24 hour   Intake 1922.26 ml   Output --   Net 1922.26 ml         Physical Exam  Constitutional:       Appearance: She is ill-appearing.   HENT:      Head: Normocephalic.   Eyes:      Extraocular Movements: Extraocular movements intact.   Cardiovascular:      Rate and Rhythm: Regular rhythm. Tachycardia present.      Pulses: Normal pulses.      Heart sounds: Normal heart sounds.   Pulmonary:      Effort: Pulmonary effort is normal. No respiratory distress.      Breath sounds: Normal breath sounds. No wheezing.   Abdominal:      General: Abdomen is flat. Bowel sounds are normal. There is no distension.      Palpations: Abdomen is soft.      Tenderness: There is no abdominal tenderness.   Musculoskeletal:         General: No swelling or tenderness. Normal range of motion.   Skin:     General: Skin is warm.      Findings: Rash (LLE) present.   Neurological:      Mental Status: She is alert.      GCS: GCS eye subscore is 4. GCS verbal subscore is 2. GCS motor subscore is 6.      Cranial Nerves: No cranial nerve deficit or facial asymmetry.      Sensory: Sensation is intact. No sensory deficit.      Motor: Weakness present.               Significant Labs: All pertinent labs within the past 24 hours have  been reviewed.    Significant Imaging: I have reviewed all pertinent imaging results/findings within the past 24 hours.

## 2025-03-14 NOTE — CONSULTS
Inpatient Radiology Pre-procedure Note    History of Present Illness:  Mohsen Julien is a 44 y.o. female who presents for Encephalopathy and fever of unknown origin. Patient noted to become more lethargic over last night. She also has had fever.     Admission H&P reviewed.  Past Medical History:   Diagnosis Date    Anemia     Arthritis     Fibromyalgia     GERD (gastroesophageal reflux disease)     Gout     HLD (hyperlipidemia)     HTN (hypertension) 2013    Peripheral neuropathy     Thyroid disease     Venous stasis dermatitis of left lower extremity      Past Surgical History:   Procedure Laterality Date    APPENDECTOMY       SECTION      CHOLECYSTECTOMY      ESOPHAGOGASTRODUODENOSCOPY N/A 2022    Procedure: EGD (ESOPHAGOGASTRODUODENOSCOPY);  Surgeon: Adis Huitron MD;  Location: [facility];  Service: Endoscopy;  Laterality: N/A;       Review of Systems:   As documented in primary team H&P    Home Meds:   Prior to Admission medications    Medication Sig Start Date End Date Taking? Authorizing Provider   allopurinoL (ZYLOPRIM) 300 MG tablet Take 1 tablet (300 mg total) by mouth once daily. 22   Elana Urias MD   amitriptyline (ELAVIL) 25 MG tablet Take 1 to 2 tablets by mouth at bedtime as needed for sleep 5/10/24   Ana Griffith MD   clotrimazole-betamethasone 1-0.05% (LOTRISONE) cream Apply topically locally as directed daily 22   Ana Griffith MD   furosemide (LASIX) 40 MG tablet Take 1 tablet (40 mg total) by mouth 2 (two) times daily. 24  Ana Griffith MD   gabapentin (NEURONTIN) 100 MG capsule Take 3 capsules (300 mg total) by mouth 3 (three) times daily. Take 100 mg three times daily for first 2 days, then take 200 mg three times daily for next 2 days, then increase to 300 mg three times daily. 24  Gilbert Hancock MD   ketoconazole (NIZORAL) 2 % cream Apply topically once daily. 3/12/25   Gilbert Hancock MD    methocarbamoL (ROBAXIN) 750 MG Tab Take 1 tablet by mouth 3 times daily as needed for muscle spasms 7/25/23   Ana Griffith MD   metoprolol tartrate (LOPRESSOR) 100 MG tablet TAKE ONE TABLET BY MOUTH TWO TIMES A DAY FOR BLOOD PRESSURE 6/21/24   Ana Griffith MD   MIRENA 20 mcg/24 hr (5 years) IUD TO BE INSERTED ONE TIME BY PRESCRIBER. ROUTE INTRAUTERINE. 4/5/17   Provider, Historical   mupirocin (BACTROBAN) 2 % ointment Apply to affected area as directed 11/16/22      natrexone tablet 4 mg Take 2 tablets in morning for 7 days. Then take 2 tablets twice daily for 7 days. Then take 4 tablets in morning, 2 tablets at night for 7 days. Then take 4 tablets twice daily. 5/23/24   Gilbert Hancock MD   pantoprazole (PROTONIX) 40 MG tablet 1 tablet by mouth daily; take daily for 2 months after surgery  Patient taking differently: Take 40 mg by mouth once daily. 11/16/22      predniSONE (DELTASONE) 20 MG tablet Take 1 tablet (20 mg total) by mouth once daily. 9/26/23   Gilbert Hancock MD   salicylic acid 2 % Gel Apply 1 Application topically once daily. 3/12/25   Gilbert Hancock MD   terbinafine HCL (LAMISIL) 250 mg tablet Take 1 tablet (250 mg total) by mouth once daily. for 14 days 3/12/25 3/26/25  Gilbert Hancock MD   traMADoL (ULTRAM) 50 mg tablet Take 1 tablet (50 mg total) by mouth every 6 (six) hours as needed. 4/23/22   Ana Griffith MD   traMADoL (ULTRAM) 50 mg tablet TAKE 1 TABLET BY MOUTH EVERY 4 HOURS 3/22/24      traMADoL (ULTRAM) 50 mg tablet Take 1 tablet (50 mg total) by mouth every 4 (four) hours as needed for pain 4/12/24      traMADoL (ULTRAM) 50 mg tablet Take 1 tablet (50 mg total) by mouth every 4 (four) hours as needed. 5/3/24      traMADoL (ULTRAM) 50 mg tablet Take 1 tablet (50 mg total) by mouth every 4 (four) hours as needed for pain 5/31/24   Pankaj Cha MD   traMADoL (ULTRAM) 50 mg tablet Take one tablet by mouth every 4 hours as needed 6/20/24   Ana Griffith  MD MANSI   traMADoL (ULTRAM) 50 mg tablet Take 1 tablet (50 mg total) by mouth every 4 (four) hours as needed. 8/7/24   Ana Griffith MD   traMADoL (ULTRAM) 50 mg tablet Take 1 tablet (50 mg total) by mouth every 4 (four) hours as needed for pain 8/30/24      traMADoL (ULTRAM) 50 mg tablet Take 1 tablet (50 mg total) by mouth every 4 (four) hours as needed for pain. 9/26/24   Ana Griffith MD   traMADoL (ULTRAM) 50 mg tablet Take 1 tablet by mouth every 4 hours as needed for pain 10/18/24   Ana Griffith MD   traMADoL (ULTRAM) 50 mg tablet Take 1 tablet (50 mg total) by mouth every 4 (four) hours as needed for pain 11/8/24      traMADoL (ULTRAM) 50 mg tablet take 1 tablet by mouth every 4 hours as needed for pain 11/27/24      traMADoL (ULTRAM) 50 mg tablet TAKE 1 TABLET BY MOUTH EVERY 4 HOURS 1/24/25      traMADoL (ULTRAM) 50 mg tablet Take 1 tablet (50 mg total) by mouth every 4 (four) hours as needed FOR PAIN 2/21/25      triamterene-hydrochlorothiazide 37.5-25 mg (MAXZIDE-25) 37.5-25 mg per tablet Take 1 tablet by mouth once daily. 2/23/24 2/17/25       Scheduled Meds:    acyclovir  10 mg/kg (Ideal) Intravenous Q8H    cefTRIAXone (Rocephin) IV (PEDS and ADULTS)  2 g Intravenous Q12H    clotrimazole-betamethasone 1-0.05%   Topical (Top) BID    dexAMETHasone injection  10 mg Intravenous Q6H    potassium bicarbonate  40 mEq Oral Q4H    potassium chloride  10 mEq Intravenous Q1H    vancomycin (VANCOCIN) IV (PEDS and ADULTS)  1,500 mg Intravenous Q12H     Continuous Infusions:   PRN Meds:  Current Facility-Administered Medications:     acetaminophen, 650 mg, Oral, Q6H PRN    dextrose 50%, 12.5 g, Intravenous, PRN    dextrose 50%, 25 g, Intravenous, PRN    glucagon (human recombinant), 1 mg, Intramuscular, PRN    glucose, 16 g, Oral, PRN    glucose, 24 g, Oral, PRN    ketorolac, 15 mg, Intravenous, Q6H PRN    melatonin, 6 mg, Oral, Nightly PRN    naloxone, 0.02 mg, Intravenous, PRN     "ondansetron, 4 mg, Intravenous, Q8H PRN    polyethylene glycol, 17 g, Oral, Daily PRN    senna-docusate 8.6-50 mg, 1 tablet, Oral, Daily PRN    sodium chloride 0.9%, 5 mL, Intravenous, PRN    Pharmacy to dose Vancomycin consult, , , Once **AND** vancomycin - pharmacy to dose, , Intravenous, pharmacy to manage frequency  Anticoagulants/Antiplatelets: no anticoagulation    Allergies: Review of patient's allergies indicates:  No Known Allergies  Sedation Hx: have not been any systemic reactions    Labs:  No results for input(s): "INR", "PT", "PTT" in the last 168 hours.    Recent Labs   Lab 03/14/25 0155   WBC 10.10   HGB 10.0*   HCT 32.1*   MCV 78*         Recent Labs   Lab 03/14/25 0155 03/14/25 0528   * 173*    137   K 2.2* 2.9*    103   CO2 24 22*   BUN 15 14   CREATININE 0.9 0.9   CALCIUM 8.3* 8.1*   MG 2.4  --    ALT 38  --    AST 35  --    ALBUMIN 2.7*  --    BILITOT 1.3*  --    BILIDIR 0.7*  --          Vitals:  Temp: 100.2 °F (37.9 °C) (03/14/25 0604)  Pulse: 101 (03/14/25 0733)  Resp: 20 (03/14/25 0733)  BP: (!) 111/58 (03/14/25 0733)  SpO2: 98 % (03/14/25 0733)     Physical Exam:  ASA: 2  Mallampati: 3    General: no acute distress  Mental Status: not alert to person time or place  HEENT: normocephalic, atraumatic  Chest: unlabored breathing  Heart: regular heart rate  Abdomen: nondistended  Extremity: moves all extremities    Plan: LP  Sedation Plan: local    Mark Bautista MD ([facility])  Interventional Radiology          "

## 2025-03-14 NOTE — PLAN OF CARE
Problem: Adult Inpatient Plan of Care  Goal: Plan of Care Review  Outcome: Progressing  Goal: Patient-Specific Goal (Individualized)  Outcome: Progressing  Goal: Absence of Hospital-Acquired Illness or Injury  Outcome: Progressing  Goal: Optimal Comfort and Wellbeing  Outcome: Progressing  Goal: Readiness for Transition of Care  Outcome: Progressing     Problem: Bariatric Environmental Safety  Goal: Safety Maintained with Care  Outcome: Progressing     Problem: Infection  Goal: Absence of Infection Signs and Symptoms  Outcome: Progressing   Patient arrived from ED. Plan of care and expectation from staff discussed with patient/family. Provider rounded at bedside. Isolation precautions discussed. IV vancomycin completed.

## 2025-03-14 NOTE — ED NOTES
"  Altered Mental Status (The pt presents to the ED with a complaint of altered mental status that started around 3 PM today. Per the pts family the pt has a history of "a leg infection, and "the last time she was infected she acted like this". Pts L leg is hot to touch)       Pt brought in by ambulance.  Daughter with patient.  Stated she has been running a fever today and then was not acting as alert this afternoon.  History of left lower leg wound for several months.  Pt is alert, responds to pain.  Does not want to verbally communicate and daughter said she has been like this at home.  Temp was 104.1 axillary.  Pt connected to cardiac monitor, Bp monitor and oximeter.   "

## 2025-03-14 NOTE — PROGRESS NOTES
"Pharmacokinetic Initial Assessment: IV Vancomycin    Assessment/Plan:    Initiate intravenous vancomycin with loading dose of 2500 mg once followed by a maintenance dose of vancomycin 1500mg IV every 12 hours  Desired empiric serum trough concentration is 15 to 20 mcg/mL  Draw vancomycin trough level 60 min prior to fourth dose on 3/15 at approximately 1300  Pharmacy will continue to follow and monitor vancomycin.      Please contact pharmacy at extension [phone] with any questions regarding this assessment.     Thank you for the consult,   Julieta Quick       Patient brief summary:  Mohsen Julien is a 44 y.o. female initiated on antimicrobial therapy with IV Vancomycin for treatment of suspected sepsis    Drug Allergies:   Review of patient's allergies indicates:  No Known Allergies    Actual Body Weight:   114kg    Renal Function:   Estimated Creatinine Clearance: 87.3 mL/min (based on SCr of 1 mg/dL).,     Dialysis Method (if applicable):  N/A    CBC (last 72 hours):  Recent Labs   Lab Result Units 03/13/25  2108   WBC K/uL 11.07   Hemoglobin g/dL 11.0*   Hematocrit % 35.5*   Platelets K/uL 188   Gran % % 82.8*   Lymph % % 10.7*   Mono % % 5.8   Eosinophil % % 0.0   Basophil % % 0.2   Differential Method  Automated       Metabolic Panel (last 72 hours):  Recent Labs   Lab Result Units 03/13/25  2108 03/13/25  2204 03/13/25  2225   Sodium mmol/L 139  --   --    Potassium mmol/L 2.2*  --   --    Chloride mmol/L 100  --   --    CO2 mmol/L 24  --   --    Glucose mg/dL 129*  --   --    Glucose, UA   --  Negative  --    BUN mg/dL 17  --   --    Creatinine mg/dL 1.0  --   --    Creatinine, Urine mg/dL  --   --  273.8   Albumin g/dL 3.1*  --   --    Total Bilirubin mg/dL 1.5*  --   --    Alkaline Phosphatase U/L 83  --   --    AST U/L 34  --   --    ALT U/L 40  --   --    Magnesium mg/dL 1.8  --   --    Phosphorus mg/dL 1.4*  --   --        Drug levels (last 3 results):  No results for input(s): "VANCOMYCINRA", " ""VANCORANDOM", "VANCOMYCINPE", "VANCOPEAK", "VANCOMYCINTR", "VANCOTROUGH" in the last 72 hours.    Microbiologic Results:  Microbiology Results (last 7 days)       Procedure Component Value Units Date/Time    Blood culture x two cultures. Draw prior to antibiotics. [[id number]] Collected: 03/13/25 2109    Order Status: Sent Specimen: Blood from Peripheral, Forearm, Left Updated: 03/14/25 0131    Blood culture x two cultures. Draw prior to antibiotics. [[id number]] Collected: 03/13/25 2108    Order Status: Sent Specimen: Blood from Peripheral, Upper Arm, Right Updated: 03/14/25 0131    Respiratory Infection Panel (PCR), Nasopharyngeal [[id number]] Collected: 03/13/25 2256    Order Status: Completed Specimen: Nasopharyngeal Swab Updated: 03/13/25 2256     Respiratory Infection Panel Source NP Swab    Narrative:      Assay not valid for lower respiratory specimens, alternate  testing required.    Respiratory Infection Panel (PCR), Nasopharyngeal [[id number]] Collected: 03/13/25 2255    Order Status: Completed Specimen: Nasopharyngeal Swab Updated: 03/13/25 2256     Respiratory Infection Panel Source NP Swab    Narrative:      Assay not valid for lower respiratory specimens, alternate  testing required.    Influenza A & B by Molecular [[id number]] Collected: 03/13/25 2119    Order Status: Completed Specimen: Nasopharyngeal Swab Updated: 03/13/25 2148     Influenza A, Molecular Negative     Influenza B, Molecular Negative     Flu A & B Source Nasal swab            "

## 2025-03-14 NOTE — PHARMACY MED REC
"        [facility]           Pharmacy  Admission Medication History     The home medication history was taken by Jill Castellon.      Medication history obtained from Medications listed below were obtained from: Analytic software- [place] and Medical records. Patient unable.    Based on information gathered for medication list, you may go to "Admission" then "Reconcile Home Medications" tabs to review and/or act upon those items.     The home medication list has been updated by the Pharmacy department.   Please read ALL comments highlighted in yellow.   Please address this information as you see fit.    Feel free to contact us if you have any questions or require assistance.        No current facility-administered medications on file prior to encounter.     Current Outpatient Medications on File Prior to Encounter   Medication Sig Dispense Refill    amitriptyline (ELAVIL) 25 MG tablet Take 1 to 2 tablets by mouth at bedtime as needed for sleep 60 tablet 2    metoprolol tartrate (LOPRESSOR) 100 MG tablet TAKE ONE TABLET BY MOUTH TWO TIMES A DAY FOR BLOOD PRESSURE 60 tablet 2    terbinafine HCL (LAMISIL) 250 mg tablet Take 1 tablet (250 mg total) by mouth once daily. for 14 days 14 tablet 0    traMADoL (ULTRAM) 50 mg tablet TAKE 1 TABLET BY MOUTH EVERY 4 HOURS 30 tablet 2    triamterene-hydrochlorothiazide 37.5-25 mg (MAXZIDE-25) 37.5-25 mg per tablet Take 1 tablet by mouth once daily. 90 tablet 3    allopurinoL (ZYLOPRIM) 300 MG tablet Take 1 tablet (300 mg total) by mouth once daily. 30 tablet 1    clotrimazole-betamethasone 1-0.05% (LOTRISONE) cream Apply topically locally as directed daily 15 g 2    furosemide (LASIX) 40 MG tablet Take 1 tablet (40 mg total) by mouth 2 (two) times daily. 60 tablet 2    gabapentin (NEURONTIN) 100 MG capsule Take 3 capsules (300 mg total) by mouth 3 (three) times daily. Take 100 mg three times daily for first 2 days, then take 200 mg three times daily for next 2 " days, then increase to 300 mg three times daily. 270 capsule 11    ketoconazole (NIZORAL) 2 % cream Apply topically once daily. 60 g 1    methocarbamoL (ROBAXIN) 750 MG Tab Take 1 tablet by mouth 3 times daily as needed for muscle spasms 90 tablet 2    MIRENA 20 mcg/24 hr (5 years) IUD TO BE INSERTED ONE TIME BY PRESCRIBER. ROUTE INTRAUTERINE.  0    mupirocin (BACTROBAN) 2 % ointment Apply to affected area as directed 22 g 2    natrexone tablet 4 mg Take 2 tablets in morning for 7 days. Then take 2 tablets twice daily for 7 days. Then take 4 tablets in morning, 2 tablets at night for 7 days. Then take 4 tablets twice daily. 70 tablet 1    pantoprazole (PROTONIX) 40 MG tablet 1 tablet by mouth daily; take daily for 2 months after surgery (Patient taking differently: Take 40 mg by mouth once daily.) 30 tablet 1    predniSONE (DELTASONE) 20 MG tablet Take 1 tablet (20 mg total) by mouth once daily. 7 tablet 0    salicylic acid 2 % Gel Apply 1 Application topically once daily. 28 g 0    traMADoL (ULTRAM) 50 mg tablet Take 1 tablet (50 mg total) by mouth every 6 (six) hours as needed. 30 tablet 2    traMADoL (ULTRAM) 50 mg tablet Take 1 tablet (50 mg total) by mouth every 4 (four) hours as needed for pain 30 tablet 2    traMADoL (ULTRAM) 50 mg tablet Take 1 tablet (50 mg total) by mouth every 4 (four) hours as needed. 30 tablet 3    traMADoL (ULTRAM) 50 mg tablet Take 1 tablet (50 mg total) by mouth every 4 (four) hours as needed for pain 30 tablet 2    traMADoL (ULTRAM) 50 mg tablet Take one tablet by mouth every 4 hours as needed 30 tablet 2    traMADoL (ULTRAM) 50 mg tablet Take 1 tablet (50 mg total) by mouth every 4 (four) hours as needed. 30 tablet 2    traMADoL (ULTRAM) 50 mg tablet Take 1 tablet (50 mg total) by mouth every 4 (four) hours as needed for pain 32 tablet 2    traMADoL (ULTRAM) 50 mg tablet Take 1 tablet (50 mg total) by mouth every 4 (four) hours as needed for pain. 32 tablet 2    traMADoL (ULTRAM)  50 mg tablet Take 1 tablet by mouth every 4 hours as needed for pain 32 tablet 2    traMADoL (ULTRAM) 50 mg tablet Take 1 tablet (50 mg total) by mouth every 4 (four) hours as needed for pain 32 tablet 2    traMADoL (ULTRAM) 50 mg tablet take 1 tablet by mouth every 4 hours as needed for pain 32 tablet 3    traMADoL (ULTRAM) 50 mg tablet TAKE 1 TABLET BY MOUTH EVERY 4 HOURS 32 tablet 3    traMADoL (ULTRAM) 50 mg tablet Take 1 tablet (50 mg total) by mouth every 4 (four) hours as needed FOR PAIN 32 tablet 3       Please address this information as you see fit.  Feel free to contact us if you have any questions or require assistance.    Jill Castellon  [phone]          .

## 2025-03-14 NOTE — PROCEDURES
"  Pre Op Diagnosis: Encephalopathy  Post Op Diagnosis: Same    Procedure: LP    Procedure performed by: Michele    Written Informed Consent Obtained: Yes  Specimen Removed: YES 14cc of CSF  Estimated Blood Loss: Minimal    Findings:   Successful LP. Opening pressure 21cm H2O    Patient tolerated procedure well.    Mark Bautista MD ([facility])  Interventional Radiology  [phone]      "

## 2025-03-14 NOTE — ED NOTES
Pt has incompatible meds due.  I have notified house supervisor of the order for midline.  Will get meds initiated as I have available IV access

## 2025-03-14 NOTE — SEDATION DOCUMENTATION
IR procedure - Lumbar Puncture - is completed. Patient tolerated well. She is easily directed by voice, but does struggle with instructions, not an unexpected finding due to admitting diagnosis. Vital signs are stable 14 mL clear CSF is obtained for ordered tests. Opening pressure is measured at 21 cm H2O. Patient will return to the ED for continuation of care. Patient is discharged from IR.

## 2025-03-14 NOTE — ED PROVIDER NOTES
"Encounter Date: 3/13/2025       History     Chief Complaint   Patient presents with    Altered Mental Status     The pt presents to the ED with a complaint of altered mental status that started around 3 PM today.  Per the pts family the pt has a history of "a leg infection, and "the last time she was infected she acted like this".  Pts L leg is hot to touch     Patient is a 44 year old female with pMHx of HTN, venous stasis dermatitis of LLE, fungal infection of skin presenting with altered mental status. Daughter and  at bedside acting as historians. 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 Yesterday she was seen in clinic by Dr. Hancock and discharged with ketoconazole cream, salicylic acid gel, and terbinafine HCL 250mg tablet. Unclear if she started the medications. Last known normal yesterday afternoon. Reportedly went to bible study last night where she was falling asleep. Today has been significantly altered, febrile.       Review of patient's allergies indicates:  No Known Allergies  Past Medical History:   Diagnosis Date    Anemia     Arthritis     Fibromyalgia     GERD (gastroesophageal reflux disease)     Gout     HLD (hyperlipidemia)     HTN (hypertension) 2013    Peripheral neuropathy     Thyroid disease     Venous stasis dermatitis of left lower extremity      Past Surgical History:   Procedure Laterality Date    APPENDECTOMY       SECTION      CHOLECYSTECTOMY      ESOPHAGOGASTRODUODENOSCOPY N/A 2022    Procedure: EGD (ESOPHAGOGASTRODUODENOSCOPY);  Surgeon: Adis Huitron MD;  Location: [facility];  Service: Endoscopy;  Laterality: N/A;     Family History   Problem Relation Name Age of Onset    Stroke Mother mother     Colon cancer Maternal Aunt colon      Social History[1]  Review of Systems   All other systems reviewed and are negative.      Physical Exam     Initial Vitals [25]   BP Pulse Resp Temp SpO2   (!) " 115/44 102 20 (!) 103.4 °F (39.7 °C) 99 %      MAP       --         Physical Exam    Nursing note and vitals reviewed.  Constitutional:   Morbidly obese   HENT:   Head: Normocephalic and atraumatic. Mouth/Throat: Oropharynx is clear and moist.   Eyes: Pupils are equal, round, and reactive to light.   Cardiovascular:  Normal rate, regular rhythm and normal heart sounds.           Pulmonary/Chest: Breath sounds normal.   Abdominal: Abdomen is soft. Bowel sounds are normal.   Morbidly obese   Musculoskeletal:         General: No tenderness or edema. Normal range of motion.     Lymphadenopathy:     She has no cervical adenopathy.   Neurological:   Unresponsive   Skin: Skin is dry.   Hot to the touch         ED Course   Critical Care    Date/Time: 3/13/2025 10:27 PM    Performed by: Tyra Sanchez MD  Authorized by: Tyra Sanchez MD  Direct patient critical care time: 10 minutes  Additional history critical care time: 15 minutes  Ordering / reviewing critical care time: 15 minutes  Documentation critical care time: 20 minutes  Consulting other physicians critical care time: 15 minutes  Consult with family critical care time: 10 minutes  Total critical care time (exclusive of procedural time) : 85 minutes  Critical care time was exclusive of separately billable procedures and treating other patients.  Critical care was necessary to treat or prevent imminent or life-threatening deterioration of the following conditions: sepsis and CNS failure or compromise.  Critical care was time spent personally by me on the following activities: development of treatment plan with patient or surrogate, discussions with consultants, evaluation of patient's response to treatment, examination of patient, obtaining history from patient or surrogate, ordering and performing treatments and interventions, ordering and review of laboratory studies, ordering and review of radiographic studies, pulse oximetry, re-evaluation of patient's condition  and review of old charts.        Labs Reviewed   CBC W/ AUTO DIFFERENTIAL - Abnormal       Result Value    WBC 11.07      RBC 4.53      Hemoglobin 11.0 (*)     Hematocrit 35.5 (*)     MCV 78 (*)     MCH 24.3 (*)     MCHC 31.0 (*)     RDW 13.7      Platelets 188      MPV 10.6      Immature Granulocytes 0.5      Gran # (ANC) 9.2 (*)     Immature Grans (Abs) 0.06 (*)     Lymph # 1.2      Mono # 0.6      Eos # 0.0      Baso # 0.02      nRBC 0      Gran % 82.8 (*)     Lymph % 10.7 (*)     Mono % 5.8      Eosinophil % 0.0      Basophil % 0.2      Differential Method Automated     COMPREHENSIVE METABOLIC PANEL - Abnormal    Sodium 139      Potassium 2.2 (*)     Chloride 100      CO2 24      Glucose 129 (*)     BUN 17      Creatinine 1.0      Calcium 8.9      Total Protein 7.6      Albumin 3.1 (*)     Total Bilirubin 1.5 (*)     Alkaline Phosphatase 83      AST 34      ALT 40      eGFR >60      Anion Gap 15      Narrative:     K critical result(s) called and verbal readback obtained from ESTELA Vasquez RN. by RE3 03/13/2025 22:29   URINALYSIS - Abnormal    Specimen UA Urine, Catheterized      Color, UA Yellow      Appearance, UA Clear      pH, UA 6.0      Specific Gravity, UA 1.025      Protein, UA 1+ (*)     Glucose, UA Negative      Ketones, UA Negative      Bilirubin (UA) Negative      Occult Blood UA 1+ (*)     Nitrite, UA Negative      Urobilinogen, UA Negative      Leukocytes, UA Negative     PROCALCITONIN - Abnormal    Procalcitonin 17.68 (*)    DRUG SCREEN PANEL, URINE EMERGENCY - Abnormal    Benzodiazepines Negative      Methadone metabolites Negative      Cocaine (Metab.) Negative      Opiate Scrn, Ur Negative      Barbiturate Screen, Ur Presumptive Positive (*)     Amphetamine Screen, Ur Negative      THC Negative      Phencyclidine Negative      Creatinine, Urine 273.8      Toxicology Information SEE COMMENT      Narrative:     Specimen Source->Urine   POCT GLUCOSE - Abnormal    POCT Glucose 123 (*)    INFLUENZA A  & B BY MOLECULAR    Influenza A, Molecular Negative      Influenza B, Molecular Negative      Flu A & B Source Nasal swab     RESPIRATORY INFECTION PANEL (PCR), NASOPHARYNGEAL    Respiratory Infection Panel Source NP Swab      Narrative:     Assay not valid for lower respiratory specimens, alternate  testing required.   RESPIRATORY INFECTION PANEL (PCR), NASOPHARYNGEAL    Respiratory Infection Panel Source NP Swab      Narrative:     Assay not valid for lower respiratory specimens, alternate  testing required.   CULTURE, BLOOD   CULTURE, BLOOD   MAGNESIUM    Magnesium 1.8     SARS-COV-2 RNA AMPLIFICATION, QUAL    SARS-CoV-2 RNA, Amplification, Qual Negative     URINALYSIS MICROSCOPIC    RBC, UA 1      WBC, UA 2      Bacteria Occasional      Squam Epithel, UA 1      Hyaline Casts, UA 0      Microscopic Comment SEE COMMENT     PHOSPHORUS   LACTIC ACID, PLASMA   PHOSPHORUS   POCT GLUCOSE MONITORING CONTINUOUS          Imaging Results              CT Head Without Contrast (Final result)  Result time 03/13/25 23:04:30      Final result by Briana Cordova MD (03/13/25 23:04:30)                   Impression:      No acute intracranial abnormality or fracture.      Electronically signed by: Briana Cordova  Date:    03/13/2025  Time:    23:04               Narrative:    EXAMINATION:  CT HEAD WITHOUT CONTRAST    CLINICAL HISTORY:  Mental status change, unknown cause;    TECHNIQUE:  Low dose axial images were obtained through the head.  Coronal and sagittal reformations were also performed. Contrast was not administered.    COMPARISON:  None.    FINDINGS:  There is no acute intra or extra-axial hemorrhage or hematoma.  The gray-white matter junction differentiation is intact.  Ventricles, cisterns and sulci are appropriate for age.  There is no mass effect/midline shift.  Paranasal sinuses, mastoid air cells middle ears are clear.  Bony calvarium appears to be intact.                                       X-Ray Chest AP  Portable (Final result)  Result time 03/13/25 22:15:41      Final result by Briana Cordova MD (03/13/25 22:15:41)                   Impression:      The no acute abnormality.      Electronically signed by: Briana Cordova  Date:    03/13/2025  Time:    22:15               Narrative:    EXAMINATION:  XR CHEST AP PORTABLE    CLINICAL HISTORY:  Fever;    TECHNIQUE:  Single frontal view of the chest was performed.    COMPARISON:  09/27/2020 chest x-ray    FINDINGS:  Patient is rotated to the right.  The cardiac silhouette is not enlarged.  Chest wall attenuation artifact is asymmetric over the left lung.  Lungs appear clear as imaged.  There is no evidence of an effusion or pneumothorax.                                       Medications   potassium chloride 10 mEq in 100 mL IVPB (10 mEq Intravenous New Bag 3/13/25 2250)   potassium chloride SA CR tablet 40 mEq (has no administration in time range)   sodium chloride 0.9% flush 5 mL (has no administration in time range)   melatonin tablet 6 mg (has no administration in time range)   ondansetron injection 4 mg (has no administration in time range)   polyethylene glycol packet 17 g (has no administration in time range)   senna-docusate 8.6-50 mg per tablet 1 tablet (has no administration in time range)   acetaminophen tablet 650 mg (has no administration in time range)   naloxone 0.4 mg/mL injection 0.02 mg (has no administration in time range)   glucose chewable tablet 16 g (has no administration in time range)   glucose chewable tablet 24 g (has no administration in time range)   dextrose 50% injection 12.5 g (has no administration in time range)   dextrose 50% injection 25 g (has no administration in time range)   glucagon (human recombinant) injection 1 mg (has no administration in time range)   clotrimazole-betamethasone 1-0.05% cream (has no administration in time range)   cefTRIAXone injection 1 g (1 g Intravenous Given 3/13/25 2122)   sodium chloride 0.9%  bolus 1,000 mL 1,000 mL (1,000 mLs Intravenous New Bag 3/13/25 2121)   acetaminophen tablet 650 mg (650 mg Oral Given 3/13/25 2125)   acetaminophen suppository 650 mg (650 mg Rectal Given 3/13/25 2129)     Medical Decision Making  Differential Diagnosis includes, but is not limited to:  CVA/TIA, seizure, status epilepticus, post-ictal state, meningitis/encephalitis, sepsis, MI/ACS, arrhythmia, syncope, intracranial mass/hemorrhage, head trauma, anaphylaxis, substance abuse, alcohol intoxication/withdrawal, medication reaction, intentional medication overdose, neuroleptic malignant syndrome, serotonin syndrome, CO poisoning, hypoxia/hypercapnea, hepatic encephalopathy, metabolic disturbance, thyroid disease, hypoglycemia.     MDM:  The patient is a 44-year-old female with a temp of 104.1°.  She is minimally responsive.  Septic workup was done.  The patient will be admitted to the [facility] service.  Drug screen is positive for barbiturates, potassium is 2.2.  Potassium replacement will be ordered.  White blood cell count is normal.    Amount and/or Complexity of Data Reviewed  Labs: ordered. Decision-making details documented in ED Course.  Radiology: ordered.               ED Course as of 03/13/25 2306   u Mar 13, 2025   2157 SARS-CoV-2 RNA, Amplification, Qual: Negative [ST]   2158 Influenza A, Molecular: Negative [ST]   2158 Influenza B, Molecular: Negative [ST]   2206 SARS-CoV-2 RNA, Amplification, Qual: Negative [ST]   2206 Influenza A, Molecular: Negative [ST]   2206 Influenza B, Molecular: Negative [ST]   2224 POCT Glucose(!): 123 [ST]      ED Course User Index  [ST] Tyra Sanchez MD                           Clinical Impression:  Final diagnoses:  [R50.9] Fever (Primary)  [R41.82] Altered mental status, unspecified altered mental status type          ED Disposition Condition    Observation                     [1]   Social History  Tobacco Use    Smoking status: Never    Smokeless tobacco: Never    Substance Use Topics    Alcohol use: No    Drug use: No        Tyra Sanchez MD  03/13/25 [id number]

## 2025-03-14 NOTE — ED NOTES
Continue to wait for midline placement.  I have meds that will be administered when I have IV access.

## 2025-03-14 NOTE — NURSING
Patient is returned to ED room one. Report given to Anastasia. Pertinent details are shared, along with instructions to keep the head of bed flat until 13:05, one hour post needle out time. Any questions, please call IR at [phone]. Thank you.

## 2025-03-14 NOTE — PROGRESS NOTES
"[facility]  [facility] Medicine  Progress Note    Patient Name: Mohsen Julien  MRN: [id number]  Patient Class: IP- Inpatient   Admission Date: 3/13/2025  Length of Stay: 0 days  Attending Physician: Bj Ferrera,*  Primary Care Provider: Gilbert Hancock MD        Subjective     Principal Problem:Acute encephalopathy        HPI:  Patient is a 45 yo female w/ PMHx of HTN, HLD, GERD chronic leg rash. Presenting for altered mental status since 3/13 afternoon. History obtained from family. States that she had headache, fevers, nausea in the past day, however in the afternoon was acting differently, was tired and not talking. She would not get up and walk. Patient was seen in clinic 3/12 for leg rash and was complaining of congestion, rhinorrhea, sore throat, and intermittent cough. At that time patient was alert and acting her usual self. Pt nods "yes" to having neck pain.    In the ED, initial vital signs /44, , Temp 103.4, SpO2 99% on room air. Labs include CBC with stable H/H 11/35 and WBC 11.07. CMP with K+ 2.2, phos 1.4 and BUN/Cr 17/1. CXR and CT head without acute abnormalities. UA unremarkable. Procalcitonin 17.68. Lactate wnl.  Initiated on 1L bolus and ceftriaxone in ED. [facility] consulted for evaluation for admission for acute encephalopathy and sepsis.      Overview/Hospital Course:  No notes on file    Interval History: Patient evaluated,  at bedside. Patient nodding to pain, weakness. Fever improved to 100.2. LP ordered, to be completed today.     Review of Systems  Objective:     Vital Signs (Most Recent):  Temp: 100.2 °F (37.9 °C) (03/14/25 0604)  Pulse: 101 (03/14/25 0733)  Resp: 20 (03/14/25 0733)  BP: (!) 111/58 (03/14/25 0733)  SpO2: 98 % (03/14/25 0733) Vital Signs (24h Range):  Temp:  [100.2 °F (37.9 °C)-104.1 °F (40.1 °C)] 100.2 °F (37.9 °C)  Pulse:  [] 101  Resp:  [16-29] 20  SpO2:  [96 %-100 %] 98 %  BP: (103-147)/(44-64) 111/58 "     Weight: 114.8 kg (253 lb)  Body mass index is 43.43 kg/m².    Intake/Output Summary (Last 24 hours) at 3/14/2025 0841  Last data filed at 3/14/2025 0839  Gross per 24 hour   Intake 1922.26 ml   Output --   Net 1922.26 ml         Physical Exam  Constitutional:       Appearance: She is ill-appearing.   HENT:      Head: Normocephalic.   Eyes:      Extraocular Movements: Extraocular movements intact.   Cardiovascular:      Rate and Rhythm: Regular rhythm. Tachycardia present.      Pulses: Normal pulses.      Heart sounds: Normal heart sounds.   Pulmonary:      Effort: Pulmonary effort is normal. No respiratory distress.      Breath sounds: Normal breath sounds. No wheezing.   Abdominal:      General: Abdomen is flat. Bowel sounds are normal. There is no distension.      Palpations: Abdomen is soft.      Tenderness: There is no abdominal tenderness.   Musculoskeletal:         General: No swelling or tenderness. Normal range of motion.   Skin:     General: Skin is warm.      Findings: Rash (LLE) present.   Neurological:      Mental Status: She is alert.      GCS: GCS eye subscore is 4. GCS verbal subscore is 2. GCS motor subscore is 6.      Cranial Nerves: No cranial nerve deficit or facial asymmetry.      Sensory: Sensation is intact. No sensory deficit.      Motor: Weakness present.               Significant Labs: All pertinent labs within the past 24 hours have been reviewed.    Significant Imaging: I have reviewed all pertinent imaging results/findings within the past 24 hours.      Assessment & Plan  Acute encephalopathy  IMPROVING  Suspect in setting of meningitis.  Patient started having AMS 3/13 in the afternoon. Patient is alert, follows some commands, however is not verbal. Shakes head yes/no. Suspect mental status change is secondary to infection. Will need to rule out meningitis. Low suspicion for stroke as patient moves all extremities spontaneously.  TSH 0.072 low, T4 1.05 wnl  CT head no acute  "abnormalities  Drug screen positive for barbiturates, however does not fit clinical picture    Plan:  - B12, B9, B6, B1, VBG pending  - Empiric meningitis coverage. See plan for "SIRS"  - Consider further stroke work-up with CTA pending LP results.   - headache, generalized body pain treated with toradol    HTN (hypertension)  Patient's blood pressure range in the last 24 hours was: BP  Min: 103/55  Max: 147/58.The patient's inpatient anti-hypertensive regimen is listed below:  Current Antihypertensives       Plan  - Hold home antihypertensives  Fungal infection of skin  Patient has had chronic left leg rash for over a year, possible venous stasis or fungal infection. Per chart review it appeared to improve with oral antifungals in the past, however has gotten worse after stopping medications. Area is hyperpigmented and warm to the touch, cannot rule out cellulitis. No large wounds. See media for clinic images from 3/12/25    Fungal infection is unlikely source of sepsis at this time, will hold off on oral antifungals for now.  XR L tib/fib and foot without osseous abnormalities    Plan:  - Clotrimazole-betamethasone topically    Hypokalemia  Patient with history of hypokalemia. Initial K 2.2 in ED. Patient's most recent potassium results are listed below.   Recent Labs     03/14/25  0155 03/14/25  0528 03/14/25  0908   K 2.2* 2.9* 2.9*     Plan  - Replete potassium per protocol  - Monitor potassium Every 4 hours  - EKG appears stable  SIRS (systemic inflammatory response syndrome)  Patient presenting with high fever 104, tachycardia. UA negative, CXR no focal consolidation. Flu/Covid negative. Patient does have left foot rash but does not seem to explain severity of symptoms. Headaches, nausea, encephalopathy concerning for meningitis, would like to rule out with LP.   S/p 1L bolus in ED. Procal 17.68. Lactate wnl. WBC wnl 11, but higher than baseline    Plan:  - Empiric meningitis coverage  - Vancomycin, " ceftriaxone 2g q12h, Acyclovir 10mg/kg q8h, Dexamethasone 10mg q6h  - HSV PCR pending  - Blood cultures pending (3/14)  - Respiratory panel pending  - IR consult for LP, pending CSF fluid studies  Right upper quadrant abdominal tenderness without rebound tenderness  RESOLVED  Patient right upper quadrant tender to palpation. No rebound or guarding. Initial T-bili 1.5. Lactic 1.9. Direct bilirubin 0.7.  RUQ US mild hepatomegaly.    VTE Risk Mitigation (From admission, onward)           Ordered     IP VTE HIGH RISK PATIENT  Once         03/13/25 2249     Place sequential compression device  Until discontinued         03/13/25 2249                    Discharge Planning   SAYRA:      Code Status: Full Code   Medical Readiness for Discharge Date:                            Darlene Henriquez MD  Department of Hospital Medicine   [facility] - Emergency Dept

## 2025-03-14 NOTE — ASSESSMENT & PLAN NOTE
Patient with history of hypokalemia. Initial K 2.2 in ED. Patient's most recent potassium results are listed below.   Recent Labs     03/14/25  0155 03/14/25  0528 03/14/25  0908   K 2.2* 2.9* 2.9*     Plan  - Replete potassium per protocol  - Monitor potassium Every 4 hours  - EKG appears stable

## 2025-03-14 NOTE — ASSESSMENT & PLAN NOTE
RESOLVED  Patient right upper quadrant tender to palpation. No rebound or guarding. Initial T-bili 1.5. Lactic 1.9. Direct bilirubin 0.7.  RUQ US mild hepatomegaly.

## 2025-03-14 NOTE — ASSESSMENT & PLAN NOTE
Patient's blood pressure range in the last 24 hours was: BP  Min: 103/55  Max: 147/58.The patient's inpatient anti-hypertensive regimen is listed below:  Current Antihypertensives       Plan  - Hold home antihypertensives

## 2025-03-14 NOTE — HPI
"Patient is a 43 yo female w/ PMHx of HTN, HLD, GERD chronic leg rash. Presenting for altered mental status since 3/13 afternoon. History obtained from family. States that she had headache, fevers, nausea in the past day, however in the afternoon was acting differently, was tired and not talking. She would not get up and walk. Patient was seen in clinic 3/12 for leg rash and was complaining of congestion, rhinorrhea, sore throat, and intermittent cough. At that time patient was alert and acting her usual self. Pt nods "yes" to having neck pain.    In the ED, initial vital signs /44, , Temp 103.4, SpO2 99% on room air. Labs include CBC with stable H/H 11/35 and WBC 11.07. CMP with K+ 2.2, phos 1.4 and BUN/Cr 17/1. CXR and CT head without acute abnormalities. UA unremarkable. Procalcitonin 17.68. Lactate wnl.  Initiated on 1L bolus and ceftriaxone in ED. [facility] consulted for evaluation for admission for acute encephalopathy and sepsis.    "

## 2025-03-14 NOTE — H&P
"  [facility] Medicine  History & Physical    Patient Name: Mohsen Julien  MRN: [id number]  Patient Class: OP- Observation  Admission Date: 3/13/2025  Attending Physician: Dr. Clarisse Cuadra  Primary Care Provider: Gilbert Hancock MD         Patient information was obtained from spouse/SO, relative(s), past medical records, and ER records.     Subjective:     Principal Problem:Acute encephalopathy    Chief Complaint:   Chief Complaint   Patient presents with    Altered Mental Status     The pt presents to the ED with a complaint of altered mental status that started around 3 PM today.  Per the pts family the pt has a history of "a leg infection, and "the last time she was infected she acted like this".  Pts L leg is hot to touch        HPI: Patient is a 43 yo female w/ PMHx of HTN, HLD, GERD chronic leg rash. Presenting for altered mental status since 3/13 afternoon. History obtained from family. States that she had headache, fevers, nausea in the past day, however in the afternoon was acting differently, was tired and not talking. She would not get up and walk. Patient was seen in clinic 3/12 for leg rash and was complaining of congestion, rhinorrhea, sore throat, and intermittent cough. At that time patient was alert and acting her usual self. Pt nods "yes" to having neck pain.    In the ED, initial vital signs /44, , Temp 103.4, SpO2 99% on room air. Labs include CBC with stable H/H 11/35 and WBC 11.07. CMP with K+ 2.2, phos 1.4 and BUN/Cr 17/1. CXR and CT head without acute abnormalities. UA unremarkable. Procalcitonin 17.68. Lactate wnl.  Initiated on 1L bolus and ceftriaxone in ED. [facility] consulted for evaluation for admission for acute encephalopathy and sepsis.      Past Medical History:   Diagnosis Date    Anemia     Arthritis     Fibromyalgia     GERD (gastroesophageal reflux disease)     Gout     HLD (hyperlipidemia)     HTN (hypertension) 5/8/2013    " Peripheral neuropathy     Thyroid disease     Venous stasis dermatitis of left lower extremity        Past Surgical History:   Procedure Laterality Date    APPENDECTOMY       SECTION      CHOLECYSTECTOMY      ESOPHAGOGASTRODUODENOSCOPY N/A 2022    Procedure: EGD (ESOPHAGOGASTRODUODENOSCOPY);  Surgeon: Adis Huitron MD;  Location: [facility];  Service: Endoscopy;  Laterality: N/A;       Review of patient's allergies indicates:  No Known Allergies    No current facility-administered medications on file prior to encounter.     Current Outpatient Medications on File Prior to Encounter   Medication Sig    allopurinoL (ZYLOPRIM) 300 MG tablet Take 1 tablet (300 mg total) by mouth once daily.    amitriptyline (ELAVIL) 25 MG tablet Take 1 to 2 tablets by mouth at bedtime as needed for sleep    clotrimazole-betamethasone 1-0.05% (LOTRISONE) cream Apply topically locally as directed daily    furosemide (LASIX) 40 MG tablet Take 1 tablet (40 mg total) by mouth 2 (two) times daily.    gabapentin (NEURONTIN) 100 MG capsule Take 3 capsules (300 mg total) by mouth 3 (three) times daily. Take 100 mg three times daily for first 2 days, then take 200 mg three times daily for next 2 days, then increase to 300 mg three times daily.    ketoconazole (NIZORAL) 2 % cream Apply topically once daily.    methocarbamoL (ROBAXIN) 750 MG Tab Take 1 tablet by mouth 3 times daily as needed for muscle spasms    metoprolol tartrate (LOPRESSOR) 100 MG tablet TAKE ONE TABLET BY MOUTH TWO TIMES A DAY FOR BLOOD PRESSURE    MIRENA 20 mcg/24 hr (5 years) IUD TO BE INSERTED ONE TIME BY PRESCRIBER. ROUTE INTRAUTERINE.    mupirocin (BACTROBAN) 2 % ointment Apply to affected area as directed    natrexone tablet 4 mg Take 2 tablets in morning for 7 days. Then take 2 tablets twice daily for 7 days. Then take 4 tablets in morning, 2 tablets at night for 7 days. Then take 4 tablets twice daily.    pantoprazole (PROTONIX) 40 MG tablet 1 tablet by  mouth daily; take daily for 2 months after surgery (Patient taking differently: Take 40 mg by mouth once daily.)    predniSONE (DELTASONE) 20 MG tablet Take 1 tablet (20 mg total) by mouth once daily.    salicylic acid 2 % Gel Apply 1 Application topically once daily.    terbinafine HCL (LAMISIL) 250 mg tablet Take 1 tablet (250 mg total) by mouth once daily. for 14 days    traMADoL (ULTRAM) 50 mg tablet Take 1 tablet (50 mg total) by mouth every 6 (six) hours as needed.    traMADoL (ULTRAM) 50 mg tablet TAKE 1 TABLET BY MOUTH EVERY 4 HOURS    traMADoL (ULTRAM) 50 mg tablet Take 1 tablet (50 mg total) by mouth every 4 (four) hours as needed for pain    traMADoL (ULTRAM) 50 mg tablet Take 1 tablet (50 mg total) by mouth every 4 (four) hours as needed.    traMADoL (ULTRAM) 50 mg tablet Take 1 tablet (50 mg total) by mouth every 4 (four) hours as needed for pain    traMADoL (ULTRAM) 50 mg tablet Take one tablet by mouth every 4 hours as needed    traMADoL (ULTRAM) 50 mg tablet Take 1 tablet (50 mg total) by mouth every 4 (four) hours as needed.    traMADoL (ULTRAM) 50 mg tablet Take 1 tablet (50 mg total) by mouth every 4 (four) hours as needed for pain    traMADoL (ULTRAM) 50 mg tablet Take 1 tablet (50 mg total) by mouth every 4 (four) hours as needed for pain.    traMADoL (ULTRAM) 50 mg tablet Take 1 tablet by mouth every 4 hours as needed for pain    traMADoL (ULTRAM) 50 mg tablet Take 1 tablet (50 mg total) by mouth every 4 (four) hours as needed for pain    traMADoL (ULTRAM) 50 mg tablet take 1 tablet by mouth every 4 hours as needed for pain    traMADoL (ULTRAM) 50 mg tablet TAKE 1 TABLET BY MOUTH EVERY 4 HOURS    traMADoL (ULTRAM) 50 mg tablet Take 1 tablet (50 mg total) by mouth every 4 (four) hours as needed FOR PAIN    triamterene-hydrochlorothiazide 37.5-25 mg (MAXZIDE-25) 37.5-25 mg per tablet Take 1 tablet by mouth once daily.     Family History       Problem Relation (Age of Onset)    Colon cancer  "Maternal Aunt    Stroke Mother          Tobacco Use    Smoking status: Never    Smokeless tobacco: Never   Substance and Sexual Activity    Alcohol use: No    Drug use: No    Sexual activity: Yes     Partners: Male     Birth control/protection: Implant     Review of Systems   Unable to perform ROS: Patient nonverbal     Objective:     Vital Signs (Most Recent):  Temp: (!) 101.6 °F (38.7 °C) (03/14/25 0048)  Pulse: 101 (03/14/25 0103)  Resp: (!) 25 (03/14/25 0103)  BP: (!) 134/58 (03/14/25 0103)  SpO2: 99 % (03/14/25 0103) Vital Signs (24h Range):  Temp:  [101.6 °F (38.7 °C)-104.1 °F (40.1 °C)] 101.6 °F (38.7 °C)  Pulse:  [] 101  Resp:  [16-29] 25  SpO2:  [96 %-100 %] 99 %  BP: (115-147)/(44-64) 134/58     Weight: 114.8 kg (253 lb)  Body mass index is 43.43 kg/m².     Physical Exam  Constitutional:       General: She is not in acute distress.     Appearance: She is not ill-appearing.      Comments: Alert, not talking, at times will nod head "yes/no", follows some commands   HENT:      Head: Normocephalic and atraumatic.   Eyes:      General: No scleral icterus.        Right eye: No discharge.         Left eye: No discharge.      Extraocular Movements: Extraocular movements intact.      Pupils: Pupils are equal, round, and reactive to light.   Cardiovascular:      Rate and Rhythm: Regular rhythm. Tachycardia present.      Heart sounds: No murmur heard.  Pulmonary:      Effort: Pulmonary effort is normal. No respiratory distress.      Breath sounds: Normal breath sounds. No stridor. No wheezing or rhonchi.   Abdominal:      General: There is no distension.      Palpations: Abdomen is soft.      Tenderness: There is abdominal tenderness. There is no guarding or rebound.      Comments: Obese abdomen, tender  to deep palpation in RUQ   Musculoskeletal:         General: No tenderness or deformity.      Cervical back: No rigidity.   Skin:     General: Skin is warm.      Findings: Rash (Left lower leg and part of foot " "with hyperpigmented rash. See images in chart from 3/12) present.   Neurological:      GCS: GCS eye subscore is 4. GCS verbal subscore is 2. GCS motor subscore is 6.      Cranial Nerves: No facial asymmetry.      Comments: Moves all extremities spontaneously              CRANIAL NERVES     CN III, IV, VI   Pupils are equal, round, and reactive to light.       Significant Labs: All pertinent labs within the past 24 hours have been reviewed.  CBC:   Recent Labs   Lab 03/13/25  2108   WBC 11.07   HGB 11.0*   HCT 35.5*        CMP:   Recent Labs   Lab 03/13/25  2108      K 2.2*      CO2 24   *   BUN 17   CREATININE 1.0   CALCIUM 8.9   PROT 7.6   ALBUMIN 3.1*   BILITOT 1.5*   ALKPHOS 83   AST 34   ALT 40   ANIONGAP 15     Lactic Acid:   Recent Labs   Lab 03/14/25  0022   LACTATE 1.0     Urine Studies:   Recent Labs   Lab 03/13/25  2204   COLORU Yellow   APPEARANCEUA Clear   PHUR 6.0   SPECGRAV 1.025   PROTEINUA 1+*   GLUCUA Negative   KETONESU Negative   BILIRUBINUA Negative   OCCULTUA 1+*   NITRITE Negative   UROBILINOGEN Negative   LEUKOCYTESUR Negative   RBCUA 1   WBCUA 2   BACTERIA Occasional   SQUAMEPITHEL 1   HYALINECASTS 0       Significant Imaging: I have reviewed all pertinent imaging results/findings within the past 24 hours.  Assessment/Plan:     * Acute encephalopathy  Patient started having AMS 3/13 in the afternoon. Patient is alert, follows some commands, however is not verbal. Shakes head yes/no. Suspect mental status change is secondary to infection. Will need to rule out meningitis. Low suspicion for stroke as patient moves all extremities spontaneously.  TSH 0.072 low, T4 1.05 wnl    - Hypokalemia of 2.2 could be contributing   - CT head no acute abnormalities  - Drug screen positive for barbiturates, however does not fit clinical picture  - B12, B9, B6, B1, VBG pending  - Empiric meningitis coverage. See plan for "SIRS"  - Consider further stroke work-up with CTA      SIRS " (systemic inflammatory response syndrome)  Patient presenting with high fever 104, tachycardia. UA negative, CXR no focal consolidation. Flu/Covid negative. Patient does have left foot rash but does not seem to explain severity of symptoms. Headaches, nausea, encephalopathy concerning for meningitis, would like to rule out with LP.   S/p 1L bolus in ED. Procal 17.68. Lactate wnl. WBC wnl 11, but higher than baseline      - Empiric meningitis coverage  - Vancomycin, ceftriaxone 2g q12h, Acyclovir 10mg/kg q8h, Dexamethasone 10mg q6h  - Blood cultures pending (3/14)  - Respiratory panel pending  - IR consult for LP    Hypokalemia  Patient with history of hypokalemia. Initial K 2.2 in ED. Patient's most recent potassium results are listed below.   Recent Labs     03/13/25 2108   K 2.2*     Plan  - Replete potassium per protocol  - Monitor potassium Every 4 hours  - EKG appears stable  - Repleting phos and mg as well    Right upper quadrant abdominal tenderness without rebound tenderness  Patient right upper quadrant tender to palpation. No rebound or guarding. Initial T-bili 1.5. Lactic 1.9    - RUQUS pending  - Direct bilirubin pending      Fungal infection of skin  Patient has had chronic left leg rash for over a year, possible venous stasis or fungal infection. Per chart review it appeared to improve with oral antifungals in the past, however has gotten worse after stopping medications. Area is hyperpigmented and warm to the touch, cannot rule out cellulitis. No large wounds. See media for clinic images from 3/12/25    Fungal infection is unlikely source of sepsis at this time, will hold off on oral antifungals for now.    - XR L tib/fib and foot  - Clotrimazole-betamethasone topically      HTN (hypertension)  Patient's blood pressure range in the last 24 hours was: BP  Min: 115/44  Max: 147/58.The patient's inpatient anti-hypertensive regimen is listed below:  Current Antihypertensives       Plan  - Hold home  antihypertensives      VTE Risk Mitigation (From admission, onward)           Ordered     IP VTE HIGH RISK PATIENT  Once         03/13/25 2249     Place sequential compression device  Until discontinued         03/13/25 2249                           On 03/14/2025, patient should be placed in hospital observation services under my care in collaboration with Dr. Cuadra.    Pharmacokinetic Initial Assessment: IV Vancomycin    Assessment/Plan:    Initiate intravenous vancomycin with loading dose of 2500 mg once followed by a maintenance dose of vancomycin 1500mg IV every 12 hours  Desired empiric serum trough concentration is 15 to 20 mcg/mL  Draw vancomycin trough level 60 min prior to fourth dose on 3/15 at approximately 1300  Pharmacy will continue to follow and monitor vancomycin.      Please contact pharmacy at extension [phone] with any questions regarding this assessment.     Thank you for the consult,   Julieta Quick       Patient brief summary:  Mohsen Julien is a 44 y.o. female initiated on antimicrobial therapy with IV Vancomycin for treatment of suspected sepsis    Drug Allergies:   Review of patient's allergies indicates:  No Known Allergies    Actual Body Weight:   114kg    Renal Function:   Estimated Creatinine Clearance: 87.3 mL/min (based on SCr of 1 mg/dL).,     Dialysis Method (if applicable):  N/A    CBC (last 72 hours):  Recent Labs   Lab Result Units 03/13/25 2108   WBC K/uL 11.07   Hemoglobin g/dL 11.0*   Hematocrit % 35.5*   Platelets K/uL 188   Gran % % 82.8*   Lymph % % 10.7*   Mono % % 5.8   Eosinophil % % 0.0   Basophil % % 0.2   Differential Method  Automated       Metabolic Panel (last 72 hours):  Recent Labs   Lab Result Units 03/13/25 2108 03/13/25 2204 03/13/25 2225   Sodium mmol/L 139  --   --    Potassium mmol/L 2.2*  --   --    Chloride mmol/L 100  --   --    CO2 mmol/L 24  --   --    Glucose mg/dL 129*  --   --    Glucose, UA   --  Negative  --    BUN mg/dL 17  --   --   "  Creatinine mg/dL 1.0  --   --    Creatinine, Urine mg/dL  --   --  273.8   Albumin g/dL 3.1*  --   --    Total Bilirubin mg/dL 1.5*  --   --    Alkaline Phosphatase U/L 83  --   --    AST U/L 34  --   --    ALT U/L 40  --   --    Magnesium mg/dL 1.8  --   --    Phosphorus mg/dL 1.4*  --   --        Drug levels (last 3 results):  No results for input(s): "VANCOMYCINRA", "VANCORANDOM", "VANCOMYCINPE", "VANCOPEAK", "VANCOMYCINTR", "VANCOTROUGH" in the last 72 hours.    Microbiologic Results:  Microbiology Results (last 7 days)       Procedure Component Value Units Date/Time    Blood culture x two cultures. Draw prior to antibiotics. [[id number]] Collected: 03/13/25 2109    Order Status: Sent Specimen: Blood from Peripheral, Forearm, Left Updated: 03/14/25 0131    Blood culture x two cultures. Draw prior to antibiotics. [[id number]] Collected: 03/13/25 2108    Order Status: Sent Specimen: Blood from Peripheral, Upper Arm, Right Updated: 03/14/25 0131    Respiratory Infection Panel (PCR), Nasopharyngeal [[id number]] Collected: 03/13/25 2256    Order Status: Completed Specimen: Nasopharyngeal Swab Updated: 03/13/25 2256     Respiratory Infection Panel Source NP Swab    Narrative:      Assay not valid for lower respiratory specimens, alternate  testing required.    Respiratory Infection Panel (PCR), Nasopharyngeal [[id number]] Collected: 03/13/25 2255    Order Status: Completed Specimen: Nasopharyngeal Swab Updated: 03/13/25 2256     Respiratory Infection Panel Source NP Swab    Narrative:      Assay not valid for lower respiratory specimens, alternate  testing required.    Influenza A & B by Molecular [[id number]] Collected: 03/13/25 2119    Order Status: Completed Specimen: Nasopharyngeal Swab Updated: 03/13/25 2148     Influenza A, Molecular Negative     Influenza B, Molecular Negative     Flu A & B Source Nasal swab              Gila Aleman MD  Department of Hospital Medicine  [facility] Emergency Dept          "

## 2025-03-14 NOTE — SUBJECTIVE & OBJECTIVE
Past Medical History:   Diagnosis Date    Anemia     Arthritis     Fibromyalgia     GERD (gastroesophageal reflux disease)     Gout     HLD (hyperlipidemia)     HTN (hypertension) 2013    Peripheral neuropathy     Thyroid disease     Venous stasis dermatitis of left lower extremity        Past Surgical History:   Procedure Laterality Date    APPENDECTOMY       SECTION      CHOLECYSTECTOMY      ESOPHAGOGASTRODUODENOSCOPY N/A 2022    Procedure: EGD (ESOPHAGOGASTRODUODENOSCOPY);  Surgeon: Adis Huitron MD;  Location: [facility];  Service: Endoscopy;  Laterality: N/A;       Review of patient's allergies indicates:  No Known Allergies    No current facility-administered medications on file prior to encounter.     Current Outpatient Medications on File Prior to Encounter   Medication Sig    allopurinoL (ZYLOPRIM) 300 MG tablet Take 1 tablet (300 mg total) by mouth once daily.    amitriptyline (ELAVIL) 25 MG tablet Take 1 to 2 tablets by mouth at bedtime as needed for sleep    clotrimazole-betamethasone 1-0.05% (LOTRISONE) cream Apply topically locally as directed daily    furosemide (LASIX) 40 MG tablet Take 1 tablet (40 mg total) by mouth 2 (two) times daily.    gabapentin (NEURONTIN) 100 MG capsule Take 3 capsules (300 mg total) by mouth 3 (three) times daily. Take 100 mg three times daily for first 2 days, then take 200 mg three times daily for next 2 days, then increase to 300 mg three times daily.    ketoconazole (NIZORAL) 2 % cream Apply topically once daily.    methocarbamoL (ROBAXIN) 750 MG Tab Take 1 tablet by mouth 3 times daily as needed for muscle spasms    metoprolol tartrate (LOPRESSOR) 100 MG tablet TAKE ONE TABLET BY MOUTH TWO TIMES A DAY FOR BLOOD PRESSURE    MIRENA 20 mcg/24 hr (5 years) IUD TO BE INSERTED ONE TIME BY PRESCRIBER. ROUTE INTRAUTERINE.    mupirocin (BACTROBAN) 2 % ointment Apply to affected area as directed    natrexone tablet 4 mg Take 2 tablets in morning for 7 days.  Then take 2 tablets twice daily for 7 days. Then take 4 tablets in morning, 2 tablets at night for 7 days. Then take 4 tablets twice daily.    pantoprazole (PROTONIX) 40 MG tablet 1 tablet by mouth daily; take daily for 2 months after surgery (Patient taking differently: Take 40 mg by mouth once daily.)    predniSONE (DELTASONE) 20 MG tablet Take 1 tablet (20 mg total) by mouth once daily.    salicylic acid 2 % Gel Apply 1 Application topically once daily.    terbinafine HCL (LAMISIL) 250 mg tablet Take 1 tablet (250 mg total) by mouth once daily. for 14 days    traMADoL (ULTRAM) 50 mg tablet Take 1 tablet (50 mg total) by mouth every 6 (six) hours as needed.    traMADoL (ULTRAM) 50 mg tablet TAKE 1 TABLET BY MOUTH EVERY 4 HOURS    traMADoL (ULTRAM) 50 mg tablet Take 1 tablet (50 mg total) by mouth every 4 (four) hours as needed for pain    traMADoL (ULTRAM) 50 mg tablet Take 1 tablet (50 mg total) by mouth every 4 (four) hours as needed.    traMADoL (ULTRAM) 50 mg tablet Take 1 tablet (50 mg total) by mouth every 4 (four) hours as needed for pain    traMADoL (ULTRAM) 50 mg tablet Take one tablet by mouth every 4 hours as needed    traMADoL (ULTRAM) 50 mg tablet Take 1 tablet (50 mg total) by mouth every 4 (four) hours as needed.    traMADoL (ULTRAM) 50 mg tablet Take 1 tablet (50 mg total) by mouth every 4 (four) hours as needed for pain    traMADoL (ULTRAM) 50 mg tablet Take 1 tablet (50 mg total) by mouth every 4 (four) hours as needed for pain.    traMADoL (ULTRAM) 50 mg tablet Take 1 tablet by mouth every 4 hours as needed for pain    traMADoL (ULTRAM) 50 mg tablet Take 1 tablet (50 mg total) by mouth every 4 (four) hours as needed for pain    traMADoL (ULTRAM) 50 mg tablet take 1 tablet by mouth every 4 hours as needed for pain    traMADoL (ULTRAM) 50 mg tablet TAKE 1 TABLET BY MOUTH EVERY 4 HOURS    traMADoL (ULTRAM) 50 mg tablet Take 1 tablet (50 mg total) by mouth every 4 (four) hours as needed FOR PAIN     "triamterene-hydrochlorothiazide 37.5-25 mg (MAXZIDE-25) 37.5-25 mg per tablet Take 1 tablet by mouth once daily.     Family History       Problem Relation (Age of Onset)    Colon cancer Maternal Aunt    Stroke Mother          Tobacco Use    Smoking status: Never    Smokeless tobacco: Never   Substance and Sexual Activity    Alcohol use: No    Drug use: No    Sexual activity: Yes     Partners: Male     Birth control/protection: Implant     Review of Systems   Unable to perform ROS: Patient nonverbal     Objective:     Vital Signs (Most Recent):  Temp: (!) 101.6 °F (38.7 °C) (03/14/25 0048)  Pulse: 101 (03/14/25 0103)  Resp: (!) 25 (03/14/25 0103)  BP: (!) 134/58 (03/14/25 0103)  SpO2: 99 % (03/14/25 0103) Vital Signs (24h Range):  Temp:  [101.6 °F (38.7 °C)-104.1 °F (40.1 °C)] 101.6 °F (38.7 °C)  Pulse:  [] 101  Resp:  [16-29] 25  SpO2:  [96 %-100 %] 99 %  BP: (115-147)/(44-64) 134/58     Weight: 114.8 kg (253 lb)  Body mass index is 43.43 kg/m².     Physical Exam  Constitutional:       General: She is not in acute distress.     Appearance: She is not ill-appearing.      Comments: Alert, not talking, at times will nod head "yes/no", follows some commands   HENT:      Head: Normocephalic and atraumatic.   Eyes:      General: No scleral icterus.        Right eye: No discharge.         Left eye: No discharge.      Extraocular Movements: Extraocular movements intact.      Pupils: Pupils are equal, round, and reactive to light.   Cardiovascular:      Rate and Rhythm: Regular rhythm. Tachycardia present.      Heart sounds: No murmur heard.  Pulmonary:      Effort: Pulmonary effort is normal. No respiratory distress.      Breath sounds: Normal breath sounds. No stridor. No wheezing or rhonchi.   Abdominal:      General: There is no distension.      Palpations: Abdomen is soft.      Tenderness: There is abdominal tenderness. There is no guarding or rebound.      Comments: Obese abdomen, tender  to deep palpation in RUQ "   Musculoskeletal:         General: No tenderness or deformity.      Cervical back: No rigidity.   Skin:     General: Skin is warm.      Findings: Rash (Left lower leg and part of foot with hyperpigmented rash. See images in chart from 3/12) present.   Neurological:      GCS: GCS eye subscore is 4. GCS verbal subscore is 2. GCS motor subscore is 6.      Cranial Nerves: No facial asymmetry.      Comments: Moves all extremities spontaneously              CRANIAL NERVES     CN III, IV, VI   Pupils are equal, round, and reactive to light.       Significant Labs: All pertinent labs within the past 24 hours have been reviewed.  CBC:   Recent Labs   Lab 03/13/25  2108   WBC 11.07   HGB 11.0*   HCT 35.5*        CMP:   Recent Labs   Lab 03/13/25  2108      K 2.2*      CO2 24   *   BUN 17   CREATININE 1.0   CALCIUM 8.9   PROT 7.6   ALBUMIN 3.1*   BILITOT 1.5*   ALKPHOS 83   AST 34   ALT 40   ANIONGAP 15     Lactic Acid:   Recent Labs   Lab 03/14/25  0022   LACTATE 1.0     Urine Studies:   Recent Labs   Lab 03/13/25  2204   COLORU Yellow   APPEARANCEUA Clear   PHUR 6.0   SPECGRAV 1.025   PROTEINUA 1+*   GLUCUA Negative   KETONESU Negative   BILIRUBINUA Negative   OCCULTUA 1+*   NITRITE Negative   UROBILINOGEN Negative   LEUKOCYTESUR Negative   RBCUA 1   WBCUA 2   BACTERIA Occasional   SQUAMEPITHEL 1   HYALINECASTS 0       Significant Imaging: I have reviewed all pertinent imaging results/findings within the past 24 hours.

## 2025-03-14 NOTE — ASSESSMENT & PLAN NOTE
Patient has had chronic left leg rash for over a year, possible venous stasis or fungal infection. Per chart review it appeared to improve with oral antifungals in the past, however has gotten worse after stopping medications. Area is hyperpigmented and warm to the touch, cannot rule out cellulitis. No large wounds. See media for clinic images from 3/12/25    Fungal infection is unlikely source of sepsis at this time, will hold off on oral antifungals for now.    - XR L tib/fib and foot  - Clotrimazole-betamethasone topically

## 2025-03-14 NOTE — ASSESSMENT & PLAN NOTE
Patient right upper quadrant tender to palpation. No rebound or guarding. Initial T-bili 1.5. Lactic 1.9    - RUQUS pending  - Direct bilirubin pending

## 2025-03-14 NOTE — ASSESSMENT & PLAN NOTE
Patient's blood pressure range in the last 24 hours was: BP  Min: 115/44  Max: 147/58.The patient's inpatient anti-hypertensive regimen is listed below:  Current Antihypertensives       Plan  - Hold home antihypertensives

## 2025-03-15 PROBLEM — G93.49 INFECTIOUS ENCEPHALOPATHY: Status: ACTIVE | Noted: 2025-03-13

## 2025-03-15 PROBLEM — B99.9 INFECTIOUS ENCEPHALOPATHY: Status: ACTIVE | Noted: 2025-03-13

## 2025-03-15 LAB
ALBUMIN SERPL BCP-MCNC: 2.7 G/DL (ref 3.5–5.2)
ALP SERPL-CCNC: 73 U/L (ref 40–150)
ALT SERPL W/O P-5'-P-CCNC: 39 U/L (ref 10–44)
ANION GAP SERPL CALC-SCNC: 11 MMOL/L (ref 8–16)
AST SERPL-CCNC: 32 U/L (ref 10–40)
BASOPHILS # BLD AUTO: 0.01 K/UL (ref 0–0.2)
BASOPHILS NFR BLD: 0.1 % (ref 0–1.9)
BILIRUB SERPL-MCNC: 0.3 MG/DL (ref 0.1–1)
BUN SERPL-MCNC: 21 MG/DL (ref 6–20)
CALCIUM SERPL-MCNC: 8.3 MG/DL (ref 8.7–10.5)
CHLORIDE SERPL-SCNC: 105 MMOL/L (ref 95–110)
CO2 SERPL-SCNC: 23 MMOL/L (ref 23–29)
CREAT SERPL-MCNC: 0.8 MG/DL (ref 0.5–1.4)
DIFFERENTIAL METHOD BLD: ABNORMAL
EOSINOPHIL # BLD AUTO: 0 K/UL (ref 0–0.5)
EOSINOPHIL NFR BLD: 0 % (ref 0–8)
ERYTHROCYTE [DISTWIDTH] IN BLOOD BY AUTOMATED COUNT: 14.5 % (ref 11.5–14.5)
EST. GFR  (NO RACE VARIABLE): >60 ML/MIN/1.73 M^2
GLUCOSE SERPL-MCNC: 216 MG/DL (ref 70–110)
HCT VFR BLD AUTO: 32.2 % (ref 37–48.5)
HGB BLD-MCNC: 10 G/DL (ref 12–16)
HSV1, PCR, CSF: NEGATIVE
HSV2, PCR, CSF: NEGATIVE
IMM GRANULOCYTES # BLD AUTO: 0.05 K/UL (ref 0–0.04)
IMM GRANULOCYTES NFR BLD AUTO: 0.5 % (ref 0–0.5)
LACTATE DEHYDROGENASE FLUID: 24 U/L
LYMPHOCYTES # BLD AUTO: 0.8 K/UL (ref 1–4.8)
LYMPHOCYTES NFR BLD: 7 % (ref 18–48)
MAGNESIUM SERPL-MCNC: 2.8 MG/DL (ref 1.6–2.6)
MCH RBC QN AUTO: 24.1 PG (ref 27–31)
MCHC RBC AUTO-ENTMCNC: 31.1 G/DL (ref 32–36)
MCV RBC AUTO: 78 FL (ref 82–98)
MONOCYTES # BLD AUTO: 0.5 K/UL (ref 0.3–1)
MONOCYTES NFR BLD: 4.1 % (ref 4–15)
NEUTROPHILS # BLD AUTO: 9.7 K/UL (ref 1.8–7.7)
NEUTROPHILS NFR BLD: 88.3 % (ref 38–73)
NRBC BLD-RTO: 0 /100 WBC
PHOSPHATE SERPL-MCNC: 1.6 MG/DL (ref 2.7–4.5)
PLATELET # BLD AUTO: 186 K/UL (ref 150–450)
PMV BLD AUTO: 11.2 FL (ref 9.2–12.9)
POCT GLUCOSE: 175 MG/DL (ref 70–110)
POTASSIUM SERPL-SCNC: 3.1 MMOL/L (ref 3.5–5.1)
PROT SERPL-MCNC: 7.1 G/DL (ref 6–8.4)
RBC # BLD AUTO: 4.15 M/UL (ref 4–5.4)
SODIUM SERPL-SCNC: 139 MMOL/L (ref 136–145)
VANCOMYCIN TROUGH SERPL-MCNC: 12.5 UG/ML (ref 10–22)
WBC # BLD AUTO: 10.95 K/UL (ref 3.9–12.7)

## 2025-03-15 PROCEDURE — 25000003 PHARM REV CODE 250

## 2025-03-15 PROCEDURE — 80053 COMPREHEN METABOLIC PANEL: CPT

## 2025-03-15 PROCEDURE — 83735 ASSAY OF MAGNESIUM: CPT

## 2025-03-15 PROCEDURE — 25000003 PHARM REV CODE 250: Performed by: HOSPITALIST

## 2025-03-15 PROCEDURE — 85025 COMPLETE CBC W/AUTO DIFF WBC: CPT

## 2025-03-15 PROCEDURE — 80202 ASSAY OF VANCOMYCIN: CPT | Performed by: HOSPITALIST

## 2025-03-15 PROCEDURE — 63600175 PHARM REV CODE 636 W HCPCS

## 2025-03-15 PROCEDURE — 11000001 HC ACUTE MED/SURG PRIVATE ROOM

## 2025-03-15 PROCEDURE — 84100 ASSAY OF PHOSPHORUS: CPT

## 2025-03-15 PROCEDURE — 63600175 PHARM REV CODE 636 W HCPCS: Performed by: HOSPITALIST

## 2025-03-15 RX ORDER — SODIUM,POTASSIUM PHOSPHATES 280-250MG
2 POWDER IN PACKET (EA) ORAL ONCE
Status: COMPLETED | OUTPATIENT
Start: 2025-03-15 | End: 2025-03-15

## 2025-03-15 RX ORDER — VANCOMYCIN 1.75 GRAM/500 ML IN 0.9 % SODIUM CHLORIDE INTRAVENOUS
1750
Status: DISCONTINUED | OUTPATIENT
Start: 2025-03-15 | End: 2025-03-16 | Stop reason: HOSPADM

## 2025-03-15 RX ADMIN — CEFTRIAXONE SODIUM 2 G: 2 INJECTION, POWDER, FOR SOLUTION INTRAMUSCULAR; INTRAVENOUS at 11:03

## 2025-03-15 RX ADMIN — KETOROLAC TROMETHAMINE 15 MG: 30 INJECTION, SOLUTION INTRAMUSCULAR; INTRAVENOUS at 06:03

## 2025-03-15 RX ADMIN — ACYCLOVIR SODIUM 550 MG: 50 INJECTION, SOLUTION INTRAVENOUS at 11:03

## 2025-03-15 RX ADMIN — SODIUM CHLORIDE 1750 MG: 0.9 INJECTION, SOLUTION INTRAVENOUS at 02:03

## 2025-03-15 RX ADMIN — VANCOMYCIN HYDROCHLORIDE 1500 MG: 1.5 INJECTION, POWDER, LYOPHILIZED, FOR SOLUTION INTRAVENOUS at 02:03

## 2025-03-15 RX ADMIN — DEXAMETHASONE SODIUM PHOSPHATE 10 MG: 10 INJECTION, SOLUTION INTRAMUSCULAR; INTRAVENOUS at 06:03

## 2025-03-15 RX ADMIN — CLOTRIMAZOLE AND BETAMETHASONE DIPROPIONATE: 10; .5 CREAM TOPICAL at 08:03

## 2025-03-15 RX ADMIN — POTASSIUM BICARBONATE 40 MEQ: 391 TABLET, EFFERVESCENT ORAL at 02:03

## 2025-03-15 RX ADMIN — POTASSIUM & SODIUM PHOSPHATES POWDER PACK 280-160-250 MG 2 PACKET: 280-160-250 PACK at 02:03

## 2025-03-15 RX ADMIN — ACYCLOVIR SODIUM 550 MG: 50 INJECTION, SOLUTION INTRAVENOUS at 12:03

## 2025-03-15 RX ADMIN — POTASSIUM BICARBONATE 40 MEQ: 391 TABLET, EFFERVESCENT ORAL at 11:03

## 2025-03-15 RX ADMIN — ENOXAPARIN SODIUM 40 MG: 40 INJECTION SUBCUTANEOUS at 04:03

## 2025-03-15 RX ADMIN — DEXAMETHASONE SODIUM PHOSPHATE 10 MG: 10 INJECTION, SOLUTION INTRAMUSCULAR; INTRAVENOUS at 12:03

## 2025-03-15 NOTE — ASSESSMENT & PLAN NOTE
Patient presenting with high fever 104, tachycardia. UA negative, CXR no focal consolidation. Flu/Covid negative. Patient does have left foot rash but does not seem to explain severity of symptoms. Headaches, nausea, encephalopathy concerning for meningitis, would like to rule out with LP.   S/p 1L bolus in ED. Procal 17.68. Lactate wnl. WBC wnl 11, but higher than baseline    Plan:  - Empiric meningitis coverage discontinued.  - Continue IV abx coverage for cellulitis.  - HSV PCR pending  - Blood cultures NGTD (3/14)  - Respiratory panel negative

## 2025-03-15 NOTE — NURSING
PT'S .  ON CALL PROVIDER (MD) NOTIFIED BY THIS NURSE (XIANG).  PRN SLIDING SCALE INSULIN AND ACCU CHECKS AC/HS SUGGESTED BY THIS NURSE (XIANG).  PER ON CALL PROVIDER (MD) CHECK PATIENT'S BLOOD SUGAR IN THE MORNING.

## 2025-03-15 NOTE — NURSING
PT'S BP 98/54 HR 93 AND MAP 69.  ON CALL PROVIDER (MD) NOTIFIED BY THIS NURSE (RM).  PER ON CALL PROVIDER (MD) CONTINUE TO MONITOR.

## 2025-03-15 NOTE — ASSESSMENT & PLAN NOTE
IMPROVING    Plan:  - Continue IV abx for coverage. Consider switch to PO tomorrow.  - Remaining plan as in SIRS.

## 2025-03-15 NOTE — PROGRESS NOTES
Pharmacokinetic Assessment Follow Up: IV Vancomycin    Vancomycin serum concentration assessment(s):    The trough level was drawn correctly and can be used to guide therapy at this time. The measurement is below the desired definitive target range of 15 to 20 mcg/mL.    Vancomycin Regimen Plan:    Change regimen to Vancomycin 1750 mg IV every 12 hours with next serum trough concentration measured at 0100 60 minutes prior to 4th dose on 03/17    Drug levels (last 3 results):  Recent Labs   Lab Result Units 03/15/25  1245   Vancomycin-Trough ug/mL 12.5       Pharmacy will continue to follow and monitor vancomycin.    Please contact pharmacy at extension [phone] for questions regarding this assessment.    Thank you for the consult,   David Patel       Patient brief summary:  Mohsen Julien is a 44 y.o. female initiated on antimicrobial therapy with IV Vancomycin for treatment of sepsis    The patient's current regimen is Vancomycin 1500 mg Q12H    Drug Allergies:   Review of patient's allergies indicates:  No Known Allergies    Actual Body Weight:   116.1 kg    Renal Function:   Estimated Creatinine Clearance: 110 mL/min (based on SCr of 0.8 mg/dL).,     Dialysis Method (if applicable):  N/A    CBC (last 72 hours):  Recent Labs   Lab Result Units 03/13/25 2108 03/14/25  0155 03/15/25  1245   WBC K/uL 11.07 10.10 10.95   Hemoglobin g/dL 11.0* 10.0* 10.0*   Hematocrit % 35.5* 32.1* 32.2*   Platelets K/uL 188 181 186   Gran % % 82.8* 84.6* 88.3*   Lymph % % 10.7* 10.0* 7.0*   Mono % % 5.8 4.7 4.1   Eosinophil % % 0.0 0.0 0.0   Basophil % % 0.2 0.2 0.1   Differential Method  Automated Automated Automated       Metabolic Panel (last 72 hours):  Recent Labs   Lab Result Units 03/13/25 2108 03/13/25  2204 03/13/25  2225 03/14/25  0155 03/14/25  0528 03/14/25  0908 03/14/25  1203 03/15/25  1245   Sodium mmol/L 139  --   --  138 137 137  --  139   Potassium mmol/L 2.2*  --   --  2.2* 2.9* 2.9*  --  3.1*   Chloride  mmol/L 100  --   --  103 103 103  --  105   CO2 mmol/L 24  --   --  24 22* 24  --  23   Glucose mg/dL 129*  --   --  132* 173* 206*  --  216*   Glucose, CSF mg/dL  --   --   --   --   --   --  106*  --    Glucose, UA   --  Negative  --   --   --   --   --   --    BUN mg/dL 17  --   --  15 14 13  --  21*   Creatinine mg/dL 1.0  --   --  0.9 0.9 0.8  --  0.8   Creatinine, Urine mg/dL  --   --  273.8  --   --   --   --   --    Albumin g/dL 3.1*  --   --  2.7*  --   --   --  2.7*   Total Bilirubin mg/dL 1.5*  --   --  1.3*  --   --   --  0.3   Alkaline Phosphatase U/L 83  --   --  78  --   --   --  73   AST U/L 34  --   --  35  --   --   --  32   ALT U/L 40  --   --  38  --   --   --  39   Magnesium mg/dL 1.8  --   --  2.4  --   --   --  2.8*   Phosphorus mg/dL 1.4*  --   --  2.0*  --  1.3*  --  1.6*       Vancomycin Administrations:  vancomycin given in the last 96 hours                     vancomycin 1,500 mg in 0.9% NaCl 250 mL IVPB (admixture device) (mg) 1,500 mg New Bag 03/15/25 0222     1,500 mg New Bag 03/14/25 1400    vancomycin (VANCOCIN) 2,500 mg in 0.9% NaCl 500 mL IVPB (mg) 2,500 mg New Bag 03/14/25 0203                    Microbiologic Results:  Microbiology Results (last 7 days)       Procedure Component Value Units Date/Time    CSF culture [[id number]] Collected: 03/14/25 1208    Order Status: Completed Specimen: CSF (Spinal Fluid) from CSF Tap, Tube 1 Updated: 03/15/25 0753     CSF CULTURE No Growth to date     Gram Stain Result Cytospin indicates:      No WBC's      No organisms seen    Blood culture x two cultures. Draw prior to antibiotics. [[id number]] Collected: 03/13/25 2109    Order Status: Completed Specimen: Blood from Peripheral, Forearm, Left Updated: 03/15/25 0614     Blood Culture, Routine No Growth to date      No Growth to date    Narrative:      Aerobic and anaerobic    Blood culture x two cultures. Draw prior to antibiotics. [[id number]] Collected: 03/13/25 2108    Order Status:  Completed Specimen: Blood from Peripheral, Upper Arm, Right Updated: 03/15/25 0614     Blood Culture, Routine No Growth to date      No Growth to date    Narrative:      Aerobic and anaerobic    AFB Culture & Smear [[id number]] Collected: 03/14/25 1203    Order Status: Sent Specimen: CSF (Spinal Fluid) from CSF Tap, Tube 1 Updated: 03/14/25 1523    Respiratory Infection Panel (PCR), Nasopharyngeal [[id number]] Collected: 03/13/25 2255    Order Status: Completed Specimen: Nasopharyngeal Swab Updated: 03/14/25 1138     Respiratory Infection Panel Source NP Swab     Adenovirus Not Detected     Coronavirus 229E, Common Cold Virus Not Detected     Coronavirus HKU1, Common Cold Virus Not Detected     Coronavirus NL63, Common Cold Virus Not Detected     Coronavirus OC43, Common Cold Virus Not Detected     Comment: The Coronavirus strains detected in this test cause the common cold.  These strains are not the COVID-19 (novel Coronavirus)strain   associated with the respiratory disease outbreak.          SARS-CoV2 (COVID-19) Qualitative PCR Not Detected     Human Metapneumovirus Not Detected     Human Rhinovirus/Enterovirus Not Detected     Influenza A Not Detected     Influenza B Not Detected     Parainfluenza Virus 1 Not Detected     Parainfluenza Virus 2 Not Detected     Parainfluenza Virus 3 Not Detected     Parainfluenza Virus 4 Not Detected     Respiratory Syncytial Virus Not Detected     Bordetella Parapertussis (NN9326) Not Detected     Bordetella pertussis (ptxP) Not Detected     Chlamydia pneumoniae Not Detected     Mycoplasma pneumoniae Not Detected    Narrative:      Assay not valid for lower respiratory specimens, alternate  testing required.    Respiratory Infection Panel (PCR), Nasopharyngeal [[id number]] Collected: 03/13/25 2256    Order Status: Completed Specimen: Nasopharyngeal Swab Updated: 03/13/25 2256     Respiratory Infection Panel Source NP Swab    Narrative:      Assay not valid for lower respiratory  specimens, alternate  testing required.    Influenza A & B by Molecular [[id number]] Collected: 03/13/25 2119    Order Status: Completed Specimen: Nasopharyngeal Swab Updated: 03/13/25 2148     Influenza A, Molecular Negative     Influenza B, Molecular Negative     Flu A & B Source Nasal swab

## 2025-03-15 NOTE — PLAN OF CARE
Problem: Adult Inpatient Plan of Care  Goal: Plan of Care Review  Outcome: Progressing  Goal: Patient-Specific Goal (Individualized)  Outcome: Progressing  Goal: Absence of Hospital-Acquired Illness or Injury  Outcome: Progressing  Goal: Optimal Comfort and Wellbeing  Outcome: Progressing  Goal: Readiness for Transition of Care  Outcome: Progressing     Problem: Bariatric Environmental Safety  Goal: Safety Maintained with Care  Outcome: Progressing     Problem: Infection  Goal: Absence of Infection Signs and Symptoms  Outcome: Progressing     Problem: Confusion Acute  Goal: Optimal Cognitive Function  Outcome: Progressing     Problem: Hypertension Acute  Goal: Blood Pressure Within Desired Range  Outcome: Progressing

## 2025-03-15 NOTE — PROGRESS NOTES
"[facility] Medicine  Progress Note    Patient Name: Mohsen Julien  MRN: [id number]  Patient Class: IP- Inpatient   Admission Date: 3/13/2025  Length of Stay: 1 days  Attending Physician: Bj Ferrera,*  Primary Care Provider: Gilbert Hancock MD        Subjective     Principal Problem:Infectious encephalopathy        HPI:  Patient is a 45 yo female w/ PMHx of HTN, HLD, GERD chronic leg rash. Presenting for altered mental status since 3/13 afternoon. History obtained from family. States that she had headache, fevers, nausea in the past day, however in the afternoon was acting differently, was tired and not talking. She would not get up and walk. Patient was seen in clinic 3/12 for leg rash and was complaining of congestion, rhinorrhea, sore throat, and intermittent cough. At that time patient was alert and acting her usual self. Pt nods "yes" to having neck pain.    In the ED, initial vital signs /44, , Temp 103.4, SpO2 99% on room air. Labs include CBC with stable H/H 11/35 and WBC 11.07. CMP with K+ 2.2, phos 1.4 and BUN/Cr 17/1. CXR and CT head without acute abnormalities. UA unremarkable. Procalcitonin 17.68. Lactate wnl.  Initiated on 1L bolus and ceftriaxone in ED. [facility] consulted for evaluation for admission for acute encephalopathy and sepsis.      Overview/Hospital Course:  No notes on file    No new subjective & objective note has been filed under this hospital service since the last note was generated.        Assessment & Plan  Infectious encephalopathy  IMPROVING  Suspect in setting of cellulitis.    Plan:  - LP results negative for meningitis. Discontinue coverage. See plan for "SIRS"  - headache, generalized body pain treated with toradol/tylenol PRN  HTN (hypertension)  Patient's blood pressure range in the last 24 hours was: BP  Min: 98/54  Max: 127/70.The patient's inpatient anti-hypertensive regimen is listed below:  Current Antihypertensives   "     Plan  - Hold home antihypertensives  - Restart as appropriate.  Fungal infection of skin  Patient has had chronic left leg rash for over a year, possible venous stasis or fungal infection. Per chart review it appeared to improve with oral antifungals in the past, however has gotten worse after stopping medications. Area is hyperpigmented and warm to the touch, cannot rule out cellulitis. No large wounds. See media for clinic images from 3/12/25    Fungal infection is unlikely source of sepsis at this time, will hold off on oral antifungals for now.  XR L tib/fib and foot without osseous abnormalities    Plan:  - Clotrimazole-betamethasone topically  Hypokalemia  Patient with history of hypokalemia. Initial K 2.2 in ED. Patient's most recent potassium results are listed below.   Recent Labs     03/14/25  0155 03/14/25  0528 03/14/25  0908   K 2.2* 2.9* 2.9*     Plan  - Replete potassium per protocol  - Monitor potassium   - EKG appears stable  SIRS (systemic inflammatory response syndrome)  Patient presenting with high fever 104, tachycardia. UA negative, CXR no focal consolidation. Flu/Covid negative. Patient does have left foot rash but does not seem to explain severity of symptoms. Headaches, nausea, encephalopathy concerning for meningitis, would like to rule out with LP.   S/p 1L bolus in ED. Procal 17.68. Lactate wnl. WBC wnl 11, but higher than baseline    Plan:  - Empiric meningitis coverage discontinued.  - Continue IV abx coverage for cellulitis.  - HSV PCR pending  - Blood cultures NGTD (3/14)  - Respiratory panel negative  Right upper quadrant abdominal tenderness without rebound tenderness  RESOLVED  Patient right upper quadrant tender to palpation. No rebound or guarding. Initial T-bili 1.5. Lactic 1.9. Direct bilirubin 0.7.  RUQ US mild hepatomegaly.    Cellulitis of left lower extremity  Plan as in SIRS, infectious encephalopathy.    VTE Risk Mitigation (From admission, onward)           Ordered      enoxaparin injection 40 mg  Every 24 hours         03/14/25 2246     IP VTE HIGH RISK PATIENT  Once         03/13/25 2249     Place sequential compression device  Until discontinued         03/13/25 2249                    Discharge Planning   SAYRA:      Code Status: Full Code   Medical Readiness for Discharge Date:                            Gilbert Hancock MD  Department of Hospital Medicine   [facility]

## 2025-03-15 NOTE — ASSESSMENT & PLAN NOTE
"IMPROVING  Suspect in setting of cellulitis.    Plan:  - LP results negative for meningitis. Discontinue coverage. See plan for "SIRS"  - headache, generalized body pain treated with toradol/tylenol PRN  "

## 2025-03-15 NOTE — ASSESSMENT & PLAN NOTE
Patient's blood pressure range in the last 24 hours was: BP  Min: 98/54  Max: 122/77.The patient's inpatient anti-hypertensive regimen is listed below:  Current Antihypertensives       Plan  - Hold home antihypertensives

## 2025-03-15 NOTE — ASSESSMENT & PLAN NOTE
Patient with history of hypokalemia. Initial K 2.2 in ED. Patient's most recent potassium results are listed below.   Recent Labs     03/14/25  0528 03/14/25  0908 03/15/25  1245   K 2.9* 2.9* 3.1*     Plan  - Replete potassium per protocol  - Monitor potassium  - EKG appears stable

## 2025-03-15 NOTE — ASSESSMENT & PLAN NOTE
Patient's blood pressure range in the last 24 hours was: BP  Min: 98/54  Max: 127/70.The patient's inpatient anti-hypertensive regimen is listed below:  Current Antihypertensives       Plan  - Hold home antihypertensives  - Restart as appropriate.

## 2025-03-15 NOTE — PROGRESS NOTES
"[facility] Medicine  Progress Note    Patient Name: Mohsen Julein  MRN: [id number]  Patient Class: IP- Inpatient   Admission Date: 3/13/2025  Length of Stay: 1 days  Attending Physician: Bj Ferrera,*  Primary Care Provider: Gilbert Hancock MD        Subjective     Principal Problem:Infectious encephalopathy        HPI:  Patient is a 43 yo female w/ PMHx of HTN, HLD, GERD chronic leg rash. Presenting for altered mental status since 3/13 afternoon. History obtained from family. States that she had headache, fevers, nausea in the past day, however in the afternoon was acting differently, was tired and not talking. She would not get up and walk. Patient was seen in clinic 3/12 for leg rash and was complaining of congestion, rhinorrhea, sore throat, and intermittent cough. At that time patient was alert and acting her usual self. Pt nods "yes" to having neck pain.    In the ED, initial vital signs /44, , Temp 103.4, SpO2 99% on room air. Labs include CBC with stable H/H 11/35 and WBC 11.07. CMP with K+ 2.2, phos 1.4 and BUN/Cr 17/1. CXR and CT head without acute abnormalities. UA unremarkable. Procalcitonin 17.68. Lactate wnl.  Initiated on 1L bolus and ceftriaxone in ED. [facility] consulted for evaluation for admission for acute encephalopathy and sepsis.      Overview/Hospital Course:  No notes on file    Interval History: No acute issues overnight. Patient remains afebrile. Mentation continues to improve this AM. Alert and oriented x3. Pain improved. Cellulitis improved.    Review of Systems   Constitutional:  Negative for fever.   HENT:  Negative for congestion.    Cardiovascular:  Negative for chest pain and palpitations.   Gastrointestinal:  Negative for diarrhea, nausea and vomiting.   Genitourinary:  Negative for frequency.   Musculoskeletal:  Negative for back pain and joint swelling.   Skin:  Positive for rash.   Psychiatric/Behavioral:  Negative for " confusion. The patient is not nervous/anxious.      Objective:     Vital Signs (Most Recent):  Temp: 98.4 °F (36.9 °C) (03/15/25 0456)  Pulse: 94 (03/15/25 0456)  Resp: 16 (03/15/25 0456)  BP: (!) 105/54 (03/15/25 0456)  SpO2: 97 % (03/15/25 0456) Vital Signs (24h Range):  Temp:  [98.1 °F (36.7 °C)-100.2 °F (37.9 °C)] 98.4 °F (36.9 °C)  Pulse:  [] 94  Resp:  [16-20] 16  SpO2:  [97 %-100 %] 97 %  BP: ()/(53-77) 105/54     Weight: 114.7 kg (252 lb 13.9 oz)  Body mass index is 43.4 kg/m².    Intake/Output Summary (Last 24 hours) at 3/15/2025 0541  Last data filed at 3/14/2025 1530  Gross per 24 hour   Intake 1148.31 ml   Output 14 ml   Net 1134.31 ml         Physical Exam  HENT:      Head: Normocephalic.      Right Ear: External ear normal.      Left Ear: External ear normal.      Nose: Nose normal.      Mouth/Throat:      Pharynx: Oropharynx is clear.   Eyes:      Extraocular Movements: Extraocular movements intact.   Cardiovascular:      Rate and Rhythm: Normal rate and regular rhythm.      Pulses: Normal pulses.      Heart sounds: Normal heart sounds.   Pulmonary:      Effort: Pulmonary effort is normal. No respiratory distress.      Breath sounds: Normal breath sounds. No wheezing.   Abdominal:      General: Abdomen is flat. Bowel sounds are normal. There is no distension.      Palpations: Abdomen is soft.      Tenderness: There is no abdominal tenderness.   Musculoskeletal:         General: No swelling or tenderness. Normal range of motion.   Skin:     General: Skin is warm.      Findings: Rash (LLE) present.   Neurological:      Mental Status: She is alert.      Cranial Nerves: No cranial nerve deficit or facial asymmetry.      Sensory: Sensation is intact. No sensory deficit.      Motor: Weakness present.   Psychiatric:         Mood and Affect: Mood normal.         Behavior: Behavior normal.         Thought Content: Thought content normal.             Significant Labs: All pertinent labs within the  "past 24 hours have been reviewed.    Significant Imaging: I have reviewed all pertinent imaging results/findings within the past 24 hours.      Assessment & Plan  Infectious encephalopathy  IMPROVING  Suspect in setting of meningitis.  Patient started having AMS 3/13 in the afternoon. Patient is alert, follows some commands, however is not verbal. Shakes head yes/no. Suspect mental status change is secondary to infection. Will need to rule out meningitis. Low suspicion for stroke as patient moves all extremities spontaneously.  TSH 0.072 low, T4 1.05 wnl  CT head no acute abnormalities  Drug screen positive for barbiturates, however does not fit clinical picture    Plan:  - LP results negative. Empiric meningitis coverage discontinued. See plan for "SIRS"   - headache, generalized body pain treated with toradol    HTN (hypertension)  Plan:  - Restart home antihypertensives as appropriate.  Fungal infection of skin  Patient has had chronic left leg rash for over a year, possible venous stasis or fungal infection. Per chart review it appeared to improve with oral antifungals in the past, however has gotten worse after stopping medications. Area is hyperpigmented and warm to the touch, cannot rule out cellulitis. No large wounds. See media for clinic images from 3/12/25    Fungal infection is unlikely source of sepsis at this time, will hold off on oral antifungals for now.  XR L tib/fib and foot without osseous abnormalities    Plan:  - Clotrimazole-betamethasone topically    Hypokalemia  Patient with history of hypokalemia. Initial K 2.2 in ED. Patient's most recent potassium results are listed below.   Recent Labs     03/14/25  0528 03/14/25  0908 03/15/25  1245   K 2.9* 2.9* 3.1*     Plan  - Replete potassium per protocol  - Monitor potassium  - EKG appears stable  SIRS (systemic inflammatory response syndrome)  Patient presenting with high fever 104, tachycardia. UA negative, CXR no focal consolidation. Flu/Covid " negative. Patient does have left foot rash but does not seem to explain severity of symptoms. Headaches, nausea, encephalopathy concerning for meningitis, would like to rule out with LP.   S/p 1L bolus in ED. Procal 17.68. Lactate wnl. WBC wnl 11, but higher than baseline    Plan:  - Empiric meningitis coverage discontinued.  - Blood cultures NGTD (3/14)  - Respiratory panel NGTD  - Continue IV abx at this time for cellulitis.  Right upper quadrant abdominal tenderness without rebound tenderness  RESOLVED  Patient right upper quadrant tender to palpation. No rebound or guarding. Initial T-bili 1.5. Lactic 1.9. Direct bilirubin 0.7.  RUQ US mild hepatomegaly.    Cellulitis of left lower extremity  IMPROVING    Plan:  - Continue IV abx for coverage. Consider switch to PO tomorrow.  - Remaining plan as in SIRS.    VTE Risk Mitigation (From admission, onward)           Ordered     enoxaparin injection 40 mg  Every 24 hours         03/14/25 2246     IP VTE HIGH RISK PATIENT  Once         03/13/25 2249     Place sequential compression device  Until discontinued         03/13/25 2249                    Discharge Planning   SAYRA:      Code Status: Full Code   Medical Readiness for Discharge Date:          ________________________  Gilbert Hancock MD  [facility] PGY-3

## 2025-03-15 NOTE — ASSESSMENT & PLAN NOTE
"IMPROVING  Suspect in setting of meningitis.  Patient started having AMS 3/13 in the afternoon. Patient is alert, follows some commands, however is not verbal. Shakes head yes/no. Suspect mental status change is secondary to infection. Will need to rule out meningitis. Low suspicion for stroke as patient moves all extremities spontaneously.  TSH 0.072 low, T4 1.05 wnl  CT head no acute abnormalities  Drug screen positive for barbiturates, however does not fit clinical picture    Plan:  - LP results negative. Empiric meningitis coverage discontinued. See plan for "SIRS"   - headache, generalized body pain treated with toradol    "

## 2025-03-15 NOTE — ASSESSMENT & PLAN NOTE
"IMPROVING  Suspect in setting of meningitis.  Patient started having AMS 3/13 in the afternoon. Patient is alert, follows some commands, however is not verbal. Shakes head yes/no. Suspect mental status change is secondary to infection. Will need to rule out meningitis. Low suspicion for stroke as patient moves all extremities spontaneously.  TSH 0.072 low, T4 1.05 wnl  CT head no acute abnormalities  Drug screen positive for barbiturates, however does not fit clinical picture  VBG, B12, B9 unremarkable    Plan:  - Empiric meningitis coverage. See plan for "SIRS"  - Consider further stroke work-up with CTA pending LP results.   - headache, generalized body pain treated with toradol  "

## 2025-03-15 NOTE — ASSESSMENT & PLAN NOTE
Patient presenting with high fever 104, tachycardia. UA negative, CXR no focal consolidation. Flu/Covid negative. Patient does have left foot rash but does not seem to explain severity of symptoms. Headaches, nausea, encephalopathy concerning for meningitis, would like to rule out with LP.   S/p 1L bolus in ED. Procal 17.68. Lactate wnl. WBC wnl 11, but higher than baseline    Plan:  - Empiric meningitis coverage discontinued.  - Blood cultures NGTD (3/14)  - Respiratory panel NGTD  - Continue IV abx at this time for cellulitis.

## 2025-03-15 NOTE — PLAN OF CARE
Problem: Adult Inpatient Plan of Care  Goal: Plan of Care Review  Outcome: Progressing     Problem: Confusion Acute  Goal: Optimal Cognitive Function  Outcome: Progressing

## 2025-03-15 NOTE — SUBJECTIVE & OBJECTIVE
Interval History: No acute issues overnight. Patient remains afebrile. Mentation continues to improve this AM. Alert and oriented x3. Pain improved. Cellulitis improved.    Review of Systems   Constitutional:  Negative for fever.   HENT:  Negative for congestion.    Cardiovascular:  Negative for chest pain and palpitations.   Gastrointestinal:  Negative for diarrhea, nausea and vomiting.   Genitourinary:  Negative for frequency.   Musculoskeletal:  Negative for back pain and joint swelling.   Skin:  Positive for rash.   Psychiatric/Behavioral:  Negative for confusion. The patient is not nervous/anxious.      Objective:     Vital Signs (Most Recent):  Temp: 98.4 °F (36.9 °C) (03/15/25 0456)  Pulse: 94 (03/15/25 0456)  Resp: 16 (03/15/25 0456)  BP: (!) 105/54 (03/15/25 0456)  SpO2: 97 % (03/15/25 0456) Vital Signs (24h Range):  Temp:  [98.1 °F (36.7 °C)-100.2 °F (37.9 °C)] 98.4 °F (36.9 °C)  Pulse:  [] 94  Resp:  [16-20] 16  SpO2:  [97 %-100 %] 97 %  BP: ()/(53-77) 105/54     Weight: 114.7 kg (252 lb 13.9 oz)  Body mass index is 43.4 kg/m².    Intake/Output Summary (Last 24 hours) at 3/15/2025 0541  Last data filed at 3/14/2025 1530  Gross per 24 hour   Intake 1148.31 ml   Output 14 ml   Net 1134.31 ml         Physical Exam  HENT:      Head: Normocephalic.      Right Ear: External ear normal.      Left Ear: External ear normal.      Nose: Nose normal.      Mouth/Throat:      Pharynx: Oropharynx is clear.   Eyes:      Extraocular Movements: Extraocular movements intact.   Cardiovascular:      Rate and Rhythm: Normal rate and regular rhythm.      Pulses: Normal pulses.      Heart sounds: Normal heart sounds.   Pulmonary:      Effort: Pulmonary effort is normal. No respiratory distress.      Breath sounds: Normal breath sounds. No wheezing.   Abdominal:      General: Abdomen is flat. Bowel sounds are normal. There is no distension.      Palpations: Abdomen is soft.      Tenderness: There is no abdominal  tenderness.   Musculoskeletal:         General: No swelling or tenderness. Normal range of motion.   Skin:     General: Skin is warm.      Findings: Rash (LLE) present.   Neurological:      Mental Status: She is alert.      Cranial Nerves: No cranial nerve deficit or facial asymmetry.      Sensory: Sensation is intact. No sensory deficit.      Motor: Weakness present.   Psychiatric:         Mood and Affect: Mood normal.         Behavior: Behavior normal.         Thought Content: Thought content normal.             Significant Labs: All pertinent labs within the past 24 hours have been reviewed.    Significant Imaging: I have reviewed all pertinent imaging results/findings within the past 24 hours.

## 2025-03-16 VITALS
RESPIRATION RATE: 18 BRPM | TEMPERATURE: 98 F | OXYGEN SATURATION: 99 % | DIASTOLIC BLOOD PRESSURE: 80 MMHG | WEIGHT: 260.13 LBS | HEIGHT: 64 IN | HEART RATE: 80 BPM | SYSTOLIC BLOOD PRESSURE: 130 MMHG | BODY MASS INDEX: 44.41 KG/M2

## 2025-03-16 LAB
ALBUMIN SERPL BCP-MCNC: 2.4 G/DL (ref 3.5–5.2)
ALP SERPL-CCNC: 60 U/L (ref 40–150)
ALT SERPL W/O P-5'-P-CCNC: 57 U/L (ref 10–44)
ANION GAP SERPL CALC-SCNC: 6 MMOL/L (ref 8–16)
AST SERPL-CCNC: 41 U/L (ref 10–40)
BASOPHILS # BLD AUTO: 0.01 K/UL (ref 0–0.2)
BASOPHILS NFR BLD: 0.1 % (ref 0–1.9)
BILIRUB SERPL-MCNC: 0.3 MG/DL (ref 0.1–1)
BUN SERPL-MCNC: 13 MG/DL (ref 6–20)
CALCIUM SERPL-MCNC: 7.8 MG/DL (ref 8.7–10.5)
CHLORIDE SERPL-SCNC: 107 MMOL/L (ref 95–110)
CO2 SERPL-SCNC: 27 MMOL/L (ref 23–29)
CREAT SERPL-MCNC: 0.7 MG/DL (ref 0.5–1.4)
DIFFERENTIAL METHOD BLD: ABNORMAL
EOSINOPHIL # BLD AUTO: 0 K/UL (ref 0–0.5)
EOSINOPHIL NFR BLD: 0 % (ref 0–8)
ERYTHROCYTE [DISTWIDTH] IN BLOOD BY AUTOMATED COUNT: 14.4 % (ref 11.5–14.5)
EST. GFR  (NO RACE VARIABLE): >60 ML/MIN/1.73 M^2
GLUCOSE SERPL-MCNC: 125 MG/DL (ref 70–110)
HCT VFR BLD AUTO: 29.9 % (ref 37–48.5)
HGB BLD-MCNC: 9.1 G/DL (ref 12–16)
HSV-1 DNA BY PCR: NEGATIVE
HSV-2 DNA BY PCR: NEGATIVE
IMM GRANULOCYTES # BLD AUTO: 0.05 K/UL (ref 0–0.04)
IMM GRANULOCYTES NFR BLD AUTO: 0.6 % (ref 0–0.5)
LYMPHOCYTES # BLD AUTO: 1.4 K/UL (ref 1–4.8)
LYMPHOCYTES NFR BLD: 16.3 % (ref 18–48)
MAGNESIUM SERPL-MCNC: 2.5 MG/DL (ref 1.6–2.6)
MCH RBC QN AUTO: 23.8 PG (ref 27–31)
MCHC RBC AUTO-ENTMCNC: 30.4 G/DL (ref 32–36)
MCV RBC AUTO: 78 FL (ref 82–98)
MONOCYTES # BLD AUTO: 0.7 K/UL (ref 0.3–1)
MONOCYTES NFR BLD: 8.7 % (ref 4–15)
NEUTROPHILS # BLD AUTO: 6.3 K/UL (ref 1.8–7.7)
NEUTROPHILS NFR BLD: 74.3 % (ref 38–73)
NRBC BLD-RTO: 0 /100 WBC
PHOSPHATE SERPL-MCNC: 1.8 MG/DL (ref 2.7–4.5)
PLATELET # BLD AUTO: 206 K/UL (ref 150–450)
PMV BLD AUTO: 11 FL (ref 9.2–12.9)
POTASSIUM SERPL-SCNC: 2.9 MMOL/L (ref 3.5–5.1)
PROT SERPL-MCNC: 6.2 G/DL (ref 6–8.4)
RBC # BLD AUTO: 3.83 M/UL (ref 4–5.4)
SODIUM SERPL-SCNC: 140 MMOL/L (ref 136–145)
WBC # BLD AUTO: 8.53 K/UL (ref 3.9–12.7)

## 2025-03-16 PROCEDURE — 80053 COMPREHEN METABOLIC PANEL: CPT

## 2025-03-16 PROCEDURE — 63600175 PHARM REV CODE 636 W HCPCS: Performed by: HOSPITALIST

## 2025-03-16 PROCEDURE — 63600175 PHARM REV CODE 636 W HCPCS

## 2025-03-16 PROCEDURE — 83735 ASSAY OF MAGNESIUM: CPT

## 2025-03-16 PROCEDURE — 84100 ASSAY OF PHOSPHORUS: CPT

## 2025-03-16 PROCEDURE — 85025 COMPLETE CBC W/AUTO DIFF WBC: CPT

## 2025-03-16 PROCEDURE — 36415 COLL VENOUS BLD VENIPUNCTURE: CPT

## 2025-03-16 PROCEDURE — 25000003 PHARM REV CODE 250: Performed by: HOSPITALIST

## 2025-03-16 RX ORDER — SULFAMETHOXAZOLE AND TRIMETHOPRIM 800; 160 MG/1; MG/1
1 TABLET ORAL 2 TIMES DAILY
Qty: 28 TABLET | Refills: 0 | Status: SHIPPED | OUTPATIENT
Start: 2025-03-16 | End: 2025-03-30

## 2025-03-16 RX ORDER — POTASSIUM CHLORIDE 20 MEQ/1
40 TABLET, EXTENDED RELEASE ORAL DAILY
Qty: 60 TABLET | Refills: 0 | Status: SHIPPED | OUTPATIENT
Start: 2025-03-16 | End: 2025-04-15

## 2025-03-16 RX ORDER — SULFAMETHOXAZOLE AND TRIMETHOPRIM 800; 160 MG/1; MG/1
1 TABLET ORAL 2 TIMES DAILY
Qty: 28 TABLET | Refills: 0 | Status: SHIPPED | OUTPATIENT
Start: 2025-03-16 | End: 2025-03-16

## 2025-03-16 RX ADMIN — SODIUM CHLORIDE 1750 MG: 0.9 INJECTION, SOLUTION INTRAVENOUS at 02:03

## 2025-03-16 RX ADMIN — CEFTRIAXONE SODIUM 2 G: 2 INJECTION, POWDER, FOR SOLUTION INTRAMUSCULAR; INTRAVENOUS at 10:03

## 2025-03-16 NOTE — ASSESSMENT & PLAN NOTE
Patient presenting with high fever 104, tachycardia. UA negative, CXR no focal consolidation. Flu/Covid negative. Patient does have left foot rash but does not seem to explain severity of symptoms. Headaches, nausea, encephalopathy concerning for meningitis, would like to rule out with LP.   S/p 1L bolus in ED. Procal 17.68. Lactate wnl. WBC wnl 11, but higher than baseline    Plan:  - Empiric meningitis coverage discontinued.  - Blood cultures NGTD (3/14)  - Respiratory panel NGTD  - Transitioned to PO abx at this time for cellulitis.

## 2025-03-16 NOTE — PLAN OF CARE
Problem: Adult Inpatient Plan of Care  Goal: Plan of Care Review  Outcome: Progressing  Goal: Patient-Specific Goal (Individualized)  Outcome: Progressing  Goal: Absence of Hospital-Acquired Illness or Injury  Outcome: Progressing  Goal: Optimal Comfort and Wellbeing  Outcome: Progressing  Goal: Readiness for Transition of Care  Outcome: Progressing     Problem: Bariatric Environmental Safety  Goal: Safety Maintained with Care  Outcome: Progressing     Problem: Infection  Goal: Absence of Infection Signs and Symptoms  Outcome: Progressing     Problem: Confusion Acute  Goal: Optimal Cognitive Function  Outcome: Progressing     Problem: Hypertension Acute  Goal: Blood Pressure Within Desired Range  Outcome: Progressing     Problem: Wound  Goal: Optimal Coping  Outcome: Progressing  Goal: Optimal Functional Ability  Outcome: Progressing  Goal: Absence of Infection Signs and Symptoms  Outcome: Progressing  Goal: Improved Oral Intake  Outcome: Progressing  Goal: Optimal Pain Control and Function  Outcome: Progressing  Goal: Skin Health and Integrity  Outcome: Progressing  Goal: Optimal Wound Healing  Outcome: Progressing

## 2025-03-16 NOTE — DISCHARGE SUMMARY
"[facility]  Discharge Summary      Patient Name: Mohsen Julien  MRN: [id number]  ALEX: [id number]  Patient Class: IP- Inpatient  Admission Date: 3/13/2025  Hospital Length of Stay: 2 days  Discharge Date and Time:  03/16/2025 12:44 PM  Attending Physician: Bj Ferrera,*   Discharging Provider: Gilbert Hancock MD  Primary Care Provider: Gilbert Hancock MD    Primary Care Team: Networked reference to record PCT     HPI:   Patient is a 45 yo female w/ PMHx of HTN, HLD, GERD chronic leg rash. Presenting for altered mental status since 3/13 afternoon. History obtained from family. States that she had headache, fevers, nausea in the past day, however in the afternoon was acting differently, was tired and not talking. She would not get up and walk. Patient was seen in clinic 3/12 for leg rash and was complaining of congestion, rhinorrhea, sore throat, and intermittent cough. At that time patient was alert and acting her usual self. Pt nods "yes" to having neck pain.    In the ED, initial vital signs /44, , Temp 103.4, SpO2 99% on room air. Labs include CBC with stable H/H 11/35 and WBC 11.07. CMP with K+ 2.2, phos 1.4 and BUN/Cr 17/1. CXR and CT head without acute abnormalities. UA unremarkable. Procalcitonin 17.68. Lactate wnl.  Initiated on 1L bolus and ceftriaxone in ED. [facility] consulted for evaluation for admission for acute encephalopathy and sepsis.      * No surgery found *      Hospital Course:   Patient admitted to [facility] for evaluation and management of acute encephalopathy. Initiated on board spectrum IV abx and coverage for possible meningitis (IV ceftriaxone, dexamethosone, vancomycin, and acyclovir). Respiratory panel negative. LP completed 3/14 with labs showing no evidence of meningitis. Mentation improved greatly over time as well as headache. De-escalated to IV ceftriaxone and vancomycin with continued improvement of LLE " cellulitis. Blood cultures remain NGTD. Transitioned to PO bactrim and prescription sent to pharmacy. Patient also continued to have hypokalemia while inpatient. Repleted and prescribed PO K for outpatient.    Patient hemodynamically and clinically stable for discharge. Patient plan discussed with patient. Return precautions discussed with patient. Patient verbally acknowledged understanding and agreement with treatment plan. Patient encouraged to take medications as prescribed and to follow up closely with PCP (follow-up scheduled for this week).        Physical Exam  Vitals and nursing note reviewed.   Constitutional:       General: She is not in acute distress.     Appearance: Normal appearance. She is obese. She is not ill-appearing, toxic-appearing or diaphoretic.   HENT:      Head: Normocephalic and atraumatic.      Right Ear: External ear normal.      Left Ear: External ear normal.      Nose: Nose normal.      Mouth/Throat:      Pharynx: Oropharynx is clear.   Eyes:      Extraocular Movements: Extraocular movements intact.   Cardiovascular:      Rate and Rhythm: Normal rate and regular rhythm.   Pulmonary:      Effort: Pulmonary effort is normal. No respiratory distress.   Musculoskeletal:         General: Normal range of motion.      Cervical back: Normal range of motion.   Skin:     Findings: Rash (cellulitis LLE, erythema, mildly tender. hyperpigmentation of LLE. altered skin integrity.) present.   Neurological:      General: No focal deficit present.      Mental Status: She is alert and oriented to person, place, and time. Mental status is at baseline.   Psychiatric:         Mood and Affect: Mood normal.         Behavior: Behavior normal.         Thought Content: Thought content normal.            Goals of Care Treatment Preferences:  Code Status: Full Code      SDOH Screening:  The patient was screened for food insecurity, housing instability, transportation needs, utility difficulties, and interpersonal  "safety. The social determinant(s) of health identified as a concern this admission are:  Housing instability  Food insecurity  Utility difficulties    Will discuss with case management and/or community health workers.    Social Drivers of Health with Concerns     Food Insecurity: Food Insecurity Present (3/14/2025)   Housing Stability: High Risk (3/14/2025)   Utilities: At Risk (3/14/2025)        Consults:   Consults (From admission, onward)          Status Ordering Provider     Inpatient consult to Midline team  Once        Provider:  (Not yet assigned)    Acknowledged ROGER COWART     Pharmacy to dose Vancomycin consult  Once        Provider:  (Not yet assigned)   Placed in "And" Linked Group    Acknowledged ROGER COWART     Inpatient consult to Interventional Radiology  Once        Provider:  (Not yet assigned)    Completed ROGER COWART            Assessment & Plan  Infectious encephalopathy  IMPROVING  Suspect in setting of meningitis.  Patient started having AMS 3/13 in the afternoon. Patient is alert, follows some commands, however is not verbal. Shakes head yes/no. Suspect mental status change is secondary to infection. Will need to rule out meningitis. Low suspicion for stroke as patient moves all extremities spontaneously.  TSH 0.072 low, T4 1.05 wnl  CT head no acute abnormalities  Drug screen positive for barbiturates, however does not fit clinical picture    Plan:  - LP results negative. Empiric meningitis coverage discontinued. See plan for "SIRS"   - headache, generalized body pain treated with toradol  HTN (hypertension)  Plan:  - Restart home antihypertensives as appropriate.  Fungal infection of skin  Patient has had chronic left leg rash for over a year, possible venous stasis or fungal infection. Per chart review it appeared to improve with oral antifungals in the past, however has gotten worse after stopping medications. Area is hyperpigmented and warm to the touch, cannot rule out cellulitis. No " large wounds. See media for clinic images from 3/12/25    Fungal infection is unlikely source of sepsis at this time, will hold off on oral antifungals for now.  XR L tib/fib and foot without osseous abnormalities    Plan:  - Clotrimazole-betamethasone topically    Hypokalemia  Patient with history of hypokalemia. Initial K 2.2 in ED. Patient's most recent potassium results are listed below.   Recent Labs     03/14/25  0908 03/15/25  1245 03/16/25  0628   K 2.9* 3.1* 2.9*     Plan  - Replete potassium per protocol  - Monitor potassium  - EKG appears stable  SIRS (systemic inflammatory response syndrome)  Patient presenting with high fever 104, tachycardia. UA negative, CXR no focal consolidation. Flu/Covid negative. Patient does have left foot rash but does not seem to explain severity of symptoms. Headaches, nausea, encephalopathy concerning for meningitis, would like to rule out with LP.   S/p 1L bolus in ED. Procal 17.68. Lactate wnl. WBC wnl 11, but higher than baseline    Plan:  - Empiric meningitis coverage discontinued.  - Blood cultures NGTD (3/14)  - Respiratory panel NGTD  - Transitioned to PO abx at this time for cellulitis.  Right upper quadrant abdominal tenderness without rebound tenderness  RESOLVED  Patient right upper quadrant tender to palpation. No rebound or guarding. Initial T-bili 1.5. Lactic 1.9. Direct bilirubin 0.7.  RUQ US mild hepatomegaly.    Cellulitis of left lower extremity  IMPROVING    Plan:  - Continue PO abx for coverage.  - Remaining plan as in SIRS.    Final Active Diagnoses:    Diagnosis Date Noted POA    PRINCIPAL PROBLEM:  Cellulitis of left lower extremity [L03.116] 09/26/2020 Yes    Infectious encephalopathy [G93.49, B99.9] 03/13/2025 Yes    Right upper quadrant abdominal tenderness without rebound tenderness [R10.811] 03/13/2025 Yes    Hypokalemia [E87.6] 09/25/2020 Yes    SIRS (systemic inflammatory response syndrome) [R65.10] 09/25/2020 Yes    Fungal infection of skin  [B36.9] 06/19/2019 Yes    HTN (hypertension) [I10] 05/08/2013 Yes     Chronic      Problems Resolved During this Admission:       Discharged Condition: stable    Disposition: Home or Self Care    Follow Up:    Patient Instructions:      Diet Cardiac     Notify your health care provider if you experience any of the following:  temperature >100.4     Notify your health care provider if you experience any of the following:  increased confusion or weakness     Activity as tolerated       Significant Diagnostic Studies: N/A    Pending Diagnostic Studies:       Procedure Component Value Units Date/Time    Cytology, Fluid/Wash/Brush [[id number]] Collected: 03/14/25 1203    Order Status: Sent Lab Status: In process Updated: 03/14/25 1505    Specimen: Body Fluid     Freeze and Hold,  [[id number]] Collected: 03/14/25 1203    Order Status: Sent Lab Status: No result     Specimen: CSF (Spinal Fluid) from Cerebrospinal Fluid     Vitamin B1 [[id number]] Collected: 03/14/25 0155    Order Status: Sent Lab Status: In process Updated: 03/14/25 0945    Specimen: Blood     Vitamin B6 [[id number]] Collected: 03/14/25 0528    Order Status: Sent Lab Status: In process Updated: 03/14/25 0945    Specimen: Blood            Medications:  Reconciled Home Medications:      Medication List        START taking these medications      potassium chloride SA 20 MEQ tablet  Commonly known as: K-DUR,KLOR-CON  Take 2 tablets (40 mEq total) by mouth once daily.     sulfamethoxazole-trimethoprim 800-160mg 800-160 mg Tab  Commonly known as: BACTRIM DS  Take 1 tablet by mouth 2 (two) times daily. for 14 days            CHANGE how you take these medications      pantoprazole 40 MG tablet  Commonly known as: PROTONIX  1 tablet by mouth daily; take daily for 2 months after surgery  What changed:   how much to take  when to take this     * traMADoL 50 mg tablet  Commonly known as: ULTRAM  Take 1 tablet (50 mg total) by mouth every 6 (six) hours as  needed.  What changed: Another medication with the same name was removed. Continue taking this medication, and follow the directions you see here.     * traMADoL 50 mg tablet  Commonly known as: ULTRAM  TAKE 1 TABLET BY MOUTH EVERY 4 HOURS  What changed: Another medication with the same name was removed. Continue taking this medication, and follow the directions you see here.     * traMADoL 50 mg tablet  Commonly known as: ULTRAM  Take 1 tablet (50 mg total) by mouth every 4 (four) hours as needed FOR PAIN  What changed: Another medication with the same name was removed. Continue taking this medication, and follow the directions you see here.           * This list has 3 medication(s) that are the same as other medications prescribed for you. Read the directions carefully, and ask your doctor or other care provider to review them with you.                CONTINUE taking these medications      allopurinoL 300 MG tablet  Commonly known as: ZYLOPRIM  Take 1 tablet (300 mg total) by mouth once daily.     amitriptyline 25 MG tablet  Commonly known as: ELAVIL  Take 1 to 2 tablets by mouth at bedtime as needed for sleep     clotrimazole-betamethasone 1-0.05% cream  Commonly known as: LOTRISONE  Apply topically locally as directed daily     furosemide 40 MG tablet  Commonly known as: LASIX  Take 1 tablet (40 mg total) by mouth 2 (two) times daily.     gabapentin 100 MG capsule  Commonly known as: NEURONTIN  Take 3 capsules (300 mg total) by mouth 3 (three) times daily. Take 100 mg three times daily for first 2 days, then take 200 mg three times daily for next 2 days, then increase to 300 mg three times daily.     ketoconazole 2 % cream  Commonly known as: NIZORAL  Apply topically once daily.     methocarbamoL 750 MG Tab  Commonly known as: ROBAXIN  Take 1 tablet by mouth 3 times daily as needed for muscle spasms     metoprolol tartrate 100 MG tablet  Commonly known as: LOPRESSOR  TAKE ONE TABLET BY MOUTH TWO TIMES A DAY FOR  BLOOD PRESSURE     MIRENA 21 mcg/24hr (up to 8 yrs) 52 mg IUD  Generic drug: levonorgestreL  TO BE INSERTED ONE TIME BY PRESCRIBER. ROUTE INTRAUTERINE.     mupirocin 2 % ointment  Commonly known as: BACTROBAN  Apply to affected area as directed     natrexone tablet 4 mg  Take 2 tablets in morning for 7 days. Then take 2 tablets twice daily for 7 days. Then take 4 tablets in morning, 2 tablets at night for 7 days. Then take 4 tablets twice daily.     predniSONE 20 MG tablet  Commonly known as: DELTASONE  Take 1 tablet (20 mg total) by mouth once daily.     salicylic acid 2 % Gel  Apply 1 Application topically once daily.     terbinafine  mg tablet  Commonly known as: LAMISIL  Take 1 tablet (250 mg total) by mouth once daily. for 14 days     triamterene-hydrochlorothiazide 37.5-25 mg 37.5-25 mg per tablet  Commonly known as: MAXZIDE-25  Take 1 tablet by mouth once daily.              Indwelling Lines/Drains at time of discharge:   Lines/Drains/Airways       None                   Time spent on the discharge of patient: 60 minutes    ________________________  Gilbert Hancock MD  [facility] PGY-3

## 2025-03-16 NOTE — HOSPITAL COURSE
Patient admitted to [facility] for evaluation and management of acute encephalopathy. Initiated on board spectrum IV abx and coverage for possible meningitis (IV ceftriaxone, dexamethosone, vancomycin, and acyclovir). Respiratory panel negative. LP completed 3/14 with labs showing no evidence of meningitis. Mentation improved greatly over time as well as headache. De-escalated to IV ceftriaxone and vancomycin with continued improvement of LLE cellulitis. Blood cultures remain NGTD. Transitioned to PO bactrim and prescription sent to pharmacy. Patient also continued to have hypokalemia while inpatient. Repleted and prescribed PO K for outpatient.    Patient hemodynamically and clinically stable for discharge. Patient plan discussed with patient. Return precautions discussed with patient. Patient verbally acknowledged understanding and agreement with treatment plan. Patient encouraged to take medications as prescribed and to follow up closely with PCP (follow-up scheduled for this week).

## 2025-03-16 NOTE — PROGRESS NOTES
Discharge orders noted. Additional clinical references attached. Patient's discharge instructions given by bedside RN and reviewed via this VN.  Education provided on new and previous medications, diagnosis, and follow-up appointments.  New medications were sent to patient's  pharmacy. Patient verbalized understanding and teach back method was used. Patient's ride/transportation home at bedside. All questions answered. Transport to [facility] requested. Floor nurse notified.              03/16/25 1441    Notification    Notified Of Discharge Status   Admission   Communication Issues? None   Shift   Virtual Nurse - Rounding Complete   Virtual Nurse - Patient Verbalized Approval Of Camera Use   Safety/Activity   Patient Rounds bed in low position;call light in patient/parent reach;clutter free environment maintained;visualized patient   Safety Promotion/Fall Prevention side rails raised x 2   Activity Management Sitting at edge of bed - L2

## 2025-03-16 NOTE — ASSESSMENT & PLAN NOTE
Patient with history of hypokalemia. Initial K 2.2 in ED. Patient's most recent potassium results are listed below.   Recent Labs     03/14/25  0908 03/15/25  1245 03/16/25  0628   K 2.9* 3.1* 2.9*     Plan  - Replete potassium per protocol  - Monitor potassium  - EKG appears stable

## 2025-03-16 NOTE — PLAN OF CARE
03/16/25 1219   Final Note   Assessment Type Final Discharge Note   Anticipated Discharge Disposition Home   What phone number can be called within the next 1-3 days to see how you are doing after discharge? [phone]   Post-Acute Status   Discharge Delays None known at this time     SW spoke with regarding discharge plan to home. Pt completed brief assessment on the phone with HENOK. Pt lives at home with her spouse and adult son. Pt is independent with ADL's and still drives and works. Pt has no medical equipment or  services within the home. Pt has no issues affording medication and no social needs at this time.     Pt informed SW that her spouse would transport her home at D/C. No needs at this time.      Follow up appointments scheduled below.     Future Appointments   Date Time Provider Department Center   3/19/2025  9:50 AM Gilbert Hancock MD [facility]   4/9/2025  3:30 PM Jodi Alvarez MD [facility]   4/23/2025  4:00 PM Gilbert Hancock MD [facility]

## 2025-03-17 LAB
ACID FAST MOD KINY STN SPEC: NORMAL
FINAL PATHOLOGIC DIAGNOSIS: NORMAL
Lab: NORMAL
MYCOBACTERIUM SPEC QL CULT: NORMAL
OHS QRS DURATION: 88 MS
OHS QTC CALCULATION: 399 MS

## 2025-03-19 ENCOUNTER — OFFICE VISIT (OUTPATIENT)
Dept: FAMILY MEDICINE | Facility: HOSPITAL | Age: 45
End: 2025-03-19
Payer: COMMERCIAL

## 2025-03-19 VITALS
OXYGEN SATURATION: 100 % | BODY MASS INDEX: 49.26 KG/M2 | SYSTOLIC BLOOD PRESSURE: 122 MMHG | HEIGHT: 60 IN | DIASTOLIC BLOOD PRESSURE: 85 MMHG | HEART RATE: 80 BPM | WEIGHT: 250.88 LBS

## 2025-03-19 DIAGNOSIS — E87.6 HYPOKALEMIA: ICD-10-CM

## 2025-03-19 DIAGNOSIS — I87.2 VENOUS STASIS DERMATITIS OF LEFT LOWER EXTREMITY: ICD-10-CM

## 2025-03-19 DIAGNOSIS — I87.8 VENOUS STASIS: ICD-10-CM

## 2025-03-19 DIAGNOSIS — L03.116 CELLULITIS OF LEFT LOWER EXTREMITY: Primary | ICD-10-CM

## 2025-03-19 LAB
BACTERIA BLD CULT: NORMAL
BACTERIA BLD CULT: NORMAL
BACTERIA CSF CULT: NO GROWTH
GRAM STN SPEC: NORMAL

## 2025-03-19 PROCEDURE — 99215 OFFICE O/P EST HI 40 MIN: CPT

## 2025-03-19 NOTE — PROGRESS NOTES
[facility] FAMILY PRACTICE CLINIC NOTE  Follow-up Visit      SUBJECTIVE:     Patient: Mohsen Julien is a 44 y.o. female.    Chief Compliant:   Chief Complaint   Patient presents with    Follow-up       History of Present Illness:  Hospital f/u - admitted for encephalopathy and fevers. Work-up including negative meningitis work-up. Likely 2/2 to cellulitis and patient continues to improve with treatment.       Review of Systems   Constitutional:  Negative for fever.   Respiratory:  Negative for shortness of breath.    Cardiovascular:  Negative for chest pain.   Gastrointestinal:  Negative for abdominal pain.   Neurological:  Negative for headaches.     A 10+ review of systems was performed with pertinent positives and negatives noted above in the history of present illness. Other systems were negative unless otherwise specified.    OBJECTIVE:     Vital Signs (Most Recent)  Vitals:    03/19/25 0951   BP: 122/85   BP Location: Left arm   Pulse: 80   SpO2: 100%   Weight: 113.8 kg (250 lb 14.1 oz)   Height: 5' (1.524 m)     BMI: Body mass index is 49 kg/m².     Physical Exam:  Physical Exam  Vitals and nursing note reviewed.   Constitutional:       General: She is not in acute distress.     Appearance: Normal appearance. She is obese. She is not ill-appearing, toxic-appearing or diaphoretic.   HENT:      Head: Normocephalic and atraumatic.      Right Ear: External ear normal.      Left Ear: External ear normal.      Nose: Nose normal.      Mouth/Throat:      Pharynx: Oropharynx is clear.   Eyes:      Extraocular Movements: Extraocular movements intact.   Cardiovascular:      Rate and Rhythm: Normal rate and regular rhythm.   Pulmonary:      Effort: Pulmonary effort is normal. No respiratory distress.   Musculoskeletal:         General: Normal range of motion.      Cervical back: Normal range of motion.   Skin:     Findings: Rash (improved from hospitalization. non-tender, non-erythematous.) present.   Neurological:       General: No focal deficit present.      Mental Status: She is alert and oriented to person, place, and time. Mental status is at baseline.   Psychiatric:         Mood and Affect: Mood normal.         Behavior: Behavior normal.         Thought Content: Thought content normal.          ASSESSMENT:   Mohsen Julien is a 44 y.o. female who presents to clinic to for    1. Cellulitis of left lower extremity    2. Hypokalemia    3. Venous stasis dermatitis of left lower extremity    4. Venous stasis         PLAN:     Cellulitis of left lower extremity  - Chronic condition.  - Improving.  - Reviewed previous labs, tests, and/or imaging obtained since last visit.  - Discussed various diagnostic and treatment options with patient at this visit.  - Orders, labs, and imaging ordered as below. Follow-up results with patient.   - Continue abx medications from hospitalization and PO/topical antifungals from previous visits.  -     Comprehensive Metabolic Panel; Future; Expected date: 03/19/2025  -     CBC Auto Differential; Future; Expected date: 03/19/2025    Hypokalemia  - Chronic condition.  - Uncontrolled.  - Reviewed previous labs, tests, and/or imaging obtained since last visit.  - Discussed various diagnostic and treatment options with patient at this visit.  - Continue current medications.  - Orders, labs, and imaging ordered as below. Follow-up results with patient.   -     Comprehensive Metabolic Panel; Future; Expected date: 03/19/2025  -     Magnesium; Future; Expected date: 03/19/2025    Venous stasis dermatitis of left lower extremity  - Chronic condition.  - Uncontrolled.  - Reviewed previous labs, tests, and/or imaging obtained since last visit.  - Discussed various diagnostic and treatment options with patient at this visit.  - Orders, labs, and imaging ordered as below. Follow-up results with patient.   -     US Ankle Brachial Indices Ext LTD WO Str; Future; Expected date: 03/19/2025    Venous stasis  - Plan  as above.  -     COMPRESSION STOCKINGS        Provided patient with anticipatory guidance and return precautions. Treatment plan discussed with patient, all questions answered, and patient acknowledged understanding and verbal agreement.      Follow-up in: 2 weeks; or sooner PRN if acute concerns arise.      ________________________  Gilbert Hancock MD  \[facility]\"" Family Medicine PGY-3

## 2025-03-20 LAB — VIT B1 BLD-MCNC: 42 UG/L (ref 38–122)

## 2025-03-21 ENCOUNTER — HOSPITAL ENCOUNTER (OUTPATIENT)
Dept: RADIOLOGY | Facility: HOSPITAL | Age: 45
Discharge: HOME | End: 2025-03-21
Payer: COMMERCIAL

## 2025-03-21 DIAGNOSIS — I87.2 VENOUS STASIS DERMATITIS OF LEFT LOWER EXTREMITY: ICD-10-CM

## 2025-03-21 LAB — PYRIDOXAL SERPL-MCNC: <2 UG/L (ref 5–50)

## 2025-03-21 PROCEDURE — 93922 UPR/L XTREMITY ART 2 LEVELS: CPT | Mod: TC

## 2025-03-21 PROCEDURE — 93922 UPR/L XTREMITY ART 2 LEVELS: CPT | Mod: 26,,, | Performed by: RADIOLOGY

## 2025-03-23 ENCOUNTER — RESULTS FOLLOW-UP (OUTPATIENT)
Dept: FAMILY MEDICINE | Facility: HOSPITAL | Age: 45
End: 2025-03-23

## 2025-03-26 ENCOUNTER — OFFICE VISIT (OUTPATIENT)
Dept: FAMILY MEDICINE | Facility: HOSPITAL | Age: 45
End: 2025-03-26
Payer: COMMERCIAL

## 2025-03-26 VITALS
DIASTOLIC BLOOD PRESSURE: 72 MMHG | SYSTOLIC BLOOD PRESSURE: 112 MMHG | HEIGHT: 60 IN | BODY MASS INDEX: 50.13 KG/M2 | HEART RATE: 76 BPM | WEIGHT: 255.31 LBS

## 2025-03-26 DIAGNOSIS — M62.838 MUSCLE SPASM: ICD-10-CM

## 2025-03-26 DIAGNOSIS — L03.116 CELLULITIS OF LEFT LOWER EXTREMITY: Primary | ICD-10-CM

## 2025-03-26 DIAGNOSIS — L30.9 DERMATITIS: ICD-10-CM

## 2025-03-26 PROCEDURE — 99215 OFFICE O/P EST HI 40 MIN: CPT

## 2025-03-26 RX ORDER — METHOCARBAMOL 750 MG/1
TABLET, FILM COATED ORAL
Qty: 90 TABLET | Refills: 2 | Status: SHIPPED | OUTPATIENT
Start: 2025-03-26

## 2025-03-26 RX ORDER — AMMONIUM LACTATE 12 G/100G
1 CREAM TOPICAL DAILY
Qty: 385 G | Refills: 0 | Status: SHIPPED | OUTPATIENT
Start: 2025-03-26

## 2025-03-26 NOTE — PROGRESS NOTES
[facility] FAMILY PRACTICE CLINIC NOTE  Follow-up Visit      SUBJECTIVE:     Patient: Mohsen Julien is a 44 y.o. female.    Chief Compliant:   Chief Complaint   Patient presents with    Follow-up     foot    Medication Refill       History of Present Illness:  Cellulitis - improved. Endorses flaky dry skin around site. Denies redness, pain, fever, chills. States still using topical antifungal cream. Completed PO abx.      Review of Systems   Constitutional:  Negative for fever.   Respiratory:  Negative for shortness of breath.    Cardiovascular:  Negative for chest pain.   Gastrointestinal:  Negative for abdominal pain.   Skin:  Positive for rash.   Neurological:  Negative for headaches.     A 10+ review of systems was performed with pertinent positives and negatives noted above in the history of present illness. Other systems were negative unless otherwise specified.    OBJECTIVE:     Vital Signs (Most Recent)  Vitals:    03/26/25 1018   BP: 112/72   Patient Position: Sitting   Pulse: 76   Weight: 115.8 kg (255 lb 4.7 oz)   Height: 5' (1.524 m)     BMI: Body mass index is 49.86 kg/m².     Physical Exam:  Physical Exam  Vitals and nursing note reviewed.   Constitutional:       General: She is not in acute distress.     Appearance: Normal appearance. She is obese. She is not ill-appearing, toxic-appearing or diaphoretic.   HENT:      Head: Normocephalic and atraumatic.      Right Ear: External ear normal.      Left Ear: External ear normal.      Nose: Nose normal.      Mouth/Throat:      Pharynx: Oropharynx is clear.   Eyes:      Extraocular Movements: Extraocular movements intact.   Cardiovascular:      Rate and Rhythm: Normal rate and regular rhythm.   Pulmonary:      Effort: Pulmonary effort is normal. No respiratory distress.   Musculoskeletal:         General: Normal range of motion.      Cervical back: Normal range of motion.   Skin:     Findings: Rash (dry flaky skin, non-tender, non-erythematoud) present.    Neurological:      General: No focal deficit present.      Mental Status: She is alert and oriented to person, place, and time. Mental status is at baseline.   Psychiatric:         Mood and Affect: Mood normal.         Behavior: Behavior normal.         Thought Content: Thought content normal.          ASSESSMENT:   Mohsen Julien is a 44 y.o. female who presents to clinic to for    1. Cellulitis of left lower extremity    2. Dermatitis    3. Muscle spasm         PLAN:     Cellulitis of left lower extremity  - Chronic condition.  - Moderately controlled.  - Reviewed previous labs, tests, and/or imaging obtained since last visit.  - Discussed various diagnostic and treatment options with patient at this visit.  - Medications and/or medication refills as below.  - Referral placed as below.   -     ammonium lactate 12 % Crea; Apply 1 g topically once daily.  Dispense: 385 g; Refill: 0  -     Ambulatory referral/consult to Wound Clinic; Future; Expected date: 04/02/2025    Dermatitis  - Plan as above.    Muscle spasm  - Chronic condition.  - Uncontrolled.  - Reviewed previous labs, tests, and/or imaging obtained since last visit.  - Discussed various diagnostic and treatment options with patient at this visit.  - Continue current medications.  - Medications and/or medication refills as below.   -     methocarbamoL (ROBAXIN) 750 MG Tab; Take 1 tablet by mouth 3 times daily as needed for muscle spasms  Dispense: 90 tablet; Refill: 2        Provided patient with anticipatory guidance and return precautions. Treatment plan discussed with patient, all questions answered, and patient acknowledged understanding and verbal agreement.      Follow-up in: 1 months; or sooner PRN if acute concerns arise.      ________________________  Gilbert Hancock MD  [facility] Family Medicine PGY-3

## 2025-05-12 ENCOUNTER — OFFICE VISIT (OUTPATIENT)
Dept: FAMILY MEDICINE | Facility: HOSPITAL | Age: 45
End: 2025-05-12
Payer: COMMERCIAL

## 2025-05-12 VITALS
WEIGHT: 256.81 LBS | HEIGHT: 60 IN | BODY MASS INDEX: 50.42 KG/M2 | DIASTOLIC BLOOD PRESSURE: 77 MMHG | SYSTOLIC BLOOD PRESSURE: 129 MMHG | HEART RATE: 77 BPM

## 2025-05-12 DIAGNOSIS — B35.3 TINEA PEDIS OF LEFT FOOT: Primary | ICD-10-CM

## 2025-05-12 DIAGNOSIS — J30.1 SEASONAL ALLERGIC RHINITIS DUE TO POLLEN: ICD-10-CM

## 2025-05-12 PROCEDURE — 99215 OFFICE O/P EST HI 40 MIN: CPT

## 2025-05-12 RX ORDER — MONTELUKAST SODIUM 10 MG/1
10 TABLET ORAL DAILY
Qty: 30 TABLET | Refills: 11 | Status: SHIPPED | OUTPATIENT
Start: 2025-05-12 | End: 2026-05-12

## 2025-05-12 RX ORDER — FLUTICASONE PROPIONATE 50 MCG
2 SPRAY, SUSPENSION (ML) NASAL DAILY
Qty: 32 G | Refills: 11 | Status: SHIPPED | OUTPATIENT
Start: 2025-05-12 | End: 2026-05-12

## 2025-05-13 NOTE — PROGRESS NOTES
[facility] FAMILY PRACTICE CLINIC NOTE  Follow-up Visit      SUBJECTIVE:     Patient: Mohsen Julien is a 44 y.o. female.    Chief Compliant:   Chief Complaint   Patient presents with    Follow-up       History of Present Illness:  Congestion - onset over the last few weeks. Endorses congestion, rhinorrhea, sore throat. Denies fever, chills, shortness of breath, chest pain, sinus pain/pressure. Denies trying OTC medication recently for concerns. Denies known sick exposures.    Tinea - chronic. Improved. Currently still using typical antifungal-steroid cream. Endorses intermittent itchiness. Denies redness, pain, warmth, fever, chills, drainage, open woulds.      Review of Systems   Constitutional:  Negative for fever.   HENT:  Positive for congestion.    Respiratory:  Negative for shortness of breath.    Cardiovascular:  Negative for chest pain.   Gastrointestinal:  Negative for abdominal pain.   Skin:  Positive for rash.   Neurological:  Negative for headaches.     A 10+ review of systems was performed with pertinent positives and negatives noted above in the history of present illness. Other systems were negative unless otherwise specified.    OBJECTIVE:     Vital Signs (Most Recent)  Vitals:    05/12/25 0840   BP: 129/77   Patient Position: Sitting   Pulse: 77   Weight: 116.5 kg (256 lb 13.4 oz)   Height: 5' (1.524 m)     BMI: Body mass index is 50.16 kg/m².     Physical Exam:  Physical Exam  Vitals and nursing note reviewed.   Constitutional:       General: She is not in acute distress.     Appearance: Normal appearance. She is obese. She is not ill-appearing, toxic-appearing or diaphoretic.   HENT:      Head: Normocephalic and atraumatic.      Right Ear: External ear normal.      Left Ear: External ear normal.      Nose: Congestion and rhinorrhea present.      Mouth/Throat:      Pharynx: Oropharynx is clear.   Eyes:      Extraocular Movements: Extraocular movements intact.   Cardiovascular:      Rate and Rhythm:  Normal rate and regular rhythm.      Pulses: Normal pulses.      Heart sounds: Normal heart sounds.   Pulmonary:      Effort: Pulmonary effort is normal. No respiratory distress.      Breath sounds: Normal breath sounds.   Abdominal:      General: Abdomen is flat.      Palpations: Abdomen is soft.      Tenderness: There is no abdominal tenderness.   Musculoskeletal:         General: Normal range of motion.      Cervical back: Normal range of motion.   Skin:     Findings: Rash present.      Comments: Improved from previous. Non-erythematous. No warmth. No open wounds. Dry, flaky appearance.   Neurological:      General: No focal deficit present.      Mental Status: She is alert and oriented to person, place, and time. Mental status is at baseline.   Psychiatric:         Mood and Affect: Mood normal.         Behavior: Behavior normal.         Thought Content: Thought content normal.          ASSESSMENT:   Mohsen Julien is a 44 y.o. female who presents to clinic to for    1. Tinea pedis of left foot    2. Seasonal allergic rhinitis due to pollen         PLAN:     Tinea pedis of left foot  - Chronic condition.  - Moderately controlled.  - Reviewed previous labs, tests, and/or imaging obtained since last visit.  - Discussed various diagnostic and treatment options with patient at this visit.  - Discussed need for referral to investigate if other interventions need to be done with patient at this visit.  - Referral placed as below.   -     Ambulatory referral/consult to Podiatry; Future; Expected date: 05/19/2025    Seasonal allergic rhinitis due to pollen  - Acute condition.  - Uncontrolled.  - Reviewed previous labs, tests, and/or imaging obtained since last visit.  - Discussed various diagnostic and treatment options with patient at this visit.  - Medications and/or medication refills as below.   -     montelukast (SINGULAIR) 10 mg tablet; Take 1 tablet (10 mg total) by mouth once daily.  Dispense: 30 tablet;  Refill: 11  -     fluticasone propionate (FLONASE) 50 mcg/actuation nasal spray; 2 sprays (100 mcg total) by Each Nostril route once daily.  Dispense: 32 g; Refill: 11        Provided patient with anticipatory guidance and return precautions. Treatment plan discussed with patient, all questions answered, and patient acknowledged understanding and verbal agreement.      Follow-up in: 1 months; or sooner PRN if acute concerns arise.      ________________________  Gilbert Hancock MD  [facility] Family Medicine PGY-3

## 2025-05-26 DIAGNOSIS — R06.83 SNORES: Primary | ICD-10-CM

## 2025-05-26 RX ORDER — IBUPROFEN 800 MG/1
800 TABLET, FILM COATED ORAL 3 TIMES DAILY
Qty: 60 TABLET | Refills: 1 | Status: SHIPPED | OUTPATIENT
Start: 2025-05-26

## 2025-06-11 ENCOUNTER — OFFICE VISIT (OUTPATIENT)
Dept: FAMILY MEDICINE | Facility: HOSPITAL | Age: 45
End: 2025-06-11
Payer: COMMERCIAL

## 2025-06-11 VITALS
WEIGHT: 257.5 LBS | HEART RATE: 87 BPM | SYSTOLIC BLOOD PRESSURE: 138 MMHG | OXYGEN SATURATION: 100 % | DIASTOLIC BLOOD PRESSURE: 84 MMHG | BODY MASS INDEX: 50.55 KG/M2 | HEIGHT: 60 IN

## 2025-06-11 DIAGNOSIS — B35.3 TINEA PEDIS OF LEFT FOOT: Primary | ICD-10-CM

## 2025-06-11 DIAGNOSIS — Z12.11 SCREENING FOR MALIGNANT NEOPLASM OF COLON: ICD-10-CM

## 2025-06-11 DIAGNOSIS — Z76.89 ENCOUNTER FOR WEIGHT MANAGEMENT: ICD-10-CM

## 2025-06-11 PROCEDURE — 99215 OFFICE O/P EST HI 40 MIN: CPT

## 2025-06-11 RX ORDER — TERBINAFINE HYDROCHLORIDE 250 MG/1
250 TABLET ORAL DAILY
Qty: 21 TABLET | Refills: 0 | Status: SHIPPED | OUTPATIENT
Start: 2025-06-11 | End: 2025-07-02

## 2025-06-11 RX ORDER — PHENTERMINE HYDROCHLORIDE 37.5 MG/1
37.5 TABLET ORAL
Qty: 30 TABLET | Refills: 0 | Status: SHIPPED | OUTPATIENT
Start: 2025-06-11 | End: 2025-07-13

## 2025-06-11 NOTE — PROGRESS NOTES
[facility] FAMILY PRACTICE CLINIC NOTE  Follow-up Visit      SUBJECTIVE:     Patient: Mohsen Julien is a 44 y.o. female.    Chief Compliant:   Chief Complaint   Patient presents with    Follow-up       History of Present Illness:  Weight Management - Past surgery approx 4-5 years ago. Reports had sleeve procedure. Reports gained weight back over time. Reports intermittent adipex use with benefits. Denies past sleep apnea evaluation. Has appt scheduled.    Foot rash - chronic with past cellulitis requiring hospitalization. Reports still using anti-fungal cream with moderate improvement. Denies fever, chills, redness.       Review of Systems   Constitutional:  Negative for fever.   Respiratory:  Negative for shortness of breath.    Cardiovascular:  Negative for chest pain.   Gastrointestinal:  Negative for abdominal pain.   Skin:  Positive for rash.   Neurological:  Negative for headaches.     A 10+ review of systems was performed with pertinent positives and negatives noted above in the history of present illness. Other systems were negative unless otherwise specified.    OBJECTIVE:     Vital Signs (Most Recent)  Vitals:    06/11/25 1316   BP: 138/84   Patient Position: Sitting   Pulse: 87   SpO2: 100%   Weight: 116.8 kg (257 lb 8 oz)   Height: 5' (1.524 m)     BMI: Body mass index is 50.29 kg/m².     Physical Exam:  Physical Exam  Vitals and nursing note reviewed.   Constitutional:       General: She is not in acute distress.     Appearance: Normal appearance. She is obese. She is not ill-appearing, toxic-appearing or diaphoretic.   HENT:      Head: Normocephalic and atraumatic.      Right Ear: External ear normal.      Left Ear: External ear normal.      Nose: Nose normal.      Mouth/Throat:      Pharynx: Oropharynx is clear.   Eyes:      Extraocular Movements: Extraocular movements intact.   Cardiovascular:      Rate and Rhythm: Normal rate and regular rhythm.   Pulmonary:      Effort: Pulmonary effort is normal.  No respiratory distress.   Musculoskeletal:         General: Normal range of motion.      Cervical back: Normal range of motion.   Skin:     Findings: Rash (dry flaky skin, non-tender, non-erythematous) present.   Neurological:      General: No focal deficit present.      Mental Status: She is alert and oriented to person, place, and time. Mental status is at baseline.   Psychiatric:         Mood and Affect: Mood normal.         Behavior: Behavior normal.         Thought Content: Thought content normal.          ASSESSMENT:   Mohsen Julien is a 44 y.o. female who presents to clinic to for    1. Tinea pedis of left foot    2. BMI 50.0-59.9, adult    3. Encounter for weight management    4. Screening for malignant neoplasm of colon         PLAN:     Tinea pedis of left foot  - Chronic condition.  - Moderately controlled.  - Reviewed previous labs, tests, and/or imaging obtained since last visit.  - Discussed various diagnostic and treatment options with patient at this visit.  - Discussed PO antifungal with patient at this visit.  - Medications and/or medication refills as below.   -     terbinafine HCL (LAMISIL) 250 mg tablet; Take 1 tablet (250 mg total) by mouth once daily. for 21 days  Dispense: 21 tablet; Refill: 0    BMI 50.0-59.9, adult  - Chronic condition.  - Uncontrolled.  - Reviewed previous labs, tests, and/or imaging obtained since last visit.  - Discussed various diagnostic and treatment options with patient at this visit.  - Discussed need for further eval from bariatric surgery and sleep medicine, further commitment to lifestyle modifications with patient at this visit.  - Medications and/or medication refills as below.   -     Hemoglobin A1C; Future; Expected date: 06/11/2025  -     phentermine (ADIPEX-P) 37.5 mg tablet; Take 1 tablet (37.5 mg total) by mouth before breakfast.  Dispense: 30 tablet; Refill: 0  -     Ambulatory referral/consult to Bariatric/Obesity Medicine; Future; Expected date:  06/18/2025    Encounter for weight management  - Plan as above.  -     phentermine (ADIPEX-P) 37.5 mg tablet; Take 1 tablet (37.5 mg total) by mouth before breakfast.  Dispense: 30 tablet; Refill: 0  -     Ambulatory referral/consult to Bariatric/Obesity Medicine; Future; Expected date: 06/18/2025    Screening for malignant neoplasm of colon  - Screening indicated. Discussed with patient. Orders as below. Follow up results.   -     Ambulatory referral/consult to Endo Procedure ; Future; Expected date: 06/12/2025        Provided patient with anticipatory guidance and return precautions. Treatment plan discussed with patient, all questions answered, and patient acknowledged understanding and verbal agreement.      Follow-up in: 1 months; or sooner PRN if acute concerns arise.      ________________________  Gilbert Hancock MD  [facility] Family Medicine PGY-3

## 2025-06-16 ENCOUNTER — CLINICAL SUPPORT (OUTPATIENT)
Dept: ENDOSCOPY | Facility: HOSPITAL | Age: 45
End: 2025-06-16
Payer: COMMERCIAL

## 2025-06-16 ENCOUNTER — TELEPHONE (OUTPATIENT)
Dept: ENDOSCOPY | Facility: HOSPITAL | Age: 45
End: 2025-06-16

## 2025-06-16 VITALS — BODY MASS INDEX: 50.45 KG/M2 | WEIGHT: 257 LBS | HEIGHT: 60 IN

## 2025-06-16 DIAGNOSIS — Z12.11 SCREENING FOR MALIGNANT NEOPLASM OF COLON: ICD-10-CM

## 2025-06-16 RX ORDER — POLYETHYLENE GLYCOL 3350, SODIUM SULFATE ANHYDROUS, SODIUM BICARBONATE, SODIUM CHLORIDE, POTASSIUM CHLORIDE 236; 22.74; 6.74; 5.86; 2.97 G/4L; G/4L; G/4L; G/4L; G/4L
4 POWDER, FOR SOLUTION ORAL ONCE
Qty: 4000 ML | Refills: 0 | Status: SHIPPED | OUTPATIENT
Start: 2025-06-16 | End: 2025-06-16

## 2025-06-16 NOTE — TELEPHONE ENCOUNTER
.Patient is scheduled for a Colonoscopy on 8/1/2025 with Dr. PIEDAD Geronimo  Referral for procedure from [facility] appointment

## 2025-06-16 NOTE — PLAN OF CARE
.Patient is scheduled for a Colonoscopy on 8/1/2025 with Dr. PIEDAD Geronimo  Referral for procedure from [facility] appointment

## 2025-07-02 ENCOUNTER — OFFICE VISIT (OUTPATIENT)
Dept: PODIATRY | Facility: CLINIC | Age: 45
End: 2025-07-02
Payer: COMMERCIAL

## 2025-07-02 VITALS
WEIGHT: 257.06 LBS | HEART RATE: 73 BPM | DIASTOLIC BLOOD PRESSURE: 88 MMHG | BODY MASS INDEX: 50.47 KG/M2 | SYSTOLIC BLOOD PRESSURE: 147 MMHG | HEIGHT: 60 IN

## 2025-07-02 DIAGNOSIS — L85.3 DRY SKIN: ICD-10-CM

## 2025-07-02 DIAGNOSIS — B35.3 TINEA PEDIS OF LEFT FOOT: ICD-10-CM

## 2025-07-02 DIAGNOSIS — I87.2 VENOUS STASIS DERMATITIS: Primary | ICD-10-CM

## 2025-07-02 PROCEDURE — 3008F BODY MASS INDEX DOCD: CPT | Mod: CPTII,S$GLB,, | Performed by: STUDENT IN AN ORGANIZED HEALTH CARE EDUCATION/TRAINING PROGRAM

## 2025-07-02 PROCEDURE — 1159F MED LIST DOCD IN RCRD: CPT | Mod: CPTII,S$GLB,, | Performed by: STUDENT IN AN ORGANIZED HEALTH CARE EDUCATION/TRAINING PROGRAM

## 2025-07-02 PROCEDURE — 99204 OFFICE O/P NEW MOD 45 MIN: CPT | Mod: S$GLB,,, | Performed by: STUDENT IN AN ORGANIZED HEALTH CARE EDUCATION/TRAINING PROGRAM

## 2025-07-02 PROCEDURE — 99999 PR PBB SHADOW E&M-EST. PATIENT-LVL V: CPT | Mod: PBBFAC,,, | Performed by: STUDENT IN AN ORGANIZED HEALTH CARE EDUCATION/TRAINING PROGRAM

## 2025-07-02 PROCEDURE — 3079F DIAST BP 80-89 MM HG: CPT | Mod: CPTII,S$GLB,, | Performed by: STUDENT IN AN ORGANIZED HEALTH CARE EDUCATION/TRAINING PROGRAM

## 2025-07-02 PROCEDURE — 3077F SYST BP >= 140 MM HG: CPT | Mod: CPTII,S$GLB,, | Performed by: STUDENT IN AN ORGANIZED HEALTH CARE EDUCATION/TRAINING PROGRAM

## 2025-07-02 RX ORDER — CLOTRIMAZOLE AND BETAMETHASONE DIPROPIONATE 10; .64 MG/G; MG/G
CREAM TOPICAL 2 TIMES DAILY
Qty: 45 G | Refills: 1 | Status: SHIPPED | OUTPATIENT
Start: 2025-07-02

## 2025-07-02 NOTE — PROGRESS NOTES
Subjective:     Patient ID: Mohsen Julien is a 44 y.o. female.    Chief Complaint: Fungus (Left foot)    Mohsen is a 44 y.o. female who presents to the podiatry clinic  with complaint of  left foot/ankle pain. Onset of the symptoms was several months ago. Precipitating event: relates developed leg swelling which lead to dry skin and wounds, was hospitalized with cellulitis, relates cellulitis and dry skin has mostly resolved, however still has dry skin to her hindfoot/ankles. Current symptoms include: swelling. Aggravating factors: any weight bearing. Symptoms have stabilized. Patient has had prior foot problems. Evaluation to date: plain films: normal. Treatment to date: currently on oral Lamisil. Patients rates pain 0/10 on pain scale.    Review of Systems   Constitutional: Negative for chills, decreased appetite, diaphoresis and fever.   HENT:  Negative for congestion and hearing loss.    Cardiovascular:  Positive for leg swelling. Negative for chest pain, claudication and syncope.   Respiratory:  Negative for cough and shortness of breath.    Skin:  Positive for dry skin and itching. Negative for color change, flushing, poor wound healing and rash.   Musculoskeletal:  Positive for arthritis, gout and joint pain.   Gastrointestinal:  Negative for nausea and vomiting.   Neurological:  Positive for paresthesias. Negative for focal weakness and weakness.   Psychiatric/Behavioral:  Negative for altered mental status. The patient is not nervous/anxious.         Objective:     Physical Exam  Constitutional:       General: She is not in acute distress.     Appearance: She is well-developed. She is not diaphoretic.   Cardiovascular:      Comments: Dorsalis pedis and posterior tibial pulses are within normal limits. Skin temperature is within normal limits. Toes are cool to touch and feet are warm proximally. Hair growth is within normal limits. Skin is normotrophic and without hyperpigmentation. Pitting edema  noted, bilaterally L>R  Musculoskeletal:      Comments: Adequate joint range of motion without pain, limitation, nor crepitation to bilateral feet and ankle joints. Muscle strength is 5/5 in all groups bilaterally.       Lymphadenopathy:      Comments: Negative lymphangitic streaking    Skin:     General: Skin is warm and dry.      Findings: No lesion.      Comments: Skin is warm and dry, no acute signs of infection noted. No open wounds, macerations or hyperkeratotic lesions, bilaterally.     Toenails are well trimmed and of normal morphology, bilaterally.     Bilateral hallux toenails are thickened by 2-4 mm's, dystrophic, and are darkened in coloration with subungual fungal debris, bilaterally.  Skin is very dry, bilaterally.     Skin is dry and erythematous to anterior and medial ankle. No drainage or open wounds noted     Neurological:      Mental Status: She is alert and oriented to person, place, and time.      Motor: No abnormal muscle tone.      Comments: Light touch within normal limits.    Psychiatric:         Behavior: Behavior normal.         Thought Content: Thought content normal.         Judgment: Judgment normal.           Assessment:      Encounter Diagnoses   Name Primary?    Tinea pedis of left foot     Venous stasis dermatitis Yes    Dry skin      Plan:     Mohsen was seen today for fungus.    Diagnoses and all orders for this visit:    Venous stasis dermatitis  -     Comprehensive Metabolic Panel; Future  -     CBC auto differential; Future  -     Ambulatory referral/consult to Dermatology; Future    Tinea pedis of left foot  -     Ambulatory referral/consult to Podiatry  -     Comprehensive Metabolic Panel; Future  -     CBC auto differential; Future  -     Ambulatory referral/consult to Dermatology; Future    Dry skin  -     Ambulatory referral/consult to Dermatology; Future    Other orders  -     clotrimazole-betamethasone 1-0.05% (LOTRISONE) cream; Apply topically 2 (two) times  daily.      I counseled the patient on her conditions, their implications and medical management.\  Previous xray left foot/ankle independently reviewed with patient noting no acute findings  Discussed dry/scaly skin likely secondary to venous stasis dermatitis, however may be a fungal component as well. Rx Lotrisone cream  Rest, elevate. Continue compression socks  Referral to Dermatology for 2nd opinion, dry skin  Return to clinic PRN

## 2025-07-03 DIAGNOSIS — M62.838 MUSCLE SPASM: ICD-10-CM

## 2025-07-07 RX ORDER — METHOCARBAMOL 750 MG/1
TABLET, FILM COATED ORAL
Qty: 90 TABLET | Refills: 2 | Status: SHIPPED | OUTPATIENT
Start: 2025-07-07

## 2025-07-23 ENCOUNTER — OFFICE VISIT (OUTPATIENT)
Dept: FAMILY MEDICINE | Facility: HOSPITAL | Age: 45
End: 2025-07-23
Payer: COMMERCIAL

## 2025-07-23 VITALS
BODY MASS INDEX: 51.42 KG/M2 | DIASTOLIC BLOOD PRESSURE: 76 MMHG | SYSTOLIC BLOOD PRESSURE: 120 MMHG | HEIGHT: 60 IN | WEIGHT: 261.94 LBS | HEART RATE: 90 BPM

## 2025-07-23 DIAGNOSIS — E66.01 MORBID OBESITY: Primary | ICD-10-CM

## 2025-07-23 PROCEDURE — 99215 OFFICE O/P EST HI 40 MIN: CPT

## 2025-07-24 PROBLEM — E66.01 MORBID OBESITY: Status: ACTIVE | Noted: 2025-07-24

## 2025-07-30 ENCOUNTER — TELEPHONE (OUTPATIENT)
Dept: ENDOSCOPY | Facility: HOSPITAL | Age: 45
End: 2025-07-30
Payer: COMMERCIAL

## 2025-07-30 RX ORDER — SODIUM, POTASSIUM,MAG SULFATES 17.5-3.13G
1 SOLUTION, RECONSTITUTED, ORAL ORAL DAILY
Qty: 1 KIT | Refills: 0 | Status: SHIPPED | OUTPATIENT
Start: 2025-07-30 | End: 2025-08-02

## 2025-07-31 ENCOUNTER — ANESTHESIA EVENT (OUTPATIENT)
Dept: ENDOSCOPY | Facility: HOSPITAL | Age: 45
End: 2025-07-31
Payer: COMMERCIAL

## 2025-07-31 NOTE — ANESTHESIA PREPROCEDURE EVALUATION
[facility]  Anesthesia Pre-Operative Evaluation         Patient Name: Mohsen Julien  YOB: 1980  MRN: [id number]    SUBJECTIVE:     2025    Procedure(s) (LRB):  COLONOSCOPY, SCREENING, LOW RISK PATIENT (N/A)    Mohsen Julien is a 44 y.o. female here for Procedure(s) (LRB):  COLONOSCOPY, SCREENING, LOW RISK PATIENT (N/A)    Drips:     Problem List[1]    Review of patient's allergies indicates:  No Known Allergies    Medications Ordered Prior to Encounter[2]    Past Surgical History:   Procedure Laterality Date    APPENDECTOMY       SECTION      CHOLECYSTECTOMY      ESOPHAGOGASTRODUODENOSCOPY N/A 2022    Procedure: EGD (ESOPHAGOGASTRODUODENOSCOPY);  Surgeon: Adis Huitron MD;  Location: [facility];  Service: Endoscopy;  Laterality: N/A;       Social History[3]      OBJECTIVE:     Vital Signs Range (Last 24H):       Significant Labs:  Lab Results   Component Value Date    WBC 11.75 2025    HGB 11.3 (L) 2025    HCT 38.2 2025     2025    CHOL 168 2024    TRIG 275 (H) 2024    HDL 37 (L) 2024    ALT 72 (H) 2025    AST 36 2025     2025    K 3.9 2025     2025    CREATININE 1.0 2025    BUN 11 2025    CO2 22 (L) 2025    TSH 0.072 (L) 2025    HGBA1C 4.6 2024       Diagnostic Studies:    EKG:   Results for orders placed or performed during the hospital encounter of 25   EKG 12-lead    Collection Time: 25  9:49 PM   Result Value Ref Range    QRS Duration 88 ms    [facility] QTC Calculation 399 ms    Narrative    Test Reason : R50.9,    Vent. Rate : 100 BPM     Atrial Rate : 100 BPM     P-R Int : 180 ms          QRS Dur :  88 ms      QT Int : 310 ms       P-R-T Axes :  87  -9  61 degrees    QTcB Int : 399 ms    Normal sinus rhythm  Low voltage QRS  Nonspecific T wave abnormality  Abnormal ECG  When compared with ECG of 2023 13:46,  Nonspecific T  wave abnormality, worse in Lateral leads  QT has shortened  Confirmed by Brielle Fallon ([id number]) on 3/17/2025 5:35:42 PM    Referred By: AAAREFERRAL SELF           Confirmed By: Brielle Fallon       2D ECHO:  TTE:  No results found. However, due to the size of the patient record, not all encounters were searched. Please check Results Review for a complete set of results.  Results for orders placed or performed during the hospital encounter of 08/26/22   Echo   Result Value Ref Range    BSA 2.36 m2    TDI SEPTAL 0.09 m/s    LV LATERAL E/E' RATIO 8.67 m/s    LV SEPTAL E/E' RATIO 8.67 m/s    LA WIDTH 3.54 cm    TDI LATERAL 0.09 m/s    PV PEAK VELOCITY 0.97 cm/s    LVIDd 4.27 3.5 - 6.0 cm    IVS 0.86 0.6 - 1.1 cm    PW 0.84 0.6 - 1.1 cm    LVIDs 2.96 2.1 - 4.0 cm    FS 31 28 - 44 %    LV mass 112.85 g    RV S' 13.11 cm/s    Left Ventricle Relative Wall Thickness 0.39 cm    AV mean gradient 5 mmHg    AV valve area 3.49 cm2    AV Velocity Ratio 0.79     AV index (prosthetic) 0.88     MV valve area p 1/2 method 4.27 cm2    MV valve area by continuity eq 4.26 cm2    E/A ratio 1.13     Mean e' 0.09 m/s    E wave deceleration time 177.67 msec    LVOT diameter 2.25 cm    LVOT area 4.0 cm2    LVOT peak shlomo 1.11 m/s    LVOT peak VTI 22.46 cm    Ao peak shlomo 1.40 m/s    Ao VTI 25.56 cm    LVOT stroke volume 89.26 cm3    AV peak gradient 8 mmHg    MV peak gradient 4 mmHg    E/E' ratio 8.67 m/s    MV Peak E Shlomo 0.78 m/s    MV VTI 20.97 cm    MV stenosis pressure 1/2 time 51.52 ms    MV Peak A Shlomo 0.69 m/s    LV Systolic Volume 33.75 mL    LV Systolic Volume Index 15.1 mL/m2    LV Diastolic Volume 81.77 mL    LV Diastolic Volume Index 36.67 mL/m2    LV Mass Index 51 g/m2    RA Major Axis 4.89 cm    Left Atrium Major Axis 5.76 cm    XIAO (MOD) 13.9 mL/m2    LA Vol (MOD) 31.06 cm3    RA Width 2.96 cm    Right Atrial Pressure (from IVC) 3 mmHg    EF 65 %    Narrative    · The left ventricle is normal in size with normal  systolic function.  · The estimated ejection fraction is 65%.  · Normal left ventricular diastolic function.  · Normal right ventricular size with normal right ventricular systolic   function.  · Normal central venous pressure (3 mmHg).        Previous airway:     12/28/2022 10:44 AM      Mask difficulty assessment: Easy mask      Successful intubation technique: Video laryngoscopy and Stylet  Blade: Cruz  Blade size: #3  Placement verified by: auscultation, CO2 Cormack-Lehane Classification: grade I - full view of glottis  ETT size: 7.0mm  Inital cuff pressure (cm H2O): MOP  ETT to lips (cm): 22  Measured from: Lips       Pre-op Assessment    I have reviewed the Patient Summary Reports.     I have reviewed the Nursing Notes. I have reviewed the NPO Status.   I have reviewed the Medications.     Review of Systems  Anesthesia Hx:  No problems with previous Anesthesia   History of prior surgery of interest to airway management or planning:            Denies Personal Hx of Anesthesia complications.                    Social:  No Alcohol Use, Non-Smoker       Cardiovascular:  Exercise tolerance: good   Hypertension  Denies Valvular problems/Murmurs.  Denies MI.                Fluid retention                      Hypertension         Pulmonary:     Denies Asthma.     Risk factors for DOROTHY                Hepatic/GI:     GERD, poorly controlled         Gerd          Musculoskeletal:     Fibromyalgia            Neurological:  Denies TIA.  Denies CVA. Neuromuscular Disease,   Denies Seizures.                              Neuromuscular Disease   Endocrine:   Hypothyroidism Hyperthyroidism       Morbid Obesity / BMI > 40      Physical Exam  General: Well nourished, Cooperative, Oriented and Alert  Bilateral nare piercings   Airway:  Mallampati: III   Mouth Opening: < 3 cm  TM Distance: < 4 cm  Tongue: Large  Neck ROM: Normal ROM    Dental:  Intact        Anesthesia Plan  Type of Anesthesia, risks & benefits  discussed:    Anesthesia Type: Gen Natural Airway  Intra-op Monitoring Plan: Standard ASA Monitors  Post Op Pain Control Plan: multimodal analgesia  Induction:  IV  Informed Consent: Informed consent signed with the Patient and all parties understand the risks and agree with anesthesia plan.  All questions answered.   ASA Score: 3  Day of Surgery Review of History & Physical: H&P Update referred to the surgeon/provider.  Anesthesia Plan Notes: Risk of DOROTHY.      Pt vomited prep at 2 a.m.  Clear liquid and likely from volume of prep on an empty stomach.  No particles seen.  Plan for ondansetron for symptoms.       Ready For Surgery From Anesthesia Perspective.     .           [1]   Patient Active Problem List  Diagnosis    HTN (hypertension)    Class 3 severe obesity due to excess calories with serious comorbidity and body mass index (BMI) of 45.0 to 49.9 in adult    Venous stasis dermatitis of left lower extremity    Fungal infection of skin    Hypokalemia    Hyperlipidemia    SIRS (systemic inflammatory response syndrome)    Cellulitis of left lower extremity    Gout    Thyroid disease    Peripheral neuropathy    GERD (gastroesophageal reflux disease)    Gastritis    Infectious encephalopathy    Right upper quadrant abdominal tenderness without rebound tenderness    Morbid obesity   [2]   No current facility-administered medications on file prior to encounter.     Current Outpatient Medications on File Prior to Encounter   Medication Sig Dispense Refill    amitriptyline (ELAVIL) 25 MG tablet Take 1 to 2 tablets by mouth at bedtime as needed for sleep 60 tablet 2    amitriptyline (ELAVIL) 25 MG tablet Take 1 to 2 tablets by mouth once daily at bedtime 180 tablet 11    ammonium lactate 12 % Crea Apply 1 g topically once daily. 385 g 0    clotrimazole-betamethasone 1-0.05% (LOTRISONE) cream Apply topically locally as directed daily 15 g 2    fluticasone propionate (FLONASE) 50 mcg/actuation nasal spray 2 sprays (100  mcg total) by Each Nostril route once daily. 32 g 11    furosemide (LASIX) 40 MG tablet Take 1 tablet (40 mg total) by mouth 2 (two) times a day. 180 tablet 11    gabapentin (NEURONTIN) 100 MG capsule Take 3 capsules (300 mg total) by mouth 3 (three) times daily. Take 100 mg three times daily for first 2 days, then take 200 mg three times daily for next 2 days, then increase to 300 mg three times daily. 270 capsule 11    ibuprofen (ADVIL,MOTRIN) 800 MG tablet Take 1 tablet (800 mg total) by mouth 3 (three) times daily. 60 tablet 1    ketoconazole (NIZORAL) 2 % cream Apply topically once daily. 60 g 1    metoprolol tartrate (LOPRESSOR) 100 MG tablet TAKE ONE TABLET BY MOUTH TWO TIMES A DAY FOR BLOOD PRESSURE 60 tablet 2    metoprolol tartrate (LOPRESSOR) 100 MG tablet Take 1 tablet (100 mg total) by mouth 2 (two) times daily. 180 tablet 11    MIRENA 20 mcg/24 hr (5 years) IUD   0    montelukast (SINGULAIR) 10 mg tablet Take 1 tablet (10 mg total) by mouth once daily. 30 tablet 11    mupirocin (BACTROBAN) 2 % ointment Apply to affected area as directed 22 g 2    salicylic acid 2 % Gel Apply 1 Application topically once daily. 28 g 0    traMADoL (ULTRAM) 50 mg tablet Take 1 tablet (50 mg total) by mouth every 6 (six) hours as needed. 30 tablet 2    traMADoL (ULTRAM) 50 mg tablet TAKE 1 TABLET BY MOUTH EVERY 4 HOURS 30 tablet 2    traMADoL (ULTRAM) 50 mg tablet Take 1 tablet (50 mg total) by mouth every 4 (four) hours as needed FOR PAIN 32 tablet 3    traMADoL (ULTRAM) 50 mg tablet TAKE 1 TABLET BY MOUTH EVERY 4 HOURS AS NEEDED FOR PAIN 32 tablet 3    traMADoL (ULTRAM) 50 mg tablet Take 1 tablet (50 mg total) by mouth every 4 (four) hours as needed for pain 32 tablet 3    triamterene-hydrochlorothiazide 37.5-25 mg (MAXZIDE-25) 37.5-25 mg per tablet Take 1 tablet by mouth once daily. 90 tablet 11   [3]   Social History  Socioeconomic History    Marital status: Single   Tobacco Use    Smoking status: Never    Smokeless  tobacco: Never   Substance and Sexual Activity    Alcohol use: No    Drug use: No    Sexual activity: Yes     Partners: Male     Birth control/protection: Implant     Social Drivers of Health     Financial Resource Strain: Low Risk  (5/13/2025)    Received from [facility]    Overall Financial Resource Strain (CARDIA)     Difficulty of Paying Living Expenses: Not hard at all   Recent Concern: Financial Resource Strain - High Risk (3/14/2025)    Overall Financial Resource Strain (CARDIA)     Difficulty of Paying Living Expenses: Hard   Food Insecurity: No Food Insecurity (6/3/2025)    Received from [facility]    Hunger Vital Sign     Worried About Running Out of Food in the Last Year: Never true     Ran Out of Food in the Last Year: Never true   Recent Concern: Food Insecurity - Food Insecurity Present (3/14/2025)    Hunger Vital Sign     Worried About Running Out of Food in the Last Year: Often true     Ran Out of Food in the Last Year: Often true   Transportation Needs: No Transportation Needs (5/13/2025)    Received from [facility]    PRAPARE - Transportation     Lack of Transportation (Medical): No     Lack of Transportation (Non-Medical): No   Physical Activity: Inactive (6/3/2025)    Received from [facility]    Exercise Vital Sign     Days of Exercise per Week: 0 days     Minutes of Exercise per Session: 0 min   Stress: No Stress Concern Present (5/13/2025)    Received from [facility] - Occupational Stress Questionnaire     Feeling of Stress : Not at all   Recent Concern: Stress - Stress Concern Present (3/14/2025)    [employer] [facility] - Occupational Stress Questionnaire     Feeling of Stress : To some extent   Housing Stability: Unknown (5/13/2025)    Received from [facility]    Housing Stability  Vital Sign     Unable to Pay for Housing in the Last Year: No     Homeless in the Last Year: No   Recent Concern: Housing Stability - High Risk (3/14/2025)    Housing Stability Vital Sign     Unable to Pay for Housing in the Last Year: Yes     Homeless in the Last Year: No

## 2025-08-01 ENCOUNTER — ANESTHESIA (OUTPATIENT)
Dept: ENDOSCOPY | Facility: HOSPITAL | Age: 45
End: 2025-08-01
Payer: COMMERCIAL

## 2025-08-01 ENCOUNTER — HOSPITAL ENCOUNTER (OUTPATIENT)
Facility: HOSPITAL | Age: 45
Discharge: HOME | End: 2025-08-01
Attending: INTERNAL MEDICINE | Admitting: INTERNAL MEDICINE
Payer: COMMERCIAL

## 2025-08-01 VITALS
DIASTOLIC BLOOD PRESSURE: 75 MMHG | WEIGHT: 247 LBS | OXYGEN SATURATION: 100 % | TEMPERATURE: 98 F | SYSTOLIC BLOOD PRESSURE: 139 MMHG | HEART RATE: 80 BPM | BODY MASS INDEX: 42.17 KG/M2 | RESPIRATION RATE: 19 BRPM | HEIGHT: 64 IN

## 2025-08-01 DIAGNOSIS — Z12.11 COLON CANCER SCREENING: ICD-10-CM

## 2025-08-01 LAB
B-HCG UR QL: NEGATIVE
CTP QC/QA: YES

## 2025-08-01 PROCEDURE — 25000003 PHARM REV CODE 250: Performed by: NURSE ANESTHETIST, CERTIFIED REGISTERED

## 2025-08-01 PROCEDURE — G0121 COLON CA SCRN NOT HI RSK IND: HCPCS | Mod: ,,, | Performed by: INTERNAL MEDICINE

## 2025-08-01 PROCEDURE — 81025 URINE PREGNANCY TEST: CPT | Performed by: INTERNAL MEDICINE

## 2025-08-01 PROCEDURE — G0121 COLON CA SCRN NOT HI RSK IND: HCPCS | Performed by: INTERNAL MEDICINE

## 2025-08-01 PROCEDURE — 25000003 PHARM REV CODE 250: Performed by: INTERNAL MEDICINE

## 2025-08-01 PROCEDURE — 37000009 HC ANESTHESIA EA ADD 15 MINS: Performed by: INTERNAL MEDICINE

## 2025-08-01 PROCEDURE — 63600175 PHARM REV CODE 636 W HCPCS: Performed by: NURSE ANESTHETIST, CERTIFIED REGISTERED

## 2025-08-01 PROCEDURE — 37000008 HC ANESTHESIA 1ST 15 MINUTES: Performed by: INTERNAL MEDICINE

## 2025-08-01 RX ORDER — LIDOCAINE HYDROCHLORIDE 20 MG/ML
INJECTION INTRAVENOUS
Status: DISCONTINUED | OUTPATIENT
Start: 2025-08-01 | End: 2025-08-01

## 2025-08-01 RX ORDER — DEXTROMETHORPHAN/PSEUDOEPHED 2.5-7.5/.8
DROPS ORAL
Status: COMPLETED | OUTPATIENT
Start: 2025-08-01 | End: 2025-08-01

## 2025-08-01 RX ORDER — SODIUM CHLORIDE 9 MG/ML
INJECTION, SOLUTION INTRAVENOUS CONTINUOUS
Status: DISCONTINUED | OUTPATIENT
Start: 2025-08-01 | End: 2025-08-01 | Stop reason: HOSPADM

## 2025-08-01 RX ORDER — ONDANSETRON HYDROCHLORIDE 2 MG/ML
4 INJECTION, SOLUTION INTRAVENOUS ONCE
Status: DISCONTINUED | OUTPATIENT
Start: 2025-08-01 | End: 2025-08-01 | Stop reason: HOSPADM

## 2025-08-01 RX ORDER — PROPOFOL 10 MG/ML
VIAL (ML) INTRAVENOUS CONTINUOUS PRN
Status: DISCONTINUED | OUTPATIENT
Start: 2025-08-01 | End: 2025-08-01

## 2025-08-01 RX ADMIN — PROPOFOL 150 MCG/KG/MIN: 10 INJECTION, EMULSION INTRAVENOUS at 10:08

## 2025-08-01 RX ADMIN — LIDOCAINE HYDROCHLORIDE 100 MG: 20 INJECTION, SOLUTION INTRAVENOUS at 10:08

## 2025-08-01 RX ADMIN — PROPOFOL 30 MG: 10 INJECTION, EMULSION INTRAVENOUS at 10:08

## 2025-08-01 RX ADMIN — SODIUM CHLORIDE: 0.9 INJECTION, SOLUTION INTRAVENOUS at 10:08

## 2025-08-01 RX ADMIN — PROPOFOL 50 MG: 10 INJECTION, EMULSION INTRAVENOUS at 10:08

## 2025-08-01 NOTE — ANESTHESIA POSTPROCEDURE EVALUATION
Anesthesia Post Evaluation    Patient: Mohsen Julien    Procedure(s) Performed: Procedure(s) (LRB):  COLONOSCOPY, SCREENING, LOW RISK PATIENT (N/A)    Final Anesthesia Type: general      Patient location during evaluation: PACU  Patient participation: Yes- Able to Participate  Level of consciousness: awake  Post-procedure vital signs: reviewed and stable  Pain management: adequate  Airway patency: patent    PONV status at discharge: No PONV  Anesthetic complications: no      Cardiovascular status: blood pressure returned to baseline  Respiratory status: unassisted  Hydration status: euvolemic  Follow-up not needed.              Vitals Value Taken Time   /75 08/01/25 11:10   Temp 36.8 °C (98.2 °F) 08/01/25 10:40   Pulse 80 08/01/25 11:10   Resp 19 08/01/25 11:10   SpO2 100 % 08/01/25 11:10         Event Time   Out of Recovery 11:34:01         Pain/Taylor Score: Taylor Score: 10 (8/1/2025 11:10 AM)

## 2025-08-01 NOTE — H&P
Short Stay Endoscopy History and Physical    PCP - Darlene Henriquez MD    Procedure - Colonoscopy  ASA - II  Mallampati - per anesthesia  History of Anesthesia problems - no  Family history Anesthesia problems - no     HPI:  This is a 44 y.o. female here for evaluation of : Colon cancer screening    Family history of colon cancer - no  History of polyps - na  Change in bowel habits - no  Rectal bleeding - no      ROS:  Constitutional: No fevers, chills, No weight loss  ENT: No allergies  CV: No chest pain  Pulm: No shortness of breath  GI: see HPI  Derm: No rash    Medical History:  has a past medical history of Anemia, Arthritis, Fibromyalgia, GERD (gastroesophageal reflux disease), Gout, HLD (hyperlipidemia), HTN (hypertension) (2013), Peripheral neuropathy, Thyroid disease, and Venous stasis dermatitis of left lower extremity.    Surgical History:  has a past surgical history that includes  section; Appendectomy; Cholecystectomy; and Esophagogastroduodenoscopy (N/A, 2022).    Family History: family history includes Colon cancer in her maternal aunt; Stroke in her mother.. Otherwise no colon cancer, inflammatory bowel disease, or GI malignancies.    Social History:  reports that she has never smoked. She has never used smokeless tobacco. She reports that she does not drink alcohol and does not use drugs.    Review of patient's allergies indicates:  No Known Allergies    Medications:   Prescriptions Prior to Admission[1]      Objective Findings:    Vital Signs: see nursing notes  Physical Exam:  General Appearance: Well appearing in no acute distress  Eyes:    No scleral icterus  ENT: Neck supple  Lungs: CTA anteriorly  Heart:  S1, S2 normal, no murmurs heard  Abdomen: Soft, non tender, non distended with positive bowel sounds. No hepatosplenomegaly, ascites, or mass  Extremities: no edema  Skin: No rash      Labs:  Lab Results   Component Value Date    WBC 11.75 2025    HGB 11.3 (L)  03/19/2025    HCT 38.2 03/19/2025     03/19/2025    CHOL 168 11/06/2024    TRIG 275 (H) 11/06/2024    HDL 37 (L) 11/06/2024    ALT 72 (H) 03/19/2025    AST 36 03/19/2025     03/19/2025    K 3.9 03/19/2025     03/19/2025    CREATININE 1.0 03/19/2025    BUN 11 03/19/2025    CO2 22 (L) 03/19/2025    TSH 0.072 (L) 03/13/2025    HGBA1C 4.6 11/06/2024       I have explained the risks and benefits of endoscopy procedures to the patient including but not limited to bleeding, perforation, infection, and death.    Vaughn Geronimo MD         [1]   Medications Prior to Admission   Medication Sig Dispense Refill Last Dose/Taking    amitriptyline (ELAVIL) 25 MG tablet Take 1 to 2 tablets by mouth at bedtime as needed for sleep 60 tablet 2     amitriptyline (ELAVIL) 25 MG tablet Take 1 to 2 tablets by mouth once daily at bedtime 180 tablet 11     ammonium lactate 12 % Crea Apply 1 g topically once daily. 385 g 0     clotrimazole-betamethasone 1-0.05% (LOTRISONE) cream Apply topically locally as directed daily 15 g 2     clotrimazole-betamethasone 1-0.05% (LOTRISONE) cream Apply topically 2 (two) times daily. 45 g 1     fluticasone propionate (FLONASE) 50 mcg/actuation nasal spray 2 sprays (100 mcg total) by Each Nostril route once daily. 32 g 11     furosemide (LASIX) 40 MG tablet Take 1 tablet (40 mg total) by mouth 2 (two) times a day. 180 tablet 11     gabapentin (NEURONTIN) 100 MG capsule Take 3 capsules (300 mg total) by mouth 3 (three) times daily. Take 100 mg three times daily for first 2 days, then take 200 mg three times daily for next 2 days, then increase to 300 mg three times daily. 270 capsule 11     ibuprofen (ADVIL,MOTRIN) 800 MG tablet Take 1 tablet (800 mg total) by mouth 3 (three) times daily. 60 tablet 1     ketoconazole (NIZORAL) 2 % cream Apply topically once daily. 60 g 1     methocarbamoL (ROBAXIN) 750 MG Tab Take 1 tablet by mouth 3 times daily as needed for muscle spasms 90  tablet 2     metoprolol tartrate (LOPRESSOR) 100 MG tablet TAKE ONE TABLET BY MOUTH TWO TIMES A DAY FOR BLOOD PRESSURE 60 tablet 2     metoprolol tartrate (LOPRESSOR) 100 MG tablet Take 1 tablet (100 mg total) by mouth 2 (two) times daily. 180 tablet 11     MIRENA 20 mcg/24 hr (5 years) IUD   0     montelukast (SINGULAIR) 10 mg tablet Take 1 tablet (10 mg total) by mouth once daily. 30 tablet 11     mupirocin (BACTROBAN) 2 % ointment Apply to affected area as directed 22 g 2     salicylic acid 2 % Gel Apply 1 Application topically once daily. 28 g 0     sodium,potassium,mag sulfates (SUPREP BOWEL PREP KIT) 17.5-3.13-1.6 gram SolR Take 177 mLs by mouth once daily. for 2 days 1 kit 0     traMADoL (ULTRAM) 50 mg tablet Take 1 tablet (50 mg total) by mouth every 6 (six) hours as needed. 30 tablet 2     traMADoL (ULTRAM) 50 mg tablet TAKE 1 TABLET BY MOUTH EVERY 4 HOURS 30 tablet 2     traMADoL (ULTRAM) 50 mg tablet Take 1 tablet (50 mg total) by mouth every 4 (four) hours as needed FOR PAIN 32 tablet 3     traMADoL (ULTRAM) 50 mg tablet TAKE 1 TABLET BY MOUTH EVERY 4 HOURS AS NEEDED FOR PAIN 32 tablet 3     traMADoL (ULTRAM) 50 mg tablet Take 1 tablet (50 mg total) by mouth every 4 (four) hours as needed for pain 32 tablet 3     traMADoL (ULTRAM) 50 mg tablet TAKE 1 TABLET BY MOUTH EVERY 4 HOURS AS NEEDED FOR PAIN 32 tablet 3     triamterene-hydrochlorothiazide 37.5-25 mg (MAXZIDE-25) 37.5-25 mg per tablet Take 1 tablet by mouth once daily. 90 tablet 11

## 2025-08-01 NOTE — [employer]ATION PATIENT INSTRUCTIONS
Discharge Summary/Instructions after an Endoscopic Procedure  Patient Name: Mohsen Julien  Patient MRN: [id number]  Patient YOB: 1980  Friday, August 1, 2025  Vaughn Geronimo MD  Dear patient,  As a result of recent federal legislation (The Federal Cures Act), you may   receive lab or pathology results from your procedure in your [facility]   account before your physician is able to contact you. Your physician or   their representative will relay the results to you with their   recommendations at their soonest availability.  Thank you,  Your health is very important to us during the Covid Crisis. Following your   procedure today, you will receive a daily text for 2 weeks asking about   signs or symptoms of Covid 19.  Please respond to this text when you   receive it so we can follow up and keep you as safe as possible.   RESTRICTIONS:  During your procedure today, you received medications for sedation.  These   medications may affect your judgment, balance and coordination.  Therefore,   for 24 hours, you have the following restrictions:   - DO NOT drive a car, operate machinery, make legal/financial decisions,   sign important papers or drink alcohol.    ACTIVITY:  Today: no heavy lifting, straining or running due to procedural   sedation/anesthesia.  The following day: return to full activity including work.  DIET:  Eat and drink normally unless instructed otherwise.     TREATMENT FOR COMMON SIDE EFFECTS:  - Mild abdominal pain, nausea, belching, bloating or excessive gas:  rest,   eat lightly and use a heating pad.  - Sore Throat: treat with throat lozenges and/or gargle with warm salt   water.  - Because air was used during the procedure, expelling large amounts of air   from your rectum or belching is normal.  - If a bowel prep was taken, you may not have a bowel movement for 1-3 days.    This is normal.  SYMPTOMS TO WATCH FOR AND REPORT TO YOUR PHYSICIAN:  1. Abdominal pain or bloating,  other than gas cramps.  2. Chest pain.  3. Back pain.  4. Signs of infection such as: chills or fever occurring within 24 hours   after the procedure.  5. Rectal bleeding, which would show as bright red, maroon, or black stools.   (A tablespoon of blood from the rectum is not serious, especially if   hemorrhoids are present.)  6. Vomiting.  7. Weakness or dizziness.  GO DIRECTLY TO THE NEAREST EMERGENCY ROOM IF YOU HAVE ANY OF THE FOLLOWING:      Difficulty breathing              Chills and/or fever over 101 F   Persistent vomiting and/or vomiting blood   Severe abdominal pain   Severe chest pain   Black, tarry stools   Bleeding- more than one tablespoon   Any other symptom or condition that you feel may need urgent attention  Your doctor recommends these additional instructions:  If any biopsies were taken, your doctors clinic will contact you in 1 to 2   weeks with any results.  - Discharge patient to home (ambulatory).   - Patient has a contact number available for emergencies.  The signs and   symptoms of potential delayed complications were discussed with the   patient.  Return to normal activities tomorrow.  Written discharge   instructions were provided to the patient.   - Resume previous diet.   - Continue present medications.   - Return to primary care physician as previously scheduled.   - Repeat colonoscopy in 10 years for screening purposes.  For questions, problems or results please call your physician - Vaughn Geronimo MD.  EMERGENCY PHONE NUMBER: [phone],  LAB RESULTS: [phone]  IF A COMPLICATION OR EMERGENCY SITUATION ARISES AND YOU ARE UNABLE TO REACH   YOUR PHYSICIAN - GO DIRECTLY TO THE EMERGENCY ROOM.  Vaughn Geronimo MD  8/1/2025 10:39:24 AM  This report has been verified and signed electronically.  Dear patient,  As a result of recent federal legislation (The Federal Cures Act), you may   receive lab or pathology results from your procedure in your [facility]    account before your physician is able to contact you. Your physician or   their representative will relay the results to you with their   recommendations at their soonest availability.  Thank you,  [employer]ATION

## 2025-08-01 NOTE — TRANSFER OF CARE
"Anesthesia Transfer of Care Note    Patient: Mohsen Julien    Procedure(s) Performed: Procedure(s) (LRB):  COLONOSCOPY, SCREENING, LOW RISK PATIENT (N/A)    Patient location: GI    Anesthesia Type: general    Transport from OR: Transported from OR on room air with adequate spontaneous ventilation    Post pain: adequate analgesia    Post assessment: no apparent anesthetic complications and tolerated procedure well    Post vital signs: stable    Level of consciousness: awake and alert    Nausea/Vomiting: no nausea/vomiting    Complications: none    Transfer of care protocol was followed      Last vitals: Visit Vitals  /63 (BP Location: Left arm, Patient Position: Lying)   Pulse 80   Temp 36.4 °C (97.5 °F) (Skin)   Resp 18   Ht 5' 4" (1.626 m)   Wt 112 kg (247 lb)   SpO2 100%   Breastfeeding No   BMI 42.40 kg/m²     "

## 2025-08-13 RX ORDER — IBUPROFEN 800 MG/1
800 TABLET, FILM COATED ORAL 3 TIMES DAILY
Qty: 60 TABLET | Refills: 1 | Status: SHIPPED | OUTPATIENT
Start: 2025-08-13

## 2025-08-14 ENCOUNTER — OFFICE VISIT (OUTPATIENT)
Facility: CLINIC | Age: 45
End: 2025-08-14
Attending: PSYCHIATRY & NEUROLOGY
Payer: COMMERCIAL

## 2025-08-14 ENCOUNTER — OFFICE VISIT (OUTPATIENT)
Facility: CLINIC | Age: 45
End: 2025-08-14
Payer: COMMERCIAL

## 2025-08-14 ENCOUNTER — HOSPITAL ENCOUNTER (OUTPATIENT)
Dept: RADIOLOGY | Facility: HOSPITAL | Age: 45
Discharge: HOME | End: 2025-08-14
Attending: PHYSICIAN ASSISTANT
Payer: COMMERCIAL

## 2025-08-14 VITALS — HEIGHT: 64 IN | WEIGHT: 246.94 LBS | BODY MASS INDEX: 42.16 KG/M2

## 2025-08-14 DIAGNOSIS — M79.645 THUMB PAIN, LEFT: ICD-10-CM

## 2025-08-14 DIAGNOSIS — G47.30 SLEEP APNEA, UNSPECIFIED TYPE: Primary | ICD-10-CM

## 2025-08-14 DIAGNOSIS — R06.83 SNORES: ICD-10-CM

## 2025-08-14 DIAGNOSIS — M79.645 THUMB PAIN, LEFT: Primary | ICD-10-CM

## 2025-08-14 DIAGNOSIS — M65.4 DE QUERVAIN'S TENOSYNOVITIS, LEFT: Primary | ICD-10-CM

## 2025-08-14 PROCEDURE — 98002 SYNCH AUDIO-VIDEO NEW MOD 45: CPT | Mod: 95,,, | Performed by: PSYCHIATRY & NEUROLOGY

## 2025-08-14 PROCEDURE — 73130 X-RAY EXAM OF HAND: CPT | Mod: TC,PO,LT

## 2025-08-14 PROCEDURE — 73130 X-RAY EXAM OF HAND: CPT | Mod: 26,LT,, | Performed by: RADIOLOGY

## 2025-08-14 PROCEDURE — 99999 PR PBB SHADOW E&M-EST. PATIENT-LVL IV: CPT | Mod: PBBFAC,,, | Performed by: PHYSICIAN ASSISTANT

## 2025-08-14 RX ORDER — MELOXICAM 15 MG/1
15 TABLET ORAL DAILY
Qty: 30 TABLET | Refills: 1 | Status: SHIPPED | OUTPATIENT
Start: 2025-08-14 | End: 2025-10-13

## 2025-08-14 RX ORDER — TRIAMCINOLONE ACETONIDE 40 MG/ML
20 INJECTION, SUSPENSION INTRA-ARTICULAR; INTRAMUSCULAR
Status: DISCONTINUED | OUTPATIENT
Start: 2025-08-14 | End: 2025-08-14 | Stop reason: HOSPADM

## 2025-08-14 RX ADMIN — TRIAMCINOLONE ACETONIDE 20 MG: 40 INJECTION, SUSPENSION INTRA-ARTICULAR; INTRAMUSCULAR at 08:08

## 2025-08-18 ENCOUNTER — PATIENT MESSAGE (OUTPATIENT)
Facility: CLINIC | Age: 45
End: 2025-08-18
Payer: COMMERCIAL

## 2025-08-26 RX ORDER — CLOTRIMAZOLE AND BETAMETHASONE DIPROPIONATE 10; .64 MG/G; MG/G
CREAM TOPICAL 2 TIMES DAILY
Qty: 45 G | Refills: 1 | Status: SHIPPED | OUTPATIENT
Start: 2025-08-26

## 2025-09-01 PROBLEM — A41.9 SEPSIS: Status: ACTIVE | Noted: 2025-09-01

## 2025-09-01 PROBLEM — D64.9 ANEMIA: Status: ACTIVE | Noted: 2025-09-01

## 2025-09-01 PROBLEM — L02.416 CELLULITIS AND ABSCESS OF LEFT LEG: Status: ACTIVE | Noted: 2025-09-01

## 2025-09-01 PROBLEM — Z13.6 SCREENING FOR CARDIOVASCULAR CONDITION: Status: ACTIVE | Noted: 2025-09-01

## 2025-09-01 PROBLEM — L03.116 CELLULITIS AND ABSCESS OF LEFT LEG: Status: ACTIVE | Noted: 2025-09-01

## 2025-09-05 ENCOUNTER — PATIENT OUTREACH (OUTPATIENT)
Dept: ADMINISTRATIVE | Facility: CLINIC | Age: 45
End: 2025-09-05
Payer: COMMERCIAL